# Patient Record
Sex: FEMALE | Race: WHITE | Employment: UNEMPLOYED | URBAN - METROPOLITAN AREA
[De-identification: names, ages, dates, MRNs, and addresses within clinical notes are randomized per-mention and may not be internally consistent; named-entity substitution may affect disease eponyms.]

---

## 2017-01-12 ENCOUNTER — TELEPHONE (OUTPATIENT)
Dept: BARIATRICS/WEIGHT MGMT | Age: 52
End: 2017-01-12

## 2017-03-16 ENCOUNTER — OFFICE VISIT (OUTPATIENT)
Dept: BARIATRICS/WEIGHT MGMT | Age: 52
End: 2017-03-16

## 2017-03-16 VITALS
DIASTOLIC BLOOD PRESSURE: 75 MMHG | SYSTOLIC BLOOD PRESSURE: 121 MMHG | RESPIRATION RATE: 16 BRPM | WEIGHT: 293 LBS | HEIGHT: 60 IN | HEART RATE: 92 BPM | BODY MASS INDEX: 57.52 KG/M2

## 2017-03-16 DIAGNOSIS — K21.9 CHRONIC GERD: ICD-10-CM

## 2017-03-16 DIAGNOSIS — G47.33 OSA (OBSTRUCTIVE SLEEP APNEA): ICD-10-CM

## 2017-03-16 DIAGNOSIS — I10 ESSENTIAL HYPERTENSION: ICD-10-CM

## 2017-03-16 DIAGNOSIS — E66.01 MORBID OBESITY WITH BMI OF 50.0-59.9, ADULT (HCC): Primary | ICD-10-CM

## 2017-03-16 PROCEDURE — 99244 OFF/OP CNSLTJ NEW/EST MOD 40: CPT | Performed by: SURGERY

## 2017-07-08 ENCOUNTER — HOSPITAL ENCOUNTER (OUTPATIENT)
Dept: MAMMOGRAPHY | Age: 52
Discharge: HOME | End: 2017-07-08
Attending: FAMILY MEDICINE | Admitting: FAMILY MEDICINE

## 2017-07-08 DIAGNOSIS — Z12.31 ENCOUNTER FOR SCREENING MAMMOGRAM FOR BREAST CANCER: ICD-10-CM

## 2017-10-09 ENCOUNTER — TELEPHONE (OUTPATIENT)
Dept: ORTHOPEDIC SURGERY | Age: 52
End: 2017-10-09

## 2017-12-01 ENCOUNTER — HOSPITAL ENCOUNTER (OUTPATIENT)
Dept: OTHER | Age: 52
Discharge: HOME | End: 2017-12-01
Attending: NURSE PRACTITIONER | Admitting: NURSE PRACTITIONER

## 2017-12-01 LAB
EKG ATRIAL RATE: 89 BPM
EKG DIAGNOSIS: NORMAL
EKG P AXIS: 32 DEGREES
EKG P-R INTERVAL: 156 MS
EKG Q-T INTERVAL: 364 MS
EKG QRS DURATION: 88 MS
EKG QTC CALCULATION (BAZETT): 442 MS
EKG R AXIS: 35 DEGREES
EKG T AXIS: 44 DEGREES
EKG VENTRICULAR RATE: 89 BPM

## 2017-12-01 PROCEDURE — 93010 ELECTROCARDIOGRAM REPORT: CPT | Performed by: INTERNAL MEDICINE

## 2018-05-01 ENCOUNTER — HOSPITAL ENCOUNTER (OUTPATIENT)
Dept: PHYSICAL THERAPY | Age: 53
Discharge: HOME | End: 2018-05-31
Attending: NURSE PRACTITIONER | Admitting: NURSE PRACTITIONER

## 2018-11-02 ENCOUNTER — APPOINTMENT (OUTPATIENT)
Dept: GENERAL RADIOLOGY | Age: 53
End: 2018-11-02
Payer: MEDICAID

## 2018-11-02 ENCOUNTER — HOSPITAL ENCOUNTER (EMERGENCY)
Age: 53
Discharge: HOME | End: 2018-11-03
Payer: MEDICAID

## 2018-11-02 ENCOUNTER — APPOINTMENT (OUTPATIENT)
Dept: ULTRASOUND IMAGING | Age: 53
End: 2018-11-02
Payer: MEDICAID

## 2018-11-02 DIAGNOSIS — R10.11 ABDOMINAL PAIN, RIGHT UPPER QUADRANT: ICD-10-CM

## 2018-11-02 DIAGNOSIS — J20.8 ACUTE BRONCHITIS DUE TO OTHER SPECIFIED ORGANISMS: Primary | ICD-10-CM

## 2018-11-02 DIAGNOSIS — R05.9 COUGH: ICD-10-CM

## 2018-11-02 DIAGNOSIS — R11.0 NAUSEA WITHOUT VOMITING: ICD-10-CM

## 2018-11-02 LAB
A/G RATIO: 1.4 (ref 1.1–2.2)
ALBUMIN SERPL-MCNC: 3.8 G/DL (ref 3.4–5)
ALP BLD-CCNC: 106 U/L (ref 40–129)
ALT SERPL-CCNC: 20 U/L (ref 10–40)
ANION GAP SERPL CALCULATED.3IONS-SCNC: 7 MMOL/L (ref 3–16)
AST SERPL-CCNC: 16 U/L (ref 15–37)
BACTERIA: ABNORMAL /HPF
BASOPHILS ABSOLUTE: 0 K/UL (ref 0–0.2)
BASOPHILS RELATIVE PERCENT: 0.5 %
BILIRUB SERPL-MCNC: 0.3 MG/DL (ref 0–1)
BILIRUBIN URINE: NEGATIVE
BLOOD, URINE: NEGATIVE
BUN BLDV-MCNC: 14 MG/DL (ref 7–20)
CALCIUM SERPL-MCNC: 9.3 MG/DL (ref 8.3–10.6)
CHLORIDE BLD-SCNC: 101 MMOL/L (ref 99–110)
CLARITY: ABNORMAL
CO2: 27 MMOL/L (ref 21–32)
COLOR: YELLOW
CREAT SERPL-MCNC: 0.7 MG/DL (ref 0.6–1.1)
EOSINOPHILS ABSOLUTE: 0 K/UL (ref 0–0.6)
EOSINOPHILS RELATIVE PERCENT: 0 %
EPITHELIAL CELLS, UA: ABNORMAL /HPF
GFR AFRICAN AMERICAN: >60
GFR NON-AFRICAN AMERICAN: >60
GLOBULIN: 2.7 G/DL
GLUCOSE BLD-MCNC: 113 MG/DL (ref 70–99)
GLUCOSE URINE: NEGATIVE MG/DL
HCT VFR BLD CALC: 34.1 % (ref 36–48)
HEMATOLOGY PATH CONSULT: YES
HEMOGLOBIN: 10.6 G/DL (ref 12–16)
KETONES, URINE: NEGATIVE MG/DL
LEUKOCYTE ESTERASE, URINE: ABNORMAL
LIPASE: 35 U/L (ref 13–60)
LYMPHOCYTES ABSOLUTE: 1.7 K/UL (ref 1–5.1)
LYMPHOCYTES RELATIVE PERCENT: 22.3 %
MCH RBC QN AUTO: 20.2 PG (ref 26–34)
MCHC RBC AUTO-ENTMCNC: 31.2 G/DL (ref 31–36)
MCV RBC AUTO: 64.9 FL (ref 80–100)
MICROSCOPIC EXAMINATION: YES
MONOCYTES ABSOLUTE: 0.5 K/UL (ref 0–1.3)
MONOCYTES RELATIVE PERCENT: 6.5 %
NEUTROPHILS ABSOLUTE: 5.4 K/UL (ref 1.7–7.7)
NEUTROPHILS RELATIVE PERCENT: 70.7 %
NITRITE, URINE: NEGATIVE
PDW BLD-RTO: 19.7 % (ref 12.4–15.4)
PH UA: 6
PLATELET # BLD: 354 K/UL (ref 135–450)
PMV BLD AUTO: 7.3 FL (ref 5–10.5)
POTASSIUM SERPL-SCNC: 4.3 MMOL/L (ref 3.5–5.1)
PROTEIN UA: NEGATIVE MG/DL
RBC # BLD: 5.25 M/UL (ref 4–5.2)
RBC UA: ABNORMAL /HPF (ref 0–2)
SODIUM BLD-SCNC: 135 MMOL/L (ref 136–145)
SPECIFIC GRAVITY UA: 1.02
TOTAL PROTEIN: 6.5 G/DL (ref 6.4–8.2)
URINE TYPE: ABNORMAL
UROBILINOGEN, URINE: 0.2 E.U./DL
WBC # BLD: 7.7 K/UL (ref 4–11)
WBC UA: ABNORMAL /HPF (ref 0–5)

## 2018-11-02 PROCEDURE — 76705 ECHO EXAM OF ABDOMEN: CPT

## 2018-11-02 PROCEDURE — 83690 ASSAY OF LIPASE: CPT

## 2018-11-02 PROCEDURE — 99284 EMERGENCY DEPT VISIT MOD MDM: CPT

## 2018-11-02 PROCEDURE — 71046 X-RAY EXAM CHEST 2 VIEWS: CPT

## 2018-11-02 PROCEDURE — 96374 THER/PROPH/DIAG INJ IV PUSH: CPT

## 2018-11-02 PROCEDURE — 96375 TX/PRO/DX INJ NEW DRUG ADDON: CPT

## 2018-11-02 PROCEDURE — 80053 COMPREHEN METABOLIC PANEL: CPT

## 2018-11-02 PROCEDURE — 6360000002 HC RX W HCPCS: Performed by: NURSE PRACTITIONER

## 2018-11-02 PROCEDURE — 81001 URINALYSIS AUTO W/SCOPE: CPT

## 2018-11-02 PROCEDURE — 85025 COMPLETE CBC W/AUTO DIFF WBC: CPT

## 2018-11-02 RX ORDER — IPRATROPIUM BROMIDE AND ALBUTEROL SULFATE 2.5; .5 MG/3ML; MG/3ML
1 SOLUTION RESPIRATORY (INHALATION) ONCE
Status: DISCONTINUED | OUTPATIENT
Start: 2018-11-02 | End: 2018-11-02

## 2018-11-02 RX ORDER — DILTIAZEM HYDROCHLORIDE 120 MG/1
120 TABLET, FILM COATED ORAL 4 TIMES DAILY
Status: ON HOLD | COMMUNITY
End: 2019-04-08

## 2018-11-02 RX ORDER — ONDANSETRON 2 MG/ML
4 INJECTION INTRAMUSCULAR; INTRAVENOUS ONCE
Status: COMPLETED | OUTPATIENT
Start: 2018-11-02 | End: 2018-11-02

## 2018-11-02 RX ORDER — METHYLPREDNISOLONE SODIUM SUCCINATE 125 MG/2ML
80 INJECTION, POWDER, LYOPHILIZED, FOR SOLUTION INTRAMUSCULAR; INTRAVENOUS ONCE
Status: COMPLETED | OUTPATIENT
Start: 2018-11-02 | End: 2018-11-02

## 2018-11-02 RX ORDER — MORPHINE SULFATE 4 MG/ML
4 INJECTION, SOLUTION INTRAMUSCULAR; INTRAVENOUS ONCE
Status: COMPLETED | OUTPATIENT
Start: 2018-11-02 | End: 2018-11-02

## 2018-11-02 RX ADMIN — MORPHINE SULFATE 4 MG: 4 INJECTION, SOLUTION INTRAMUSCULAR; INTRAVENOUS at 21:41

## 2018-11-02 RX ADMIN — ONDANSETRON 4 MG: 2 INJECTION, SOLUTION INTRAMUSCULAR; INTRAVENOUS at 21:41

## 2018-11-02 RX ADMIN — METHYLPREDNISOLONE SODIUM SUCCINATE 80 MG: 125 INJECTION, POWDER, FOR SOLUTION INTRAMUSCULAR; INTRAVENOUS at 21:41

## 2018-11-02 ASSESSMENT — PAIN SCALES - GENERAL
PAINLEVEL_OUTOF10: 6
PAINLEVEL_OUTOF10: 6
PAINLEVEL_OUTOF10: 3

## 2018-11-02 ASSESSMENT — PAIN SCALES - WONG BAKER
WONGBAKER_NUMERICALRESPONSE: 0
WONGBAKER_NUMERICALRESPONSE: 0

## 2018-11-02 ASSESSMENT — PAIN DESCRIPTION - PAIN TYPE: TYPE: ACUTE PAIN

## 2018-11-03 VITALS
DIASTOLIC BLOOD PRESSURE: 77 MMHG | HEIGHT: 60 IN | BODY MASS INDEX: 55.95 KG/M2 | SYSTOLIC BLOOD PRESSURE: 128 MMHG | HEART RATE: 71 BPM | RESPIRATION RATE: 18 BRPM | OXYGEN SATURATION: 98 % | TEMPERATURE: 98 F | WEIGHT: 285 LBS

## 2018-11-03 RX ORDER — DICYCLOMINE HYDROCHLORIDE 10 MG/1
10 CAPSULE ORAL EVERY 6 HOURS PRN
Qty: 20 CAPSULE | Refills: 0 | Status: ON HOLD | OUTPATIENT
Start: 2018-11-03 | End: 2019-04-08

## 2018-11-03 RX ORDER — ONDANSETRON 4 MG/1
4 TABLET, ORALLY DISINTEGRATING ORAL EVERY 8 HOURS PRN
Qty: 20 TABLET | Refills: 0 | Status: ON HOLD | OUTPATIENT
Start: 2018-11-03 | End: 2019-04-08

## 2018-11-03 RX ORDER — PREDNISONE 10 MG/1
60 TABLET ORAL DAILY
Qty: 30 TABLET | Refills: 0 | Status: SHIPPED | OUTPATIENT
Start: 2018-11-03 | End: 2018-11-08

## 2018-11-03 RX ORDER — ALBUTEROL SULFATE 90 UG/1
2 AEROSOL, METERED RESPIRATORY (INHALATION) EVERY 4 HOURS PRN
Qty: 1 INHALER | Refills: 0 | Status: SHIPPED | OUTPATIENT
Start: 2018-11-03

## 2018-11-03 RX ORDER — PROMETHAZINE HYDROCHLORIDE AND CODEINE PHOSPHATE 6.25; 1 MG/5ML; MG/5ML
5 SYRUP ORAL 4 TIMES DAILY PRN
Qty: 100 ML | Refills: 0 | Status: SHIPPED | OUTPATIENT
Start: 2018-11-03 | End: 2018-11-10

## 2018-11-03 ASSESSMENT — ENCOUNTER SYMPTOMS
NAUSEA: 0
ABDOMINAL PAIN: 1
BACK PAIN: 0
SHORTNESS OF BREATH: 0
COUGH: 1
DIARRHEA: 0
COLOR CHANGE: 0
VOMITING: 0
WHEEZING: 0

## 2018-11-03 NOTE — ED PROVIDER NOTES
Patient reports a positive sonographic Bermudez sign. XR CHEST STANDARD (2 VW)   Preliminary Result   Low lung volume exam with mild prominence of the cardiopericardial silhouette. No evidence of overt pulmonary edema. Lungs are clear. No results found. MEDICAL DECISION MAKING / ED COURSE:      PROCEDURES:   Procedures    None    Patient was given:  Medications   methylPREDNISolone sodium (SOLU-MEDROL) injection 80 mg (80 mg Intravenous Given 11/2/18 2141)   morphine injection 4 mg (4 mg Intravenous Given 11/2/18 2141)   ondansetron (ZOFRAN) injection 4 mg (4 mg Intravenous Given 11/2/18 2141)       Patient presents to the emergency Department with 2 complaints. Patient states she's not been feeling well since October 12 dealing with cough, congestion or shortness of breath however denies any chest pain or pleuritic chest pain. Today she is complaining also of right upper quadrant abdominal pain associated with nausea but denies any vomiting or diarrhea. After evaluation and examination the patient the nursing staff initiated protocols. Upon my exam I ordered pain medicine, nausea medicine, Solu-Medrol and nebulizer and she has diminished breath sounds. CBC shows no acute sepsis, chronic anemia with hemoglobin of 10.6 and hematocrit 34.1. Metabolic panel shows no electrolytes disturbances or renal insufficiency. Liver functions are normal.  Urinalysis shows 6-10 WBCs, most likely a contaminated sample as she has epithelial cells and negative nitrates, her urine will be ReFlex for culturing. Called ultrasound shows a limited study secondary to body habitus, appears cholelithiasis with stones to the posterior wall. Chest x-ray shows low lung volumes however generally clear no evidence of pulmonary edema. Upon reevaluation she does report feeling better.   I did educate her that she would benefit from following up with a GI specialist in regards to her gallbladder also her

## 2018-11-05 LAB — HEMATOLOGY PATH CONSULT: NORMAL

## 2018-11-25 ENCOUNTER — HOSPITAL ENCOUNTER (EMERGENCY)
Age: 53
Discharge: HOME | End: 2018-11-25
Attending: EMERGENCY MEDICINE
Payer: MEDICAID

## 2018-11-25 ENCOUNTER — APPOINTMENT (OUTPATIENT)
Dept: GENERAL RADIOLOGY | Age: 53
End: 2018-11-25
Payer: MEDICAID

## 2018-11-25 VITALS
WEIGHT: 293 LBS | SYSTOLIC BLOOD PRESSURE: 138 MMHG | HEART RATE: 85 BPM | DIASTOLIC BLOOD PRESSURE: 74 MMHG | BODY MASS INDEX: 58.59 KG/M2 | TEMPERATURE: 98.6 F | RESPIRATION RATE: 22 BRPM | OXYGEN SATURATION: 94 %

## 2018-11-25 DIAGNOSIS — R00.2 PALPITATIONS: Primary | ICD-10-CM

## 2018-11-25 DIAGNOSIS — D72.9 WHITE BLOOD CELL ABNORMALITY: ICD-10-CM

## 2018-11-25 LAB
A/G RATIO: 1.3 (ref 1.1–2.2)
ALBUMIN SERPL-MCNC: 3.6 G/DL (ref 3.4–5)
ALP BLD-CCNC: 98 U/L (ref 40–129)
ALT SERPL-CCNC: 21 U/L (ref 10–40)
ANION GAP SERPL CALCULATED.3IONS-SCNC: 10 MMOL/L (ref 3–16)
ANISOCYTOSIS: ABNORMAL
AST SERPL-CCNC: 14 U/L (ref 15–37)
BASOPHILS ABSOLUTE: 0 K/UL (ref 0–0.2)
BASOPHILS RELATIVE PERCENT: 0.2 %
BILIRUB SERPL-MCNC: 0.3 MG/DL (ref 0–1)
BILIRUBIN URINE: NEGATIVE
BLOOD, URINE: NEGATIVE
BUN BLDV-MCNC: 14 MG/DL (ref 7–20)
BURR CELLS: ABNORMAL
CALCIUM SERPL-MCNC: 8.7 MG/DL (ref 8.3–10.6)
CHLORIDE BLD-SCNC: 104 MMOL/L (ref 99–110)
CLARITY: CLEAR
CO2: 26 MMOL/L (ref 21–32)
COLOR: YELLOW
CREAT SERPL-MCNC: 0.6 MG/DL (ref 0.6–1.1)
EKG ATRIAL RATE: 99 BPM
EKG DIAGNOSIS: NORMAL
EKG P AXIS: 36 DEGREES
EKG P-R INTERVAL: 164 MS
EKG Q-T INTERVAL: 346 MS
EKG QRS DURATION: 92 MS
EKG QTC CALCULATION (BAZETT): 444 MS
EKG R AXIS: 20 DEGREES
EKG T AXIS: 53 DEGREES
EKG VENTRICULAR RATE: 99 BPM
EOSINOPHILS ABSOLUTE: 0 K/UL (ref 0–0.6)
EOSINOPHILS RELATIVE PERCENT: 0 %
EPITHELIAL CELLS, UA: ABNORMAL /HPF
GFR AFRICAN AMERICAN: >60
GFR NON-AFRICAN AMERICAN: >60
GLOBULIN: 2.8 G/DL
GLUCOSE BLD-MCNC: 133 MG/DL (ref 70–99)
GLUCOSE URINE: NEGATIVE MG/DL
HCT VFR BLD CALC: 35 % (ref 36–48)
HEMATOLOGY PATH CONSULT: NO
HEMOGLOBIN: 10.8 G/DL (ref 12–16)
KETONES, URINE: NEGATIVE MG/DL
LEUKOCYTE ESTERASE, URINE: ABNORMAL
LYMPHOCYTES ABSOLUTE: 0.9 K/UL (ref 1–5.1)
LYMPHOCYTES RELATIVE PERCENT: 13.1 %
MCH RBC QN AUTO: 20 PG (ref 26–34)
MCHC RBC AUTO-ENTMCNC: 30.9 G/DL (ref 31–36)
MCV RBC AUTO: 64.7 FL (ref 80–100)
MICROCYTES: ABNORMAL
MICROSCOPIC EXAMINATION: YES
MONOCYTES ABSOLUTE: 0.5 K/UL (ref 0–1.3)
MONOCYTES RELATIVE PERCENT: 7.4 %
NEUTROPHILS ABSOLUTE: 5.6 K/UL (ref 1.7–7.7)
NEUTROPHILS RELATIVE PERCENT: 79.3 %
NITRITE, URINE: NEGATIVE
OVALOCYTES: ABNORMAL
PDW BLD-RTO: 20.1 % (ref 12.4–15.4)
PH UA: 6
PLATELET # BLD: 304 K/UL (ref 135–450)
PLATELET SLIDE REVIEW: ADEQUATE
PMV BLD AUTO: 8.8 FL (ref 5–10.5)
POIKILOCYTES: ABNORMAL
POTASSIUM SERPL-SCNC: 4.2 MMOL/L (ref 3.5–5.1)
PROTEIN UA: NEGATIVE MG/DL
RBC # BLD: 5.41 M/UL (ref 4–5.2)
RBC UA: ABNORMAL /HPF (ref 0–2)
SLIDE REVIEW: ABNORMAL
SODIUM BLD-SCNC: 140 MMOL/L (ref 136–145)
SPECIFIC GRAVITY UA: >=1.03
STOMATOCYTES: ABNORMAL
TEAR DROP CELLS: ABNORMAL
TOTAL PROTEIN: 6.4 G/DL (ref 6.4–8.2)
TROPONIN: <0.01 NG/ML
URINE REFLEX TO CULTURE: YES
URINE TYPE: ABNORMAL
UROBILINOGEN, URINE: 0.2 E.U./DL
WBC # BLD: 7 K/UL (ref 4–11)
WBC UA: ABNORMAL /HPF (ref 0–5)

## 2018-11-25 PROCEDURE — 85025 COMPLETE CBC W/AUTO DIFF WBC: CPT

## 2018-11-25 PROCEDURE — 93005 ELECTROCARDIOGRAM TRACING: CPT | Performed by: EMERGENCY MEDICINE

## 2018-11-25 PROCEDURE — 84484 ASSAY OF TROPONIN QUANT: CPT

## 2018-11-25 PROCEDURE — 80053 COMPREHEN METABOLIC PANEL: CPT

## 2018-11-25 PROCEDURE — 81001 URINALYSIS AUTO W/SCOPE: CPT

## 2018-11-25 PROCEDURE — 87086 URINE CULTURE/COLONY COUNT: CPT

## 2018-11-25 PROCEDURE — 71046 X-RAY EXAM CHEST 2 VIEWS: CPT

## 2018-11-25 PROCEDURE — 99285 EMERGENCY DEPT VISIT HI MDM: CPT

## 2018-11-25 PROCEDURE — 93010 ELECTROCARDIOGRAM REPORT: CPT | Performed by: INTERNAL MEDICINE

## 2018-11-25 RX ORDER — DILTIAZEM HYDROCHLORIDE 120 MG/1
120 CAPSULE, COATED, EXTENDED RELEASE ORAL 2 TIMES DAILY
Qty: 30 CAPSULE | Refills: 0 | Status: SHIPPED | OUTPATIENT
Start: 2018-11-25

## 2018-11-25 ASSESSMENT — ENCOUNTER SYMPTOMS
APNEA: 0
PHOTOPHOBIA: 0
ABDOMINAL PAIN: 0
VOMITING: 0
TROUBLE SWALLOWING: 0
ABDOMINAL DISTENTION: 0
NAUSEA: 0
VOICE CHANGE: 0
RHINORRHEA: 0
EYE PAIN: 0
CHEST TIGHTNESS: 0
COUGH: 0
SINUS PRESSURE: 0
EYE REDNESS: 0
COLOR CHANGE: 0
BLOOD IN STOOL: 0
BACK PAIN: 0
RECTAL PAIN: 0
EYE DISCHARGE: 0
CONSTIPATION: 0
STRIDOR: 0
SHORTNESS OF BREATH: 0
WHEEZING: 0
SORE THROAT: 0
DIARRHEA: 0

## 2018-11-25 NOTE — ED PROVIDER NOTES
cardiomediastinal silhouette is stable. The osseous structures are stable. Stable cardiomegaly       EKG Interpretation. The Ekg interpreted by me in the absence of a cardiologist shows. normal sinus rhythm with a rate of 99  Axis is   Normal  QTc is  within an acceptable range  Intervals and Durations are unremarkable. No specific ST-T wave changes appreciated. No evidence of acute ischemia. No significant change from prior EKG dated 1 December 2017.             Yrn Lang MD  11/25/18 [id number]

## 2018-11-27 LAB — URINE CULTURE, ROUTINE: NORMAL

## 2019-04-06 ENCOUNTER — APPOINTMENT (OUTPATIENT)
Dept: CT IMAGING | Age: 54
DRG: 139 | End: 2019-04-06
Payer: MEDICAID

## 2019-04-06 ENCOUNTER — APPOINTMENT (OUTPATIENT)
Dept: GENERAL RADIOLOGY | Age: 54
DRG: 139 | End: 2019-04-06
Payer: MEDICAID

## 2019-04-06 ENCOUNTER — HOSPITAL ENCOUNTER (INPATIENT)
Age: 54
LOS: 2 days | Discharge: HOME | DRG: 139 | End: 2019-04-09
Attending: INTERNAL MEDICINE | Admitting: INTERNAL MEDICINE
Payer: MEDICAID

## 2019-04-06 DIAGNOSIS — J96.01 ACUTE RESPIRATORY FAILURE WITH HYPOXIA (HCC): Primary | ICD-10-CM

## 2019-04-06 LAB
A/G RATIO: 1.4 (ref 1.1–2.2)
ALBUMIN SERPL-MCNC: 3.6 G/DL (ref 3.4–5)
ALP BLD-CCNC: 97 U/L (ref 40–129)
ALT SERPL-CCNC: 19 U/L (ref 10–40)
ANION GAP SERPL CALCULATED.3IONS-SCNC: 11 MMOL/L (ref 3–16)
AST SERPL-CCNC: 14 U/L (ref 15–37)
BASE EXCESS VENOUS: 0.4 MMOL/L (ref -3–3)
BILIRUB SERPL-MCNC: 0.3 MG/DL (ref 0–1)
BUN BLDV-MCNC: 14 MG/DL (ref 7–20)
CALCIUM SERPL-MCNC: 8.5 MG/DL (ref 8.3–10.6)
CARBOXYHEMOGLOBIN: 2 % (ref 0–1.5)
CHLORIDE BLD-SCNC: 104 MMOL/L (ref 99–110)
CO2: 28 MMOL/L (ref 21–32)
CREAT SERPL-MCNC: 0.7 MG/DL (ref 0.6–1.1)
GFR AFRICAN AMERICAN: >60
GFR NON-AFRICAN AMERICAN: >60
GLOBULIN: 2.6 G/DL
GLUCOSE BLD-MCNC: 116 MG/DL (ref 70–99)
GONADOTROPIN, CHORIONIC (HCG) QUANT: <5 MIU/ML
HCO3 VENOUS: 25.8 MMOL/L (ref 23–29)
METHEMOGLOBIN VENOUS: 0.4 %
O2 CONTENT, VEN: 15 VOL %
O2 SAT, VEN: 97 %
O2 THERAPY: ABNORMAL
PCO2, VEN: 44.7 MMHG (ref 40–50)
PH VENOUS: 7.38 (ref 7.35–7.45)
PO2, VEN: 105.1 MMHG (ref 25–40)
POTASSIUM SERPL-SCNC: 4.1 MMOL/L (ref 3.5–5.1)
PRO-BNP: 91 PG/ML (ref 0–124)
RAPID INFLUENZA  B AGN: NEGATIVE
RAPID INFLUENZA A AGN: NEGATIVE
SODIUM BLD-SCNC: 143 MMOL/L (ref 136–145)
TCO2 CALC VENOUS: 27 MMOL/L
TOTAL PROTEIN: 6.2 G/DL (ref 6.4–8.2)
TROPONIN: <0.01 NG/ML

## 2019-04-06 PROCEDURE — 82803 BLOOD GASES ANY COMBINATION: CPT

## 2019-04-06 PROCEDURE — 87804 INFLUENZA ASSAY W/OPTIC: CPT

## 2019-04-06 PROCEDURE — 94761 N-INVAS EAR/PLS OXIMETRY MLT: CPT

## 2019-04-06 PROCEDURE — 2580000003 HC RX 258: Performed by: PHYSICIAN ASSISTANT

## 2019-04-06 PROCEDURE — 85025 COMPLETE CBC W/AUTO DIFF WBC: CPT

## 2019-04-06 PROCEDURE — 80053 COMPREHEN METABOLIC PANEL: CPT

## 2019-04-06 PROCEDURE — 84484 ASSAY OF TROPONIN QUANT: CPT

## 2019-04-06 PROCEDURE — 6370000000 HC RX 637 (ALT 250 FOR IP): Performed by: PHYSICIAN ASSISTANT

## 2019-04-06 PROCEDURE — 84703 CHORIONIC GONADOTROPIN ASSAY: CPT

## 2019-04-06 PROCEDURE — 83880 ASSAY OF NATRIURETIC PEPTIDE: CPT

## 2019-04-06 PROCEDURE — 71260 CT THORAX DX C+: CPT

## 2019-04-06 PROCEDURE — 84702 CHORIONIC GONADOTROPIN TEST: CPT

## 2019-04-06 PROCEDURE — 94640 AIRWAY INHALATION TREATMENT: CPT

## 2019-04-06 PROCEDURE — 6360000004 HC RX CONTRAST MEDICATION: Performed by: PHYSICIAN ASSISTANT

## 2019-04-06 PROCEDURE — 93005 ELECTROCARDIOGRAM TRACING: CPT | Performed by: INTERNAL MEDICINE

## 2019-04-06 PROCEDURE — 71046 X-RAY EXAM CHEST 2 VIEWS: CPT

## 2019-04-06 PROCEDURE — 2700000000 HC OXYGEN THERAPY PER DAY

## 2019-04-06 PROCEDURE — 99285 EMERGENCY DEPT VISIT HI MDM: CPT

## 2019-04-06 RX ORDER — 0.9 % SODIUM CHLORIDE 0.9 %
1000 INTRAVENOUS SOLUTION INTRAVENOUS ONCE
Status: COMPLETED | OUTPATIENT
Start: 2019-04-06 | End: 2019-04-07

## 2019-04-06 RX ORDER — IPRATROPIUM BROMIDE AND ALBUTEROL SULFATE 2.5; .5 MG/3ML; MG/3ML
1 SOLUTION RESPIRATORY (INHALATION) ONCE
Status: COMPLETED | OUTPATIENT
Start: 2019-04-06 | End: 2019-04-06

## 2019-04-06 RX ADMIN — IOPAMIDOL 75 ML: 755 INJECTION, SOLUTION INTRAVENOUS at 22:55

## 2019-04-06 RX ADMIN — SODIUM CHLORIDE 1000 ML: 9 INJECTION, SOLUTION INTRAVENOUS at 21:25

## 2019-04-06 RX ADMIN — IPRATROPIUM BROMIDE AND ALBUTEROL SULFATE 1 AMPULE: .5; 3 SOLUTION RESPIRATORY (INHALATION) at 23:44

## 2019-04-06 ASSESSMENT — PAIN SCALES - GENERAL: PAINLEVEL_OUTOF10: 0

## 2019-04-07 PROBLEM — R09.02 HYPOXIA: Status: ACTIVE | Noted: 2019-04-07

## 2019-04-07 LAB
BASOPHILS ABSOLUTE: 0.1 K/UL (ref 0–0.2)
BASOPHILS RELATIVE PERCENT: 0.9 %
EKG ATRIAL RATE: 110 BPM
EKG DIAGNOSIS: NORMAL
EKG P AXIS: 35 DEGREES
EKG P-R INTERVAL: 164 MS
EKG Q-T INTERVAL: 330 MS
EKG QRS DURATION: 90 MS
EKG QTC CALCULATION (BAZETT): 446 MS
EKG R AXIS: 29 DEGREES
EKG T AXIS: 38 DEGREES
EKG VENTRICULAR RATE: 110 BPM
EOSINOPHILS ABSOLUTE: 0 K/UL (ref 0–0.6)
EOSINOPHILS RELATIVE PERCENT: 0 %
HCT VFR BLD CALC: 34.3 % (ref 36–48)
HEMATOLOGY PATH CONSULT: YES
HEMOGLOBIN: 10.7 G/DL (ref 12–16)
LYMPHOCYTES ABSOLUTE: 1.6 K/UL (ref 1–5.1)
LYMPHOCYTES RELATIVE PERCENT: 20.3 %
MCH RBC QN AUTO: 20.6 PG (ref 26–34)
MCHC RBC AUTO-ENTMCNC: 31.2 G/DL (ref 31–36)
MCV RBC AUTO: 65.9 FL (ref 80–100)
MONOCYTES ABSOLUTE: 0.7 K/UL (ref 0–1.3)
MONOCYTES RELATIVE PERCENT: 8.3 %
NEUTROPHILS ABSOLUTE: 5.7 K/UL (ref 1.7–7.7)
NEUTROPHILS RELATIVE PERCENT: 70.5 %
PDW BLD-RTO: 19.8 % (ref 12.4–15.4)
PLATELET # BLD: 338 K/UL (ref 135–450)
PMV BLD AUTO: 8.1 FL (ref 5–10.5)
PROCALCITONIN: 0.07 NG/ML (ref 0–0.15)
RBC # BLD: 5.2 M/UL (ref 4–5.2)
TROPONIN: <0.01 NG/ML
TROPONIN: <0.01 NG/ML
WBC # BLD: 8.1 K/UL (ref 4–11)

## 2019-04-07 PROCEDURE — 6370000000 HC RX 637 (ALT 250 FOR IP): Performed by: INTERNAL MEDICINE

## 2019-04-07 PROCEDURE — 84484 ASSAY OF TROPONIN QUANT: CPT

## 2019-04-07 PROCEDURE — 83540 ASSAY OF IRON: CPT

## 2019-04-07 PROCEDURE — 36415 COLL VENOUS BLD VENIPUNCTURE: CPT

## 2019-04-07 PROCEDURE — 6360000002 HC RX W HCPCS: Performed by: NURSE PRACTITIONER

## 2019-04-07 PROCEDURE — 94640 AIRWAY INHALATION TREATMENT: CPT

## 2019-04-07 PROCEDURE — 93010 ELECTROCARDIOGRAM REPORT: CPT | Performed by: INTERNAL MEDICINE

## 2019-04-07 PROCEDURE — 6360000002 HC RX W HCPCS: Performed by: INTERNAL MEDICINE

## 2019-04-07 PROCEDURE — 6370000000 HC RX 637 (ALT 250 FOR IP): Performed by: NURSE PRACTITIONER

## 2019-04-07 PROCEDURE — 84145 PROCALCITONIN (PCT): CPT

## 2019-04-07 PROCEDURE — 94761 N-INVAS EAR/PLS OXIMETRY MLT: CPT

## 2019-04-07 PROCEDURE — 2580000003 HC RX 258: Performed by: INTERNAL MEDICINE

## 2019-04-07 PROCEDURE — 2500000003 HC RX 250 WO HCPCS: Performed by: NURSE PRACTITIONER

## 2019-04-07 PROCEDURE — 83550 IRON BINDING TEST: CPT

## 2019-04-07 PROCEDURE — 6370000000 HC RX 637 (ALT 250 FOR IP)

## 2019-04-07 PROCEDURE — 2700000000 HC OXYGEN THERAPY PER DAY

## 2019-04-07 PROCEDURE — 87449 NOS EACH ORGANISM AG IA: CPT

## 2019-04-07 PROCEDURE — 1200000000 HC SEMI PRIVATE

## 2019-04-07 PROCEDURE — 83036 HEMOGLOBIN GLYCOSYLATED A1C: CPT

## 2019-04-07 PROCEDURE — 82728 ASSAY OF FERRITIN: CPT

## 2019-04-07 RX ORDER — ASPIRIN 81 MG/1
81 TABLET ORAL DAILY
Status: DISCONTINUED | OUTPATIENT
Start: 2019-04-07 | End: 2019-04-09 | Stop reason: HOSPADM

## 2019-04-07 RX ORDER — DILTIAZEM HYDROCHLORIDE 60 MG/1
120 TABLET, FILM COATED ORAL 4 TIMES DAILY
Status: DISCONTINUED | OUTPATIENT
Start: 2019-04-07 | End: 2019-04-07

## 2019-04-07 RX ORDER — ACETAMINOPHEN 500 MG
500 TABLET ORAL EVERY 6 HOURS PRN
Status: DISCONTINUED | OUTPATIENT
Start: 2019-04-07 | End: 2019-04-09 | Stop reason: HOSPADM

## 2019-04-07 RX ORDER — MONTELUKAST SODIUM 10 MG/1
10 TABLET ORAL NIGHTLY
Status: DISCONTINUED | OUTPATIENT
Start: 2019-04-07 | End: 2019-04-09 | Stop reason: HOSPADM

## 2019-04-07 RX ORDER — PREDNISONE 20 MG/1
40 TABLET ORAL DAILY
Status: DISCONTINUED | OUTPATIENT
Start: 2019-04-07 | End: 2019-04-09 | Stop reason: HOSPADM

## 2019-04-07 RX ORDER — ALBUTEROL SULFATE 2.5 MG/3ML
2.5 SOLUTION RESPIRATORY (INHALATION) EVERY 6 HOURS PRN
Status: DISCONTINUED | OUTPATIENT
Start: 2019-04-07 | End: 2019-04-09 | Stop reason: HOSPADM

## 2019-04-07 RX ORDER — DILTIAZEM HYDROCHLORIDE 120 MG/1
120 CAPSULE, COATED, EXTENDED RELEASE ORAL 2 TIMES DAILY
Status: DISCONTINUED | OUTPATIENT
Start: 2019-04-07 | End: 2019-04-09 | Stop reason: HOSPADM

## 2019-04-07 RX ORDER — SODIUM CHLORIDE 0.9 % (FLUSH) 0.9 %
10 SYRINGE (ML) INJECTION EVERY 12 HOURS SCHEDULED
Status: DISCONTINUED | OUTPATIENT
Start: 2019-04-07 | End: 2019-04-09 | Stop reason: HOSPADM

## 2019-04-07 RX ORDER — ALBUTEROL SULFATE 90 UG/1
2 AEROSOL, METERED RESPIRATORY (INHALATION) EVERY 4 HOURS PRN
Status: DISCONTINUED | OUTPATIENT
Start: 2019-04-07 | End: 2019-04-09 | Stop reason: HOSPADM

## 2019-04-07 RX ORDER — ACETAMINOPHEN 325 MG/1
650 TABLET ORAL EVERY 4 HOURS PRN
Status: DISCONTINUED | OUTPATIENT
Start: 2019-04-07 | End: 2019-04-09 | Stop reason: HOSPADM

## 2019-04-07 RX ORDER — SODIUM CHLORIDE 0.9 % (FLUSH) 0.9 %
10 SYRINGE (ML) INJECTION PRN
Status: DISCONTINUED | OUTPATIENT
Start: 2019-04-07 | End: 2019-04-09 | Stop reason: HOSPADM

## 2019-04-07 RX ORDER — GUAIFENESIN 600 MG/1
600 TABLET, EXTENDED RELEASE ORAL 2 TIMES DAILY
Status: DISCONTINUED | OUTPATIENT
Start: 2019-04-07 | End: 2019-04-09 | Stop reason: HOSPADM

## 2019-04-07 RX ORDER — ALBUTEROL SULFATE 2.5 MG/3ML
2.5 SOLUTION RESPIRATORY (INHALATION) 4 TIMES DAILY
Status: DISCONTINUED | OUTPATIENT
Start: 2019-04-07 | End: 2019-04-09 | Stop reason: HOSPADM

## 2019-04-07 RX ORDER — LAMOTRIGINE 100 MG/1
200 TABLET ORAL 2 TIMES DAILY
Status: DISCONTINUED | OUTPATIENT
Start: 2019-04-07 | End: 2019-04-09 | Stop reason: HOSPADM

## 2019-04-07 RX ORDER — GABAPENTIN 600 MG/1
800 TABLET ORAL DAILY
Status: DISCONTINUED | OUTPATIENT
Start: 2019-04-07 | End: 2019-04-07

## 2019-04-07 RX ORDER — PANTOPRAZOLE SODIUM 40 MG/1
40 TABLET, DELAYED RELEASE ORAL DAILY
Status: DISCONTINUED | OUTPATIENT
Start: 2019-04-07 | End: 2019-04-09 | Stop reason: HOSPADM

## 2019-04-07 RX ORDER — ONDANSETRON 2 MG/ML
4 INJECTION INTRAMUSCULAR; INTRAVENOUS EVERY 6 HOURS PRN
Status: DISCONTINUED | OUTPATIENT
Start: 2019-04-07 | End: 2019-04-09 | Stop reason: HOSPADM

## 2019-04-07 RX ORDER — CHOLECALCIFEROL (VITAMIN D3) 125 MCG
5 CAPSULE ORAL NIGHTLY PRN
Status: DISCONTINUED | OUTPATIENT
Start: 2019-04-07 | End: 2019-04-09 | Stop reason: HOSPADM

## 2019-04-07 RX ORDER — LEVOFLOXACIN 5 MG/ML
500 INJECTION, SOLUTION INTRAVENOUS EVERY 24 HOURS
Status: DISCONTINUED | OUTPATIENT
Start: 2019-04-07 | End: 2019-04-09 | Stop reason: HOSPADM

## 2019-04-07 RX ORDER — DICYCLOMINE HYDROCHLORIDE 10 MG/1
10 CAPSULE ORAL EVERY 6 HOURS PRN
Status: DISCONTINUED | OUTPATIENT
Start: 2019-04-07 | End: 2019-04-09 | Stop reason: HOSPADM

## 2019-04-07 RX ORDER — LEVOTHYROXINE SODIUM 112 UG/1
112 TABLET ORAL DAILY
Status: DISCONTINUED | OUTPATIENT
Start: 2019-04-07 | End: 2019-04-09 | Stop reason: HOSPADM

## 2019-04-07 RX ORDER — LISINOPRIL 5 MG/1
5 TABLET ORAL DAILY
Status: DISCONTINUED | OUTPATIENT
Start: 2019-04-07 | End: 2019-04-09 | Stop reason: HOSPADM

## 2019-04-07 RX ADMIN — Medication 10 ML: at 22:01

## 2019-04-07 RX ADMIN — SERTRALINE HYDROCHLORIDE 100 MG: 50 TABLET ORAL at 03:00

## 2019-04-07 RX ADMIN — LEVOFLOXACIN 500 MG: 5 INJECTION, SOLUTION INTRAVENOUS at 11:06

## 2019-04-07 RX ADMIN — SERTRALINE HYDROCHLORIDE 100 MG: 50 TABLET ORAL at 21:58

## 2019-04-07 RX ADMIN — DILTIAZEM HYDROCHLORIDE 120 MG: 120 CAPSULE, COATED, EXTENDED RELEASE ORAL at 21:58

## 2019-04-07 RX ADMIN — LAMOTRIGINE 200 MG: 100 TABLET ORAL at 21:58

## 2019-04-07 RX ADMIN — ENOXAPARIN SODIUM 40 MG: 40 INJECTION SUBCUTANEOUS at 09:37

## 2019-04-07 RX ADMIN — Medication: at 21:58

## 2019-04-07 RX ADMIN — LAMOTRIGINE 200 MG: 100 TABLET ORAL at 09:03

## 2019-04-07 RX ADMIN — LEVOTHYROXINE SODIUM 112 MCG: 0.11 TABLET ORAL at 06:10

## 2019-04-07 RX ADMIN — ALBUTEROL SULFATE 2.5 MG: 2.5 SOLUTION RESPIRATORY (INHALATION) at 12:35

## 2019-04-07 RX ADMIN — LISINOPRIL 5 MG: 5 TABLET ORAL at 09:37

## 2019-04-07 RX ADMIN — GUAIFENESIN 600 MG: 600 TABLET, EXTENDED RELEASE ORAL at 21:58

## 2019-04-07 RX ADMIN — MONTELUKAST 10 MG: 10 TABLET, FILM COATED ORAL at 21:58

## 2019-04-07 RX ADMIN — PANTOPRAZOLE SODIUM 40 MG: 40 TABLET, DELAYED RELEASE ORAL at 09:36

## 2019-04-07 RX ADMIN — LAMOTRIGINE 200 MG: 100 TABLET ORAL at 01:36

## 2019-04-07 RX ADMIN — PREDNISONE 40 MG: 20 TABLET ORAL at 11:05

## 2019-04-07 RX ADMIN — ALBUTEROL SULFATE 2.5 MG: 2.5 SOLUTION RESPIRATORY (INHALATION) at 20:38

## 2019-04-07 RX ADMIN — GABAPENTIN 600 MG: 100 CAPSULE ORAL at 09:30

## 2019-04-07 RX ADMIN — ASPIRIN 81 MG: 81 TABLET, COATED ORAL at 09:36

## 2019-04-07 RX ADMIN — MELATONIN TAB 5 MG 5 MG: 5 TAB at 23:36

## 2019-04-07 RX ADMIN — Medication 10 ML: at 09:37

## 2019-04-07 RX ADMIN — ALBUTEROL SULFATE 2.5 MG: 2.5 SOLUTION RESPIRATORY (INHALATION) at 16:41

## 2019-04-07 RX ADMIN — DILTIAZEM HYDROCHLORIDE 120 MG: 120 CAPSULE, COATED, EXTENDED RELEASE ORAL at 01:36

## 2019-04-07 RX ADMIN — DILTIAZEM HYDROCHLORIDE 120 MG: 120 CAPSULE, COATED, EXTENDED RELEASE ORAL at 10:04

## 2019-04-07 RX ADMIN — MICONAZOLE NITRATE: 2 POWDER TOPICAL at 12:01

## 2019-04-07 RX ADMIN — MICONAZOLE NITRATE: 2 POWDER TOPICAL at 22:01

## 2019-04-07 ASSESSMENT — PAIN SCALES - GENERAL: PAINLEVEL_OUTOF10: 0

## 2019-04-07 NOTE — H&P
Hospital Medicine History & Physical      PCP: Anita Gaitan MD    Date of Admission: 4/6/2019    Date of Service: Pt seen/examined on 4/7/2019 and Admitted to Inpatient with expected LOS greater than two midnights due to medical therapy. rvation. Chief Complaint:  SOB    History Of Present Illness:  48 y.o. female, with a PMH of HTN, KRYSTIAN, hypothyroidism and obesity,  who presented to [facility] with SOB. The patient states she was treated for bronchitis at the end of February with a 10 day course of amoxicillin and steroids. She felt better initially, but states she hasn't really felt back to normal since she was first sick. Over the last few days, she has been more SOB, especially with activity. She denies fever, chills, chest pain, back pain, N/V/D, leg swelling, orthopnea. In the ED at [facility] was completed and negative for a PE, but did show possible bilateral lower lobe pneumonia vs atelectasis. She was hypoxic on room air with minimal activity, with O2 sats dropping to 84%. She was admitted for further management. Past Medical History:          Diagnosis Date    Anemia     Anxiety     Depression     Fibromyalgia     GERD (gastroesophageal reflux disease)     Heart palpitations     Hiatal hernia     Hypertension     Irritable bowel syndrome     Osteoarthritis     Panic attacks     Pneumonia     Seasonal allergies     Seizure disorder (HCC)     Sleep apnea     Spastic colon     Thyroid disease        Past Surgical History:          Procedure Laterality Date    BLADDER SURGERY      BRAIN SURGERY      HIP SURGERY      JOINT REPLACEMENT  2009    hip    KNEE SURGERY      OTHER SURGICAL HISTORY  07/31/2013    cystoscopy, urethral dilatation    TOTAL HIP ARTHROPLASTY      Left    URETHRAL STRICTURE DILATATION         Medications Prior to Admission:      Prior to Admission medications    Medication Sig Start Date End Date Taking?  Authorizing Provider   diltiazem (CARDIZEM CD) 120 MG extended release capsule Take 1 capsule by mouth 2 times daily 11/25/18   Leonardo Wheeler MD   albuterol sulfate HFA (PROVENTIL HFA) 108 (90 Base) MCG/ACT inhaler Inhale 2 puffs into the lungs every 4 hours as needed for Wheezing or Shortness of Breath (Space out to every 6 hours as symptoms improve) Space out to every 6 hours as symptoms improve. 11/3/18   MATHEW Hernández CNP   dicyclomine (BENTYL) 10 MG capsule Take 1 capsule by mouth every 6 hours as needed (cramps) 11/3/18   MATHEW Hernández CNP   ondansetron (ZOFRAN ODT) 4 MG disintegrating tablet Take 1 tablet by mouth every 8 hours as needed for Nausea 11/3/18   MATHEW Hernández CNP   diltiazem (CARDIZEM) 120 MG tablet Take 120 mg by mouth 4 times daily    Historical Provider, MD   NAPROXEN PO Take 500 mg by mouth as needed. Historical Provider, MD   lamotrigine (LAMICTAL) 100 MG tablet Take 200 mg by mouth 2 times daily. 100 mg in am- 200 mg HS 1/9/11   Historical Provider, MD   sertraline (ZOLOFT) 50 MG tablet Take 100 mg by mouth nightly     Historical Provider, MD   lisinopril (PRINIVIL;ZESTRIL) 10 MG tablet Take 5 mg by mouth daily     Historical Provider, MD   pantoprazole (PROTONIX) 40 MG tablet Take 40 mg by mouth daily. Historical Provider, MD   ibuprofen (ADVIL;MOTRIN) 600 MG tablet Take 600 mg by mouth every 6 hours as needed. Historical Provider, MD   levothyroxine (SYNTHROID) 112 MCG tablet Take 112 mcg by mouth daily. Historical Provider, MD   gabapentin (NEURONTIN) 600 MG tablet Take 600 mg by mouth 2 times daily. Historical Provider, MD   montelukast (SINGULAIR) 10 MG tablet Take 10 mg by mouth every morning     Historical Provider, MD   aspirin 81 MG EC tablet Take 81 mg by mouth daily. Historical Provider, MD   acetaminophen (TYLENOL) 500 MG tablet Take 500 mg by mouth every 6 hours as needed.     Historical Provider, MD   therapeutic multivitamin-minerals [facility]) tablet Take 1 tablet by mouth daily. Historical Provider, MD       Allergies: Other and Percocet [oxycodone-acetaminophen]    Social History:      The patient currently lives independently. TOBACCO:   reports that she quit smoking about 32 years ago. She has a 1.00 pack-year smoking history. She has never used smokeless tobacco.  ETOH:   reports that she does not drink alcohol. Family History:      Reviewed in detail. Positive as follows:        Problem Relation Age of Onset    Asthma Other     Asthma Other     Hypertension Father     Hypertension Sister     Cancer Neg Hx     Diabetes Neg Hx     Emphysema Neg Hx     Heart Failure Neg Hx        REVIEW OF SYSTEMS:   Pertinent positives as noted in the HPI. All other systems reviewed and negative. PHYSICAL EXAM PERFORMED:    /78   Pulse 86   Temp 98.5 °F (36.9 °C) (Oral)   Resp 16   Ht 5' (1.524 m)   Wt 300 lb (136.1 kg)   LMP 07/29/2013   SpO2 92%   BMI 58.59 kg/m²     General appearance:  Pleasant female in no apparent distress, appears stated age and cooperative. HEENT:  Normal cephalic, atraumatic without obvious deformity. Pupils equal, round, and reactive to light. Extra ocular muscles intact. Conjunctivae/corneas clear. Neck: Supple, with full range of motion. No jugular venous distention. Trachea midline. Respiratory:  Normal respiratory effort. Clear to auscultation, bilaterally without Rales/Wheezes/Rhonchi. On 3L nc. Cardiovascular:  Regular rate and rhythm with normal S1/S2 without murmurs, rubs or gallops. Abdomen: Soft, non-tender, non-distended with normal bowel sounds. Musculoskeletal:  No clubbing, cyanosis or edema bilaterally. Full range of motion without deformity. Skin: Skin color, texture, turgor normal.  No rashes or lesions. Neurologic:  Neurovascularly intact without any focal sensory/motor deficits.  Cranial nerves: II-XII intact, grossly non-focal.  Psychiatric:  Alert and oriented, thought content appropriate, normal insight  Capillary Refill: Brisk,< 3 seconds   Peripheral Pulses: +2 palpable, equal bilaterally       Labs:     Recent Labs     04/06/19 2034   WBC 8.1   HGB 10.7*   HCT 34.3*        Recent Labs     04/06/19 2034      K 4.1      CO2 28   BUN 14   CREATININE 0.7   CALCIUM 8.5     Recent Labs     04/06/19 2034   AST 14*   ALT 19   BILITOT 0.3   ALKPHOS 97     No results for input(s): INR in the last 72 hours. Recent Labs     04/06/19 2034 04/07/19  0950   TROPONINI <0.01 <0.01       Urinalysis:      Lab Results   Component Value Date    NITRU Negative 11/25/2018    WBCUA 6-10 11/25/2018    BACTERIA 1+ 11/02/2018    RBCUA 0-2 11/25/2018    BLOODU Negative 11/25/2018    SPECGRAV >=1.030 11/25/2018    GLUCOSEU Negative 11/25/2018       Radiology:     CXR: I have reviewed the CXR with the following interpretation: Left lung base opacification with concern for possible effusion and adjacent atelectasis. Underlying pneumonia cannot be excluded. Cardiomegaly with pulmonary vascular congestion. EKG:  I have reviewed the EKG with the following interpretation: Sinus tachycardia, rate 110, no acute ST abnormalities. CT CHEST PULMONARY EMBOLISM W CONTRAST   Final Result   1. Limited study with no definite scan evidence for pulmonary embolus. 2. Left pleural effusion with lingular and left lower lobe atelectasis or   pneumonia. 3. Right lower lobe atelectasis or pneumonia. XR CHEST STANDARD (2 VW)   Final Result   Opacification at the left lung base with blunting of the left costophrenic   sulcus on the lateral view concerning for effusion with adjacent atelectasis. Underlying pneumonia cannot be excluded. Cardiomegaly pulmonary vascular congestion. ASSESSMENT:    Active Hospital Problems    Diagnosis Date Noted    Hypoxia [R09.02] 04/07/2019         PLAN:    Acute respiratory failure with hypoxia - possibly related to pneumonia.    - CTPA negative for PE.   - Pro-BNP 91.  - supplemental O2 to keep sats > 92%. Wean as tolerated. Bilateral lower lobe infiltrates - noted on CTPA. - start Levaquin - previously completed course of amoxicillin. - Prednisone 40 mg daily x 5 days. - supportive care with mucinex and scheduled albuterol HHN. - check procalcitonin, strep pneumoniae and legionella. OH - possible anginal equivalent?  - CTPA negative for PE.  - check serial troponin - initial negative. EKG negative. - Check TTE in AM.    HTN - continue home diltiazem, lisinopril. Anemia, microcytic - appears chronic. - check iron studies. - recommend outpatient colonoscopy. Morbid obesity - body mass index is 58.7 kg/m². Complicating assessment and treatment. Placing patient at risk for multiple co-morbidities as well as early death and contributing to the patient's presentation. Counseled on weight loss    DVT Prophylaxis: Lovenox  Diet: DIET LOW SODIUM 2 GM;  Code Status: Full Code    PT/OT Eval Status: not indicated - patient ambulatory    Dispo - likely 1-2 days inpatient       Lebron Stoll, MATHEW - CNP    Thank you Paige Higginbotham MD for the opportunity to be involved in this patient's care. If you have any questions or concerns please feel free to contact me at [phone].

## 2019-04-07 NOTE — PLAN OF CARE
In bed most of day. Up to bathroom wearing O2 3 liters per nasal cannula. Short of breath with short walk. Standby assist for safety. Up to shower but sat in shower chair. Tolerated well. Able to eat meals. Family here to visit. Occasional cough. Reports feeling easily fatigued with small amount of activity. Calls for assist as needed.

## 2019-04-07 NOTE — PROGRESS NOTES
RESPIRATORY THERAPY ASSESSMENT    Name:  Kendrick Mason  Medical Record Number:  [id number]  Age: 48 y.o. Gender: female  : 1965  Today's Date:  2019  Room:  [facility]9/0329-01    Assessment     Is the patient being admitted for a COPD or Asthma exacerbation? No   (If yes the patient will be seen every 4 hours for the first 24 hours and then reassessed)    Patient Admission Diagnosis      Allergies  Allergies   Allergen Reactions    Other      Nickel,citrus, pain meds?  Percocet [Oxycodone-Acetaminophen]      Can take VICODIN       Minimum Predicted Vital Capacity:     682          Actual Vital Capacity:      N/A              Pulmonary History: Sleep apnea  Home Oxygen Therapy:  room air  Home Respiratory Therapy:Albuterol   Current Respiratory Therapy:  Albuterol mdi PRN, Albuterol HHN PRN  Treatment Type: HHN  Medications: Albuterol/Ipratropium    Respiratory Severity Index(RSI)   Patients with orders for inhalation medications, oxygen, or any therapeutic treatment modality will be placed on Respiratory Protocol. They will be assessed with the first treatment and at least every 72 hours thereafter. The following severity scale will be used to determine frequency of treatment intervention.     Smoking History: Smoking History Less than 1ppd or less than 15 pack year = 1    Social History  Social History     Tobacco Use    Smoking status: Former Smoker     Packs/day: 0.50     Years: 2.00     Pack years: 1.00     Last attempt to quit: 1986     Years since quittin.9    Smokeless tobacco: Never Used   Substance Use Topics    Alcohol use: No    Drug use: No       Recent Surgical History: None = 0  Past Surgical History  Past Surgical History:   Procedure Laterality Date    BLADDER SURGERY      BRAIN SURGERY      HIP SURGERY      JOINT REPLACEMENT      hip    KNEE SURGERY      OTHER SURGICAL HISTORY  2013    cystoscopy, urethral dilatation    TOTAL HIP ARTHROPLASTY      Left  URETHRAL STRICTURE DILATATION         Level of Consciousness: Alert, Oriented, and Cooperative = 0    Level of Activity: Walking unassisted = 0    Respiratory Pattern: Regular Pattern; RR 8-20 = 0    Breath Sounds: Diminshed bilaterally and/or crackles = 2    Sputum   ,  , Sputum How Obtained: Spontaneous cough  Cough: Strong, spontaneous, non-productive = 0    Vital Signs   BP (!) 145/76   Pulse 102   Temp 98.4 °F (36.9 °C) (Oral)   Resp 18   Ht 5' (1.524 m)   Wt 300 lb (136.1 kg)   LMP 07/29/2013   SpO2 96%   BMI 58.59 kg/m²   SPO2 (COPD values may differ): 88-89% on room air or greater than 92% on FiO2 28- 35% = 2    Peak Flow (asthma only): not applicable = 0    RSI: 5-6 = Q4hr PRN (every four hours as needed) for dyspnea        Plan       Goals: medication delivery, mobilize retained secretions, volume expansion and improve oxygenation    Patient/caregiver was educated on the proper method of use for Respiratory Care Devices:  Yes      Level of patient/caregiver understanding able to:   ? Verbalize understanding   ? Demonstrate understanding       ? Teach back        ? Needs reinforcement       ? No available caregiver               ? Other:     Response to education:  Good     Is patient being placed on Home Treatment Regimen? Yes     Does the patient have everything they need prior to discharge? NA     Comments: Evaluated pt and reviewed chart. Plan of Care: Albuterol mdi PRN, Albuterol HHN PRN    Electronically signed by Manju Harris RCP on 4/7/2019 at 3:47 AM    Respiratory Protocol Guidelines     1. Assessment and treatment by Respiratory Therapy will be initiated for medication and therapeutic interventions upon initiation of aerosolized medication. 2. Physician will be contacted for respiratory rate (RR) greater than 35 breaths per minute. Therapy will be held for heart rate (HR) greater than 140 beats per minute, pending direction from physician.   3. Bronchodilators will be administered via Metered Dose Inhaler (MDI) with spacer when the following criteria are met:  a. Alert and cooperative     b. HR < 140 bpm  c. RR < 30 bpm                d. Can demonstrate a 2-3 second inspiratory hold  4. Bronchodilators will be administered via Hand Held Nebulizer SOFIA [facility]) to patients when ANY of the following criteria are met  a. Incognizant or uncooperative          b. Patients treated with HHN at Home        c. Unable to demonstrate proper use of MDI with spacer     d. RR > 30 bpm   5. Bronchodilators will be delivered via Metered Dose Inhaler (MDI), HHN, Aerogen to intubated patients on mechanical ventilation. 6. Inhalation medication orders will be delivered and/or substituted as outlined below. Aerosolized Medications Ordering and Administration Guidelines:    1. All Medications will be ordered by a physician, and their frequency and/or modality will be adjusted as defined by the patients Respiratory Severity Index (RSI) score. 2. If the patient does not have documented COPD, consider discontinuing anticholinergics when RSI is less than 9.  3. If the bronchospasm worsens (increased RSI), then the bronchodilator frequency can be increased to a maximum of every 4 hours. If greater than every 4 hours is required, the physician will be contacted. 4. If the bronchospasm improves, the frequency of the bronchodilator can be decreased, based on the patient's RSI, but not less than home treatment regimen frequency. 5. Bronchodilator(s) will be discontinued if patient has a RSI less than 9 and has received no scheduled or as needed treatment for 72  Hrs. Patients Ordered on a Mucolytic Agent:    1. Must always be administered with a bronchodilator. 2. Discontinue if patient experiences worsened bronchospasm, or secretions have lessened to the point that the patient is able to clear them with a cough. Anti-inflammatory and Combination Medications:    1.  If the patient lacks prior history of lung disease, is not using inhaled anti-inflammatory medication at home, and lacks wheezing by examination or by history for at least 24 hours, contact physician for possible discontinuation.

## 2019-04-07 NOTE — PLAN OF CARE
Patient oriented to room and use of call light system. Patient verbalizes understanding that she should call out for assistance if in need. Will continue to monitor.

## 2019-04-07 NOTE — ED PROVIDER NOTES
[address] [facility] Emergency Department    CHIEF COMPLAINT  Shortness of Breath (recent bronchotis 3weeks ago; increased SOB, and cough. )      HISTORY OF PRESENT ILLNESS  Cheryle Aden is a 48 y.o. female who presents to the ED complaining of 6 week history of progressively worsening shortness of breath. Patient is observed lying in bed, appears nontoxic and in no acute distress at this time. Patient brought in by squad today for evaluation. Patient states that she was diagnosed with bronchitis several weeks ago and finish a course of it antibiotics. Nonsmoker. Denies history of asthma or COPD. Reports that despite treatment symptoms have been worsening. States that she is experiencing worsening dyspnea on exertion. No associated chest pain. No pain with deep inspiration. No leg pain or swelling. No recent travel, trauma, or surgery. She uses no blood thinners. Nor does she use any hormone replacement therapies. States that with exertion and shortness of breath does develop some lightheadedness without dizziness or confusion. No abdominal pain. No nausea or vomiting. No fevers chills. No other complaints, modifying factors or associated symptoms. Nursing notes reviewed.    Past Medical History:   Diagnosis Date    Anemia     Anxiety     Depression     Fibromyalgia     GERD (gastroesophageal reflux disease)     Heart palpitations     Hiatal hernia     Hypertension     Irritable bowel syndrome     Osteoarthritis     Panic attacks     Pneumonia     Seasonal allergies     Seizure disorder (HCC)     Sleep apnea     Spastic colon     Thyroid disease      Past Surgical History:   Procedure Laterality Date    BLADDER SURGERY      BRAIN SURGERY      HIP SURGERY      JOINT REPLACEMENT  2009    hip    KNEE SURGERY      OTHER SURGICAL HISTORY  07/31/2013    cystoscopy, urethral dilatation    TOTAL HIP ARTHROPLASTY      Left    URETHRAL STRICTURE DILATATION Family History   Problem Relation Age of Onset    Asthma Other     Asthma Other     Hypertension Father     Hypertension Sister     Cancer Neg Hx     Diabetes Neg Hx     Emphysema Neg Hx     Heart Failure Neg Hx      Social History     Socioeconomic History    Marital status: Single     Spouse name: Not on file    Number of children: Not on file    Years of education: Not on file    Highest education level: Not on file   Occupational History    Not on file   Social Needs    Financial resource strain: Not on file    Food insecurity:     Worry: Not on file     Inability: Not on file    Transportation needs:     Medical: Not on file     Non-medical: Not on file   Tobacco Use    Smoking status: Former Smoker     Packs/day: 0.50     Years: 2.00     Pack years: 1.00     Last attempt to quit: 1986     Years since quittin.9    Smokeless tobacco: Never Used   Substance and Sexual Activity    Alcohol use: No    Drug use: No    Sexual activity: Not on file   Lifestyle    Physical activity:     Days per week: Not on file     Minutes per session: Not on file    Stress: Not on file   Relationships    Social connections:     Talks on phone: Not on file     Gets together: Not on file     Attends Nondenominational service: Not on file     Active member of club or organization: Not on file     Attends meetings of clubs or organizations: Not on file     Relationship status: Not on file    Intimate partner violence:     Fear of current or ex partner: Not on file     Emotionally abused: Not on file     Physically abused: Not on file     Forced sexual activity: Not on file   Other Topics Concern    Not on file   Social History Narrative    Not on file     Current Facility-Administered Medications   Medication Dose Route Frequency Provider Last Rate Last Dose    0.9 % sodium chloride bolus  1,000 mL Intravenous Once BEKAH Knight 1,000 mL/hr at 19 1,000 mL at 19     Current Outpatient Medications   Medication Sig Dispense Refill    diltiazem (CARDIZEM CD) 120 MG extended release capsule Take 1 capsule by mouth 2 times daily 30 capsule 0    albuterol sulfate HFA (PROVENTIL HFA) 108 (90 Base) MCG/ACT inhaler Inhale 2 puffs into the lungs every 4 hours as needed for Wheezing or Shortness of Breath (Space out to every 6 hours as symptoms improve) Space out to every 6 hours as symptoms improve. 1 Inhaler 0    dicyclomine (BENTYL) 10 MG capsule Take 1 capsule by mouth every 6 hours as needed (cramps) 20 capsule 0    ondansetron (ZOFRAN ODT) 4 MG disintegrating tablet Take 1 tablet by mouth every 8 hours as needed for Nausea 20 tablet 0    diltiazem (CARDIZEM) 120 MG tablet Take 120 mg by mouth 4 times daily      NAPROXEN PO Take 500 mg by mouth as needed.  lamotrigine (LAMICTAL) 100 MG tablet Take 200 mg by mouth 2 times daily. 100 mg in am- 200 mg HS      sertraline (ZOLOFT) 50 MG tablet Take 100 mg by mouth daily       lisinopril (PRINIVIL;ZESTRIL) 10 MG tablet Take 5 mg by mouth daily       pantoprazole (PROTONIX) 40 MG tablet Take 40 mg by mouth daily.  ibuprofen (ADVIL;MOTRIN) 600 MG tablet Take 600 mg by mouth every 6 hours as needed.  levothyroxine (SYNTHROID) 112 MCG tablet Take 112 mcg by mouth daily.  gabapentin (NEURONTIN) 600 MG tablet Take 800 mg by mouth daily. .      montelukast (SINGULAIR) 10 MG tablet Take 10 mg by mouth nightly.  aspirin 81 MG EC tablet Take 81 mg by mouth daily.  acetaminophen (TYLENOL) 500 MG tablet Take 500 mg by mouth every 6 hours as needed.  therapeutic multivitamin-minerals (THERAGRAN-M) tablet Take 1 tablet by mouth daily. Allergies   Allergen Reactions    Other      Nickel,citrus, pain meds?     Percocet [Oxycodone-Acetaminophen]      Can take VICODIN       REVIEW OF SYSTEMS  10 systems reviewed, pertinent positives per HPI otherwise noted to be negative    PHYSICAL EXAM  /87   Pulse 99 TECHNIQUE: CTA of the chest was performed after the administration of intravenous contrast.  Multiplanar reformatted images are provided for review. MIP images are provided for review. Dose modulation, iterative reconstruction, and/or weight based adjustment of the mA/kV was utilized to reduce the radiation dose to as low as reasonably achievable. COMPARISON: None. HISTORY: ORDERING SYSTEM PROVIDED HISTORY: sob, tachycardia TECHNOLOGIST PROVIDED HISTORY: Ordering Physician Provided Reason for Exam: Shortness of Breath (recent bronchotis 3weeks ago; increased SOB, and cough. ) FINDINGS: Pulmonary Arteries: Evaluation is compromised by motion artifact and the patient's body habitus. There is no convincing evidence for pulmonary embolus. Mediastinum: No evidence of mediastinal lymphadenopathy. The heart and pericardium demonstrate no acute abnormality. There is no acute abnormality of the thoracic aorta. Lungs/pleura: There is a small left pleural effusion. There is no pneumothorax. There is lingular and left lower lobe airspace disease. There is minimal airspace disease in the lateral and anterior right lower lobe. Upper Abdomen: Limited images of the upper abdomen are unremarkable. Soft Tissues/Bones: No acute bone or soft tissue abnormality. 1. Limited study with no definite scan evidence for pulmonary embolus. 2. Left pleural effusion with lingular and left lower lobe atelectasis or pneumonia. 3. Right lower lobe atelectasis or pneumonia. ED COURSE   I have evaluated this patient. Attending physician was available for consultation. Pain control was not required while here in the emergency department. Triage vital significant for hypoxia at 83%. She was placed on nasal cannula at 2 L with increase to 97%. Also tachycardic with pulse rate 118. CBC without leukocytosis or anemia. HCG less than 5. BNP was 91 with troponin less than 0.01. CMP unremarkable. VBG unremarkable.   Rapid flu negative. Chest x-ray with left lung base opacification which appears consistent with effusion in the adjacent atelectasis. Pulmonary vascular congestion incidentally noted as well. We are Recommending Admission for Respiratory Failure with Hypoxia. Did Attempt Breathing Treatment with No significant improvement of shortness of breath. Discussed case with hospitalist and admission orders placed. A discussion was had with Mrs. Sutton regarding shortness of breath, ED findings and recommendations for admission. All questions were answered. Patient is in agreement. 40 minutes of critical care time spent not including any separately billable procedures.     MDM  Results for orders placed or performed during the hospital encounter of 04/06/19   Rapid influenza A/B antigens   Result Value Ref Range    Rapid Influenza A Ag Negative Negative    Rapid Influenza B Ag Negative Negative   CBC Auto Differential   Result Value Ref Range    WBC 8.1 4.0 - 11.0 K/uL    RBC 5.20 4.00 - 5.20 M/uL    Hemoglobin 10.7 (L) 12.0 - 16.0 g/dL    Hematocrit 34.3 (L) 36.0 - 48.0 %    MCV 65.9 (L) 80.0 - 100.0 fL    MCH 20.6 (L) 26.0 - 34.0 pg    MCHC 31.2 31.0 - 36.0 g/dL    RDW 19.8 (H) 12.4 - 15.4 %    Platelets 744 194 - 873 K/uL    MPV 8.1 5.0 - 10.5 fL    Neutrophils % 70.5 %    Lymphocytes % 20.3 %    Monocytes % 8.3 %    Eosinophils % 0.0 %    Basophils % 0.9 %    Neutrophils # 5.7 1.7 - 7.7 K/uL    Lymphocytes # 1.6 1.0 - 5.1 K/uL    Monocytes # 0.7 0.0 - 1.3 K/uL    Eosinophils # 0.0 0.0 - 0.6 K/uL    Basophils # 0.1 0.0 - 0.2 K/uL   Comprehensive Metabolic Panel   Result Value Ref Range    Sodium 143 136 - 145 mmol/L    Potassium 4.1 3.5 - 5.1 mmol/L    Chloride 104 99 - 110 mmol/L    CO2 28 21 - 32 mmol/L    Anion Gap 11 3 - 16    Glucose 116 (H) 70 - 99 mg/dL    BUN 14 7 - 20 mg/dL    CREATININE 0.7 0.6 - 1.1 mg/dL    GFR Non-African American >60 >60    GFR African American >60 >60    Calcium 8.5 8.3 - 10.6 mg/dL    Total

## 2019-04-07 NOTE — PROGRESS NOTES
4 Eyes Skin Assessment     The patient is being assess for  Admission    I agree that 2 RN's have performed a thorough Head to Toe Skin Assessment on the patient. ALL assessment sites listed below have been assessed. Areas assessed by both nurses:   [x]   Head, Face, and Ears   [x]   Shoulders, Back, and Chest  [x]   Arms, Elbows, and Hands   [x]   Coccyx, Sacrum, and IschIum  [x]   Legs, Feet, and Heels        Does the Patient have Skin Breakdown?   No         Sumit Prevention initiated:  No   Wound Care Orders initiated:  No      [facility] nurse consulted for Pressure Injury (Stage 3,4, Unstageable, DTI, NWPT, and Complex wounds), New and Established Ostomies:  No      Nurse 1 eSignature: Electronically signed by Messi Concepcion RN on 4/7/19 at 1:59 AM    **SHARE this note so that the co-signing nurse is able to place an eSignature**    Nurse 2 eSignature: Electronically signed by Kristi Merchant RN on 4/8/19 at 2:09 AM

## 2019-04-08 LAB
ANION GAP SERPL CALCULATED.3IONS-SCNC: 10 MMOL/L (ref 3–16)
BASOPHILS ABSOLUTE: 0 K/UL (ref 0–0.2)
BASOPHILS RELATIVE PERCENT: 0.4 %
BUN BLDV-MCNC: 14 MG/DL (ref 7–20)
CALCIUM SERPL-MCNC: 8.6 MG/DL (ref 8.3–10.6)
CHLORIDE BLD-SCNC: 105 MMOL/L (ref 99–110)
CO2: 28 MMOL/L (ref 21–32)
CREAT SERPL-MCNC: 0.7 MG/DL (ref 0.6–1.1)
EOSINOPHILS ABSOLUTE: 0 K/UL (ref 0–0.6)
EOSINOPHILS RELATIVE PERCENT: 0 %
ESTIMATED AVERAGE GLUCOSE: 131.2 MG/DL
FERRITIN: 52.9 NG/ML (ref 15–150)
GFR AFRICAN AMERICAN: >60
GFR NON-AFRICAN AMERICAN: >60
GLUCOSE BLD-MCNC: 120 MG/DL (ref 70–99)
HBA1C MFR BLD: 6.2 %
HCT VFR BLD CALC: 33 % (ref 36–48)
HEMATOLOGY PATH CONSULT: NO
HEMATOLOGY PATH CONSULT: NORMAL
HEMOGLOBIN: 10.3 G/DL (ref 12–16)
IRON SATURATION: 7 % (ref 15–50)
IRON: 24 UG/DL (ref 37–145)
L. PNEUMOPHILA SEROGP 1 UR AG: NORMAL
LV EF: 58 %
LVEF MODALITY: NORMAL
LYMPHOCYTES ABSOLUTE: 1.3 K/UL (ref 1–5.1)
LYMPHOCYTES RELATIVE PERCENT: 16.3 %
MCH RBC QN AUTO: 20.6 PG (ref 26–34)
MCHC RBC AUTO-ENTMCNC: 31.2 G/DL (ref 31–36)
MCV RBC AUTO: 66 FL (ref 80–100)
MONOCYTES ABSOLUTE: 0.7 K/UL (ref 0–1.3)
MONOCYTES RELATIVE PERCENT: 8.3 %
NEUTROPHILS ABSOLUTE: 6 K/UL (ref 1.7–7.7)
NEUTROPHILS RELATIVE PERCENT: 75 %
PDW BLD-RTO: 20.2 % (ref 12.4–15.4)
PLATELET # BLD: 340 K/UL (ref 135–450)
PMV BLD AUTO: 8.2 FL (ref 5–10.5)
POTASSIUM SERPL-SCNC: 4.2 MMOL/L (ref 3.5–5.1)
RBC # BLD: 5 M/UL (ref 4–5.2)
SODIUM BLD-SCNC: 143 MMOL/L (ref 136–145)
STREP PNEUMONIAE ANTIGEN, URINE: NORMAL
TOTAL IRON BINDING CAPACITY: 329 UG/DL (ref 260–445)
WBC # BLD: 8.1 K/UL (ref 4–11)

## 2019-04-08 PROCEDURE — 36415 COLL VENOUS BLD VENIPUNCTURE: CPT

## 2019-04-08 PROCEDURE — 6360000002 HC RX W HCPCS: Performed by: NURSE PRACTITIONER

## 2019-04-08 PROCEDURE — 94640 AIRWAY INHALATION TREATMENT: CPT

## 2019-04-08 PROCEDURE — 6370000000 HC RX 637 (ALT 250 FOR IP): Performed by: STUDENT IN AN ORGANIZED HEALTH CARE EDUCATION/TRAINING PROGRAM

## 2019-04-08 PROCEDURE — 85025 COMPLETE CBC W/AUTO DIFF WBC: CPT

## 2019-04-08 PROCEDURE — 1200000000 HC SEMI PRIVATE

## 2019-04-08 PROCEDURE — 2580000003 HC RX 258: Performed by: INTERNAL MEDICINE

## 2019-04-08 PROCEDURE — 2700000000 HC OXYGEN THERAPY PER DAY

## 2019-04-08 PROCEDURE — 80048 BASIC METABOLIC PNL TOTAL CA: CPT

## 2019-04-08 PROCEDURE — 94761 N-INVAS EAR/PLS OXIMETRY MLT: CPT

## 2019-04-08 PROCEDURE — 94150 VITAL CAPACITY TEST: CPT

## 2019-04-08 PROCEDURE — 6360000002 HC RX W HCPCS: Performed by: INTERNAL MEDICINE

## 2019-04-08 PROCEDURE — 6370000000 HC RX 637 (ALT 250 FOR IP): Performed by: INTERNAL MEDICINE

## 2019-04-08 PROCEDURE — 6370000000 HC RX 637 (ALT 250 FOR IP): Performed by: NURSE PRACTITIONER

## 2019-04-08 PROCEDURE — 93306 TTE W/DOPPLER COMPLETE: CPT

## 2019-04-08 RX ORDER — SERTRALINE HYDROCHLORIDE 100 MG/1
100 TABLET, FILM COATED ORAL DAILY
COMMUNITY

## 2019-04-08 RX ORDER — LISINOPRIL 5 MG/1
5 TABLET ORAL DAILY
COMMUNITY
End: 2020-09-01 | Stop reason: SDUPTHER

## 2019-04-08 RX ORDER — LAMOTRIGINE 200 MG/1
200 TABLET ORAL 2 TIMES DAILY
COMMUNITY

## 2019-04-08 RX ORDER — FERROUS SULFATE 325(65) MG
325 TABLET ORAL
Status: DISCONTINUED | OUTPATIENT
Start: 2019-04-08 | End: 2019-04-09 | Stop reason: HOSPADM

## 2019-04-08 RX ORDER — IBUPROFEN 200 MG
600 TABLET ORAL PRN
COMMUNITY

## 2019-04-08 RX ORDER — GABAPENTIN 300 MG/1
600 CAPSULE ORAL 2 TIMES DAILY
Status: DISCONTINUED | OUTPATIENT
Start: 2019-04-08 | End: 2019-04-09 | Stop reason: HOSPADM

## 2019-04-08 RX ADMIN — DILTIAZEM HYDROCHLORIDE 120 MG: 120 CAPSULE, COATED, EXTENDED RELEASE ORAL at 10:26

## 2019-04-08 RX ADMIN — LEVOFLOXACIN 500 MG: 5 INJECTION, SOLUTION INTRAVENOUS at 10:27

## 2019-04-08 RX ADMIN — LISINOPRIL 5 MG: 5 TABLET ORAL at 10:27

## 2019-04-08 RX ADMIN — GUAIFENESIN 600 MG: 600 TABLET, EXTENDED RELEASE ORAL at 20:02

## 2019-04-08 RX ADMIN — ENOXAPARIN SODIUM 40 MG: 40 INJECTION SUBCUTANEOUS at 10:26

## 2019-04-08 RX ADMIN — GUAIFENESIN 600 MG: 600 TABLET, EXTENDED RELEASE ORAL at 10:26

## 2019-04-08 RX ADMIN — LEVOTHYROXINE SODIUM 112 MCG: 0.11 TABLET ORAL at 06:30

## 2019-04-08 RX ADMIN — LAMOTRIGINE 200 MG: 100 TABLET ORAL at 20:02

## 2019-04-08 RX ADMIN — ASPIRIN 81 MG: 81 TABLET, COATED ORAL at 10:27

## 2019-04-08 RX ADMIN — ALBUTEROL SULFATE 2.5 MG: 2.5 SOLUTION RESPIRATORY (INHALATION) at 19:02

## 2019-04-08 RX ADMIN — GABAPENTIN 600 MG: 300 CAPSULE ORAL at 20:02

## 2019-04-08 RX ADMIN — LAMOTRIGINE 200 MG: 100 TABLET ORAL at 10:27

## 2019-04-08 RX ADMIN — MONTELUKAST 10 MG: 10 TABLET, FILM COATED ORAL at 20:02

## 2019-04-08 RX ADMIN — PREDNISONE 40 MG: 20 TABLET ORAL at 10:27

## 2019-04-08 RX ADMIN — GABAPENTIN 600 MG: 100 CAPSULE ORAL at 10:27

## 2019-04-08 RX ADMIN — ALBUTEROL SULFATE 2.5 MG: 2.5 SOLUTION RESPIRATORY (INHALATION) at 07:24

## 2019-04-08 RX ADMIN — MICONAZOLE NITRATE: 2 POWDER TOPICAL at 20:03

## 2019-04-08 RX ADMIN — DILTIAZEM HYDROCHLORIDE 120 MG: 120 CAPSULE, COATED, EXTENDED RELEASE ORAL at 20:02

## 2019-04-08 RX ADMIN — PANTOPRAZOLE SODIUM 40 MG: 40 TABLET, DELAYED RELEASE ORAL at 10:27

## 2019-04-08 RX ADMIN — SERTRALINE HYDROCHLORIDE 100 MG: 50 TABLET ORAL at 20:02

## 2019-04-08 RX ADMIN — ALBUTEROL SULFATE 2.5 MG: 2.5 SOLUTION RESPIRATORY (INHALATION) at 11:16

## 2019-04-08 RX ADMIN — FERROUS SULFATE TAB 325 MG (65 MG ELEMENTAL FE) 325 MG: 325 (65 FE) TAB at 12:48

## 2019-04-08 RX ADMIN — Medication 10 ML: at 20:02

## 2019-04-08 RX ADMIN — Medication 10 ML: at 10:28

## 2019-04-08 RX ADMIN — ALBUTEROL SULFATE 2.5 MG: 2.5 SOLUTION RESPIRATORY (INHALATION) at 15:49

## 2019-04-08 ASSESSMENT — PAIN SCALES - GENERAL
PAINLEVEL_OUTOF10: 0
PAINLEVEL_OUTOF10: 0

## 2019-04-08 NOTE — PROGRESS NOTES
Inpatient Family Medicine   Progress Note      PCP: Candelario Carr MD    Date of Admission: 4/6/2019    Chief Complaint: Shortness of breath    Hospital Course:   48 y.o. female, with a PMH of HTN, KRYSTIAN, hypothyroidism and obesity who presented to [facility] with SOB x 6 weeks. The patient states she was treated for bronchitis at the end of February with a 10 day course of amoxicillin and steroids. She felt better initially, but states she hasn't really felt back to normal since she was first sick. Over the last few days, she has been more SOB, especially with activity. She denies fever, chills, chest pain, back pain, N/V/D, leg swelling, orthopnea.       In the ED at CT was completed and negative for a PE, but did show possible bilateral lower lobe pneumonia vs atelectasis. She was hypoxic on room air with minimal activity, with O2 sats dropping to 84%. She was admitted for further management. Subjective:   Pt resting comfortably in bed. Took off her oxygen when using restroom and not wearing currently. Reports that she is still having some minimal SOB when ambulating back from restroom. She has not attempted to ambulate in hallway. Does not use home oxygen. She does not get much physical activity due to chronic low back pain. Denies any other issues. Denies any fevers, chills, chest pain, SOB, nausea, vomiting, diarrhea, or constipation.      Medications:  Reviewed    Infusion Medications   Scheduled Medications    aspirin  81 mg Oral Daily    diltiazem  120 mg Oral BID    lamoTRIgine  200 mg Oral BID    levothyroxine  112 mcg Oral Daily    lisinopril  5 mg Oral Daily    montelukast  10 mg Oral Nightly    pantoprazole  40 mg Oral Daily    sertraline  100 mg Oral Daily    sodium chloride flush  10 mL Intravenous 2 times per day    enoxaparin  40 mg Subcutaneous Daily    gabapentin  800 mg Oral Daily    albuterol  2.5 mg Nebulization 4x daily    predniSONE  40 mg Oral Daily    miconazole Topical BID    levofloxacin  500 mg Intravenous Q24H    guaiFENesin  600 mg Oral BID     PRN Meds: acetaminophen, albuterol sulfate HFA, dicyclomine, sodium chloride flush, magnesium hydroxide, ondansetron, acetaminophen, albuterol, melatonin      Intake/Output Summary (Last 24 hours) at 4/8/2019 0955  Last data filed at 4/8/2019 0433  Gross per 24 hour   Intake 840 ml   Output 0 ml   Net 840 ml       Physical Exam Performed:    /75   Pulse 96   Temp 98.5 °F (36.9 °C) (Oral)   Resp 16   Ht 5' (1.524 m)   Wt 300 lb (136.1 kg)   LMP 07/29/2013   SpO2 98%   BMI 58.59 kg/m²     General appearance: No apparent distress, appears stated age and cooperative. HEENT: Pupils equal, round, and reactive to light. Conjunctivae/corneas clear. Neck: Supple, with full range of motion. No jugular venous distention. Trachea midline. Respiratory:  Normal respiratory effort. Faint expiratory wheezing noted in bilateral apices. Decreased bibasilar breath sounds. Cardiovascular: Regular rate and rhythm with normal S1/S2 without murmurs, rubs or gallops. Abdomen: Soft, non-tender, non-distended with normal bowel sounds. Musculoskeletal: No clubbing, cyanosis or edema bilaterally. Full range of motion without deformity. Skin: Skin color, texture, turgor normal.  No rashes or lesions. Neurologic:  Neurovascularly intact without any focal sensory/motor deficits.  Cranial nerves: II-XII intact, grossly non-focal.  Psychiatric: Alert and oriented, thought content appropriate, normal insight  Extremities: 1+ pitting edema noted in bilateral ankles and feet      Labs:   Recent Labs     04/06/19 2034 04/08/19  0531   WBC 8.1 8.1   HGB 10.7* 10.3*   HCT 34.3* 33.0*    340     Recent Labs     04/06/19 2034 04/08/19  0531    143   K 4.1 4.2    105   CO2 28 28   BUN 14 14   CREATININE 0.7 0.7   CALCIUM 8.5 8.6     Recent Labs     04/06/19 2034   AST 14*   ALT 19   BILITOT 0.3   ALKPHOS 97     No results for input(s): INR in the last 72 hours. Recent Labs     04/06/19  2034 04/07/19  0950 04/07/19  1458   TROPONINI <0.01 <0.01 <0.01       Urinalysis:      Lab Results   Component Value Date    NITRU Negative 11/25/2018    WBCUA 6-10 11/25/2018    BACTERIA 1+ 11/02/2018    RBCUA 0-2 11/25/2018    BLOODU Negative 11/25/2018    SPECGRAV >=1.030 11/25/2018    GLUCOSEU Negative 11/25/2018       Radiology:  CT CHEST PULMONARY EMBOLISM W CONTRAST   Final Result   1. Limited study with no definite scan evidence for pulmonary embolus. 2. Left pleural effusion with lingular and left lower lobe atelectasis or   pneumonia. 3. Right lower lobe atelectasis or pneumonia. XR CHEST STANDARD (2 VW)   Final Result   Opacification at the left lung base with blunting of the left costophrenic   sulcus on the lateral view concerning for effusion with adjacent atelectasis. Underlying pneumonia cannot be excluded. Cardiomegaly pulmonary vascular congestion. Assessment/Plan:    Active Hospital Problems    Diagnosis Date Noted    Hypoxia [R09.02] 04/07/2019     Sravani Heredia is a 48 y.o. with PMH of HTN, KRYSTIAN, hypothyroidism, and morbid obesity admitted on 4/6 admitted for acute respiratory failure with hypoxia in setting of possible pneumonia. Acute respiratory failure with hypoxia - possibly related to pneumonia. Improving.   - CTPA negative for PE. CT demonstrated bilateral lower atelectasis vs pneumonia  - Pro-BNP 91  - supplemental O2 to keep sats > 92%. O2 sat 89-93% on RA. Placed back on 1L via NC. Wean as tolerated. Ambulatory oxygen test prior to discharge.    - Continue RT  - Echo performed this AM. Awaiting final read.      Bilateral lower lobe infiltrates - noted on CTPA  - Levaquin started on 4/7 - previously completed course of amoxicillin  - Prednisone 40 mg daily x 5 days (started on 4/7)  - supportive care with mucinex and scheduled albuterol   - procalcitonin 0.6 on 4/7   - strep pneumoniae and legionella pending     Dyspnea on exertion   - CTPA negative for PE.  - serial troponin - negative x3. EKG negative. - Echo pending      HTN   - continue home diltiazem, lisinopril.     Anemia, microcytic - appears chronic  - Hgb 10.7 on admission (10.3 today)  - iron studies as noted above suggesting Fe-deficiency. Will start on Feosol PO q48hrs.   - recommend outpatient colonoscopy     Morbid obesity   - body mass index is 11.8 kg/m². Complicating assessment and treatment. Placing patient at risk for multiple co-morbidities as well as early death and contributing to the patient's presentation. Counseled on weight loss  - HgbA1c pending      DVT Prophylaxis: Lovenox  Diet: DIET LOW SODIUM 2 GM;  Code Status: Full Code  PT/OT Eval Status: N/A    Dispo - Doing well. Still requiring 1L O2 via NC. Wean as tolerated. Echo pending today. Home O2 test ordered. Anticipate discharge today or tomorrow. This patient was seen and evaluated with attending, Dr. Ramu Nichols. Ghada Davis D.O.   [facility] Resident  PGY-1

## 2019-04-08 NOTE — PROGRESS NOTES
Oxygen documentation:      1. O2 saturation at REST on ROOM AIR = __92__%.     If saturation is 89% or above please proceed with steps 2 and 3.      2. O2 saturation with AMBULATION of __10__ feet on ROOM AIR = _74__%  3. O2 saturation with AMBULATION on current liter flow = __88___%            Signature  . Misha Lowe, JOANN

## 2019-04-08 NOTE — CARE COORDINATION
Case Management  Progress Note      Chart reviewed and discussed case with attending/NP. No case management needs identified at this time. Please notifiy  (CM) for further needs.

## 2019-04-08 NOTE — PROGRESS NOTES
Perfect serve sent to cross cover NP \"Pt would like to know if she can have something to help her sleep tonight. Melatonin? Thanks! \"       2034: New order placed for PRN nightly melatonin

## 2019-04-09 ENCOUNTER — TELEPHONE (OUTPATIENT)
Dept: PULMONOLOGY | Age: 54
End: 2019-04-09

## 2019-04-09 VITALS
RESPIRATION RATE: 18 BRPM | OXYGEN SATURATION: 96 % | HEIGHT: 60 IN | BODY MASS INDEX: 57.52 KG/M2 | TEMPERATURE: 98.1 F | WEIGHT: 293 LBS | HEART RATE: 97 BPM | SYSTOLIC BLOOD PRESSURE: 144 MMHG | DIASTOLIC BLOOD PRESSURE: 80 MMHG

## 2019-04-09 PROCEDURE — 6360000002 HC RX W HCPCS: Performed by: NURSE PRACTITIONER

## 2019-04-09 PROCEDURE — 2580000003 HC RX 258: Performed by: INTERNAL MEDICINE

## 2019-04-09 PROCEDURE — 6370000000 HC RX 637 (ALT 250 FOR IP): Performed by: FAMILY MEDICINE

## 2019-04-09 PROCEDURE — 6370000000 HC RX 637 (ALT 250 FOR IP): Performed by: NURSE PRACTITIONER

## 2019-04-09 PROCEDURE — 2700000000 HC OXYGEN THERAPY PER DAY

## 2019-04-09 PROCEDURE — 6370000000 HC RX 637 (ALT 250 FOR IP): Performed by: INTERNAL MEDICINE

## 2019-04-09 PROCEDURE — 94761 N-INVAS EAR/PLS OXIMETRY MLT: CPT

## 2019-04-09 PROCEDURE — 6370000000 HC RX 637 (ALT 250 FOR IP): Performed by: STUDENT IN AN ORGANIZED HEALTH CARE EDUCATION/TRAINING PROGRAM

## 2019-04-09 PROCEDURE — 94640 AIRWAY INHALATION TREATMENT: CPT

## 2019-04-09 PROCEDURE — 99223 1ST HOSP IP/OBS HIGH 75: CPT | Performed by: INTERNAL MEDICINE

## 2019-04-09 PROCEDURE — 6360000002 HC RX W HCPCS: Performed by: INTERNAL MEDICINE

## 2019-04-09 RX ORDER — FUROSEMIDE 10 MG/ML
40 INJECTION INTRAMUSCULAR; INTRAVENOUS ONCE
Status: DISCONTINUED | OUTPATIENT
Start: 2019-04-09 | End: 2019-04-09 | Stop reason: HOSPADM

## 2019-04-09 RX ORDER — FERROUS SULFATE 325(65) MG
325 TABLET ORAL
Qty: 30 TABLET | Refills: 0 | Status: ON HOLD | OUTPATIENT
Start: 2019-04-10 | End: 2020-06-26

## 2019-04-09 RX ORDER — FUROSEMIDE 40 MG/1
40 TABLET ORAL ONCE
Status: COMPLETED | OUTPATIENT
Start: 2019-04-09 | End: 2019-04-09

## 2019-04-09 RX ADMIN — ALBUTEROL SULFATE 2.5 MG: 2.5 SOLUTION RESPIRATORY (INHALATION) at 11:22

## 2019-04-09 RX ADMIN — ASPIRIN 81 MG: 81 TABLET, COATED ORAL at 09:55

## 2019-04-09 RX ADMIN — Medication 10 ML: at 09:56

## 2019-04-09 RX ADMIN — PREDNISONE 40 MG: 20 TABLET ORAL at 09:56

## 2019-04-09 RX ADMIN — LAMOTRIGINE 200 MG: 100 TABLET ORAL at 09:55

## 2019-04-09 RX ADMIN — PANTOPRAZOLE SODIUM 40 MG: 40 TABLET, DELAYED RELEASE ORAL at 09:56

## 2019-04-09 RX ADMIN — GUAIFENESIN 600 MG: 600 TABLET, EXTENDED RELEASE ORAL at 09:55

## 2019-04-09 RX ADMIN — MICONAZOLE NITRATE: 2 POWDER TOPICAL at 10:16

## 2019-04-09 RX ADMIN — GABAPENTIN 600 MG: 300 CAPSULE ORAL at 09:55

## 2019-04-09 RX ADMIN — DILTIAZEM HYDROCHLORIDE 120 MG: 120 CAPSULE, COATED, EXTENDED RELEASE ORAL at 09:55

## 2019-04-09 RX ADMIN — LEVOFLOXACIN 500 MG: 5 INJECTION, SOLUTION INTRAVENOUS at 11:21

## 2019-04-09 RX ADMIN — FUROSEMIDE 40 MG: 40 TABLET ORAL at 15:43

## 2019-04-09 RX ADMIN — MELATONIN TAB 5 MG 5 MG: 5 TAB at 01:28

## 2019-04-09 RX ADMIN — ENOXAPARIN SODIUM 40 MG: 40 INJECTION SUBCUTANEOUS at 09:56

## 2019-04-09 RX ADMIN — LEVOTHYROXINE SODIUM 112 MCG: 0.11 TABLET ORAL at 06:03

## 2019-04-09 RX ADMIN — LISINOPRIL 5 MG: 5 TABLET ORAL at 09:56

## 2019-04-09 RX ADMIN — ALBUTEROL SULFATE 2.5 MG: 2.5 SOLUTION RESPIRATORY (INHALATION) at 08:37

## 2019-04-09 RX ADMIN — ALBUTEROL SULFATE 2.5 MG: 2.5 SOLUTION RESPIRATORY (INHALATION) at 15:25

## 2019-04-09 ASSESSMENT — ENCOUNTER SYMPTOMS
SHORTNESS OF BREATH: 1
VOICE CHANGE: 0
COUGH: 0
SORE THROAT: 0
CHOKING: 0
EYE PAIN: 0
DIARRHEA: 0
CONSTIPATION: 0
ABDOMINAL PAIN: 0
EYE DISCHARGE: 0
EYE ITCHING: 0

## 2019-04-09 NOTE — PLAN OF CARE
Bed locked and in lowest position, call light within reach, room free from clutter, non-skid socks in place. Will continue to monitor.

## 2019-04-09 NOTE — DISCHARGE INSTR - COC
Continuity of Care Form    Patient Name: Aleene Bamberger   :  1965  MRN:  [id number]    Admit date:  2019  Discharge date:  ***    Code Status Order: Full Code   Advance Directives:   Advance Care Flowsheet Documentation     Date/Time Healthcare Directive Type of Healthcare Directive Copy in [address] Agent's Name Healthcare Agent's Phone Number    19 [phone]  No, patient does not have an advance directive for healthcare treatment -- -- -- -- --          Admitting Physician:  No admitting provider for patient encounter. PCP: Zeinab Acosta MD    Discharging Nurse: [facility] Unit/Room#: 0329/0329-01  Discharging Unit Phone Number: ***    Emergency Contact:   Extended Emergency Contact Information  Primary Emergency Contact: Óscar Sutton   [facility] [address] Phone: [phone]  Mobile Phone: [phone]  Relation: Parent  Secondary Emergency Contact: [facility] [address] Phone: [phone]  Mobile Phone: [phone]  Relation: Brother/Sister    Past Surgical History:  Past Surgical History:   Procedure Laterality Date    BLADDER SURGERY      BRAIN SURGERY      HIP SURGERY      JOINT REPLACEMENT  2009    hip    KNEE SURGERY      OTHER SURGICAL HISTORY  2013    cystoscopy, urethral dilatation    TOTAL HIP ARTHROPLASTY      Left    URETHRAL STRICTURE DILATATION         Immunization History: There is no immunization history on file for this patient.     Active Problems:  Patient Active Problem List   Diagnosis Code    Morbid obesity with BMI of 50.0-59.9, adult (HCC) E66.01, Z68.43    KRYSTIAN (obstructive sleep apnea) G47.33    Hypothyroid E03.9    Seizure disorder ([facility] [address] G40.909    Essential hypertension I10    Chronic GERD K21.9    Hypoxia R09.02       Isolation/Infection:   Isolation          No Isolation            Nurse Assessment:  Last Vital Signs: BP (!) 144/80   Pulse 97   Temp 98.1 °F (36.7 °C) (Oral)   Resp 18   Ht 5' (1.524 m)   Wt 300 lb (136.1 kg)   LMP 2013   SpO2 96%   BMI 58.59 kg/m²     Last documented pain score (0-10 scale): Pain Level: 0  Last Weight:   Wt Readings from Last 1 Encounters:   19 300 lb (136.1 kg)     Mental Status:  {IP PT MENTAL STATUS:73451}    IV Access:  { BRIGHT IV ACCESS:472278375}    Nursing Mobility/ADLs:  Walking   {CHP DME AXRO:992297677}  Transfer  {CHP DME QHLB:574347182}  Bathing  {CHP DME UWC}  Dressing  {CHP DME YLYJ:833304859}  Toileting  {CHP DME ICJP:790254697}  Feeding  {CHP DME SBZE:387726504}  Med Admin  {P DME IKKI:026755068}  Med Delivery   { BRIGHT MED Delivery:874136159}    Wound Care Documentation and Therapy:        Elimination:  Continence:   · Bowel: {YES / OY:85138}  · Bladder: {YES / KF:24072}  Urinary Catheter: {Urinary Catheter:846537659}   Colostomy/Ileostomy/Ileal Conduit: {YES / DX:69578}       Date of Last BM: ***    Intake/Output Summary (Last 24 hours) at 2019 1709  Last data filed at 2019 1638  Gross per 24 hour   Intake 1930 ml   Output --   Net 1930 ml     I/O last 3 completed shifts:   In: 1 [P.O.:960; I.V.:10]  Out: -     Safety Concerns:     [address] Safety Concerns:287085336}    Impairments/Disabilities:      [address] Impairments/Disabilities:633445378}    Nutrition Therapy:  Current Nutrition Therapy:   [address] Diet List:736259435}    Routes of Feeding: {CHP DME Other Feedings:056405792}  Liquids: {Slp liquid thickness:17467}  Daily Fluid Restriction: {CHP DME Yes amt example:263969869}  Last Modified Barium Swallow with Video (Video Swallowing Test): {Done Not Done WH:909479795}    Treatments at the Time of Hospital Discharge:   Respiratory Treatments: ***  Oxygen Therapy:  {Therapy; copd oxygen:15450}  Ventilator:    {IESHA KEYES Vent TYAY:959613874}    Rehab Therapies: {THERAPEUTIC INTERVENTION:6608415723}  Weight Bearing Status/Restrictions: {IESHA KEYES Weight Bearin}  Other Medical

## 2019-04-09 NOTE — PROGRESS NOTES
Inpatient Family Medicine   Progress Note      PCP: Candelario Carr MD    Date of Admission: 4/6/2019    Chief Complaint: Shortness of breath    Hospital Course:   48 y.o. female, with a PMH of HTN, KRYSTIAN, hypothyroidism and obesity who presented to [facility] with SOB x 6 weeks. The patient states she was treated for bronchitis at the end of February with a 10 day course of amoxicillin and steroids. She felt better initially, but states she hasn't really felt back to normal since she was first sick. Over the last few days, she has been more SOB, especially with activity. She denies fever, chills, chest pain, back pain, N/V/D, leg swelling, orthopnea.       In the ED at [place] was completed and negative for a PE, but did show possible bilateral lower lobe pneumonia vs atelectasis. She was hypoxic on room air with minimal activity, with O2 sats dropping to 84%. She was admitted for further management. Subjective: Pt resting comfortably in bed. Reports that she is doing well. Did have some mild SOB when returning from ambulating to restroom. Took off her oxygen and O2 saturation currently at 92%. Ambulatory oxygen test yesterday reported a drop of O2 down to 74% when ambulating to restroom with improvement with 2L. RN today reported desaturation down to 69% when ambulating back from bathroom on RA. Pt does admit to having remote hx of KRYSTIAN and was using a CPAP machine but lost it >10 years ago. Denies any fevers, chills, chest pain, SOB, nausea, vomiting, diarrhea, or constipation.      Medications:  Reviewed    Infusion Medications   Scheduled Medications    gabapentin  600 mg Oral BID    ferrous sulfate  325 mg Oral Q48H    aspirin  81 mg Oral Daily    diltiazem  120 mg Oral BID    lamoTRIgine  200 mg Oral BID    levothyroxine  112 mcg Oral Daily    lisinopril  5 mg Oral Daily    montelukast  10 mg Oral Nightly    pantoprazole  40 mg Oral Daily    sertraline  100 mg Oral Daily    sodium chloride flush  10 mL Intravenous 2 times per day    enoxaparin  40 mg Subcutaneous Daily    albuterol  2.5 mg Nebulization 4x daily    predniSONE  40 mg Oral Daily    miconazole   Topical BID    levofloxacin  500 mg Intravenous Q24H    guaiFENesin  600 mg Oral BID     PRN Meds: acetaminophen, albuterol sulfate HFA, dicyclomine, sodium chloride flush, magnesium hydroxide, ondansetron, acetaminophen, albuterol, melatonin      Intake/Output Summary (Last 24 hours) at 4/9/2019 0843  Last data filed at 4/9/2019 0411  Gross per 24 hour   Intake 970 ml   Output 0 ml   Net 970 ml       Physical Exam Performed:    /81   Pulse 79   Temp 97.8 °F (36.6 °C) (Axillary)   Resp 20   Ht 5' (1.524 m)   Wt 300 lb (136.1 kg)   LMP 07/29/2013   SpO2 90%   BMI 58.59 kg/m²     General appearance: No apparent distress, appears stated age and cooperative. HEENT: Pupils equal, round, and reactive to light. Conjunctivae/corneas clear. Neck: Supple, with full range of motion. No jugular venous distention. Trachea midline. Respiratory:  Normal respiratory effort. Decreased breath sounds in left base. Cardiovascular: Regular rate and rhythm with normal S1/S2 without murmurs, rubs or gallops. Abdomen: Soft, non-tender, non-distended with normal bowel sounds. Musculoskeletal: No clubbing, cyanosis or edema bilaterally. Full range of motion without deformity. Skin: Skin color, texture, turgor normal.  No rashes or lesions. Neurologic:  Neurovascularly intact without any focal sensory/motor deficits.  Cranial nerves: II-XII intact, grossly non-focal.  Psychiatric: Alert and oriented, thought content appropriate, normal insight  Extremities: 1+ pitting edema noted in bilateral ankles and feet      Labs:   Recent Labs     04/06/19 2034 04/08/19  0531   WBC 8.1 8.1   HGB 10.7* 10.3*   HCT 34.3* 33.0*    340     Recent Labs     04/06/19 2034 04/08/19  0531    143   K 4.1 4.2    105   CO2 28 28   BUN 14 14 CREATININE 0.7 0.7   CALCIUM 8.5 8.6     Recent Labs     04/06/19 2034   AST 14*   ALT 19   BILITOT 0.3   ALKPHOS 97     No results for input(s): INR in the last 72 hours. Recent Labs     04/06/19 2034 04/07/19  0950 04/07/19  1458   TROPONINI <0.01 <0.01 <0.01      4/7/2019 09:50   Ferritin 52.9   Iron 24 (L)   Iron Saturation 7 (L)   TIBC 329     Urinalysis:      Lab Results   Component Value Date    NITRU Negative 11/25/2018    WBCUA 6-10 11/25/2018    BACTERIA 1+ 11/02/2018    RBCUA 0-2 11/25/2018    BLOODU Negative 11/25/2018    SPECGRAV >=1.030 11/25/2018    GLUCOSEU Negative 11/25/2018       Radiology:  CT CHEST PULMONARY EMBOLISM W CONTRAST   Final Result   1. Limited study with no definite scan evidence for pulmonary embolus. 2. Left pleural effusion with lingular and left lower lobe atelectasis or   pneumonia. 3. Right lower lobe atelectasis or pneumonia. XR CHEST STANDARD (2 VW)   Final Result   Opacification at the left lung base with blunting of the left costophrenic   sulcus on the lateral view concerning for effusion with adjacent atelectasis. Underlying pneumonia cannot be excluded. Cardiomegaly pulmonary vascular congestion. Echocardiogram (4/9/19)  Summary   Technically difficult examination due to patient body habitus.   Normal left ventricle systolic function with a visually estimated ejection fraction of 55-60%.   No regional wall motion abnormalities are seen.   Normal left ventricular diastolic filling pressure.   The right ventricle appears mildly enlarged with normal systolic function.   Mild mitral regurgitation. Assessment/Plan:    Active Hospital Problems    Diagnosis Date Noted    Hypoxia [R09.02] 04/07/2019     Kathia Garcia is a 48 y.o. with PMH of HTN, KRYSTIAN, hypothyroidism, and morbid obesity admitted on 4/6 admitted for acute respiratory failure with hypoxia in setting of possible pneumonia.      Acute respiratory failure with hypoxia - possibly related to pneumonia. Improving.   - CTPA negative for PE. CT demonstrated bilateral lower atelectasis vs pneumonia  - Pro-BNP 91  - supplemental O2 to keep sats > 92%. Ambulatory oxygen test in room yesterday with desaturation to 74% with improvement with 2L via NC. Has been on 1-2L overnight. Did desat to 69% this AM after returning from bathroom on RA. Plan for hallway ambulatory home O2 eval today  - Continue RT  - Echo as noted above with mild MR with normal EF  - Pulmonology consulted secondary to episodes of hypoxia down to 60-70% with ambulation on RA which improves with O2 supplementation      Bilateral lower lobe infiltrates - noted on CTPA  - Levaquin started on 4/7 - previously completed course of amoxicillin  - Prednisone 40 mg daily x 5 days (started on 4/7)  - supportive care with mucinex and scheduled albuterol   - procalcitonin 0.6 on 4/7   - strep pneumoniae and legionella negative     Dyspnea on exertion   - CTPA negative for PE.  - serial troponin - negative x3. EKG negative. - Echo results as noted above  - Still requiring oxygen supplementation for desaturation down to upper 60s-80s.      HTN   - continue home diltiazem, lisinopril.     Anemia, microcytic - appears chronic  - Hgb 10.7 on admission (10.3 today)  - iron studies as noted above suggesting Fe-deficiency. Will start on Feosol PO q48hrs.   - recommend outpatient colonoscopy     Morbid obesity   - body mass index is 44.4 kg/m². Complicating assessment and treatment. Placing patient at risk for multiple co-morbidities as well as early death and contributing to the patient's presentation. Counseled on weight loss  - HgbA1c 6.2  - Pt reports hx of KRYSTIAN but not wearing her CPAP for >10 years. Will need sleep study as outpatient. DVT Prophylaxis: Lovenox  Diet: DIET LOW SODIUM 2 GM;  Code Status: Full Code  PT/OT Eval Status: N/A    Dispo - Doing well. Still requiring 1L O2 via NC. Wean as tolerated. Home O2 test today.  Anticipate discharge today or tomorrow. This patient was seen and evaluated with attending, Dr. Delynn Castleman. Cale Rao D.O.   [facility] Resident  PGY-1

## 2019-04-09 NOTE — CONSULTS
Pulmonary & Critical Care Consultation Note   Phi Allen MD    REASONFOR CONSULTATION/CC: acute on chronic resp failure    Consult at request of  Urbano Davis DO  PCP: Jodee Barragan MD    HISTORYOF PRESENT ILLNESS:    48y.o. year old female with morbid obesity, KRYSTIAN (AHI of 19) not on treatment. She started experiencing symptoms six weeks ago consisting of increased shortness of breath with mild exertion on a daily basis. Progressively worsening. Had no other associated symptoms such as cough, congestion, or fevers. Workup in the ED identified a possible lower lobe pneumonia. She was admitted for pneumonia treatment, also required supplemental oxygen for hypoxia. Despite these treatments today she remains with exertional hypoxia and shortness of breath, prompting further pulmonary input. Admits to increased weight gain since her prior sleep study testing. Had intolerance issues with a full face mask but otherwise denies issue with use of her device. No preceding respiratory issues otherwise. Past Medical History:   Diagnosis Date    Anemia     Anxiety     Depression     Fibromyalgia     GERD (gastroesophageal reflux disease)     Heart palpitations     Hiatal hernia     Hypertension     Irritable bowel syndrome     Osteoarthritis     Panic attacks     Pneumonia     Seasonal allergies     Seizure disorder (HCC)     Sleep apnea     Spastic colon     Thyroid disease        Past Surgical History:   Procedure Laterality Date    BLADDER SURGERY      BRAIN SURGERY      HIP SURGERY      JOINT REPLACEMENT  2009    hip    KNEE SURGERY      OTHER SURGICAL HISTORY  07/31/2013    cystoscopy, urethral dilatation    TOTAL HIP ARTHROPLASTY      Left    URETHRAL STRICTURE DILATATION         family history includes Asthma in some other family members; Hypertension in her father and sister.     Social History     Tobacco Use    Smoking status: Former Smoker     Packs/day: 0.50     Years: 2.00 Pack years: 1.00     Last attempt to quit: 1986     Years since quittin.9    Smokeless tobacco: Never Used   Substance Use Topics    Alcohol use: No        Scheduled Meds:   gabapentin  600 mg Oral BID    ferrous sulfate  325 mg Oral Q48H    aspirin  81 mg Oral Daily    diltiazem  120 mg Oral BID    lamoTRIgine  200 mg Oral BID    levothyroxine  112 mcg Oral Daily    lisinopril  5 mg Oral Daily    montelukast  10 mg Oral Nightly    pantoprazole  40 mg Oral Daily    sertraline  100 mg Oral Daily    sodium chloride flush  10 mL Intravenous 2 times per day    enoxaparin  40 mg Subcutaneous Daily    albuterol  2.5 mg Nebulization 4x daily    predniSONE  40 mg Oral Daily    miconazole   Topical BID    levofloxacin  500 mg Intravenous Q24H    guaiFENesin  600 mg Oral BID       Continuous Infusions:      PRN Meds:   acetaminophen, albuterol sulfate HFA, dicyclomine, sodium chloride flush, magnesium hydroxide, ondansetron, acetaminophen, albuterol, melatonin   Inpatient consult to Pulmonology  Consult performed by: Carlie Harry MD  Consult ordered by: Jose Manuel Jones DO        ALLERGIES:  Patient is allergic to other and percocet [oxycodone-acetaminophen]. REVIEW OF SYSTEMS:  Review of Systems   Constitutional: Negative for chills, fever and unexpected weight change. HENT: Negative for mouth sores, sore throat and voice change. Eyes: Negative for pain, discharge and itching. Respiratory: Positive for shortness of breath. Negative for cough and choking. Cardiovascular: Negative for chest pain, palpitations and leg swelling. Gastrointestinal: Negative for abdominal pain, constipation and diarrhea. Endocrine: Negative for cold intolerance, heat intolerance and polydipsia. Genitourinary: Negative for dysuria, frequency and hematuria. Musculoskeletal: Negative for gait problem, joint swelling and neck stiffness.    Neurological: Negative for dizziness, numbness and hours.    Invalid input(s): KETONESU  No results for input(s): PHART, QFB5LFA, PO2ART in the last 72 hours. CT chest personally reviewed, trace left pleural effusion and basilar atelectasis. No other acute findings. Assessment:     1. Chronic respiratory failure with hypoxia  2. KRYSTIAN  3. Pulmonary hypertension relating to KRYSTIAN and Obesity  4. Suspected restrictive lung disease due to extrathoracic effect from obesity    Plan:      -Workup so far not convincing for an acute etiology to her symptoms (negative pct, troponins, etc). No need for atb or additional inpatient testing from a pulm standpoint  -I discussed right heart findings on echo and associated diagnosis of pulm htn. Treatment in her circumstance would be CPAP therapy and weight loss   -Will give a dose of lasix as she had a small left pleural effusion on imaging  -Wean FIO2 as tolerated but will require home O2 eval prior to discharge  -Discussed outpatient sleep study testing, she was agreeable to follow up with us for further treatment of this, will coordinate at discharge     Please contact with questions.      Kami Loera MD

## 2019-04-09 NOTE — DISCHARGE SUMMARY
of KRYSTIAN but not wearing her CPAP for >10 years. Will need sleep study as outpatient. Physical Exam Performed:     BP (!) 144/80   Pulse 97   Temp 98.1 °F (36.7 °C) (Oral)   Resp 18   Ht 5' (1.524 m)   Wt 300 lb (136.1 kg)   LMP 07/29/2013   SpO2 96%   BMI 58.59 kg/m²     General appearance: No apparent distress, appears stated age and cooperative. HEENT: Pupils equal, round, and reactive to light. Conjunctivae/corneas clear. Neck: Supple, with full range of motion. No jugular venous distention. Trachea midline. Respiratory:  Normal respiratory effort. Decreased breath sounds in left base. Cardiovascular: Regular rate and rhythm with normal S1/S2 without murmurs, rubs or gallops. Abdomen: Soft, non-tender, non-distended with normal bowel sounds. Musculoskeletal: No clubbing, cyanosis or edema bilaterally. Full range of motion without deformity. Skin: Skin color, texture, turgor normal.  No rashes or lesions. Neurologic:  Neurovascularly intact without any focal sensory/motor deficits. Cranial nerves: II-XII intact, grossly non-focal.  Psychiatric: Alert and oriented, thought content appropriate, normal insight  Extremities: 1+ pitting edema noted in bilateral ankles and feet    Labs: For convenience and continuity at follow-up the following most recent labs are provided:      CBC:    Lab Results   Component Value Date    WBC 8.1 04/08/2019    HGB 10.3 04/08/2019    HCT 33.0 04/08/2019     04/08/2019       Renal:    Lab Results   Component Value Date     04/08/2019    K 4.2 04/08/2019     04/08/2019    CO2 28 04/08/2019    BUN 14 04/08/2019    CREATININE 0.7 04/08/2019    CALCIUM 8.6 04/08/2019    PHOS 4.2 05/13/2010         Significant Diagnostic Studies    Radiology:   CT CHEST PULMONARY EMBOLISM W CONTRAST   Final Result   1. Limited study with no definite scan evidence for pulmonary embolus.    2. Left pleural effusion with lingular and left lower lobe atelectasis or gabapentin (NEURONTIN) 600 MG tablet Take 600 mg by mouth 2 times daily. Time Spent on discharge is more than 45 minutes in the examination, evaluation, counseling and review of medications and discharge plan. This patient was seen and evaluated with attending, Dr. Gonzalo Abbott. Signed:    Demetrius Storey DO   4/9/2019      Thank you Elaine Rodriguez MD for the opportunity to be involved in this patient's care. If you have any questions or concerns please feel free to contact me at [phone].

## 2019-04-17 ENCOUNTER — OFFICE VISIT (OUTPATIENT)
Dept: PULMONOLOGY | Age: 54
End: 2019-04-17
Payer: MEDICAID

## 2019-04-17 VITALS
OXYGEN SATURATION: 96 % | WEIGHT: 293 LBS | SYSTOLIC BLOOD PRESSURE: 118 MMHG | BODY MASS INDEX: 57.52 KG/M2 | HEIGHT: 60 IN | HEART RATE: 97 BPM | DIASTOLIC BLOOD PRESSURE: 70 MMHG

## 2019-04-17 DIAGNOSIS — G47.10 EXCESSIVE SOMNOLENCE DISORDER: ICD-10-CM

## 2019-04-17 DIAGNOSIS — R06.02 SHORTNESS OF BREATH: Primary | ICD-10-CM

## 2019-04-17 DIAGNOSIS — J96.21 ACUTE ON CHRONIC RESPIRATORY FAILURE WITH HYPOXIA (HCC): ICD-10-CM

## 2019-04-17 PROCEDURE — G8427 DOCREV CUR MEDS BY ELIG CLIN: HCPCS | Performed by: INTERNAL MEDICINE

## 2019-04-17 PROCEDURE — 1111F DSCHRG MED/CURRENT MED MERGE: CPT | Performed by: INTERNAL MEDICINE

## 2019-04-17 PROCEDURE — 99214 OFFICE O/P EST MOD 30 MIN: CPT | Performed by: INTERNAL MEDICINE

## 2019-04-17 PROCEDURE — 3017F COLORECTAL CA SCREEN DOC REV: CPT | Performed by: INTERNAL MEDICINE

## 2019-04-17 PROCEDURE — 1036F TOBACCO NON-USER: CPT | Performed by: INTERNAL MEDICINE

## 2019-04-17 PROCEDURE — G8417 CALC BMI ABV UP PARAM F/U: HCPCS | Performed by: INTERNAL MEDICINE

## 2019-04-17 RX ORDER — LEVOTHYROXINE SODIUM 112 UG/1
112 TABLET ORAL DAILY
COMMUNITY
Start: 2019-01-22

## 2019-04-17 RX ORDER — CETIRIZINE HYDROCHLORIDE 10 MG/1
10 TABLET ORAL DAILY
COMMUNITY
Start: 2019-03-26 | End: 2019-07-16

## 2019-04-17 ASSESSMENT — SLEEP AND FATIGUE QUESTIONNAIRES
HOW LIKELY ARE YOU TO NOD OFF OR FALL ASLEEP WHILE SITTING QUIETLY AFTER LUNCH WITHOUT ALCOHOL: 2
NECK CIRCUMFERENCE (INCHES): 17.25
HOW LIKELY ARE YOU TO NOD OFF OR FALL ASLEEP IN A CAR, WHILE STOPPED FOR A FEW MINUTES IN TRAFFIC: 0
HOW LIKELY ARE YOU TO NOD OFF OR FALL ASLEEP WHILE WATCHING TV: 3
HOW LIKELY ARE YOU TO NOD OFF OR FALL ASLEEP WHILE LYING DOWN TO REST IN THE AFTERNOON WHEN CIRCUMSTANCES PERMIT: 2
HOW LIKELY ARE YOU TO NOD OFF OR FALL ASLEEP WHILE SITTING AND TALKING TO SOMEONE: 0
ESS TOTAL SCORE: 9
HOW LIKELY ARE YOU TO NOD OFF OR FALL ASLEEP WHILE SITTING AND READING: 2
HOW LIKELY ARE YOU TO NOD OFF OR FALL ASLEEP WHILE SITTING INACTIVE IN A PUBLIC PLACE: 0
HOW LIKELY ARE YOU TO NOD OFF OR FALL ASLEEP WHEN YOU ARE A PASSENGER IN A CAR FOR AN HOUR WITHOUT A BREAK: 0

## 2019-04-17 ASSESSMENT — ENCOUNTER SYMPTOMS
DIARRHEA: 0
CONSTIPATION: 0
VOICE CHANGE: 0
SHORTNESS OF BREATH: 1
EYE DISCHARGE: 0
COUGH: 0
EYE PAIN: 0
CHOKING: 0
EYE ITCHING: 0
SORE THROAT: 0
ABDOMINAL PAIN: 0

## 2019-04-17 NOTE — PROGRESS NOTES
Pulmonary Outpatient Note   Salvador Wagner MD       2019    Chief Complaint:  Follow-Up from Hospital (pneumonia)     HPI:   48y.o. year old female here for further evaluation of respiratory failure and recent hospital admission. Since discharge she has been on supplemental oxygen 2-3 LPM. Her DME is [facility], she asked for portable oxygen and they refused to supply, she has been wheeling large tanks when outside of her home. Describes shortness of breath with exertion, will improve with use of oxygen. Denies cough or congestion. Has a prior history of KRYSTIAN, was on a CPAP device but has not used for years. Admits to daytime somnolence, witnessed snoring and apneas at night. Takes naps in the daytime.      Past Medical History:   Diagnosis Date    Anemia     Anxiety     Depression     Fibromyalgia     GERD (gastroesophageal reflux disease)     Heart palpitations     Hiatal hernia     Hypertension     Irritable bowel syndrome     Osteoarthritis     Panic attacks     Pneumonia     Seasonal allergies     Seizure disorder (HCC)     Sleep apnea     Spastic colon     Thyroid disease        Past Surgical History:   Procedure Laterality Date    BLADDER SURGERY      BRAIN SURGERY      HIP SURGERY      JOINT REPLACEMENT  2009    hip    KNEE SURGERY      OTHER SURGICAL HISTORY  2013    cystoscopy, urethral dilatation    TOTAL HIP ARTHROPLASTY      Left    URETHRAL STRICTURE DILATATION         Social History     Tobacco Use    Smoking status: Former Smoker     Packs/day: 0.50     Years: 2.00     Pack years: 1.00     Last attempt to quit: 1986     Years since quittin.0    Smokeless tobacco: Never Used   Substance Use Topics    Alcohol use: No          Family History   Problem Relation Age of Onset    Asthma Other     Asthma Other     Hypertension Father     Hypertension Sister     Cancer Neg Hx     Diabetes Neg Hx     Emphysema Neg Hx     Heart Failure Neg Hx Current Outpatient Medications:     Multiple Vitamin (MULTI-VITAMIN DAILY PO), Take 1 tablet by mouth, Disp: , Rfl:     Cyclobenzaprine HCl (FLEXERIL PO), Take 1 tablet by mouth as needed, Disp: , Rfl:     cetirizine (ZYRTEC) 10 MG tablet, Take 10 mg by mouth daily, Disp: , Rfl:     levothyroxine (SYNTHROID) 112 MCG tablet, Take 112 mcg by mouth daily, Disp: , Rfl:     ferrous sulfate 325 (65 Fe) MG tablet, Take 1 tablet by mouth every 48 hours, Disp: 30 tablet, Rfl: 0    ibuprofen (ADVIL;MOTRIN) 200 MG tablet, Take 600 mg by mouth as needed for Pain, Disp: , Rfl:     sertraline (ZOLOFT) 100 MG tablet, Take 100 mg by mouth daily, Disp: , Rfl:     lisinopril (PRINIVIL;ZESTRIL) 5 MG tablet, Take 5 mg by mouth daily, Disp: , Rfl:     lamoTRIgine (LAMICTAL) 200 MG tablet, Take 200 mg by mouth 2 times daily, Disp: , Rfl:     diltiazem (CARDIZEM CD) 120 MG extended release capsule, Take 1 capsule by mouth 2 times daily, Disp: 30 capsule, Rfl: 0    albuterol sulfate HFA (PROVENTIL HFA) 108 (90 Base) MCG/ACT inhaler, Inhale 2 puffs into the lungs every 4 hours as needed for Wheezing or Shortness of Breath (Space out to every 6 hours as symptoms improve) Space out to every 6 hours as symptoms improve., Disp: 1 Inhaler, Rfl: 0    pantoprazole (PROTONIX) 40 MG tablet, Take 40 mg by mouth daily. , Disp: , Rfl:     gabapentin (NEURONTIN) 600 MG tablet, Take 600 mg by mouth 2 times daily. , Disp: , Rfl:     montelukast (SINGULAIR) 10 MG tablet, Take 10 mg by mouth every morning , Disp: , Rfl:     aspirin 81 MG EC tablet, Take 81 mg by mouth daily. , Disp: , Rfl:     acetaminophen (TYLENOL) 500 MG tablet, Take 500 mg by mouth every 6 hours as needed. , Disp: , Rfl:     Bioflavonoids; Other; Oxycodone-acetaminophen; Percocet [oxycodone-acetaminophen];  Acetaminophen; and Nickel    Vitals:    04/17/19 1331   BP: 118/70   Site: Left Lower Arm   Position: Sitting   Cuff Size: Medium Adult   Pulse: 97   SpO2: 96% Weight: (!) 307 lb (139.3 kg)   Height: 5' (1.524 m)       Review of Systems   Constitutional: Negative for chills, fever and unexpected weight change. HENT: Negative for mouth sores, sore throat and voice change. Eyes: Negative for pain, discharge and itching. Respiratory: Positive for shortness of breath. Negative for cough and choking. Cardiovascular: Negative for chest pain, palpitations and leg swelling. Gastrointestinal: Negative for abdominal pain, constipation and diarrhea. Endocrine: Negative for cold intolerance, heat intolerance and polydipsia. Genitourinary: Negative for dysuria, frequency and hematuria. Musculoskeletal: Negative for gait problem, joint swelling and neck stiffness. Neurological: Negative for dizziness, numbness and headaches. Psychiatric/Behavioral: Negative for agitation, confusion and hallucinations. Physical Exam   Constitutional: She appears well-developed and well-nourished. No distress. HENT:   Head: Normocephalic and atraumatic. Mouth/Throat: Oropharynx is clear and moist. No oropharyngeal exudate. Eyes: Pupils are equal, round, and reactive to light. EOM are normal.   Neck: Neck supple. No JVD present. Cardiovascular: Normal heart sounds. Exam reveals no gallop and no friction rub. No murmur heard. Pulmonary/Chest: Effort normal. She has no wheezes. She has no rales. Equal chest rise and expansion bilaterally   Abdominal: Soft. Bowel sounds are normal. She exhibits no distension. There is no tenderness. Musculoskeletal: Normal range of motion. She exhibits no edema. Lymphadenopathy:     She has no cervical adenopathy. Neurological: She is alert. No cranial nerve deficit. CN 2-12 grossly intact   Skin: Skin is warm and dry. No rash noted. She is not diaphoretic. CT chest personally reviewed, left sided airspace disease, atelectasis, and a small pleural effusion. Echo with right heart dilation. ASSESSMENT:    1.  Shortness of breath    2. Excessive somnolence disorder    3. Acute on chronic respiratory failure with hypoxia (HCC)      PLAN:    -Further evaluate respiratory impairment with PFTs  -Check 6 minute walk to clarify need for oxygen, is achieving a symptom benefit from use and should continue in the interim.  Will send order to DME for portable oxygen   -Check sleep study and start on PAP therapy pending results.   -Counseled on sleep hygiene and weight management    Orders Placed This Encounter   Procedures    Nasal Cannula Oxygen    Full PFT Study With Bronchodilator    6 Minute Walk Test    Baseline Diagnostic Sleep Study       Krishna Mccarthy MD

## 2019-04-20 ENCOUNTER — APPOINTMENT (OUTPATIENT)
Dept: GENERAL RADIOLOGY | Age: 54
DRG: 133 | End: 2019-04-20
Payer: MEDICAID

## 2019-04-20 ENCOUNTER — HOSPITAL ENCOUNTER (INPATIENT)
Age: 54
LOS: 2 days | Discharge: HOME HEALTH | DRG: 133 | End: 2019-04-22
Attending: EMERGENCY MEDICINE | Admitting: FAMILY MEDICINE
Payer: MEDICAID

## 2019-04-20 DIAGNOSIS — J96.01 ACUTE RESPIRATORY FAILURE WITH HYPOXIA (HCC): Primary | ICD-10-CM

## 2019-04-20 PROBLEM — R06.89 INEFFECTIVE AIRWAY CLEARANCE: Status: ACTIVE | Noted: 2019-04-20

## 2019-04-20 PROBLEM — R91.8 PULMONARY INFILTRATES: Status: ACTIVE | Noted: 2019-04-20

## 2019-04-20 PROBLEM — J90 PLEURAL EFFUSION ON LEFT: Status: ACTIVE | Noted: 2019-04-20

## 2019-04-20 PROBLEM — J96.00 ACUTE RESPIRATORY FAILURE (HCC): Status: ACTIVE | Noted: 2019-04-20

## 2019-04-20 PROBLEM — J98.11 ATELECTASIS: Status: ACTIVE | Noted: 2019-04-20

## 2019-04-20 PROBLEM — D50.9 IRON DEFICIENCY ANEMIA: Status: ACTIVE | Noted: 2019-04-20

## 2019-04-20 PROBLEM — R06.02 SOB (SHORTNESS OF BREATH): Status: ACTIVE | Noted: 2019-04-20

## 2019-04-20 PROBLEM — I51.7 RIGHT VENTRICULAR ENLARGEMENT: Status: ACTIVE | Noted: 2019-04-20

## 2019-04-20 LAB
A/G RATIO: 1.3 (ref 1.1–2.2)
ALBUMIN SERPL-MCNC: 3.5 G/DL (ref 3.4–5)
ALP BLD-CCNC: 91 U/L (ref 40–129)
ALT SERPL-CCNC: 14 U/L (ref 10–40)
ANION GAP SERPL CALCULATED.3IONS-SCNC: 12 MMOL/L (ref 3–16)
AST SERPL-CCNC: 15 U/L (ref 15–37)
BASOPHILS ABSOLUTE: 0 K/UL (ref 0–0.2)
BASOPHILS RELATIVE PERCENT: 0.4 %
BILIRUB SERPL-MCNC: 0.4 MG/DL (ref 0–1)
BILIRUBIN URINE: NEGATIVE
BLOOD, URINE: NEGATIVE
BUN BLDV-MCNC: 15 MG/DL (ref 7–20)
CALCIUM SERPL-MCNC: 8.5 MG/DL (ref 8.3–10.6)
CHLORIDE BLD-SCNC: 103 MMOL/L (ref 99–110)
CLARITY: CLEAR
CO2: 27 MMOL/L (ref 21–32)
COLOR: YELLOW
CREAT SERPL-MCNC: 0.7 MG/DL (ref 0.6–1.1)
EOSINOPHILS ABSOLUTE: 0 K/UL (ref 0–0.6)
EOSINOPHILS RELATIVE PERCENT: 0 %
GFR AFRICAN AMERICAN: >60
GFR NON-AFRICAN AMERICAN: >60
GLOBULIN: 2.8 G/DL
GLUCOSE BLD-MCNC: 112 MG/DL (ref 70–99)
GLUCOSE URINE: NEGATIVE MG/DL
HCT VFR BLD CALC: 33.9 % (ref 36–48)
HEMATOLOGY PATH CONSULT: NO
HEMOGLOBIN: 10.6 G/DL (ref 12–16)
KETONES, URINE: NEGATIVE MG/DL
LEUKOCYTE ESTERASE, URINE: NEGATIVE
LYMPHOCYTES ABSOLUTE: 1.3 K/UL (ref 1–5.1)
LYMPHOCYTES RELATIVE PERCENT: 17.9 %
MCH RBC QN AUTO: 20.5 PG (ref 26–34)
MCHC RBC AUTO-ENTMCNC: 31.2 G/DL (ref 31–36)
MCV RBC AUTO: 65.6 FL (ref 80–100)
MICROSCOPIC EXAMINATION: NORMAL
MONOCYTES ABSOLUTE: 0.6 K/UL (ref 0–1.3)
MONOCYTES RELATIVE PERCENT: 7.6 %
NEUTROPHILS ABSOLUTE: 5.5 K/UL (ref 1.7–7.7)
NEUTROPHILS RELATIVE PERCENT: 74.1 %
NITRITE, URINE: NEGATIVE
PDW BLD-RTO: 20.1 % (ref 12.4–15.4)
PH UA: 6 (ref 5–8)
PLATELET # BLD: 331 K/UL (ref 135–450)
PMV BLD AUTO: 7.6 FL (ref 5–10.5)
POTASSIUM SERPL-SCNC: 4.4 MMOL/L (ref 3.5–5.1)
PRO-BNP: 74 PG/ML (ref 0–124)
PROTEIN UA: NEGATIVE MG/DL
RBC # BLD: 5.17 M/UL (ref 4–5.2)
SODIUM BLD-SCNC: 142 MMOL/L (ref 136–145)
SPECIFIC GRAVITY UA: 1.02 (ref 1–1.03)
TOTAL PROTEIN: 6.3 G/DL (ref 6.4–8.2)
TROPONIN: <0.01 NG/ML
URINE REFLEX TO CULTURE: NORMAL
URINE TYPE: NORMAL
UROBILINOGEN, URINE: 0.2 E.U./DL
WBC # BLD: 7.4 K/UL (ref 4–11)

## 2019-04-20 PROCEDURE — 94761 N-INVAS EAR/PLS OXIMETRY MLT: CPT

## 2019-04-20 PROCEDURE — 36415 COLL VENOUS BLD VENIPUNCTURE: CPT

## 2019-04-20 PROCEDURE — 83880 ASSAY OF NATRIURETIC PEPTIDE: CPT

## 2019-04-20 PROCEDURE — 96374 THER/PROPH/DIAG INJ IV PUSH: CPT

## 2019-04-20 PROCEDURE — 1200000000 HC SEMI PRIVATE

## 2019-04-20 PROCEDURE — 84443 ASSAY THYROID STIM HORMONE: CPT

## 2019-04-20 PROCEDURE — 99254 IP/OBS CNSLTJ NEW/EST MOD 60: CPT | Performed by: INTERNAL MEDICINE

## 2019-04-20 PROCEDURE — 80053 COMPREHEN METABOLIC PANEL: CPT

## 2019-04-20 PROCEDURE — 93005 ELECTROCARDIOGRAM TRACING: CPT | Performed by: EMERGENCY MEDICINE

## 2019-04-20 PROCEDURE — 94150 VITAL CAPACITY TEST: CPT

## 2019-04-20 PROCEDURE — 2700000000 HC OXYGEN THERAPY PER DAY

## 2019-04-20 PROCEDURE — 6370000000 HC RX 637 (ALT 250 FOR IP): Performed by: STUDENT IN AN ORGANIZED HEALTH CARE EDUCATION/TRAINING PROGRAM

## 2019-04-20 PROCEDURE — 94640 AIRWAY INHALATION TREATMENT: CPT

## 2019-04-20 PROCEDURE — 84484 ASSAY OF TROPONIN QUANT: CPT

## 2019-04-20 PROCEDURE — 81003 URINALYSIS AUTO W/O SCOPE: CPT

## 2019-04-20 PROCEDURE — 99285 EMERGENCY DEPT VISIT HI MDM: CPT

## 2019-04-20 PROCEDURE — 6370000000 HC RX 637 (ALT 250 FOR IP): Performed by: PHYSICIAN ASSISTANT

## 2019-04-20 PROCEDURE — 85025 COMPLETE CBC W/AUTO DIFF WBC: CPT

## 2019-04-20 PROCEDURE — G0378 HOSPITAL OBSERVATION PER HR: HCPCS

## 2019-04-20 PROCEDURE — 2580000003 HC RX 258: Performed by: STUDENT IN AN ORGANIZED HEALTH CARE EDUCATION/TRAINING PROGRAM

## 2019-04-20 PROCEDURE — 71046 X-RAY EXAM CHEST 2 VIEWS: CPT

## 2019-04-20 PROCEDURE — 96375 TX/PRO/DX INJ NEW DRUG ADDON: CPT

## 2019-04-20 PROCEDURE — 6360000002 HC RX W HCPCS: Performed by: PHYSICIAN ASSISTANT

## 2019-04-20 RX ORDER — LAMOTRIGINE 100 MG/1
200 TABLET ORAL 2 TIMES DAILY
Status: DISCONTINUED | OUTPATIENT
Start: 2019-04-20 | End: 2019-04-22 | Stop reason: HOSPADM

## 2019-04-20 RX ORDER — FUROSEMIDE 10 MG/ML
20 INJECTION INTRAMUSCULAR; INTRAVENOUS ONCE
Status: COMPLETED | OUTPATIENT
Start: 2019-04-20 | End: 2019-04-20

## 2019-04-20 RX ORDER — LEVOTHYROXINE SODIUM 112 UG/1
112 TABLET ORAL DAILY
Status: DISCONTINUED | OUTPATIENT
Start: 2019-04-20 | End: 2019-04-22 | Stop reason: HOSPADM

## 2019-04-20 RX ORDER — FERROUS SULFATE 325(65) MG
325 TABLET ORAL
Status: DISCONTINUED | OUTPATIENT
Start: 2019-04-20 | End: 2019-04-22 | Stop reason: HOSPADM

## 2019-04-20 RX ORDER — METHYLPREDNISOLONE SODIUM SUCCINATE 125 MG/2ML
125 INJECTION, POWDER, LYOPHILIZED, FOR SOLUTION INTRAMUSCULAR; INTRAVENOUS ONCE
Status: COMPLETED | OUTPATIENT
Start: 2019-04-20 | End: 2019-04-20

## 2019-04-20 RX ORDER — MONTELUKAST SODIUM 10 MG/1
10 TABLET ORAL EVERY MORNING
Status: DISCONTINUED | OUTPATIENT
Start: 2019-04-21 | End: 2019-04-22 | Stop reason: HOSPADM

## 2019-04-20 RX ORDER — ONDANSETRON 2 MG/ML
4 INJECTION INTRAMUSCULAR; INTRAVENOUS EVERY 6 HOURS PRN
Status: DISCONTINUED | OUTPATIENT
Start: 2019-04-20 | End: 2019-04-22 | Stop reason: HOSPADM

## 2019-04-20 RX ORDER — GABAPENTIN 300 MG/1
600 CAPSULE ORAL 2 TIMES DAILY
Status: DISCONTINUED | OUTPATIENT
Start: 2019-04-20 | End: 2019-04-22 | Stop reason: HOSPADM

## 2019-04-20 RX ORDER — SODIUM CHLORIDE 0.9 % (FLUSH) 0.9 %
10 SYRINGE (ML) INJECTION PRN
Status: DISCONTINUED | OUTPATIENT
Start: 2019-04-20 | End: 2019-04-22 | Stop reason: HOSPADM

## 2019-04-20 RX ORDER — LISINOPRIL 5 MG/1
5 TABLET ORAL DAILY
Status: DISCONTINUED | OUTPATIENT
Start: 2019-04-20 | End: 2019-04-22 | Stop reason: HOSPADM

## 2019-04-20 RX ORDER — DILTIAZEM HYDROCHLORIDE 120 MG/1
120 CAPSULE, COATED, EXTENDED RELEASE ORAL 2 TIMES DAILY
Status: DISCONTINUED | OUTPATIENT
Start: 2019-04-20 | End: 2019-04-22 | Stop reason: HOSPADM

## 2019-04-20 RX ORDER — ASPIRIN 81 MG/1
81 TABLET ORAL DAILY
Status: DISCONTINUED | OUTPATIENT
Start: 2019-04-20 | End: 2019-04-22 | Stop reason: HOSPADM

## 2019-04-20 RX ORDER — POLYETHYLENE GLYCOL 3350 17 G/17G
17 POWDER, FOR SOLUTION ORAL DAILY PRN
Status: DISCONTINUED | OUTPATIENT
Start: 2019-04-20 | End: 2019-04-22 | Stop reason: HOSPADM

## 2019-04-20 RX ORDER — PANTOPRAZOLE SODIUM 40 MG/1
40 TABLET, DELAYED RELEASE ORAL DAILY
Status: DISCONTINUED | OUTPATIENT
Start: 2019-04-20 | End: 2019-04-22 | Stop reason: HOSPADM

## 2019-04-20 RX ORDER — CETIRIZINE HYDROCHLORIDE 10 MG/1
10 TABLET ORAL DAILY
Status: DISCONTINUED | OUTPATIENT
Start: 2019-04-20 | End: 2019-04-21

## 2019-04-20 RX ORDER — SODIUM CHLORIDE 0.9 % (FLUSH) 0.9 %
10 SYRINGE (ML) INJECTION EVERY 12 HOURS SCHEDULED
Status: DISCONTINUED | OUTPATIENT
Start: 2019-04-20 | End: 2019-04-22 | Stop reason: HOSPADM

## 2019-04-20 RX ORDER — IPRATROPIUM BROMIDE AND ALBUTEROL SULFATE 2.5; .5 MG/3ML; MG/3ML
1 SOLUTION RESPIRATORY (INHALATION)
Status: DISCONTINUED | OUTPATIENT
Start: 2019-04-21 | End: 2019-04-22 | Stop reason: HOSPADM

## 2019-04-20 RX ORDER — IPRATROPIUM BROMIDE AND ALBUTEROL SULFATE 2.5; .5 MG/3ML; MG/3ML
3 SOLUTION RESPIRATORY (INHALATION) ONCE
Status: COMPLETED | OUTPATIENT
Start: 2019-04-20 | End: 2019-04-20

## 2019-04-20 RX ORDER — IPRATROPIUM BROMIDE AND ALBUTEROL SULFATE 2.5; .5 MG/3ML; MG/3ML
1 SOLUTION RESPIRATORY (INHALATION)
Status: DISCONTINUED | OUTPATIENT
Start: 2019-04-20 | End: 2019-04-20

## 2019-04-20 RX ADMIN — ASPIRIN 81 MG: 81 TABLET, COATED ORAL at 21:35

## 2019-04-20 RX ADMIN — DILTIAZEM HYDROCHLORIDE 120 MG: 120 CAPSULE, COATED, EXTENDED RELEASE ORAL at 21:27

## 2019-04-20 RX ADMIN — METHYLPREDNISOLONE SODIUM SUCCINATE 125 MG: 125 INJECTION, POWDER, FOR SOLUTION INTRAMUSCULAR; INTRAVENOUS at 16:38

## 2019-04-20 RX ADMIN — IPRATROPIUM BROMIDE AND ALBUTEROL SULFATE 3 AMPULE: .5; 3 SOLUTION RESPIRATORY (INHALATION) at 16:42

## 2019-04-20 RX ADMIN — FERROUS SULFATE TAB 325 MG (65 MG ELEMENTAL FE) 325 MG: 325 (65 FE) TAB at 21:27

## 2019-04-20 RX ADMIN — Medication 10 ML: at 21:31

## 2019-04-20 RX ADMIN — FUROSEMIDE 20 MG: 10 INJECTION, SOLUTION INTRAMUSCULAR; INTRAVENOUS at 17:53

## 2019-04-20 RX ADMIN — GABAPENTIN 600 MG: 300 CAPSULE ORAL at 21:27

## 2019-04-20 RX ADMIN — LAMOTRIGINE 200 MG: 100 TABLET ORAL at 21:27

## 2019-04-20 RX ADMIN — SERTRALINE HYDROCHLORIDE 100 MG: 50 TABLET ORAL at 21:35

## 2019-04-20 SDOH — HEALTH STABILITY: PHYSICAL HEALTH: ON AVERAGE, HOW MANY MINUTES DO YOU ENGAGE IN EXERCISE AT THIS LEVEL?: 0 MIN

## 2019-04-20 SDOH — HEALTH STABILITY: PHYSICAL HEALTH: ON AVERAGE, HOW MANY DAYS PER WEEK DO YOU ENGAGE IN MODERATE TO STRENUOUS EXERCISE (LIKE A BRISK WALK)?: 0 DAYS

## 2019-04-20 ASSESSMENT — PAIN SCALES - GENERAL
PAINLEVEL_OUTOF10: 0
PAINLEVEL_OUTOF10: 5
PAINLEVEL_OUTOF10: 0

## 2019-04-20 NOTE — H&P
Inpatient Family Medicine Service History & Physical        PCP: Vineet Varela MD    Date of Admission: 4/20/2019    Date of Service: Pt seen/examined on 4/20/19 and Admitted to Inpatient with expected LOS greater than two midnights due to medical therapy. Chief Complaint:  SOB, cough    History Of Present Illness:    Carlos Fraire is a 48 y.o. female with PMHx of morbid obesity, seizures (last one in 2010), KRYSTIAN and chronic respiratory failure who presented to the [facility] ED on 4/20/19 complaining of a several-day history of recurrent shortness of breath and a one-day history of chest pain. She was accompanied by sister, who brought her in for evaluation. Of note patient had a recent admission just a couple of weeks for acute hypoxic respiratory failure. She was discharged home on 2-3L of oxygen. She followed up with Dr. Mardee Libman (pulmonology) on 4/17/19 and later that day began developing worsening symptoms (SOB with minimal exertion and cough). Also began requiring more oxygen at rest. She was supposed to have another appointment the following day with him for a 6-minute walk test and admits she intentionally did not go because she did not think she would be able to do the test. Symptoms continued to progress over the last few days, causing her to come to the ER. She denies any hemoptysis, fever/chills, dizziness, headache, abdominal pain, or N/V. Has had some \"twinges\" of chest pain which appear to be intermittent. Denies neck, arm, jaw or back pain. No pain with deep inspiration. No leg pain or swelling. On arrival to the ER, patient was non-toxic appearing, speaking in full-sentences, was wearing 3L O2 NC, and had stable vitals. She did however desat to 84% with minimal ambulation while wearing oxygen. Initial labs, including CBC, CMP, UA, and Pro-BNP were within normal limits. EKG was NSR. CXR showed only some mild atelectasis. CTPA was negative for PE, possible pleural small pleural effusion. She was evaluated by pulmonology while still in the ER, who recommend trial of lasix and also to do a bronchoscopy in the morning. She was given lasix 20 mg IV, a duoneb breathing treatment and one dose of IV solu-medrol in the ER. Patient was thus admitted to the floor for acute on chronic respiratory failure with hypoxia. Past Medical History:        Diagnosis Date    Anemia     Anxiety     Depression     Fibromyalgia     GERD (gastroesophageal reflux disease)     Heart palpitations     Hiatal hernia     Hypertension     Irritable bowel syndrome     Osteoarthritis     Panic attacks     Pneumonia     Pulmonary infiltrates 4/20/2019    Seasonal allergies     Seizure disorder ([facility] [address]     Sleep apnea     Spastic colon     Thyroid disease        Past Surgical History:        Procedure Laterality Date    BLADDER SURGERY      BRAIN SURGERY      HIP SURGERY      JOINT REPLACEMENT  2009    hip    KNEE SURGERY      OTHER SURGICAL HISTORY  07/31/2013    cystoscopy, urethral dilatation    TOTAL HIP ARTHROPLASTY      Left    URETHRAL STRICTURE DILATATION         Medications Prior to Admission:   Prior to Admission medications    Medication Sig Start Date End Date Taking?  Authorizing Provider   Multiple Vitamin (MULTI-VITAMIN DAILY PO) Take 1 tablet by mouth    Historical Provider, MD   Cyclobenzaprine HCl (FLEXERIL PO) Take 1 tablet by mouth as needed 11/27/18   Historical Provider, MD   cetirizine (ZYRTEC) 10 MG tablet Take 10 mg by mouth daily 3/26/19   Historical Provider, MD   levothyroxine (SYNTHROID) 112 MCG tablet Take 112 mcg by mouth daily 1/22/19   Historical Provider, MD   ferrous sulfate 325 (65 Fe) MG tablet Take 1 tablet by mouth every 48 hours 4/10/19   Herbie Warner DO   ibuprofen (ADVIL;MOTRIN) 200 MG tablet Take 600 mg by mouth as needed for Pain    Historical Provider, MD   sertraline (ZOLOFT) 100 MG tablet Take 100 mg by mouth daily    Historical Provider, MD   lisinopril (PRINIVIL;ZESTRIL) 5 MG tablet Take 5 mg by mouth daily    Historical Provider, MD   lamoTRIgine (LAMICTAL) 200 MG tablet Take 200 mg by mouth 2 times daily    Historical Provider, MD   diltiazem (CARDIZEM CD) 120 MG extended release capsule Take 1 capsule by mouth 2 times daily 11/25/18   Hui Myers MD   albuterol sulfate HFA (PROVENTIL HFA) 108 (90 Base) MCG/ACT inhaler Inhale 2 puffs into the lungs every 4 hours as needed for Wheezing or Shortness of Breath (Space out to every 6 hours as symptoms improve) Space out to every 6 hours as symptoms improve. 11/3/18   Alba Antonio APRN - CNP   pantoprazole (PROTONIX) 40 MG tablet Take 40 mg by mouth daily. Historical Provider, MD   gabapentin (NEURONTIN) 600 MG tablet Take 600 mg by mouth 2 times daily. Historical Provider, MD   montelukast (SINGULAIR) 10 MG tablet Take 10 mg by mouth every morning     Historical Provider, MD   aspirin 81 MG EC tablet Take 81 mg by mouth daily. Historical Provider, MD   acetaminophen (TYLENOL) 500 MG tablet Take 500 mg by mouth every 6 hours as needed. Historical Provider, MD       Allergies:  Bioflavonoids; Other; Oxycodone-acetaminophen; Percocet [oxycodone-acetaminophen]; Acetaminophen; and Nickel    Social History:    The patient currently lives at home  TOBACCO:   reports that she quit smoking about 33 years ago. She has a 1.00 pack-year smoking history. She has never used smokeless tobacco.  ETOH:   reports that she does not drink alcohol. Family History:    Reviewed in detail and negative for DM, CAD, Cancer, CVA. Positive as follows:      Problem Relation Age of Onset    Asthma Other     Asthma Other     Hypertension Father     Hypertension Sister     Cancer Neg Hx     Diabetes Neg Hx     Emphysema Neg Hx     Heart Failure Neg Hx        REVIEW OF SYSTEMS:   Pertinent positives as noted in the HPI. All other systems reviewed and negative.     PHYSICAL EXAM PERFORMED:  /68   Pulse 94 Temp 98 °F (36.7 °C) (Oral)   Resp 12   Wt (!) 308 lb (139.7 kg)   LMP 07/29/2013   SpO2 94%   BMI 60.15 kg/m²   GENERAL APPEARANCE: Awake and alert. Cooperative. No acute  distress. Morbidly obese  HEAD: Normocephalic. Atraumatic. EYES: PERRL. EOM's grossly intact. ENT: Mucous membranes are moist.   NECK: Supple. No JVD. No tracheal tenderness or deviation. HEART: RRR. No murmurs. No chest wall tenderness. *exam limited due to body habitus*  LUNGS: Respirations unlabored on 2L O2 NC. CTAB. Good air exchange. Speaking comfortably in full sentences. No wheezes, rhonchi, rales. ABDOMEN: Soft. Non-distended. Non-tender. No guarding or rebound. No midline pulsatile mass. EXTREMITIES: No peripheral edema. No unilateral calf pain, redness or swelling. Moves all extremities equally. All extremities neurovascularly intact. SKIN: Warm and dry. No acute rashes. NEUROLOGICAL: Alert and oriented. Grossly non-focal   PSYCHIATRIC: Normal mood and affect. Labs:   Recent Labs     04/20/19  1348   WBC 7.4   HGB 10.6*   HCT 33.9*        Recent Labs     04/20/19  1348      K 4.4      CO2 27   BUN 15   CREATININE 0.7   CALCIUM 8.5     Recent Labs     04/20/19  1348   AST 15   ALT 14   BILITOT 0.4   ALKPHOS 91     No results for input(s): INR in the last 72 hours. Recent Labs     04/20/19  1348   TROPONINI <0.01       Urinalysis:   Lab Results   Component Value Date    NITRU Negative 04/20/2019    WBCUA 6-10 11/25/2018    BACTERIA 1+ 11/02/2018    RBCUA 0-2 11/25/2018    BLOODU Negative 04/20/2019    SPECGRAV 1.025 04/20/2019    GLUCOSEU Negative 04/20/2019       Radiology:       XR CHEST STANDARD (2 VW)   Final Result   Slight bibasilar atelectasis suspected. No other significant abnormality.              ASSESSMENT & PLAN:  Active Hospital Problems    Diagnosis Date Noted    SOB (shortness of breath) [R06.02] 04/20/2019    Pleural effusion on left [J90] 04/20/2019    Pulmonary infiltrates [R91.8] 04/20/2019    Atelectasis [J98.11] 04/20/2019    Ineffective airway clearance [R06.89] 04/20/2019    Acute respiratory failure with hypoxia (HCC) [J96.01] 04/20/2019    Right ventricular enlargement [I51.7] 04/20/2019    Iron deficiency anemia [D50.9] 04/20/2019    [facility] [J96.00] 04/20/2019    Morbid obesity with BMI of 50.0-59.9, adult ([facility] [E66.01, Z68.43] 04/23/2014    KRYSTIAN (obstructive sleep apnea) [G47.33] 04/23/2014    Hypothyroid [E03.9] 04/23/2014     Acute on chronic respiratory failure with hypoxia - with recent admission for same diagnosis, was discharged home on 2-3L O2; has been following with pulmonology but missed 2nd appointment; increased oxygen requirement at rest with desaturations to 84% while wearing oxygen. Labs (CBC, CMP, UA, Pro-BMP, troponin) and vitals otherwise within normal limtis. Previous Echo (4/6/19) showing EF 55-60%. Previous chest CTPA on 4/6/19 showing right lower lobe atelectasis vs pneumonia and no evidence of PE. CXR today showing slight bibasilar atelectasis. Does not appear fluid overloaded on exam today  - Pulmonology consulted, appreciate recs- plan for bronchoscopy tomorrow AM  - Continue RT/Duonebs  - continue oxygen protocol  - ICS  - ambulation  - will likely needed CPAP at night    HTN   - continue home diltiazem, lisinopril. History of seizure disorder- managed by PCP, last seizure in 2010  -continue Lamictal and gabapentin    Anemia, microcytic - appears chronic on last admission had a Hgb of 10.7 on admission, today   - continue iron supplement  - recommend outpatient colonoscopy     Morbid obesity - body mass index is 58.7 kg/m². Complicating assessment and treatment. Placing patient at risk for multiple co-morbidities as well as early death and contributing to the patient's presentation.  Counseled on weight loss;  HgbA1c 6.2      Suspected Obstructive Sleep Apnea: Pt reports hx of KRYSTIAN but not wearing her

## 2019-04-20 NOTE — ED NOTES
Ambulated pt approx. 25ft on 3L of oxygen. Pt's O2 dropped from 96% in rm to 85%. Pt brought back to rm and hooked back up to oxyen.  HR increased from 88bpm in room to 113bpm     Camille Lawson  04/20/19 1603

## 2019-04-20 NOTE — CONSULTS
not have any sore throat, patient does feel that she has increased shortness of breath and wheezing along with that patient also says that she feels tiny amount or chest pain on the left side which bothers her along with that patient also says that she has no history of any abdominal discomfort in the recent past, patient does not give history of any nausea or vomiting, patient does not have any dysuria or hematuria, patient does have some leg edema, patient also says that she was not sent home on any diuretics along with that patient also says that she has been taking Synthroid for many years now and she was told on the discharge sheet that she does not need to take that and patient did not know why for that reason patient continues taking her medications as before, patient continues to be symptomatic is made her come to the hospital, and patient says that she wants something to be done about that  Patient does not have a history of any exposures to any chemicals or fumes but patient says that when she was growing up [place] and all the chemicals which were sprayed in the farm were inhaled by her ; patient has history of avascular necrosis of the bones and patient needs some surgeries in the near future; patient is alert and communicative when seen, no other pertinent review of system of concern  Patient has history of iron deficiency anemia and takes iron pills every other day       Patient Active Problem List    Diagnosis Date Noted    SOB (shortness of breath) 04/20/2019    Pleural effusion on left 04/20/2019    Pulmonary infiltrates 04/20/2019    Atelectasis 04/20/2019    Ineffective airway clearance 04/20/2019    Acute respiratory failure with hypoxia ([address] 04/20/2019    Right ventricular enlargement 04/20/2019    Iron deficiency anemia 04/20/2019    Hypoxia 04/07/2019    Essential hypertension 03/16/2017    Chronic GERD 03/16/2017    Morbid obesity with BMI of 50.0-59.9, adult ([facility] 2014    KRYSTIAN (obstructive sleep apnea) 2014    Hypothyroid 2014    Seizure disorder ([facility] 2014       Past Medical History:   Diagnosis Date    Anemia     Anxiety     Depression     Fibromyalgia     GERD (gastroesophageal reflux disease)     Heart palpitations     Hiatal hernia     Hypertension     Irritable bowel syndrome     Osteoarthritis     Panic attacks     Pneumonia     Pulmonary infiltrates 2019    Seasonal allergies     Seizure disorder (HCC)     Sleep apnea     Spastic colon     Thyroid disease         Past Surgical History:   Procedure Laterality Date    BLADDER SURGERY      BRAIN SURGERY      HIP SURGERY      JOINT REPLACEMENT  2009    hip    KNEE SURGERY      OTHER SURGICAL HISTORY  2013    cystoscopy, urethral dilatation    TOTAL HIP ARTHROPLASTY      Left    URETHRAL STRICTURE DILATATION          Family History   Problem Relation Age of Onset    Asthma Other     Asthma Other     Hypertension Father     Hypertension Sister     Cancer Neg Hx     Diabetes Neg Hx     Emphysema Neg Hx     Heart Failure Neg Hx         Social History     Tobacco Use    Smoking status: Former Smoker     Packs/day: 0.50     Years: 2.00     Pack years: 1.00     Last attempt to quit: 1986     Years since quittin.0    Smokeless tobacco: Never Used   Substance Use Topics    Alcohol use: No        Allergies   Allergen Reactions    Bioflavonoids Anaphylaxis    Other      Nickel,citrus, pain meds?  Oxycodone-Acetaminophen Hives    Percocet [Oxycodone-Acetaminophen]      Can take VICODIN    Acetaminophen Rash    Nickel Hives, Other (See Comments) and Rash               Physical Exam:  Blood pressure 124/66, pulse 80, temperature 98 °F (36.7 °C), temperature source Oral, resp. rate 16, weight (!) 308 lb (139.7 kg), last menstrual period 2013, SpO2 96 %.'   Constitutional:  No acute distress.    HENT:  Oropharynx is clear and moist. No thyromegaly. Eyes:  Conjunctivae are normal. Pupils equal, round, and reactive to light. No scleral icterus. Neck: . No tracheal deviation present. No obvious thyroid mass. Short and large neck  Cardiovascular: Normal rate, regular rhythm, normal heart sounds. No right ventricular heave. 1+ lower extremity edema. Pulmonary/Chest: No wheezes. Bibasilar rales-left more than right. Chest wall is not dull to percussion. No accessory muscle usage or stridor. Prolonged expiration with decreased breath sound intensity  Abdominal: Soft. Bowel sounds present. No distension or hernia. No tenderness. Morbid obesity and possible free fluid in the peritoneal cavity   Musculoskeletal: No cyanosis. No clubbing. No obvious joint deformity. Lymphadenopathy: No cervical or supraclavicular adenopathy. Skin: Skin is warm and dry. No rash or nodules on the exposed extremities. Psychiatric: Normal mood and affect. Behavior is normal.  No anxiety. Neurologic: Alert, awake and oriented. PERRL. Speech fluent        Results:  CBC:   Recent Labs     04/20/19  1348   WBC 7.4   HGB 10.6*   HCT 33.9*   MCV 65.6*        BMP:   Recent Labs     04/20/19  1348      K 4.4      CO2 27   BUN 15   CREATININE 0.7     LIVER PROFILE:   Recent Labs     04/20/19  1348   AST 15   ALT 14   BILITOT 0.4   ALKPHOS 91     PT/INR: No results for input(s): PROTIME, INR in the last 72 hours. APTT: No results for input(s): APTT in the last 72 hours. UA:  Recent Labs     04/20/19  1434   COLORU Yellow   PHUR 6.0   CLARITYU Clear   SPECGRAV 1.025   LEUKOCYTESUR Negative   UROBILINOGEN 0.2   BILIRUBINUR Negative   BLOODU Negative   GLUCOSEU Negative       Imaging:  I have reviewed radiology images personally. XR CHEST STANDARD (2 VW)   Final Result   Slight bibasilar atelectasis suspected. No other significant abnormality.            Xr Chest Standard (2 Vw)    Result Date: 4/20/2019  EXAMINATION: TWO VIEWS OF THE CHEST 4/20/2019 1:54 pm COMPARISON: 04/06/2019 HISTORY: ORDERING SYSTEM PROVIDED HISTORY: shortness of breath TECHNOLOGIST PROVIDED HISTORY: Reason for exam:->shortness of breath Acute symptoms. Initial exam. FINDINGS: Heart size is within normal.  Slight bibasilar atelectasis may be present. No abnormal vascular congestion or sizable pleural effusion. Slight bibasilar atelectasis suspected. No other significant abnormality. Xr Chest Standard (2 Vw)    Result Date: 4/6/2019  EXAMINATION: TWO VIEWS OF THE CHEST 4/6/2019 8:29 pm COMPARISON: 11/25/2018 HISTORY: ORDERING SYSTEM PROVIDED HISTORY: SOB TECHNOLOGIST PROVIDED HISTORY: Reason for exam:->SOB Ordering Physician Provided Reason for Exam: sob; cough Acuity: Acute Type of Exam: Initial FINDINGS: Limited by body habitus. Cardiomegaly. Fullness of the pulmonary vasculature. Opacification at the left lung base with blunting of the left costophrenic sulcus on the lateral view concerning for effusion with atelectasis or pneumonia. No pneumothorax. Opacification at the left lung base with blunting of the left costophrenic sulcus on the lateral view concerning for effusion with adjacent atelectasis. Underlying pneumonia cannot be excluded. Cardiomegaly pulmonary vascular congestion. Ct Chest Pulmonary Embolism W Contrast    Result Date: 4/6/2019  EXAMINATION: CTA OF THE CHEST 4/6/2019 10:35 pm TECHNIQUE: CTA of the chest was performed after the administration of intravenous contrast.  Multiplanar reformatted images are provided for review. MIP images are provided for review. Dose modulation, iterative reconstruction, and/or weight based adjustment of the mA/kV was utilized to reduce the radiation dose to as low as reasonably achievable. COMPARISON: None.  HISTORY: ORDERING SYSTEM PROVIDED HISTORY: sob, tachycardia TECHNOLOGIST PROVIDED HISTORY: Ordering Physician Provided Reason for Exam: Shortness of Breath (recent bronchotis 3weeks ago; increased SOB, and cough. ) FINDINGS: Pulmonary Arteries: Evaluation is compromised by motion artifact and the patient's body habitus. There is no convincing evidence for pulmonary embolus. Mediastinum: No evidence of mediastinal lymphadenopathy. The heart and pericardium demonstrate no acute abnormality. There is no acute abnormality of the thoracic aorta. Lungs/pleura: There is a small left pleural effusion. There is no pneumothorax. There is lingular and left lower lobe airspace disease. There is minimal airspace disease in the lateral and anterior right lower lobe. Upper Abdomen: Limited images of the upper abdomen are unremarkable. Soft Tissues/Bones: No acute bone or soft tissue abnormality. 1. Limited study with no definite scan evidence for pulmonary embolus. 2. Left pleural effusion with lingular and left lower lobe atelectasis or pneumonia. 3. Right lower lobe atelectasis or pneumonia. Results for Heather Clark (MRN [id number]) as of 4/20/2019 17:04   Ref. Range 4/6/2019 20:34 4/7/2019 09:50 4/7/2019 14:58 4/8/2019 05:31 4/20/2019 13:48   Sodium Latest Ref Range: 136 - 145 mmol/L 143   143 142   Potassium Latest Ref Range: 3.5 - 5.1 mmol/L 4.1   4.2 4.4   Chloride Latest Ref Range: 99 - 110 mmol/L 104   105 103   CO2 Latest Ref Range: 21 - 32 mmol/L 28   28 27   BUN Latest Ref Range: 7 - 20 mg/dL 14   14 15   Creatinine Latest Ref Range: 0.6 - 1.1 mg/dL 0.7   0.7 0.7   Anion Gap Latest Ref Range: 3 - 16  11   10 12   GFR Non- Latest Ref Range: >60  >60   >60 >60   GFR  Latest Ref Range: >60  >60   >60 >60   Glucose Latest Ref Range: 70 - 99 mg/dL 116 (H)   120 (H) 112 (H)   Calcium Latest Ref Range: 8.3 - 10.6 mg/dL 8.5   8.6 8.5   Total Protein Latest Ref Range: 6.4 - 8.2 g/dL 6.2 (L)    6.3 (L)   Procalcitonin Latest Ref Range: 0.00 - 0.15 ng/mL  0.07        Results for Heather Clark (MRN [id number]) as of 4/20/2019 17:04   Ref.  Range 11/25/2018 14:11 4/6/2019 20:34 4/7/2019 09:50 4/8/2019 05:31 4/20/2019 13:48   WBC Latest Ref Range: 4.0 - 11.0 K/uL 7.0 8.1  8.1 7.4   RBC Latest Ref Range: 4.00 - 5.20 M/uL 5.41 (H) 5.20  5.00 5.17   Hemoglobin Quant Latest Ref Range: 12.0 - 16.0 g/dL 10.8 (L) 10.7 (L)  10.3 (L) 10.6 (L)   Hematocrit Latest Ref Range: 36.0 - 48.0 % 35.0 (L) 34.3 (L)  33.0 (L) 33.9 (L)   MCV Latest Ref Range: 80.0 - 100.0 fL 64.7 (L) 65.9 (L)  66.0 (L) 65.6 (L)   MCH Latest Ref Range: 26.0 - 34.0 pg 20.0 (L) 20.6 (L)  20.6 (L) 20.5 (L)   MCHC Latest Ref Range: 31.0 - 36.0 g/dL 30.9 (L) 31.2  31.2 31.2   MPV Latest Ref Range: 5.0 - 10.5 fL 8.8 8.1  8.2 7.6   RDW Latest Ref Range: 12.4 - 15.4 % 20.1 (H) 19.8 (H)  20.2 (H) 20.1 (H)   Platelet Count Latest Ref Range: 135 - 450 K/uL 304 338  340 331   Neutrophils % Latest Units: % 79.3 70.5  75.0 74.1   Lymphocyte % Latest Units: % 13.1 20.3  16.3 17.9   Monocytes % Latest Units: % 7.4 8.3  8.3 7.6     Results for Darylene Judge (MRN [id number]) as of 4/20/2019 17:04   Ref. Range 4/6/2019 20:39 4/7/2019 22:08 4/20/2019 14:34   Urine Reflex to Culture Unknown   Not Indicated   Rapid Influenza A Ag Latest Ref Range: Negative  Negative     Rapid Influenza B Ag Latest Ref Range: Negative  Negative     RAPID INFLUENZA A/B ANTIGENS Unknown Rpt     L. pneumophila Serogp 1 Ur Ag Unknown  Presumptive Negat. .. LEGIONELLA ANTIGEN, URINE Unknown  Rpt      Results for Darylene Judge (MRN [id number]) as of 4/20/2019 17:04   Ref.  Range 4/6/2019 20:34   pH, Augustine Latest Ref Range: 7.350 - 7.450  7.379   pCO2, Augustine Latest Ref Range: 40.0 - 50.0 mmHg 44.7   pO2, Augustine Latest Ref Range: 25.0 - 40.0 mmHg 105.1 (H)   HCO3, Venous Latest Ref Range: 23.0 - 29.0 mmol/L 25.8   TC02 (Calc), Augustine Latest Ref Range: Not Established mmol/L 27   Base Excess, Augustine Latest Ref Range: -3.0 - 3.0 mmol/L 0.4   O2 Content, Augustine Latest Ref Range: Not Established VOL % 15   MetHgb, Augustine Latest Ref Range: <1.5 % 0.4   O2 Sat, Augustine Latest Ref Range: Not Established % 97     Echocardiogram:   Summary   Technically difficult examination due to patient body habitus.   Normal left ventricle systolic function with a visually estimated ejection   fraction of 55-60%.   No regional wall motion abnormalities are seen.   Normal left ventricular diastolic filling pressure.   The right ventricle appears mildly enlarged with normal systolic function.   Mild mitral regurgitation.     PFT:None in Epic         Assessment:  Active Problems: Morbid obesity with BMI of 50.0-59.9, adult (HCC)    KRYSTIAN (obstructive sleep apnea)    Hypothyroid    SOB (shortness of breath)    Pleural effusion on left    Pulmonary infiltrates    Atelectasis    Ineffective airway clearance    Acute respiratory failure with hypoxia (HCC)    Right ventricular enlargement    Iron deficiency anemia  Resolved Problems:    * No resolved hospital problems.  *          Plan:   · Oxygen supplementation to keep saturation between 90-94% only  · Pulmonary toilet  · Patient was shown the x-ray chest done today along with findings and implications  · Patient was also shown the CT scan of the chest done 2 weeks back along with findings, differential diagnoses and implications  · Patient was also told about the echocardiogram results along with implications  · Patient has shortness of breath which may be multifactorial in nature including some obstructive airway disease along with that patient also has pleural effusion with atelectasis pulmonary infiltrates patient also has morbid obesity with possible restrictive lung disease and KRYSTIAN along with that patient also has right ventricular enlargement along with some allergic rhinitis and hypothyroidism which maybe contributing to patient's symptomatology  · Patient was told about these entities in detail  · Patient also has iron index is anemia which needs to be evaluated including evaluation for any thalassemia minor, if deemed appropriate patient's PCP can decide upon that  · Patient to be given bronchodilators  · Patient was given 1 dose of IV Solu-Medrol in the ER  · Patient can be given small dose of IV Solu-Medrol or PO prednisone as patient still has some prolonged expiration on auscultation when seen  · Patient should be given some diuretics and after discussion with ER provider patient was given 20 mg of Lasix in the ER but it may need to be continued  · Patient needs to be monitored in terms of improper and BMP  · Patient and he be given some saline nasal spray if the nasal congestion is more  · Patient should have some humidification with the supplemental oxygen  · Patient to have a TSH level drawn to see if patient needs to continue with the same dose of Synthroid or to change it  · Salt and fluid restriction in the diet will help  · BiPAP on whenever necessary basis ordered  · Patient may benefit from a bronchoscopy for diagnostic and the liberty purposes and patient was told about the procedure along with the pros and cons and patient wants to proceed with that-we will keep patient nothing by mouth after midnight and do the procedure tomorrow morning  · Consider IV Venofer if deemed appropriate  · PUD and DVT prophylaxis as per IM team    Case discussed with patient, family and ER provider    Further management depending on patient's clinical status and the follow-up on above recommendations          Electronically signed by:  Ofelia Ohara MD    4/20/2019    5:52 PM.

## 2019-04-20 NOTE — ED PROVIDER NOTES
[facility] Emergency Department    CHIEF COMPLAINT  Shortness of Breath (started this morning, was sent home with oxygen 2 weeks ago, after hospitalization for pneumonia, cp in center of chest)      HISTORY OF PRESENT ILLNESS  Cheryle Aden is a 48 y.o. female who presents to the ED complaining of several-day history of recurrent shortness of breath and a one-day history of chest pain. Patient observed lying in bed, appears nontoxic and in no acute distress at this time. She is accompanied by sister brought her in today for evaluation. Patient reports recent admission for respiratory failure with hypoxia. Discharged home on home oxygen. Follow with pulmonologist on Wednesday. Reports that her shortness of breath began that evening. Reports that she wears 3 L when out in the bowel. Approximately 2 L when moving around the house. States that she typically does not need oxygen while at rest however. Over the past 3 days has required oxygen at rest.  Mild nonproductive cough. No hemoptysis. Denies fevers chills. Has had some \"twinges\" of chest pain which appear to be intermittent. Without obvious aggravating or alleviating factors. Does not radiate. As bad as 3-4 out of 10. No neck, arm, jaw or back pain. Denies headache, lightheadedness, dizziness or confusion. No pain with deep inspiration. No abdominal pain. No nausea or vomiting. Denies urinary symptoms. No leg pain or swelling. No other complaints, modifying factors or associated symptoms. Nursing notes reviewed.    Past Medical History:   Diagnosis Date    Anemia     Anxiety     Depression     Fibromyalgia     GERD (gastroesophageal reflux disease)     Heart palpitations     Hiatal hernia     Hypertension     Irritable bowel syndrome     Osteoarthritis     Panic attacks     Pneumonia     Seasonal allergies     Seizure disorder (HCC)     Sleep apnea     Spastic colon     Thyroid disease normal sinus rhythm without evidence for acute ischemic changes. With ambulation on patient's max 3 L she does still desat to 85% with tachycardia and around 1:15. Patient seen and evaluated in the emergency department by pulmonologist Dr. Lawrence Cuello. He does agree with admission and will perform bronchoscopy tomorrow. Discussed care with patient's primary physician who is admitting physician as well and orders have been placed. A discussion was had with Mrs. Sutton regarding worsening shortness of breath, ED findings and recommendations for admission. All questions were answered. Patient is in agreement. MDM  I spoke with Maria Elena Montana, and Puja Martinez. We thoroughly discussed the history, physical exam, laboratory and imaging studies, as well as, emergency department course. Based upon that discussion, we've decided to admit Esthela Mauro to the hospital for further observation, evaluation, and treatment. Final Impression  1. Acute respiratory failure with hypoxia (HCC)      Blood pressure 126/68, pulse 94, temperature 98 °F (36.7 °C), temperature source Oral, resp. rate 12, weight (!) 308 lb (139.7 kg), last menstrual period 07/29/2013, SpO2 94 %. DISPOSITION  Patient was admitted to the hospital in stable condition.           Forrest Merlosma  04/20/19 [id number]

## 2019-04-21 LAB
A/G RATIO: 1.3 (ref 1.1–2.2)
ALBUMIN SERPL-MCNC: 3.8 G/DL (ref 3.4–5)
ALP BLD-CCNC: 88 U/L (ref 40–129)
ALT SERPL-CCNC: 14 U/L (ref 10–40)
ANION GAP SERPL CALCULATED.3IONS-SCNC: 11 MMOL/L (ref 3–16)
AST SERPL-CCNC: 11 U/L (ref 15–37)
BASOPHILS ABSOLUTE: 0 K/UL (ref 0–0.2)
BASOPHILS RELATIVE PERCENT: 0.2 %
BILIRUB SERPL-MCNC: <0.2 MG/DL (ref 0–1)
BUN BLDV-MCNC: 18 MG/DL (ref 7–20)
CALCIUM SERPL-MCNC: 9.2 MG/DL (ref 8.3–10.6)
CHLORIDE BLD-SCNC: 103 MMOL/L (ref 99–110)
CO2: 27 MMOL/L (ref 21–32)
CREAT SERPL-MCNC: 0.8 MG/DL (ref 0.6–1.1)
EOSINOPHILS ABSOLUTE: 0 K/UL (ref 0–0.6)
EOSINOPHILS RELATIVE PERCENT: 0 %
GFR AFRICAN AMERICAN: >60
GFR NON-AFRICAN AMERICAN: >60
GLOBULIN: 3 G/DL
GLUCOSE BLD-MCNC: 184 MG/DL (ref 70–99)
HCT VFR BLD CALC: 34.5 % (ref 36–48)
HEMATOLOGY PATH CONSULT: NO
HEMOGLOBIN: 10.8 G/DL (ref 12–16)
LYMPHOCYTES ABSOLUTE: 0.5 K/UL (ref 1–5.1)
LYMPHOCYTES RELATIVE PERCENT: 5.6 %
MCH RBC QN AUTO: 20.5 PG (ref 26–34)
MCHC RBC AUTO-ENTMCNC: 31.3 G/DL (ref 31–36)
MCV RBC AUTO: 65.5 FL (ref 80–100)
MONOCYTES ABSOLUTE: 0.1 K/UL (ref 0–1.3)
MONOCYTES RELATIVE PERCENT: 1.2 %
NEUTROPHILS ABSOLUTE: 7.7 K/UL (ref 1.7–7.7)
NEUTROPHILS RELATIVE PERCENT: 93 %
PDW BLD-RTO: 19.9 % (ref 12.4–15.4)
PLATELET # BLD: 395 K/UL (ref 135–450)
PMV BLD AUTO: 8.1 FL (ref 5–10.5)
POTASSIUM REFLEX MAGNESIUM: 4.7 MMOL/L (ref 3.5–5.1)
RBC # BLD: 5.27 M/UL (ref 4–5.2)
SODIUM BLD-SCNC: 141 MMOL/L (ref 136–145)
TOTAL PROTEIN: 6.8 G/DL (ref 6.4–8.2)
TSH REFLEX: 2.61 UIU/ML (ref 0.27–4.2)
WBC # BLD: 8.2 K/UL (ref 4–11)

## 2019-04-21 PROCEDURE — 2700000000 HC OXYGEN THERAPY PER DAY

## 2019-04-21 PROCEDURE — 88305 TISSUE EXAM BY PATHOLOGIST: CPT

## 2019-04-21 PROCEDURE — 87205 SMEAR GRAM STAIN: CPT

## 2019-04-21 PROCEDURE — 7100000011 HC PHASE II RECOVERY - ADDTL 15 MIN: Performed by: INTERNAL MEDICINE

## 2019-04-21 PROCEDURE — 97161 PT EVAL LOW COMPLEX 20 MIN: CPT

## 2019-04-21 PROCEDURE — 99233 SBSQ HOSP IP/OBS HIGH 50: CPT | Performed by: INTERNAL MEDICINE

## 2019-04-21 PROCEDURE — G0378 HOSPITAL OBSERVATION PER HR: HCPCS

## 2019-04-21 PROCEDURE — 3609010800 HC BRONCHOSCOPY ALVEOLAR LAVAGE: Performed by: INTERNAL MEDICINE

## 2019-04-21 PROCEDURE — 7100000010 HC PHASE II RECOVERY - FIRST 15 MIN: Performed by: INTERNAL MEDICINE

## 2019-04-21 PROCEDURE — 6370000000 HC RX 637 (ALT 250 FOR IP): Performed by: NURSE PRACTITIONER

## 2019-04-21 PROCEDURE — 36415 COLL VENOUS BLD VENIPUNCTURE: CPT

## 2019-04-21 PROCEDURE — 85025 COMPLETE CBC W/AUTO DIFF WBC: CPT

## 2019-04-21 PROCEDURE — 2580000003 HC RX 258: Performed by: INTERNAL MEDICINE

## 2019-04-21 PROCEDURE — 3609010900 HC BRONCHOSCOPY THERAPUTIC ASPIRATION INITIAL: Performed by: INTERNAL MEDICINE

## 2019-04-21 PROCEDURE — 88112 CYTOPATH CELL ENHANCE TECH: CPT

## 2019-04-21 PROCEDURE — 93010 ELECTROCARDIOGRAM REPORT: CPT | Performed by: INTERNAL MEDICINE

## 2019-04-21 PROCEDURE — 6360000002 HC RX W HCPCS: Performed by: INTERNAL MEDICINE

## 2019-04-21 PROCEDURE — 87116 MYCOBACTERIA CULTURE: CPT

## 2019-04-21 PROCEDURE — 1200000000 HC SEMI PRIVATE

## 2019-04-21 PROCEDURE — 31645 BRNCHSC W/THER ASPIR 1ST: CPT | Performed by: INTERNAL MEDICINE

## 2019-04-21 PROCEDURE — G8979 MOBILITY GOAL STATUS: HCPCS

## 2019-04-21 PROCEDURE — 99152 MOD SED SAME PHYS/QHP 5/>YRS: CPT | Performed by: INTERNAL MEDICINE

## 2019-04-21 PROCEDURE — 80053 COMPREHEN METABOLIC PANEL: CPT

## 2019-04-21 PROCEDURE — 2500000003 HC RX 250 WO HCPCS: Performed by: INTERNAL MEDICINE

## 2019-04-21 PROCEDURE — 6370000000 HC RX 637 (ALT 250 FOR IP): Performed by: INTERNAL MEDICINE

## 2019-04-21 PROCEDURE — 2580000003 HC RX 258: Performed by: STUDENT IN AN ORGANIZED HEALTH CARE EDUCATION/TRAINING PROGRAM

## 2019-04-21 PROCEDURE — 87252 VIRUS INOCULATION TISSUE: CPT

## 2019-04-21 PROCEDURE — 87070 CULTURE OTHR SPECIMN AEROBIC: CPT

## 2019-04-21 PROCEDURE — 6370000000 HC RX 637 (ALT 250 FOR IP): Performed by: FAMILY MEDICINE

## 2019-04-21 PROCEDURE — 94761 N-INVAS EAR/PLS OXIMETRY MLT: CPT

## 2019-04-21 PROCEDURE — 6370000000 HC RX 637 (ALT 250 FOR IP): Performed by: STUDENT IN AN ORGANIZED HEALTH CARE EDUCATION/TRAINING PROGRAM

## 2019-04-21 PROCEDURE — 6360000002 HC RX W HCPCS: Performed by: STUDENT IN AN ORGANIZED HEALTH CARE EDUCATION/TRAINING PROGRAM

## 2019-04-21 PROCEDURE — 2580000003 HC RX 258

## 2019-04-21 PROCEDURE — 87015 SPECIMEN INFECT AGNT CONCNTJ: CPT

## 2019-04-21 PROCEDURE — 94640 AIRWAY INHALATION TREATMENT: CPT

## 2019-04-21 PROCEDURE — 87206 SMEAR FLUORESCENT/ACID STAI: CPT

## 2019-04-21 PROCEDURE — 87102 FUNGUS ISOLATION CULTURE: CPT

## 2019-04-21 PROCEDURE — 97116 GAIT TRAINING THERAPY: CPT

## 2019-04-21 PROCEDURE — 31624 DX BRONCHOSCOPE/LAVAGE: CPT | Performed by: INTERNAL MEDICINE

## 2019-04-21 PROCEDURE — G8978 MOBILITY CURRENT STATUS: HCPCS

## 2019-04-21 PROCEDURE — 2709999900 HC NON-CHARGEABLE SUPPLY: Performed by: INTERNAL MEDICINE

## 2019-04-21 PROCEDURE — 0B9J8ZX DRAINAGE OF LEFT LOWER LUNG LOBE, VIA NATURAL OR ARTIFICIAL OPENING ENDOSCOPIC, DIAGNOSTIC: ICD-10-PCS | Performed by: INTERNAL MEDICINE

## 2019-04-21 PROCEDURE — 94660 CPAP INITIATION&MGMT: CPT

## 2019-04-21 RX ORDER — SODIUM CHLORIDE 9 MG/ML
INJECTION, SOLUTION INTRAVENOUS
Status: COMPLETED
Start: 2019-04-21 | End: 2019-04-21

## 2019-04-21 RX ORDER — GUAIFENESIN 600 MG/1
600 TABLET, EXTENDED RELEASE ORAL 2 TIMES DAILY
Status: DISCONTINUED | OUTPATIENT
Start: 2019-04-21 | End: 2019-04-22 | Stop reason: HOSPADM

## 2019-04-21 RX ORDER — 0.9 % SODIUM CHLORIDE 0.9 %
INTRAVENOUS SOLUTION INTRAVENOUS CONTINUOUS PRN
Status: COMPLETED | OUTPATIENT
Start: 2019-04-21 | End: 2019-04-21

## 2019-04-21 RX ORDER — LIDOCAINE HYDROCHLORIDE 20 MG/ML
INJECTION, SOLUTION INFILTRATION; PERINEURAL PRN
Status: DISCONTINUED | OUTPATIENT
Start: 2019-04-21 | End: 2019-04-21 | Stop reason: ALTCHOICE

## 2019-04-21 RX ORDER — MAGNESIUM HYDROXIDE 1200 MG/15ML
LIQUID ORAL CONTINUOUS PRN
Status: COMPLETED | OUTPATIENT
Start: 2019-04-21 | End: 2019-04-21

## 2019-04-21 RX ORDER — FUROSEMIDE 10 MG/ML
40 INJECTION INTRAMUSCULAR; INTRAVENOUS ONCE
Status: COMPLETED | OUTPATIENT
Start: 2019-04-21 | End: 2019-04-21

## 2019-04-21 RX ORDER — MIDAZOLAM HYDROCHLORIDE 5 MG/ML
INJECTION INTRAMUSCULAR; INTRAVENOUS PRN
Status: DISCONTINUED | OUTPATIENT
Start: 2019-04-21 | End: 2019-04-21 | Stop reason: ALTCHOICE

## 2019-04-21 RX ORDER — ACETYLCYSTEINE 200 MG/ML
SOLUTION ORAL; RESPIRATORY (INHALATION) PRN
Status: DISCONTINUED | OUTPATIENT
Start: 2019-04-21 | End: 2019-04-21 | Stop reason: ALTCHOICE

## 2019-04-21 RX ORDER — FENTANYL CITRATE 50 UG/ML
INJECTION, SOLUTION INTRAMUSCULAR; INTRAVENOUS PRN
Status: DISCONTINUED | OUTPATIENT
Start: 2019-04-21 | End: 2019-04-21 | Stop reason: ALTCHOICE

## 2019-04-21 RX ORDER — CHOLECALCIFEROL (VITAMIN D3) 125 MCG
5 CAPSULE ORAL NIGHTLY PRN
Status: DISCONTINUED | OUTPATIENT
Start: 2019-04-21 | End: 2019-04-22 | Stop reason: HOSPADM

## 2019-04-21 RX ORDER — ACETAMINOPHEN 325 MG/1
650 TABLET ORAL EVERY 4 HOURS PRN
Status: DISCONTINUED | OUTPATIENT
Start: 2019-04-21 | End: 2019-04-22 | Stop reason: HOSPADM

## 2019-04-21 RX ADMIN — Medication 10 ML: at 20:53

## 2019-04-21 RX ADMIN — PANTOPRAZOLE SODIUM 40 MG: 40 TABLET, DELAYED RELEASE ORAL at 16:20

## 2019-04-21 RX ADMIN — FUROSEMIDE 40 MG: 10 INJECTION, SOLUTION INTRAMUSCULAR; INTRAVENOUS at 23:10

## 2019-04-21 RX ADMIN — DILTIAZEM HYDROCHLORIDE 120 MG: 120 CAPSULE, COATED, EXTENDED RELEASE ORAL at 20:52

## 2019-04-21 RX ADMIN — SERTRALINE HYDROCHLORIDE 100 MG: 50 TABLET ORAL at 16:21

## 2019-04-21 RX ADMIN — ACETAMINOPHEN 650 MG: 325 TABLET ORAL at 16:22

## 2019-04-21 RX ADMIN — IPRATROPIUM BROMIDE AND ALBUTEROL SULFATE 1 AMPULE: .5; 3 SOLUTION RESPIRATORY (INHALATION) at 15:59

## 2019-04-21 RX ADMIN — CETIRIZINE HYDROCHLORIDE 10 MG: 10 TABLET, FILM COATED ORAL at 16:22

## 2019-04-21 RX ADMIN — LAMOTRIGINE 200 MG: 100 TABLET ORAL at 20:52

## 2019-04-21 RX ADMIN — IPRATROPIUM BROMIDE AND ALBUTEROL SULFATE 1 AMPULE: .5; 3 SOLUTION RESPIRATORY (INHALATION) at 00:33

## 2019-04-21 RX ADMIN — GABAPENTIN 600 MG: 300 CAPSULE ORAL at 20:52

## 2019-04-21 RX ADMIN — LAMOTRIGINE 200 MG: 100 TABLET ORAL at 16:21

## 2019-04-21 RX ADMIN — IPRATROPIUM BROMIDE AND ALBUTEROL SULFATE 1 AMPULE: .5; 3 SOLUTION RESPIRATORY (INHALATION) at 08:35

## 2019-04-21 RX ADMIN — Medication 10 ML: at 16:39

## 2019-04-21 RX ADMIN — ASPIRIN 81 MG: 81 TABLET, COATED ORAL at 16:23

## 2019-04-21 RX ADMIN — ENOXAPARIN SODIUM 40 MG: 40 INJECTION SUBCUTANEOUS at 16:39

## 2019-04-21 RX ADMIN — DILTIAZEM HYDROCHLORIDE 120 MG: 120 CAPSULE, COATED, EXTENDED RELEASE ORAL at 16:22

## 2019-04-21 RX ADMIN — Medication 5 MG: at 02:22

## 2019-04-21 RX ADMIN — LISINOPRIL 5 MG: 5 TABLET ORAL at 16:21

## 2019-04-21 RX ADMIN — SODIUM CHLORIDE: 9 INJECTION, SOLUTION INTRAVENOUS at 12:00

## 2019-04-21 RX ADMIN — IPRATROPIUM BROMIDE AND ALBUTEROL SULFATE 1 AMPULE: .5; 3 SOLUTION RESPIRATORY (INHALATION) at 20:40

## 2019-04-21 RX ADMIN — GUAIFENESIN 600 MG: 600 TABLET, EXTENDED RELEASE ORAL at 20:51

## 2019-04-21 ASSESSMENT — PAIN SCALES - GENERAL
PAINLEVEL_OUTOF10: 0
PAINLEVEL_OUTOF10: 3

## 2019-04-21 NOTE — PROCEDURES
Bronchoscopy Note     Patient with worsening shortness of breath, patient also was found to have pulmonary infiltrates, lung collapse, atelectasis with ineffective airway clearance and given the patient's clinical status and the radiology it was decided to a bronchoscopy for diagnostic and therapeutic purposes and for that reason after informed consent, the patient was taken to the endoscopy suite, patient was given oropharyngeal analgesia with benzocaine and after timeout, patient was given lidocaine for tracheal bronchial analgesia, patient was given conscious sedation in the form of 8 mg of Versed and 100 mcg of fentanyl for the procedure; the bronchoscopy was introduced the mouth using a bite block, patient was found to have some increased bogginess of the posterior pharyngeal tissue, patient's vocal cords and normal without any paralysis or anatomy defect, patient's trachea was normal, patient's right tracheal bronchial tree shows some mucus plugging in the right lower lobe but the entire left tracheal bronchial tree was full of thick mucous plugs which were quite tenacious and viscous and mucoid, the bronchoscopy was wedged into the left lower lobe bronchus and BAL was done from that area which was sent for various cultures and cytology, the rest of the tracheobronchial tree namely left upper lobe and right lower lobe bronchi were therapeutic reaspirated using Mucomyst and saline, patient tolerated the procedure well and did not have any apparent complications  Further management depending on patient's clinical status and the bronchoscopy results  Patient is bronchoscopy findings were discussed later with Dr. Delynn Castleman and the patient    Kirit Lucero

## 2019-04-21 NOTE — PROGRESS NOTES
sulcus on the lateral view concerning for effusion with adjacent atelectasis. Underlying pneumonia cannot be excluded. Cardiomegaly pulmonary vascular congestion. Ct Chest Pulmonary Embolism W Contrast    Result Date: 4/6/2019  EXAMINATION: CTA OF THE CHEST 4/6/2019 10:35 pm TECHNIQUE: CTA of the chest was performed after the administration of intravenous contrast.  Multiplanar reformatted images are provided for review. MIP images are provided for review. Dose modulation, iterative reconstruction, and/or weight based adjustment of the mA/kV was utilized to reduce the radiation dose to as low as reasonably achievable. COMPARISON: None. HISTORY: ORDERING SYSTEM PROVIDED HISTORY: sob, tachycardia TECHNOLOGIST PROVIDED HISTORY: Ordering Physician Provided Reason for Exam: Shortness of Breath (recent bronchotis 3weeks ago; increased SOB, and cough. ) FINDINGS: Pulmonary Arteries: Evaluation is compromised by motion artifact and the patient's body habitus. There is no convincing evidence for pulmonary embolus. Mediastinum: No evidence of mediastinal lymphadenopathy. The heart and pericardium demonstrate no acute abnormality. There is no acute abnormality of the thoracic aorta. Lungs/pleura: There is a small left pleural effusion. There is no pneumothorax. There is lingular and left lower lobe airspace disease. There is minimal airspace disease in the lateral and anterior right lower lobe. Upper Abdomen: Limited images of the upper abdomen are unremarkable. Soft Tissues/Bones: No acute bone or soft tissue abnormality. 1. Limited study with no definite scan evidence for pulmonary embolus. 2. Left pleural effusion with lingular and left lower lobe atelectasis or pneumonia. 3. Right lower lobe atelectasis or pneumonia. Assessment and plan:  Acute respiratory failure, hypoxemic. Presently on oxygen at 2 LPM, wean to keep SaO2 >90%. Encouraged she should get a home pulse oximeter.  Asking for a POC,  Moises Garcia can arrange after results of 6 MWT available. Bilateral pneumonia/atelectasis, inability to clear secretions. Status post bronchoscopy, continue aggressive pulmonary toilet, not on any antibiotic. Results pending  Left pleural effusion. Small, conservative management  KRYSTIAN. Noncompliant in the past, has a sleep study scheduled. Anemia. Microcytic, low serum Fe. Hypothyroidism, hypertension, seizure disorder iron deficiency anemia. Per IM. DVT prophylaxis. Lovenox    Can be discharged home from the pulmonary standpoint with follow with Dr. Moises Garcia, message sent to office.           Electronically signed by:  Sulaiman Muller MD    4/22/2019    12:30 PM.

## 2019-04-21 NOTE — PROGRESS NOTES
Physical Therapy    Facility/Department: [facility] C5 - MED SURG/ORTHO  Initial Assessment    NAME: Sharita Guido  : 1965  MRN: [id number]    Date of Service: 2019    Discharge Recommendations:  Home with Home health PT, Home with assist PRN   PT Equipment Recommendations  Equipment Needed: No    Assessment   Body structures, Functions, Activity limitations: Decreased functional mobility ; Decreased endurance  Assessment: Patient is a 48year old female with hx of chronic respiratory failure and seizures referred to physical therapy due to O2 desat during ambulation. Patient ambulated 165 ft with SBA on 2 L O2, desatting to 82%. Patient's O2 sats increased to 90s within 30 seconds of sitting. Patient is currently functioning below baseline with functional mobility and endurance, and requires continued skilled therapy to address deficits and DC safely. Recommending home with PRN A and home PT upon DC. Treatment Diagnosis: decreased endurance  Prognosis: Good  Decision Making: Low Complexity  Patient Education: Role of PT, importance of monitoring O2 sats at home, safety with mobility, DC recs  Barriers to Learning: None  REQUIRES PT FOLLOW UP: Yes  Activity Tolerance  Activity Tolerance: Patient Tolerated treatment well;Patient limited by endurance  Activity Tolerance: O2 sats dropped to 82% when ambulating on 2 L; increased to 90s within 30 seconds of sitting. Patient Diagnosis(es): The encounter diagnosis was Acute respiratory failure with hypoxia ([address] has a past medical history of Anemia, Anxiety, Depression, Fibromyalgia, GERD (gastroesophageal reflux disease), Heart palpitations, Hiatal hernia, Hypertension, Irritable bowel syndrome, Osteoarthritis, Panic attacks, Pneumonia, Pulmonary infiltrates, Seasonal allergies, Seizure disorder ([address] Sleep apnea, Spastic colon, and Thyroid disease.    has a past surgical history that includes Total hip arthroplasty; brain surgery; Urethra dilation; knee surgery; hip surgery; Bladder surgery; joint replacement (2009); and other surgical history (07/31/2013).     Restrictions  Restrictions/Precautions  Restrictions/Precautions: General Precautions  Required Braces or Orthoses?: No  Position Activity Restriction  Other position/activity restrictions: O2     Vision/Hearing  Vision: Impaired  Hearing: Within functional limits       Subjective  General  Chart Reviewed: Yes  Patient assessed for rehabilitation services?: Yes  Response To Previous Treatment: Not applicable  Family / Caregiver Present: No  Referring Practitioner: Loulou Kaur  Referral Date : 04/21/19  Follows Commands: Within Functional Limits  General Comment  Comments: RN cleared pt for therapy eval  Subjective  Subjective: Patient resting supine in bed upon therapy arrival.  Patient agreeable to therapy eval.  Pain Screening  Patient Currently in Pain: Denies  Vital Signs  Patient Currently in Pain: Denies       Orientation  Orientation  Overall Orientation Status: Within Normal Limits     Social/Functional History  Social/Functional History  Type of Home: Apartment  Home Layout: One level  Home Access: Stairs to enter with rails  Entrance Stairs - Number of Steps: 5  Entrance Stairs - Rails: Right  Bathroom Shower/Tub: Tub/Shower unit, Shower chair with back  Bathroom Toilet: Standard  Bathroom Equipment: Hand-held shower  Home Equipment: Oxygen  Receives Help From: Family  ADL Assistance: Independent  Homemaking Assistance: Independent  Homemaking Responsibilities: Yes  Meal Prep Responsibility: Primary  Laundry Responsibility: Primary  Cleaning Responsibility: Primary  Shopping Responsibility: Primary(uses scooter)  Ambulation Assistance: Independent(uses RW occasionally, typically uses cane)  Transfer Assistance: Independent  Active : Yes     Cognition   Cognition  Overall Cognitive Status: WNL    Objective  AROM RLE (degrees)  RLE AROM: WNL  AROM LLE (degrees)  LLE AROM :

## 2019-04-21 NOTE — PROGRESS NOTES
breathing treatment and one dose of IV solu-medrol in the ER. Patient was thus admitted to the floor for acute on chronic respiratory failure with hypoxia. Subjective:   No acute events overnight. Vitals stable. Did not tolerate BiPAP very well overnight. Still de-satting with ambulation. On my evaluation patient was laying in bed on room air, satting at 96%. Still having the occasional cough, said she could not sleep well last night. Denying any SOB or chest pain at rest. Otherwise doing well, awaiting bronchoscopy for today. Medications:  Reviewed  Infusion Medications   Scheduled Medications    aspirin  81 mg Oral Daily    cetirizine  10 mg Oral Daily    diltiazem  120 mg Oral BID    ferrous sulfate  325 mg Oral Q48H    gabapentin  600 mg Oral BID    lamoTRIgine  200 mg Oral BID    levothyroxine  112 mcg Oral Daily    lisinopril  5 mg Oral Daily    montelukast  10 mg Oral QAM    pantoprazole  40 mg Oral Daily    sertraline  100 mg Oral Daily    sodium chloride flush  10 mL Intravenous 2 times per day    enoxaparin  40 mg Subcutaneous Daily    ipratropium-albuterol  1 ampule Inhalation Q4H WA     PRN Meds: melatonin, sodium chloride flush, ondansetron, polyethylene glycol      Intake/Output Summary (Last 24 hours) at 4/21/2019 0925  Last data filed at 4/20/2019 2131  Gross per 24 hour   Intake 10 ml   Output --   Net 10 ml       Physical Exam Performed:  BP (!) 143/75   Pulse 88   Temp 98 °F (36.7 °C) (Oral)   Resp 26   Ht 5' (1.524 m)   Wt (!) 308 lb (139.7 kg)   LMP 07/29/2013   SpO2 96%   BMI 60.15 kg/m²   GENERAL APPEARANCE: Awake and alert. Cooperative. No acute  distress. Morbidly obese  HEAD: Normocephalic. Atraumatic. EYES: PERRL. EOM's grossly intact. ENT: Mucous membranes are moist.   NECK: Supple. No JVD.  No tracheal tenderness or deviation. HEART: RRR. No murmurs. No chest wall tenderness.  *exam limited due to body habitus*  LUNGS: Respirations unlabored on RA while Family Practice

## 2019-04-21 NOTE — PROGRESS NOTES
the left costophrenic sulcus on the lateral view concerning for effusion with atelectasis or pneumonia. No pneumothorax. Opacification at the left lung base with blunting of the left costophrenic sulcus on the lateral view concerning for effusion with adjacent atelectasis. Underlying pneumonia cannot be excluded. Cardiomegaly pulmonary vascular congestion. Ct Chest Pulmonary Embolism W Contrast    Result Date: 4/6/2019  EXAMINATION: CTA OF THE CHEST 4/6/2019 10:35 pm TECHNIQUE: CTA of the chest was performed after the administration of intravenous contrast.  Multiplanar reformatted images are provided for review. MIP images are provided for review. Dose modulation, iterative reconstruction, and/or weight based adjustment of the mA/kV was utilized to reduce the radiation dose to as low as reasonably achievable. COMPARISON: None. HISTORY: ORDERING SYSTEM PROVIDED HISTORY: sob, tachycardia TECHNOLOGIST PROVIDED HISTORY: Ordering Physician Provided Reason for Exam: Shortness of Breath (recent bronchotis 3weeks ago; increased SOB, and cough. ) FINDINGS: Pulmonary Arteries: Evaluation is compromised by motion artifact and the patient's body habitus. There is no convincing evidence for pulmonary embolus. Mediastinum: No evidence of mediastinal lymphadenopathy. The heart and pericardium demonstrate no acute abnormality. There is no acute abnormality of the thoracic aorta. Lungs/pleura: There is a small left pleural effusion. There is no pneumothorax. There is lingular and left lower lobe airspace disease. There is minimal airspace disease in the lateral and anterior right lower lobe. Upper Abdomen: Limited images of the upper abdomen are unremarkable. Soft Tissues/Bones: No acute bone or soft tissue abnormality. 1. Limited study with no definite scan evidence for pulmonary embolus. 2. Left pleural effusion with lingular and left lower lobe atelectasis or pneumonia.  3. Right lower lobe atelectasis or pneumonia. Results for Oracio Maharaj (MRN [id number]) as of 4/21/2019 17:19   Ref. Range 4/20/2019 13:48   TSH Latest Ref Range: 0.27 - 4.20 uIU/mL 2.61     Results for Oracio Maharaj (MRN [id number]) as of 4/21/2019 17:19   Ref. Range 4/8/2019 05:31 4/20/2019 13:48 4/21/2019 05:18   Sodium Latest Ref Range: 136 - 145 mmol/L 143 142 141   Potassium Latest Ref Range: 3.5 - 5.1 mmol/L 4.2 4.4 4.7   Chloride Latest Ref Range: 99 - 110 mmol/L 105 103 103   CO2 Latest Ref Range: 21 - 32 mmol/L 28 27 27   BUN Latest Ref Range: 7 - 20 mg/dL 14 15 18   Creatinine Latest Ref Range: 0.6 - 1.1 mg/dL 0.7 0.7 0.8   Anion Gap Latest Ref Range: 3 - 16  10 12 11   GFR Non- Latest Ref Range: >60  >60 >60 >60   GFR  Latest Ref Range: >60  >60 >60 >60   Glucose Latest Ref Range: 70 - 99 mg/dL 120 (H) 112 (H) 184 (H)   Calcium Latest Ref Range: 8.3 - 10.6 mg/dL 8.6 8.5 9.2   Total Protein Latest Ref Range: 6.4 - 8.2 g/dL  6.3 (L) 6.8     Results for Oracio Maharaj (MRN [id number]) as of 4/21/2019 17:19   Ref.  Range 4/6/2019 20:34 4/7/2019 09:50 4/8/2019 05:31 4/20/2019 13:48 4/21/2019 05:18   WBC Latest Ref Range: 4.0 - 11.0 K/uL 8.1  8.1 7.4 8.2   RBC Latest Ref Range: 4.00 - 5.20 M/uL 5.20  5.00 5.17 5.27 (H)   Hemoglobin Quant Latest Ref Range: 12.0 - 16.0 g/dL 10.7 (L)  10.3 (L) 10.6 (L) 10.8 (L)   Hematocrit Latest Ref Range: 36.0 - 48.0 % 34.3 (L)  33.0 (L) 33.9 (L) 34.5 (L)   MCV Latest Ref Range: 80.0 - 100.0 fL 65.9 (L)  66.0 (L) 65.6 (L) 65.5 (L)   MCH Latest Ref Range: 26.0 - 34.0 pg 20.6 (L)  20.6 (L) 20.5 (L) 20.5 (L)   MCHC Latest Ref Range: 31.0 - 36.0 g/dL 31.2  31.2 31.2 31.3   MPV Latest Ref Range: 5.0 - 10.5 fL 8.1  8.2 7.6 8.1   RDW Latest Ref Range: 12.4 - 15.4 % 19.8 (H)  20.2 (H) 20.1 (H) 19.9 (H)   Platelet Count Latest Ref Range: 135 - 450 K/uL 338  340 331 395   Neutrophils % Latest Units: % 70.5  75.0 74.1 93.0   Lymphocyte % Latest Units: % 20.3  16.3 17.9 5.6 benefit from a bronchoscopy for diagnostic and the liberty purposes and patient was told about the procedure along with the pros and cons and patient wants to proceed with that-we will keep patient nothing by mouth after midnight and do the procedure today  · Consider IV Venofer if deemed appropriate  · Patient needs to lose weight   · PUD and DVT prophylaxis as per primary  team     Case discussed with Dr Tami Joya     Further management depending on patient's clinical status and the follow-up on above recommendations and bronchoscopy findings           Electronically signed by:  Andrzej Meyer MD    4/21/2019    5:17 PM.

## 2019-04-21 NOTE — PROGRESS NOTES
..4 Eyes Skin Assessment     The patient is being assess for  Admission    I agree that 2 RN's have performed a thorough Head to Toe Skin Assessment on the patient. ALL assessment sites listed below have been assessed. Areas assessed by both nurses: yes  [x]   Head, Face, and Ears   [x]   Shoulders, Back, and Chest  [x]   Arms, Elbows, and Hands   [x]   Coccyx, Sacrum, and IschIum  [x]   Legs, Feet, and Heels        Does the Patient have Skin Breakdown?   No         Sumit Prevention initiated:  No   Wound Care Orders initiated:  No      [facility] nurse consulted for Pressure Injury (Stage 3,4, Unstageable, DTI, NWPT, and Complex wounds), New and Established Ostomies:  No      Nurse 1 eSignature: Electronically signed by Sharan Reyez on 4/20/19 at 11:02 PM    **SHARE this note so that the co-signing nurse is able to place an eSignature**    Nurse 2 eSignature: Electronically signed by Khadijah Nam RN on 4/21/19 at 4:40 AM

## 2019-04-21 NOTE — PLAN OF CARE
..Bed in lowest position, wheels locked, 2/4 side rails up, nonskid footwear on. Bed/ chair check alarm in place, call light within reach. Pt instructed to call out when needing assistance. Pt stated understanding. Nurse will continue to monitor. Gio Bonilla .Pt scoring pain on 0-10 scale. Pain medications given per MAR. Pt instructed to call out when pain level increasing. Call light within reach. Nurse will continue to reassess and monitor.

## 2019-04-21 NOTE — PRE SEDATION
Sedation Pre-Procedure Note    Patient Name: Emily Watt   YOB: 1965  Room/Bed: 0521/0521-01  Medical Record Number: [id number]  Date: 4/21/2019   Time: 5:34 PM       Indication:  SOB/Lung collapse/atelectasis/ineffective airway clearance     Consent: I have discussed with the patient and/or the patient representative the indication, alternatives, and the possible risks and/or complications of the planned procedure and the anesthesia methods. The patient and/or patient representative appear to understand and agree to proceed. Vital Signs:   Vitals:    04/21/19 1559   BP:    Pulse:    Resp: 18   Temp:    SpO2: 90%       Past Medical History:   has a past medical history of Anemia, Anxiety, Depression, Fibromyalgia, GERD (gastroesophageal reflux disease), Heart palpitations, Hiatal hernia, Hypertension, Irritable bowel syndrome, Osteoarthritis, Panic attacks, Pneumonia, Pulmonary infiltrates, Seasonal allergies, Seizure disorder ([address] Sleep apnea, Spastic colon, and Thyroid disease. Past Surgical History:   has a past surgical history that includes Total hip arthroplasty; brain surgery; Urethra dilation; knee surgery; hip surgery; Bladder surgery; joint replacement (2009); and other surgical history (07/31/2013).     Medications:   Scheduled Meds:    furosemide  40 mg Intravenous Once    guaiFENesin  600 mg Oral BID    aspirin  81 mg Oral Daily    diltiazem  120 mg Oral BID    ferrous sulfate  325 mg Oral Q48H    gabapentin  600 mg Oral BID    lamoTRIgine  200 mg Oral BID    levothyroxine  112 mcg Oral Daily    lisinopril  5 mg Oral Daily    montelukast  10 mg Oral QAM    pantoprazole  40 mg Oral Daily    sertraline  100 mg Oral Daily    sodium chloride flush  10 mL Intravenous 2 times per day    enoxaparin  40 mg Subcutaneous Daily    ipratropium-albuterol  1 ampule Inhalation Q4H WA     Continuous Infusions:   PRN Meds: melatonin, acetaminophen, sodium chloride flush, MD Angelia           Pre-Sedation Documentation and Exam:   Vital signs have been reviewed (see flow sheet for vitals).     Mallampati Airway Assessment:  Mallampati Class  IV - (soft palate not visible)    Prior History of Anesthesia Complications:   none    ASA Classification:  Class 3 - A patient with severe systemic disease that limits activity but is not incapacitating    Sedation/ Anesthesia Plan:   intravenous sedation    Medications Planned:   midazolam (Versed) intravenously and fentanyl intravenously    Patient is an appropriate candidate for plan of sedation: yes    Electronically signed by Naheed Goss MD on 4/21/2019 at 5:34 PM

## 2019-04-21 NOTE — PROGRESS NOTES
RESPIRATORY THERAPY ASSESSMENT    Name:  Savanah Xiong  Medical Record Number:  [id number]  Age: 48 y.o. Gender: female  : 1965  Today's Date:  2019  Room:  [facility]0521-    Assessment     Is the patient being admitted for a COPD or Asthma exacerbation? No   (If yes the patient will be seen every 4 hours for the first 24 hours and then reassessed)    Patient Admission Diagnosis      Allergies  Allergies   Allergen Reactions    Bioflavonoids Anaphylaxis    Other      Nickel,citrus, pain meds?  Oxycodone-Acetaminophen Hives    Percocet [Oxycodone-Acetaminophen]      Can take VICODIN    Acetaminophen Rash    Nickel Hives, Other (See Comments) and Rash       Minimum Predicted Vital Capacity:     690          Actual Vital Capacity:      1000              Pulmonary History:No history  Home Oxygen Therapy:  2 liters/min via nasal cannula prn  Home Respiratory Therapy:Albuterol   Current Respiratory Therapy:  HHN Duoneb Q4WA  Treatment Type: IS  Medications: Albuterol/Ipratropium    Respiratory Severity Index(RSI)   Patients with orders for inhalation medications, oxygen, or any therapeutic treatment modality will be placed on Respiratory Protocol. They will be assessed with the first treatment and at least every 72 hours thereafter. The following severity scale will be used to determine frequency of treatment intervention.     Smoking History: Smoking History Less than 1ppd or less than 15 pack year = 1    Social History  Social History     Tobacco Use    Smoking status: Former Smoker     Packs/day: 0.50     Years: 2.00     Pack years: 1.00     Last attempt to quit: 1986     Years since quittin.0    Smokeless tobacco: Never Used   Substance Use Topics    Alcohol use: No    Drug use: No       Recent Surgical History: None = 0  Past Surgical History  Past Surgical History:   Procedure Laterality Date    BLADDER SURGERY      BRAIN SURGERY      HIP SURGERY      JOINT REPLACEMENT 2009    hip    KNEE SURGERY      OTHER SURGICAL HISTORY  07/31/2013    cystoscopy, urethral dilatation    TOTAL HIP ARTHROPLASTY      Left    URETHRAL STRICTURE DILATATION         Level of Consciousness: Alert, Oriented, and Cooperative = 0    Level of Activity: Walking unassisted = 0    Respiratory Pattern: Dyspnea with exertion;Irregular pattern;or RR less than 6 = 2    Breath Sounds: Diminshed bilaterally and/or crackles = 2    Sputum   ,  , Sputum How Obtained: Cough on request, Spontaneous cough  Cough: Strong, spontaneous, non-productive = 0    Vital Signs   BP (!) 146/81   Pulse 99   Temp 98.4 °F (36.9 °C) (Oral)   Resp 22   Ht 5' (1.524 m)   Wt (!) 308 lb (139.7 kg)   LMP 07/29/2013   SpO2 95%   BMI 60.15 kg/m²   SPO2 (COPD values may differ): 90-91% on room air or greater than 92% on FiO2 24- 28% = 1    Peak Flow (asthma only): not applicable = 0    RSI: 5-6 = Q4hr PRN (every four hours as needed) for dyspnea        Plan       Goals: medication delivery, mobilize retained secretions, volume expansion and improve oxygenation    Patient/caregiver was educated on the proper method of use for Respiratory Care Devices:  Yes      Level of patient/caregiver understanding able to:   ? Verbalize understanding   ? Demonstrate understanding       ? Teach back        ? Needs reinforcement       ? No available caregiver               ? Other:     Response to education:  Good     Is patient being placed on Home Treatment Regimen? No     Does the patient have everything they need prior to discharge? NA     Comments: Patient admits with shortness of breath. Plan of Care: [facility] Duoneb Q4WA    Electronically signed by Fredis Olivares RCP on 4/20/2019 at 9:59 PM    Respiratory Protocol Guidelines     1. Assessment and treatment by Respiratory Therapy will be initiated for medication and therapeutic interventions upon initiation of aerosolized medication.   2. Physician will be contacted for respiratory rate (RR) greater than 35 breaths per minute. Therapy will be held for heart rate (HR) greater than 140 beats per minute, pending direction from physician. 3. Bronchodilators will be administered via Metered Dose Inhaler (MDI) with spacer when the following criteria are met:  a. Alert and cooperative     b. HR < 140 bpm  c. RR < 30 bpm                d. Can demonstrate a 2-3 second inspiratory hold  4. Bronchodilators will be administered via Hand Held Nebulizer SOF[facility]) to patients when ANY of the following criteria are met  a. Incognizant or uncooperative          b. Patients treated with HHN at Home        c. Unable to demonstrate proper use of MDI with spacer     d. RR > 30 bpm   5. Bronchodilators will be delivered via Metered Dose Inhaler (MDI), HHN, Aerogen to intubated patients on mechanical ventilation. 6. Inhalation medication orders will be delivered and/or substituted as outlined below. Aerosolized Medications Ordering and Administration Guidelines:    1. All Medications will be ordered by a physician, and their frequency and/or modality will be adjusted as defined by the patients Respiratory Severity Index (RSI) score. 2. If the patient does not have documented COPD, consider discontinuing anticholinergics when RSI is less than 9.  3. If the bronchospasm worsens (increased RSI), then the bronchodilator frequency can be increased to a maximum of every 4 hours. If greater than every 4 hours is required, the physician will be contacted. 4. If the bronchospasm improves, the frequency of the bronchodilator can be decreased, based on the patient's RSI, but not less than home treatment regimen frequency. 5. Bronchodilator(s) will be discontinued if patient has a RSI less than 9 and has received no scheduled or as needed treatment for 72  Hrs. Patients Ordered on a Mucolytic Agent:    1. Must always be administered with a bronchodilator.     2. Discontinue if patient experiences worsened bronchospasm, or secretions have lessened to the point that the patient is able to clear them with a cough. Anti-inflammatory and Combination Medications:    1. If the patient lacks prior history of lung disease, is not using inhaled anti-inflammatory medication at home, and lacks wheezing by examination or by history for at least 24 hours, contact physician for possible discontinuation.

## 2019-04-22 ENCOUNTER — TELEPHONE (OUTPATIENT)
Dept: PULMONOLOGY | Age: 54
End: 2019-04-22

## 2019-04-22 VITALS
HEIGHT: 60 IN | RESPIRATION RATE: 16 BRPM | BODY MASS INDEX: 57.52 KG/M2 | TEMPERATURE: 98.2 F | WEIGHT: 293 LBS | DIASTOLIC BLOOD PRESSURE: 88 MMHG | OXYGEN SATURATION: 96 % | HEART RATE: 76 BPM | SYSTOLIC BLOOD PRESSURE: 143 MMHG

## 2019-04-22 PROCEDURE — 97535 SELF CARE MNGMENT TRAINING: CPT

## 2019-04-22 PROCEDURE — G0378 HOSPITAL OBSERVATION PER HR: HCPCS

## 2019-04-22 PROCEDURE — 99232 SBSQ HOSP IP/OBS MODERATE 35: CPT | Performed by: INTERNAL MEDICINE

## 2019-04-22 PROCEDURE — 97530 THERAPEUTIC ACTIVITIES: CPT

## 2019-04-22 PROCEDURE — 6370000000 HC RX 637 (ALT 250 FOR IP): Performed by: STUDENT IN AN ORGANIZED HEALTH CARE EDUCATION/TRAINING PROGRAM

## 2019-04-22 PROCEDURE — 6360000002 HC RX W HCPCS: Performed by: STUDENT IN AN ORGANIZED HEALTH CARE EDUCATION/TRAINING PROGRAM

## 2019-04-22 PROCEDURE — 94640 AIRWAY INHALATION TREATMENT: CPT

## 2019-04-22 PROCEDURE — 96372 THER/PROPH/DIAG INJ SC/IM: CPT

## 2019-04-22 PROCEDURE — 97116 GAIT TRAINING THERAPY: CPT

## 2019-04-22 PROCEDURE — 6370000000 HC RX 637 (ALT 250 FOR IP): Performed by: INTERNAL MEDICINE

## 2019-04-22 PROCEDURE — 94761 N-INVAS EAR/PLS OXIMETRY MLT: CPT

## 2019-04-22 PROCEDURE — 2700000000 HC OXYGEN THERAPY PER DAY

## 2019-04-22 PROCEDURE — 97165 OT EVAL LOW COMPLEX 30 MIN: CPT

## 2019-04-22 RX ORDER — FUROSEMIDE 20 MG/1
20 TABLET ORAL DAILY
Qty: 30 TABLET | Refills: 1 | Status: SHIPPED
Start: 2019-04-22 | End: 2019-07-16

## 2019-04-22 RX ORDER — FLUCONAZOLE 100 MG/1
100 TABLET ORAL DAILY
Qty: 7 TABLET | Refills: 0 | Status: SHIPPED | OUTPATIENT
Start: 2019-04-22 | End: 2019-04-29

## 2019-04-22 RX ADMIN — ENOXAPARIN SODIUM 40 MG: 40 INJECTION SUBCUTANEOUS at 10:06

## 2019-04-22 RX ADMIN — GUAIFENESIN 600 MG: 600 TABLET, EXTENDED RELEASE ORAL at 10:06

## 2019-04-22 RX ADMIN — LEVOTHYROXINE SODIUM 112 MCG: 112 TABLET ORAL at 06:36

## 2019-04-22 RX ADMIN — LAMOTRIGINE 200 MG: 100 TABLET ORAL at 10:06

## 2019-04-22 RX ADMIN — IPRATROPIUM BROMIDE AND ALBUTEROL SULFATE 1 AMPULE: .5; 3 SOLUTION RESPIRATORY (INHALATION) at 11:36

## 2019-04-22 RX ADMIN — GABAPENTIN 600 MG: 300 CAPSULE ORAL at 10:05

## 2019-04-22 RX ADMIN — SERTRALINE HYDROCHLORIDE 100 MG: 50 TABLET ORAL at 10:05

## 2019-04-22 RX ADMIN — LISINOPRIL 5 MG: 5 TABLET ORAL at 10:06

## 2019-04-22 RX ADMIN — ASPIRIN 81 MG: 81 TABLET, COATED ORAL at 10:06

## 2019-04-22 RX ADMIN — MONTELUKAST 10 MG: 10 TABLET, FILM COATED ORAL at 10:06

## 2019-04-22 RX ADMIN — DILTIAZEM HYDROCHLORIDE 120 MG: 120 CAPSULE, COATED, EXTENDED RELEASE ORAL at 10:05

## 2019-04-22 RX ADMIN — PANTOPRAZOLE SODIUM 40 MG: 40 TABLET, DELAYED RELEASE ORAL at 10:05

## 2019-04-22 RX ADMIN — IPRATROPIUM BROMIDE AND ALBUTEROL SULFATE 1 AMPULE: .5; 3 SOLUTION RESPIRATORY (INHALATION) at 08:17

## 2019-04-22 ASSESSMENT — PAIN SCALES - GENERAL: PAINLEVEL_OUTOF10: 0

## 2019-04-22 NOTE — DISCHARGE INSTR - COC
Continuity of Care Form    Patient Name: Jordan Jolley   :  1965  MRN:  [id number]    Admit date:  2019  Discharge date:  19    Code Status Order: Full Code   Advance Directives:   [facility] Documentation     Date/Time Healthcare Directive Type of Healthcare Directive Copy in [address] Agent's Name Healthcare Agent's Phone Number    [phone]  No, patient does not have an advance directive for healthcare treatment -- -- -- -- --          Admitting Physician:  Sergio Starr DO  PCP: Anita Gaiatn MD    Discharging Nurse: [address]  Unit/Room#: 0521/0521-01  Discharging Unit Phone Number: [phone]    Emergency Contact:   Extended Emergency Contact Information  Primary Emergency Contact: Óscar Sutton   [facility] [address] [facility] Phone: [phone]  Mobile Phone: [phone]  Relation: Parent  Secondary Emergency Contact: [address] Phone: [phone]  Mobile Phone: [phone]  Relation: Brother/Sister    Past Surgical History:  Past Surgical History:   Procedure Laterality Date    BLADDER SURGERY      BRAIN SURGERY      BRONCHOSCOPY N/A 2019    BRONCHOSCOPY ALVEOLAR LAVAGE performed by Bernarda Piper MD at  [facility]  2019    BRONCHOSCOPY THERAPUTIC ASPIRATION INITIAL performed by Bernarda Piper MD at [facility] 468 REPLACEMENT  2009    hip    KNEE SURGERY      OTHER SURGICAL HISTORY  2013    cystoscopy, urethral dilatation    TOTAL HIP ARTHROPLASTY      Left    URETHRAL STRICTURE DILATATION         Immunization History:   Immunization History   Administered Date(s) Administered    Influenza, Quadv, 3 yrs and older, IM, PF (Fluzone 3 yrs and older or Afluria 5 yrs and older) 2018    Tdap (Boostrix, Adacel) 2015       Active Problems:  Patient Active Problem List   Diagnosis Code    Swallowing Test): not done    Treatments at the Time of Hospital Discharge:   Respiratory Treatments:   Oxygen Therapy:  is on oxygen at 2 L/min per nasal cannula. Ventilator:    - No ventilator support    Rehab Therapies: Physical Therapy  Weight Bearing Status/Restrictions: No weight bearing restirctions  Other Medical Equipment (for information only, NOT a DME order):  {EQUIPMENT:[id number]}  Other Treatments:     Patient's personal belongings (please select all that are sent with patient):  None    RN SIGNATURE:  Electronically signed by Darwin Herndon RN on 4/22/19 at 3:22 PM    CASE MANAGEMENT/SOCIAL WORK SECTION    Inpatient Status Date:     Readmission Risk Assessment Score:  Readmission Risk              Risk of Unplanned Readmission:        12           Discharging to Facility/ Agency   Name:  [facility] care    Address: [address] [facility] [place], [address] [place] [address],[address]  Phone: [phone]  Fax: [phone]  ·     Dialysis Facility (if applicable)   · Name:  · Address:  · Dialysis Schedule:  · Phone:  · Fax:    / signature: {Esignature:[id number]}    PHYSICIAN SECTION    Prognosis: Good    Condition at Discharge: Stable    Rehab Potential (if transferring to Rehab): Good    Recommended Labs or Other Treatments After Discharge: She will be getting home skilled nursing and  physical therapy (evaluate & treat). Physician Certification: I certify the above information and transfer of Byron Cote  is necessary for the continuing treatment of the diagnosis listed and that she requires Home Care for less 30 days.      Update Admission H&P: No change in H&P    PHYSICIAN SIGNATURE:  Electronically signed by Mo Gagnon on 4/22/19 at 2:27 PM

## 2019-04-22 NOTE — PROGRESS NOTES
assess(pt sitting EOB before & after therapy)  Scooting: Independent(to scoot forward<>backward at EOB)     Transfers  Sit to Stand: Independent  Stand to sit: Independent  Bed to Chair: Independent(without AD, moving from bed<>couch)     Ambulation  Surface: level tile  Device: No Device  Other Apparatus: O2(2L O2)  Assistance: Supervision  Quality of Gait: Patient ambulated with decreased eliel, step length and heel strike, although gait pattern otherwise [facility] for pt. No significant episodes of unsteadiness or LOB observed. Distance: 2 x 125 feet (seated rest break x 3 minutes midway through amb 2* fatigue/increasing SOB)    Stairs/Curb  # Steps : 3  Stairs Height: 6\"  Rails: Bilateral  Device: No Device  Assistance: Stand by assistance  Comment: Pt amb up/down steps using step-to gait pattern with good safety awareness. States she has one handrail and a wall on opposite side to push against near her 5 steps at home. Balance  Posture: Good  Sitting - Static: Good  Sitting - Dynamic: Good  Standing - Static: Good  Standing - Dynamic: Good;-     Exercises  Comments: Deferred LE ther ex today due to impending D/C home. Assessment   Body structures, Functions, Activity limitations: Decreased functional mobility ; Decreased endurance;Decreased strength;Decreased balance  Assessment: Pt participated well with PT this afternoon, ambulating increased distance without AD and navigating 3 steps safely with use of B handrails. Pt requiring 2L O2 at rest and with activity with mild O2 desaturations after ambulating long distances.   Pt appears to be functioning with adequate safety and (I) to safely return home once medically cleared for discharge by medical team.  Will continue to follow until formally discharged from hospital.  Treatment Diagnosis: Decreased endurance  Specific instructions for Next Treatment: Progress ther ex and mobility as tolerated  Prognosis: Good  Decision Making: Low Complexity  Patient

## 2019-04-22 NOTE — PLAN OF CARE
Bed in lowest position, wheels locked, 2/4 side rails up, nonskid footwear on, call light within reach. Pt instructed to call out when needing assistance, however pt can ambulate independently. Pt stated understanding. Nurse will continue to monitor.      Problem: Falls - Risk of:  Goal: Absence of physical injury  Description  Absence of physical injury  Outcome: Ongoing

## 2019-04-22 NOTE — TELEPHONE ENCOUNTER
Needs FU with Dr. Kathleen Flowers after 6 MWT completed.  Needs a POC, Dr. Kathleen Flowers can arrange after 6 MWT results available

## 2019-04-22 NOTE — DISCHARGE SUMMARY
Inpatient Family Medicine Service Discharge Summary      Patient ID: Jordan Jolley    Patient's PCP: Anita Gaitan MD    Admit Date: 4/20/2019   Discharge Date:   4/22/19    Admitting Physician: Sergio Starr DO  Discharge Physician: Sherly Herndon     Discharge Diagnoses: Active Hospital Problems    Diagnosis Date Noted    SOB (shortness of breath) [R06.02] 04/20/2019    Pleural effusion on left [J90] 04/20/2019    Pulmonary infiltrates [R91.8] 04/20/2019    Atelectasis [J98.11] 04/20/2019    Ineffective airway clearance [R06.89] 04/20/2019    Acute respiratory failure with hypoxia (HCC) [J96.01] 04/20/2019    Right ventricular enlargement [I51.7] 04/20/2019    Iron deficiency anemia [D50.9] 04/20/2019    Acute respiratory failure (HCC) [J96.00] 04/20/2019    Morbid obesity with BMI of 50.0-59.9, adult ([facility] [E66.01, Z68.43] 04/23/2014    KRYSTIAN (obstructive sleep apnea) [G47.33] 04/23/2014    Hypothyroid [E03.9] 04/23/2014     The patient was seen and examined on day of discharge and this discharge summary is in conjunction with any daily progress note from day of discharge. Hospital Course:   Demetris Sutton is a 48 y.o. female with PMHx of morbid obesity, seizures (last one in 2010), KRYSTIAN and chronic respiratory failure who presented to the [address] [place] [address] on 4/20/19 complaining of a several-day history of recurrent shortness of breath and a one-day history of chest pain. She was accompanied by sister, who brought her in for evaluation. Of note patient had a recent admission just a couple of weeks for acute hypoxic respiratory failure. She was discharged home on 2-3L of oxygen. She followed up with Dr. Satish Hay (pulmonology) on 4/17/19 and later that day began developing worsening symptoms (SOB with minimal exertion and cough).  Also began requiring more oxygen at rest. She was supposed to have another appointment the following day with him for a 6-minute walk test and admits she showing EF 55-60%. Previous chest CTPA on 4/6/19 showing right lower lobe atelectasis vs pneumonia and no evidence of PE. CXR 4/20/19 showing slight bibasilar atelectasis. Does not appear fluid overloaded on exam   - Pulmonology consulted, appreciate recs- plan for bronchoscopy today  - Continued RT/Duonebs - currently on 2-3 L with ambulation  - continue oxygen protocol  - ICS  - ambulation  - CPAP at night     HTN   - continue home diltiazem, lisinopril.     History of seizure disorder- managed by PCP, last seizure in 2010  -continue Lamictal and gabapentin     Anemia, microcytic - appears chronic on last admission had a Hgb of 10.7 on admission, today 10.8  - continue iron supplement  - recommend outpatient colonoscopy     Morbid obesity - body mass index is 58.7 kg/m². Complicating assessment and treatment. Placing patient at risk for multiple co-morbidities as well as early death and contributing to the patient's presentation. Counseled on weight loss;  HgbA1c 6.2     Suspected Obstructive Sleep Apnea: Pt reports hx of KRYSTIAN but not wearing her CPAP for >10 years. Will need sleep study as outpatient. Will attempt to use while inpatient as well       Physical Exam Performed:   BP (!) 143/88   Pulse 76   Temp 98.2 °F (36.8 °C) (Oral)   Resp 16   Ht 5' (1.524 m)   Wt (!) 308 lb (139.7 kg)   LMP 07/29/2013   SpO2 96%   BMI 60.15 kg/m²   GENERAL APPEARANCE: Awake and alert. Cooperative. No acute  distress. Morbidly obese  HEAD: Normocephalic. Atraumatic. EYES: PERRL. EOM's grossly intact. ENT: Mucous membranes are moist.   NECK: Supple. No JVD.  No tracheal tenderness or deviation. HEART: RRR. No murmurs. No chest wall tenderness.  *exam limited due to body habitus*  LUNGS: Respirations unlabored on RA while at rest. CTAB. Good air exchange. Speaking comfortably in full sentences. No wheezes, rhonchi, rales.    ABDOMEN: Soft. Non-distended. Non-tender.  No guarding or rebound. No midline pulsatile mass.   EXTREMITIES: No peripheral edema.  No unilateral calf pain, redness or swelling.  Moves all extremities equally. All extremities neurovascularly intact. SKIN: Warm and dry. No acute rashes. NEUROLOGICAL: Alert and oriented. Grossly non-focal   PSYCHIATRIC: Normal mood and affect. Labs: For convenience and continuity at follow-up the following most recent labs are provided:  CBC:    Lab Results   Component Value Date    WBC 8.2 04/21/2019    HGB 10.8 04/21/2019    HCT 34.5 04/21/2019     04/21/2019       Renal:    Lab Results   Component Value Date     04/21/2019    K 4.7 04/21/2019     04/21/2019    CO2 27 04/21/2019    BUN 18 04/21/2019    CREATININE 0.8 04/21/2019    CALCIUM 9.2 04/21/2019    PHOS 4.2 05/13/2010       Significant Diagnostic Studies  Radiology:   XR CHEST STANDARD (2 VW)   Final Result   Slight bibasilar atelectasis suspected. No other significant abnormality.               Consults:   IP CONSULT TO PULMONOLOGY  IP CONSULT TO HOSPITALIST  IP CONSULT TO HOME CARE NEEDS    Disposition:  Home     Condition at Discharge: Stable    Discharge Instructions/Follow-up:  Follow up with Dr. Harriet Jon and Wanda Foley (for diet) within one week; Continue to follow with Pumonology     Code Status:  Full Code     Activity: activity as tolerated    Diet: regular diet      Discharge Medications:     Current Discharge Medication List           Details   fluconazole (DIFLUCAN) 100 MG tablet Take 1 tablet by mouth daily for 7 days  Qty: 7 tablet, Refills: 0      furosemide (LASIX) 20 MG tablet Take 1 tablet by mouth daily  Qty: 30 tablet, Refills: 1              Details   Multiple Vitamin (MULTI-VITAMIN DAILY PO) Take 1 tablet by mouth      Cyclobenzaprine HCl (FLEXERIL PO) Take 1 tablet by mouth as needed      cetirizine (ZYRTEC) 10 MG tablet Take 10 mg by mouth daily      levothyroxine (SYNTHROID) 112 MCG tablet Take 112 mcg by mouth daily      ferrous sulfate 325 (65 Fe) MG tablet Take 1 tablet by mouth every 48 hours  Qty: 30 tablet, Refills: 0      ibuprofen (ADVIL;MOTRIN) 200 MG tablet Take 600 mg by mouth as needed for Pain      sertraline (ZOLOFT) 100 MG tablet Take 100 mg by mouth daily      lisinopril (PRINIVIL;ZESTRIL) 5 MG tablet Take 5 mg by mouth daily      lamoTRIgine (LAMICTAL) 200 MG tablet Take 200 mg by mouth 2 times daily      diltiazem (CARDIZEM CD) 120 MG extended release capsule Take 1 capsule by mouth 2 times daily  Qty: 30 capsule, Refills: 0      albuterol sulfate HFA (PROVENTIL HFA) 108 (90 Base) MCG/ACT inhaler Inhale 2 puffs into the lungs every 4 hours as needed for Wheezing or Shortness of Breath (Space out to every 6 hours as symptoms improve) Space out to every 6 hours as symptoms improve. Qty: 1 Inhaler, Refills: 0      pantoprazole (PROTONIX) 40 MG tablet Take 40 mg by mouth daily. gabapentin (NEURONTIN) 600 MG tablet Take 600 mg by mouth 2 times daily. montelukast (SINGULAIR) 10 MG tablet Take 10 mg by mouth every morning       aspirin 81 MG EC tablet Take 81 mg by mouth daily. acetaminophen (TYLENOL) 500 MG tablet Take 500 mg by mouth every 6 hours as needed. Time Spent on discharge is more than 30 minutes in the examination, evaluation, counseling and review of medications and discharge plan. This patient was seen and evaluated with attending, Dr. Steff Marie. Signed:    Signed,  Rodri Logan D.O. Resident Physician PGY3  [facility] [address] [place] [address]      Thank you Kate Duron MD for the opportunity to be involved in this patient's care. If you have any questions or concerns please feel free to contact me at [phone].

## 2019-04-22 NOTE — CARE COORDINATION
[facility]    Referral received from [address] [place] [address] with request for home care services-SN/PT/OT. Writer has reviewed H&P and is aware the pt had a bronchoscopy 4/21. Per notes pt was previously DC'ed home with O2- to wear 2-3L. I will continue to follow patient for needs, and arrange [facility] services prior to DC. Should pt dc over the weekend please fax facesheet, order, BRIGHT, and H&P to [facility]. Update 11:50- Spoke with resident in the [place] after she saw the pt, she would like an order for home care placed for SN/PT/OT, Dr. Jessica Rivero. Will place order- verbal order for home care. Spoke with pt at bedside about [facility], all questions answered. Pt is agreeable to SN/PT/OT. Pt aware agency will call to set up [facility] by 5p or tomorrow mid-morning/afternoon. Pt is agreeable to having a SOC within 2 days of DC. Demographics verified. Orders to be faxed to [facility], waiting to DC order. Pt reports she needs a portable O2 tank, CM aware. Update 3:55- DC order noted, orders faxed to [facility].    Elder Garcias RN CTN with [facility]  YKS:[phone]

## 2019-04-22 NOTE — PROGRESS NOTES
techniques      Restrictions  Restrictions/Precautions  Restrictions/Precautions: Up as Tolerated, Fall Risk  Required Braces or Orthoses?: No  Position Activity Restriction  Other position/activity restrictions: O2    Subjective   General  Chart Reviewed: Yes  Patient assessed for rehabilitation services?: Yes  Family / Caregiver Present: No  Subjective  Subjective: Pt found seated on EOB. Is receptive to OT evaluation and treatment  Pain Assessment  Pain Assessment: 0-10  Pain Level: 0  Oxygen Therapy  SpO2: 96 %  O2 Device: Nasal cannula  O2 Flow Rate (L/min): 2 L/min  Social/Functional History  Social/Functional History  Lives With: Alone  Type of Home: Apartment  Home Layout: One level  Home Access: (railing on R side)  Entrance Stairs - Number of Steps: 5  Entrance Stairs - Rails: Right  Bathroom Shower/Tub: Curtain, Tub/Shower unit, Shower chair with back  Bathroom Toilet: Standard  Bathroom Equipment: Hand-held shower  Home Equipment: Oxygen, Cane, Rolling walker, Reacher, Crutches  Receives Help From: Family, Friend(s)  ADL Assistance: Independent  Homemaking Assistance: Independent  Homemaking Responsibilities: Yes  Meal Prep Responsibility: Primary  Laundry Responsibility: (laundry on first floor)  Cleaning Responsibility: Primary  Shopping Responsibility: Primary(uses scooter)  Ambulation Assistance: Independent(uses RW occasionally, typically uses cane)  Transfer Assistance: Independent  Active : Yes  Occupation: On disability  Leisure & Hobbies: sewing, craft shows     Objective   Vision Exceptions: Wears glasses for reading;Wears glasses for distance    Orientation  Overall Orientation Status: Within Normal Limits     Balance  Sitting Balance: Independent  Standing Balance: Independent(with dynamic balance)  Standing Balance  Time: 5 mins.   Activity: grooming, brushing hair and teeth  Comment: no loss of balance  Functional Mobility  Functional - Mobility Device: No device  Activity: Retrieve items; Transport items; To/from bathroom  Assist Level: Modified independent (with O2-2L)  Toilet Transfers  Toilet - Technique: Ambulating  Equipment Used: Grab bars  Toilet Transfer: Modified independent  Toilet Transfers Comments: used grab bar  ADL  Grooming: Supervision  LE Dressing: Supervision(donned pants while seated on commode)  Toileting: Supervision  Tone RUE  RUE Tone: Normotonic  Tone LUE  LUE Tone: Normotonic  Coordination  Movements Are Fluid And Coordinated: Yes     Bed mobility  Sit to Supine: Modified independent(with HOB elevated)  Scooting: Independent  Transfers  Sit to stand: Independent  Stand to sit: Independent  Vision - Basic Assessment  Prior Vision: Wears glasses all the time  Cognition  Overall Cognitive Status: WNL  LUE AROM (degrees)  LUE AROM : WNL  RUE AROM (degrees)  RUE AROM : WNL  LUE Strength  Gross LUE Strength: WFL  RUE Strength  Gross RUE Strength: WFL     Hand Dominance  Hand Dominance: Right       Plan   Plan  Times per week: 3-5x/wk  Current Treatment Recommendations: Patient/Caregiver Education & Training, Self-Care / ADL, Safety Education & Training, Endurance Training, Strengthening    AM-PAC Score   AM-[facility] Inpatient Daily Activity Raw Score: 22  AM-PAC Inpatient ADL T-Scale Score : 47.1  ADL Inpatient CMS 0-100% Score: 25.8  ADL Inpatient CMS G-Code Modifier : CJ    Goals  Short term goals  Time Frame for Short term goals: 1 week  Short term goal 1: Pt will list 2 energy conservation techniques to improve her daily function by 4/27. Short term goal 2: Pt will perform toileting and grooming activity and maintain SpO2 in the 90s. Short term goal 3: Supervision with shower task. Patient Goals   Patient goals : None stated     Therapy Time   Individual Concurrent Group Co-treatment   Time In 0926         Time Out 1001         Minutes 35         Timed Code Treatment Minutes: 25 Minutes(10 mins.  eval)   [address] [facility] MS, OTR/L

## 2019-04-22 NOTE — PLAN OF CARE
Pt ambulated in room, oxygen saturation dropped to 91% with 2 liters of oxygen, however reached 95% upon sitting and taking deep breaths. Will continue to monitor.    Problem: Gas Exchange - Impaired:  Goal: Levels of oxygenation will improve  Description  Levels of oxygenation will improve  Outcome: Ongoing

## 2019-04-22 NOTE — PROGRESS NOTES
Prescriptions can be picked up at patient pharmacy and discharge instructions given. Pt verbalized understanding denies any questions/ needs at this time. Took patient down to front lobby in wheelchair for discharge.

## 2019-04-22 NOTE — CARE COORDINATION
CASE MANAGEMENT INITIAL ASSESSMENT      Reviewed chart and met with patient today, re: 48 yr old female   Explained Case Management role/services. Family present: None  Primary contact information: Father Ishmael Sevilla [phone]    Admit date/status: 04/20/19  Diagnosis: Acute Respiratory Failure     Insurance: [employer] required for SNF - Y      3 night stay required - N    Living arrangements, Adls, care needs, prior to admission: Pt states that she lives in second story apartment with five steps to get into residence. She lives alone. She is typically independent in ADLs and drives. Transportation: [place] [id number] at home: Walker_x_Cane_x_RTS__ BSC__Shower Chair__  02_2L/NC W/ [facility] _ HHN__ CPAP__  BiPap__  Hospital Bed__ W/C___ Other___Crutches _______    Services in the home and/or outpatient, prior to admission: O2 though [address] (if applicable)   · Name:  · Address:  · Dialysis Schedule:  · Phone:  · Fax:    PT/OT recs: Home assist PRN, Home PT/OT    Hospital Exemption Notification (HEN): NA    Barriers to discharge: None    Plan/comments: Pt plans to dc home. She is aware of PT/OT recs and is agreeable to [facility]. She does not have agency preference. Pt already [facility] portable O2 tank [facility] but is requesting more portable option stating that navigating her steps is very difficult. Spoke with [employer] who will contact pt PCP office for Orders. [facility] upon DC to set up eval by RT. Referral for [facility] placed with [facility]; Marques Burroughs is aware and will follow. No further needs identified at this time.      ECOC on chart for MD johana Christopher, RN

## 2019-04-23 LAB
CULTURE, RESPIRATORY: NORMAL
EKG ATRIAL RATE: 88 BPM
EKG DIAGNOSIS: NORMAL
EKG P AXIS: 36 DEGREES
EKG P-R INTERVAL: 148 MS
EKG Q-T INTERVAL: 364 MS
EKG QRS DURATION: 86 MS
EKG QTC CALCULATION (BAZETT): 440 MS
EKG R AXIS: 49 DEGREES
EKG T AXIS: 49 DEGREES
EKG VENTRICULAR RATE: 88 BPM
GRAM STAIN RESULT: NORMAL

## 2019-04-23 NOTE — ED PROVIDER NOTES
achievable. COMPARISON: None. HISTORY: ORDERING SYSTEM PROVIDED HISTORY: sob, tachycardia TECHNOLOGIST PROVIDED HISTORY: Ordering Physician Provided Reason for Exam: Shortness of Breath (recent bronchotis 3weeks ago; increased SOB, and cough. ) FINDINGS: Pulmonary Arteries: Evaluation is compromised by motion artifact and the patient's body habitus. There is no convincing evidence for pulmonary embolus. Mediastinum: No evidence of mediastinal lymphadenopathy. The heart and pericardium demonstrate no acute abnormality. There is no acute abnormality of the thoracic aorta. Lungs/pleura: There is a small left pleural effusion. There is no pneumothorax. There is lingular and left lower lobe airspace disease. There is minimal airspace disease in the lateral and anterior right lower lobe. Upper Abdomen: Limited images of the upper abdomen are unremarkable. Soft Tissues/Bones: No acute bone or soft tissue abnormality. 1. Limited study with no definite scan evidence for pulmonary embolus. 2. Left pleural effusion with lingular and left lower lobe atelectasis or pneumonia. 3. Right lower lobe atelectasis or pneumonia. XR CHEST STANDARD (2 VW)   Final Result   Slight bibasilar atelectasis suspected. No other significant abnormality.              CBC Auto Differential   Result Value Ref Range    WBC 7.4 4.0 - 11.0 K/uL    RBC 5.17 4.00 - 5.20 M/uL    Hemoglobin 10.6 (L) 12.0 - 16.0 g/dL    Hematocrit 33.9 (L) 36.0 - 48.0 %    MCV 65.6 (L) 80.0 - 100.0 fL    MCH 20.5 (L) 26.0 - 34.0 pg    MCHC 31.2 31.0 - 36.0 g/dL    RDW 20.1 (H) 12.4 - 15.4 %    Platelets 445 130 - 650 K/uL    MPV 7.6 5.0 - 10.5 fL    Path Consult No     Neutrophils % 74.1 %    Lymphocytes % 17.9 %    Monocytes % 7.6 %    Eosinophils % 0.0 %    Basophils % 0.4 %    Neutrophils # 5.5 1.7 - 7.7 K/uL    Lymphocytes # 1.3 1.0 - 5.1 K/uL    Monocytes # 0.6 0.0 - 1.3 K/uL    Eosinophils # 0.0 0.0 - 0.6 K/uL    Basophils # 0.0 0.0 - 0.2 K/uL Comprehensive Metabolic Panel   Result Value Ref Range    Sodium 142 136 - 145 mmol/L    Potassium 4.4 3.5 - 5.1 mmol/L    Chloride 103 99 - 110 mmol/L    CO2 27 21 - 32 mmol/L    Anion Gap 12 3 - 16    Glucose 112 (H) 70 - 99 mg/dL    BUN 15 7 - 20 mg/dL    CREATININE 0.7 0.6 - 1.1 mg/dL    GFR Non-African American >60 >60    GFR African American >60 >60    Calcium 8.5 8.3 - 10.6 mg/dL    Total Protein 6.3 (L) 6.4 - 8.2 g/dL    Alb 3.5 3.4 - 5.0 g/dL    Albumin/Globulin Ratio 1.3 1.1 - 2.2    Total Bilirubin 0.4 0.0 - 1.0 mg/dL    Alkaline Phosphatase 91 40 - 129 U/L    ALT 14 10 - 40 U/L    AST 15 15 - 37 U/L    Globulin 2.8 g/dL   Urinalysis Reflex to Culture   Result Value Ref Range    Color, UA Yellow Straw/Yellow    Clarity, UA Clear Clear    Glucose, Ur Negative Negative mg/dL    Bilirubin Urine Negative Negative    Ketones, Urine Negative Negative mg/dL    Specific Gravity, UA 1.025 1.005 - 1.030    Blood, Urine Negative Negative    pH, UA 6.0 5.0 - 8.0    Protein, UA Negative Negative mg/dL    Urobilinogen, Urine 0.2 <2.0 E.U./dL    Nitrite, Urine Negative Negative    Leukocyte Esterase, Urine Negative Negative    Microscopic Examination Not Indicated     Urine Reflex to Culture Not Indicated     Urine Type Not Specified    Troponin   Result Value Ref Range    Troponin <0.01 <0.01 ng/mL   Brain Natriuretic Peptide   Result Value Ref Range    Pro-BNP 74 0 - 124 pg/mL     Patient was not hypoxic at rest but desats to 85% with ambulation even on 3 liters oxygen. No signficant bronchospasm but given trial of neb in ED. Has had CTPA for PE negative but suboptimal due to habitus. No CHF on cXR and normal BNP. No clear pneumonia or leukocytosis. Discussed case with pulmonary who agree with admission plan and plan to perform bronch for  More information. 1. Acute respiratory failure with hypoxia ([address]          This chart was created using Dragon voice recognition software.         Karmen Badillo

## 2019-04-25 ENCOUNTER — OFFICE VISIT (OUTPATIENT)
Dept: PULMONOLOGY | Age: 54
End: 2019-04-25
Payer: MEDICAID

## 2019-04-25 ENCOUNTER — HOSPITAL ENCOUNTER (OUTPATIENT)
Dept: PULMONOLOGY | Age: 54
Discharge: HOME | End: 2019-04-25
Payer: MEDICAID

## 2019-04-25 VITALS
RESPIRATION RATE: 16 BRPM | HEART RATE: 89 BPM | SYSTOLIC BLOOD PRESSURE: 131 MMHG | WEIGHT: 293 LBS | TEMPERATURE: 98.1 F | DIASTOLIC BLOOD PRESSURE: 52 MMHG | OXYGEN SATURATION: 95 % | HEIGHT: 60 IN | BODY MASS INDEX: 57.52 KG/M2

## 2019-04-25 VITALS — OXYGEN SATURATION: 93 %

## 2019-04-25 DIAGNOSIS — J45.50 SEVERE PERSISTENT ASTHMA WITHOUT COMPLICATION: Primary | ICD-10-CM

## 2019-04-25 DIAGNOSIS — I27.29 PULMONARY HYPERTENSION DUE TO SLEEP-DISORDERED BREATHING (HCC): ICD-10-CM

## 2019-04-25 DIAGNOSIS — G47.33 OBSTRUCTIVE SLEEP APNEA: ICD-10-CM

## 2019-04-25 DIAGNOSIS — R06.02 SHORTNESS OF BREATH: ICD-10-CM

## 2019-04-25 DIAGNOSIS — J98.4 RESTRICTIVE LUNG DISEASE SECONDARY TO OBESITY: ICD-10-CM

## 2019-04-25 DIAGNOSIS — G47.8 PULMONARY HYPERTENSION DUE TO SLEEP-DISORDERED BREATHING (HCC): ICD-10-CM

## 2019-04-25 DIAGNOSIS — E66.9 RESTRICTIVE LUNG DISEASE SECONDARY TO OBESITY: ICD-10-CM

## 2019-04-25 DIAGNOSIS — J96.11 CHRONIC RESPIRATORY FAILURE WITH HYPOXIA (HCC): ICD-10-CM

## 2019-04-25 LAB
DLCO %PRED: 301 %
DLCO PRED: NORMAL ML/MIN/MMHG
DLCO/VA %PRED: NORMAL %
DLCO/VA PRED: NORMAL ML/MIN/MMHG
DLCO/VA: NORMAL ML/MIN/MMHG
DLCO: NORMAL ML/MIN/MMHG
EXPIRATORY TIME-POST: NORMAL SEC
EXPIRATORY TIME: NORMAL SEC
FEF 25-75% %CHNG: NORMAL
FEF 25-75% %PRED-POST: NORMAL %
FEF 25-75% %PRED-PRE: NORMAL L/SEC
FEF 25-75% PRED: NORMAL L/SEC
FEF 25-75%-POST: NORMAL L/SEC
FEF 25-75%-PRE: NORMAL L/SEC
FEV1 %PRED-POST: 108 %
FEV1 %PRED-PRE: 100 %
FEV1 PRED: NORMAL L
FEV1-POST: NORMAL L
FEV1-PRE: NORMAL L
FEV1/FVC %PRED-POST: NORMAL %
FEV1/FVC %PRED-PRE: NORMAL %
FEV1/FVC PRED: NORMAL %
FEV1/FVC-POST: 1.43 %
FEV1/FVC-PRE: 1.07 %
FVC %PRED-POST: 55 L
FVC %PRED-PRE: 50 %
FVC PRED: NORMAL L
FVC-POST: NORMAL L
FVC-PRE: NORMAL L
GAW %PRED: NORMAL %
GAW PRED: NORMAL L/S/CMH2O
GAW: NORMAL L/S/CMH2O
IC %PRED: NORMAL %
IC PRED: NORMAL L
IC: NORMAL L
MEP: NORMAL
MIP: NORMAL
MVV %PRED-PRE: NORMAL %
MVV PRED: NORMAL L/MIN
MVV-PRE: NORMAL L/MIN
PEF %PRED-POST: NORMAL %
PEF %PRED-PRE: NORMAL L/SEC
PEF PRED: NORMAL L/SEC
PEF%CHNG: NORMAL
PEF-POST: NORMAL L/SEC
PEF-PRE: NORMAL L/SEC
RAW %PRED: NORMAL %
RAW PRED: NORMAL CMH2O/L/S
RAW: NORMAL CMH2O/L/S
RV %PRED: NORMAL %
RV PRED: NORMAL L
RV: NORMAL L
SVC %PRED: NORMAL %
SVC PRED: NORMAL L
SVC: NORMAL L
TLC %PRED: 27 %
TLC PRED: NORMAL L
TLC: NORMAL L
VA %PRED: NORMAL %
VA PRED: NORMAL L
VA: NORMAL L
VTG %PRED: NORMAL %
VTG PRED: NORMAL L
VTG: NORMAL L

## 2019-04-25 PROCEDURE — 94060 EVALUATION OF WHEEZING: CPT

## 2019-04-25 PROCEDURE — G8417 CALC BMI ABV UP PARAM F/U: HCPCS | Performed by: INTERNAL MEDICINE

## 2019-04-25 PROCEDURE — G8427 DOCREV CUR MEDS BY ELIG CLIN: HCPCS | Performed by: INTERNAL MEDICINE

## 2019-04-25 PROCEDURE — 3017F COLORECTAL CA SCREEN DOC REV: CPT | Performed by: INTERNAL MEDICINE

## 2019-04-25 PROCEDURE — 1036F TOBACCO NON-USER: CPT | Performed by: INTERNAL MEDICINE

## 2019-04-25 PROCEDURE — 1111F DSCHRG MED/CURRENT MED MERGE: CPT | Performed by: INTERNAL MEDICINE

## 2019-04-25 PROCEDURE — 6370000000 HC RX 637 (ALT 250 FOR IP): Performed by: INTERNAL MEDICINE

## 2019-04-25 PROCEDURE — 99214 OFFICE O/P EST MOD 30 MIN: CPT | Performed by: INTERNAL MEDICINE

## 2019-04-25 PROCEDURE — 94618 PULMONARY STRESS TESTING: CPT

## 2019-04-25 PROCEDURE — 94729 DIFFUSING CAPACITY: CPT

## 2019-04-25 PROCEDURE — 94726 PLETHYSMOGRAPHY LUNG VOLUMES: CPT

## 2019-04-25 RX ORDER — ALBUTEROL SULFATE 90 UG/1
4 AEROSOL, METERED RESPIRATORY (INHALATION) ONCE
Status: COMPLETED | OUTPATIENT
Start: 2019-04-25 | End: 2019-04-25

## 2019-04-25 RX ADMIN — Medication 4 PUFF: at 13:38

## 2019-04-25 ASSESSMENT — ENCOUNTER SYMPTOMS
CHEST TIGHTNESS: 0
VOICE CHANGE: 0
STRIDOR: 0
EYE PAIN: 0
SHORTNESS OF BREATH: 1
CONSTIPATION: 0
SORE THROAT: 0
DIARRHEA: 0
ABDOMINAL PAIN: 0
EYE ITCHING: 0
CHOKING: 0
EYE DISCHARGE: 0

## 2019-04-25 ASSESSMENT — PULMONARY FUNCTION TESTS
FVC_PERCENT_PREDICTED_PRE: 50
FVC_PERCENT_PREDICTED_POST: 55
FEV1/FVC_PRE: 1.07
FEV1_PERCENT_PREDICTED_POST: 108
FEV1/FVC_POST: 1.43
FEV1_PERCENT_PREDICTED_PRE: 100

## 2019-04-25 NOTE — PROGRESS NOTES
[facility] Pulmonary Function Lab - Six Minute Walk      Test Performed on:   Room Air__X____   Oxygen at _1-3___ lpm via N/C- continuous Oxygen at _____ lpm via N/C- OCD  Assist Device Used During Test:  None__X____ Cane________ Walker_________    Modified Piter's Scale  0 Nothing at all 5 Strong    0.5 Extremely Weak 6 Stronger (Hard)    1 Very weak 7 Very Strong   2 Weak (light) 8 Very Very Strong   3 Moderate 9 Extremely Strong   4 Somewhat Strong 10 Maximum All out      Time SpO2 Heart Rate Respiratory Rate Dyspnea-  Modified Piters Scale Fatigue- Modified Piters Scale Other Symptoms   Baseline                     93% room air rest 85 18 2 2      1 minute                     85%  20 2 2 Pt stopped 1 min, placed on 1L and increased in 1L increments to keep sat 88% and above   2 minutes                     95% 2L 115 22 2 2      3 minutes                     86% 3L 100 22 2 2 Pt stopped to rest   4 minutes                     88% 3L 118 24 3 2    5 minutes                     91% 3L 113 24 3 2 Pt stopped to rest   6 minutes                     88% 3L 118 26 3 3    Recovery x 1 minute                     92% 3L 107 24 2 2    Recovery x 2 Minute                     96% 2L 92 20 2 2     Number of Laps_____6____ X 120 feet + _________ additional feet = Total Distance ___720__feet   Stopped or paused before 6 minutes? No_______ Yes ___X_____  Total expected 6 MW distance is __1247___feet. Patient achieved ___58_% of expected distance.    Pre Blood Pressure: 126/68  Post Blood Pressure: 123/65  Other symptoms at the end of exercise: back pain, some leg pain

## 2019-04-25 NOTE — PROGRESS NOTES
Pulmonary Outpatient Note   Adam Mabry MD       4/25/2019    Chief Complaint:  Results (PFt)     HPI:   48y.o. year old female here for follow up from the hospital.    Since last visit she was hospitalized with an acute respiratory infection, required bronchoscopic intervention and was discharged on supplemental oxygen. CXR personally reviewed, basilar atelectasis  Labs reviewed, cultures from bronchoscopy negative. PFTs reviewed. FEV1 50% predicted with partial post bronchodilator response indicating severe obstruction. Reduced TLC consistent with extrathoracic restriction from obesity  Walk testing showed desaturation requiring 3 LPM of oxygen to maintain saturations     States that she is still short of breath but improved after use of oxygen. Using inhaler with some improvement, now shown how to use a spacer and feels that this helped more.      Past Medical History:   Diagnosis Date    Anemia     Anxiety     Depression     Fibromyalgia     GERD (gastroesophageal reflux disease)     Heart palpitations     Hiatal hernia     Hypertension     Irritable bowel syndrome     Osteoarthritis     Panic attacks     Pneumonia     Pulmonary infiltrates 4/20/2019    Seasonal allergies     Seizure disorder ([address]     Sleep apnea     Spastic colon     Thyroid disease        Past Surgical History:   Procedure Laterality Date    BLADDER SURGERY      BRAIN SURGERY      BRONCHOSCOPY N/A 4/21/2019    BRONCHOSCOPY ALVEOLAR LAVAGE performed by Mainor White MD at [address] [place] [address]  4/21/2019    BRONCHOSCOPY THERAPUTIC ASPIRATION INITIAL performed by Mainor White MD at [facility] REPLACEMENT  2009    hip    KNEE SURGERY      OTHER SURGICAL HISTORY  07/31/2013    cystoscopy, urethral dilatation    TOTAL HIP ARTHROPLASTY      Left    URETHRAL STRICTURE DILATATION         Social History     Tobacco Use    Smoking status: Former Smoker     Packs/day: 0.50     Years: 2.00     Pack years: 1.00     Last attempt to quit: 1986     Years since quittin.0    Smokeless tobacco: Never Used   Substance Use Topics    Alcohol use: No          Family History   Problem Relation Age of Onset    Asthma Other     Asthma Other     Hypertension Father     Hypertension Sister     Cancer Neg Hx     Diabetes Neg Hx     Emphysema Neg Hx     Heart Failure Neg Hx          Current Outpatient Medications:     fluconazole (DIFLUCAN) 100 MG tablet, Take 1 tablet by mouth daily for 7 days, Disp: 7 tablet, Rfl: 0    furosemide (LASIX) 20 MG tablet, Take 1 tablet by mouth daily, Disp: 30 tablet, Rfl: 1    Multiple Vitamin (MULTI-VITAMIN DAILY PO), Take 1 tablet by mouth, Disp: , Rfl:     Cyclobenzaprine HCl (FLEXERIL PO), Take 1 tablet by mouth as needed, Disp: , Rfl:     cetirizine (ZYRTEC) 10 MG tablet, Take 10 mg by mouth daily, Disp: , Rfl:     levothyroxine (SYNTHROID) 112 MCG tablet, Take 112 mcg by mouth daily, Disp: , Rfl:     ferrous sulfate 325 (65 Fe) MG tablet, Take 1 tablet by mouth every 48 hours, Disp: 30 tablet, Rfl: 0    ibuprofen (ADVIL;MOTRIN) 200 MG tablet, Take 600 mg by mouth as needed for Pain, Disp: , Rfl:     sertraline (ZOLOFT) 100 MG tablet, Take 100 mg by mouth daily, Disp: , Rfl:     lisinopril (PRINIVIL;ZESTRIL) 5 MG tablet, Take 5 mg by mouth daily, Disp: , Rfl:     lamoTRIgine (LAMICTAL) 200 MG tablet, Take 200 mg by mouth 2 times daily, Disp: , Rfl:     diltiazem (CARDIZEM CD) 120 MG extended release capsule, Take 1 capsule by mouth 2 times daily, Disp: 30 capsule, Rfl: 0    albuterol sulfate HFA (PROVENTIL HFA) 108 (90 Base) MCG/ACT inhaler, Inhale 2 puffs into the lungs every 4 hours as needed for Wheezing or Shortness of Breath (Space out to every 6 hours as symptoms improve) Space out to every 6 hours as symptoms improve., Disp: 1 Inhaler, Rfl: 0    pantoprazole (PROTONIX) 40 MG tablet, Take 40 mg by mouth daily. , Disp: , She has no wheezes. She has no rales. Equal chest rise and expansion bilaterally   Abdominal: Soft. Bowel sounds are normal. She exhibits no distension. There is no tenderness. Musculoskeletal: Normal range of motion. She exhibits no edema. Lymphadenopathy:     She has no cervical adenopathy. Neurological: She is alert. No cranial nerve deficit. CN 2-12 grossly intact   Skin: Skin is warm and dry. No rash noted. She is not diaphoretic. ASSESSMENT:    1. Severe persistent asthma without complication    2. Chronic respiratory failure with hypoxia (HCC)    3. Obstructive sleep apnea    4. Restrictive lung disease secondary to obesity    5. Pulmonary hypertension due to sleep-disordered breathing (HCC)      PLAN:    -Discussed PFT findings which are consistent with mixed asthma and obesity related restriction. Continue albuterol MDI for now, can escalate inhaled regimen further if additional testing and treatment due to not improve her symptoms.   -Has obesity related restrictive lung disease as well as pulm htn, discussed the benefits of weight loss.   -Sleep study scheduled for May 22, will follow up with the patient after and discuss additional treatment for probable underlying KRYSTIAN  -Is achieving a symptom benefit from supplemental oxygen and should continue. Will be set up with portable tanks from [facility] next week.    -Discussed additional measures including pulm rehab, she states that she will be receiving home therapy and wanted to hold off       Kami Loera MD

## 2019-04-25 NOTE — PROCEDURES
distance  was 1147. The patient achieved 58% of expected distance. On the basis  of this PFT, the patient had combined severe obstructive airway disease  along with restrictive lung disease which is also severe. Along with  that, the patient has some reactive airway disease. The patient also  has some changes in the PFT parameters because of body habitus. Along  with that, the patient has exertional hypoxemia, requiring 3L of nasal  cannula oxygen. Please correlate clinically.         Virgen Matta MD    D: 04/25/2019 15:35:47       T: 04/25/2019 16:36:19     [email or web address]  Job#: [id number]     Doc#: [id number]    CC:

## 2019-05-03 LAB
FINAL REPORT: NORMAL
PRELIMINARY: NORMAL

## 2019-05-08 ENCOUNTER — TELEPHONE (OUTPATIENT)
Dept: PULMONOLOGY | Age: 54
End: 2019-05-08

## 2019-05-08 DIAGNOSIS — J45.50 SEVERE PERSISTENT ASTHMA WITHOUT COMPLICATION: Primary | ICD-10-CM

## 2019-05-08 RX ORDER — IPRATROPIUM BROMIDE AND ALBUTEROL SULFATE 2.5; .5 MG/3ML; MG/3ML
1 SOLUTION RESPIRATORY (INHALATION) 4 TIMES DAILY
Qty: 360 ML | Refills: 3 | Status: ON HOLD | OUTPATIENT
Start: 2019-05-08 | End: 2022-10-15 | Stop reason: HOSPADM

## 2019-05-08 NOTE — TELEPHONE ENCOUNTER
Patient was seen yesterday at home visit , she has increased SOB , oxygen sat are dropping to the high 80's and now complaining of intermittent left upper back.

## 2019-05-08 NOTE — TELEPHONE ENCOUNTER
Reviewed prior notes. See if she can get set up with a nebulizer device, set up for follow up visit within the next week. Ok to schedule with Dr. Thalia Blizzard or Dr. Latnoia Coronado if I do not have availability.    To ED otherwise

## 2019-05-09 ENCOUNTER — OFFICE VISIT (OUTPATIENT)
Dept: PULMONOLOGY | Age: 54
End: 2019-05-09
Payer: MEDICAID

## 2019-05-09 ENCOUNTER — HOSPITAL ENCOUNTER (OUTPATIENT)
Age: 54
Discharge: HOME | End: 2019-05-09
Payer: MEDICAID

## 2019-05-09 VITALS
TEMPERATURE: 97.9 F | DIASTOLIC BLOOD PRESSURE: 68 MMHG | BODY MASS INDEX: 57.52 KG/M2 | OXYGEN SATURATION: 97 % | HEIGHT: 60 IN | WEIGHT: 293 LBS | HEART RATE: 99 BPM | RESPIRATION RATE: 24 BRPM | SYSTOLIC BLOOD PRESSURE: 128 MMHG

## 2019-05-09 DIAGNOSIS — J45.51 SEVERE PERSISTENT ASTHMA WITH (ACUTE) EXACERBATION: ICD-10-CM

## 2019-05-09 DIAGNOSIS — J22 ACUTE LOWER RESPIRATORY TRACT INFECTION: Primary | ICD-10-CM

## 2019-05-09 DIAGNOSIS — J96.11 CHRONIC RESPIRATORY FAILURE WITH HYPOXIA (HCC): ICD-10-CM

## 2019-05-09 DIAGNOSIS — J22 ACUTE LOWER RESPIRATORY TRACT INFECTION: ICD-10-CM

## 2019-05-09 DIAGNOSIS — G47.33 OSA (OBSTRUCTIVE SLEEP APNEA): ICD-10-CM

## 2019-05-09 LAB
IGA: 76 MG/DL (ref 70–400)
IGG: 448 MG/DL (ref 700–1600)
IGM: 219 MG/DL (ref 40–230)

## 2019-05-09 PROCEDURE — 86003 ALLG SPEC IGE CRUDE XTRC EA: CPT

## 2019-05-09 PROCEDURE — 1111F DSCHRG MED/CURRENT MED MERGE: CPT | Performed by: INTERNAL MEDICINE

## 2019-05-09 PROCEDURE — 3017F COLORECTAL CA SCREEN DOC REV: CPT | Performed by: INTERNAL MEDICINE

## 2019-05-09 PROCEDURE — G8417 CALC BMI ABV UP PARAM F/U: HCPCS | Performed by: INTERNAL MEDICINE

## 2019-05-09 PROCEDURE — 82785 ASSAY OF IGE: CPT

## 2019-05-09 PROCEDURE — 99214 OFFICE O/P EST MOD 30 MIN: CPT | Performed by: INTERNAL MEDICINE

## 2019-05-09 PROCEDURE — 1036F TOBACCO NON-USER: CPT | Performed by: INTERNAL MEDICINE

## 2019-05-09 PROCEDURE — G8427 DOCREV CUR MEDS BY ELIG CLIN: HCPCS | Performed by: INTERNAL MEDICINE

## 2019-05-09 PROCEDURE — 82784 ASSAY IGA/IGD/IGG/IGM EACH: CPT

## 2019-05-09 PROCEDURE — 36415 COLL VENOUS BLD VENIPUNCTURE: CPT

## 2019-05-09 RX ORDER — PREDNISONE 20 MG/1
20 TABLET ORAL 2 TIMES DAILY
Qty: 10 TABLET | Refills: 0 | Status: SHIPPED | OUTPATIENT
Start: 2019-05-09 | End: 2019-05-14

## 2019-05-09 RX ORDER — DOXYCYCLINE HYCLATE 100 MG
100 TABLET ORAL 2 TIMES DAILY
Qty: 14 TABLET | Refills: 0 | Status: SHIPPED | OUTPATIENT
Start: 2019-05-09 | End: 2019-05-16

## 2019-05-09 ASSESSMENT — SLEEP AND FATIGUE QUESTIONNAIRES
HOW LIKELY ARE YOU TO NOD OFF OR FALL ASLEEP WHILE SITTING AND TALKING TO SOMEONE: 0
HOW LIKELY ARE YOU TO NOD OFF OR FALL ASLEEP WHEN YOU ARE A PASSENGER IN A CAR FOR AN HOUR WITHOUT A BREAK: 0
HOW LIKELY ARE YOU TO NOD OFF OR FALL ASLEEP WHILE WATCHING TV: 3
HOW LIKELY ARE YOU TO NOD OFF OR FALL ASLEEP WHILE SITTING AND READING: 3
NECK CIRCUMFERENCE (INCHES): 17.5
ESS TOTAL SCORE: 10
HOW LIKELY ARE YOU TO NOD OFF OR FALL ASLEEP IN A CAR, WHILE STOPPED FOR A FEW MINUTES IN TRAFFIC: 0
HOW LIKELY ARE YOU TO NOD OFF OR FALL ASLEEP WHILE SITTING INACTIVE IN A PUBLIC PLACE: 0
HOW LIKELY ARE YOU TO NOD OFF OR FALL ASLEEP WHILE SITTING QUIETLY AFTER LUNCH WITHOUT ALCOHOL: 1
HOW LIKELY ARE YOU TO NOD OFF OR FALL ASLEEP WHILE LYING DOWN TO REST IN THE AFTERNOON WHEN CIRCUMSTANCES PERMIT: 3

## 2019-05-12 LAB
2000687N OAK TREE IGE: <0.1 KU/L
ALLERGEN ASPERGILLUS ALTERNATA IGE: <0.1 KU/L
ALLERGEN ASPERGILLUS FUMIGATUS IGE: <0.1 KU/L
ALLERGEN BERMUDA GRASS IGE: <0.1 KU/L
ALLERGEN BIRCH IGE: <0.1 KU/L
ALLERGEN CAT DANDER IGE: <0.1 KU/L
ALLERGEN COMMON SHORT RAGWEED IGE: <0.1 KU/L
ALLERGEN COTTONWOOD: <0.1 KU/L
ALLERGEN COW MILK IGE: <0.1 KU/L
ALLERGEN DOG DANDER IGE: <0.1 KU/L
ALLERGEN ELM IGE: <0.1 KU/L
ALLERGEN FUNGI/MOLD M.RACEMOSUS IGE: <0.1 KU/L
ALLERGEN GERMAN COCKROACH IGE: <0.1 KU/L
ALLERGEN HORMODENDRUM HORDEI IGE: <0.1 KU/L
ALLERGEN MAPLE/BOX ELDER IGE: <0.1 KU/L
ALLERGEN MITE DUST FARINAE IGE: <0.1 KU/L
ALLERGEN MITE DUST PTERONYSSINUS IGE: <0.1 KU/L
ALLERGEN MOUNTAIN CEDAR: <0.1 KU/L
ALLERGEN MOUSE EPITHELIA IGE: <0.1 KU/L
ALLERGEN PEANUT (F13) IGE: <0.1 KU/L
ALLERGEN PECAN TREE IGE: <0.1 KU/L
ALLERGEN PENICILLIUM NOTATUM: <0.1 KU/L
ALLERGEN ROUGH PIGWEED (W14) IGE: <0.1 KU/L
ALLERGEN RUSSIAN THISTLE IGE: <0.1 KU/L
ALLERGEN SEE NOTE: NORMAL
ALLERGEN SHEEP SORREL (W18) IGE: <0.1 KU/L
ALLERGEN TIMOTHY GRASS: <0.1 KU/L
ALLERGEN TREE SYCAMORE: <0.1 KU/L
ALLERGEN WALNUT TREE IGE: <0.1 KU/L
ALLERGEN WHITE MULBERRY TREE, IGE: <0.1 KU/L
ALLERGEN, TREE, WHITE ASH IGE: <0.1 KU/L
IGE: 3 KU/L

## 2019-05-12 ASSESSMENT — ENCOUNTER SYMPTOMS
EYE PAIN: 0
CHOKING: 0
CHEST TIGHTNESS: 0
EYE ITCHING: 0
STRIDOR: 0
COUGH: 1
EYE DISCHARGE: 0
WHEEZING: 1
ABDOMINAL PAIN: 0
SORE THROAT: 0
VOICE CHANGE: 0
SHORTNESS OF BREATH: 1
CONSTIPATION: 0
DIARRHEA: 0

## 2019-05-12 NOTE — PROGRESS NOTES
Father     Hypertension Sister     Cancer Neg Hx     Diabetes Neg Hx     Emphysema Neg Hx     Heart Failure Neg Hx          Current Outpatient Medications:     doxycycline hyclate (VIBRA-TABS) 100 MG tablet, Take 1 tablet by mouth 2 times daily for 7 days, Disp: 14 tablet, Rfl: 0    predniSONE (DELTASONE) 20 MG tablet, Take 1 tablet by mouth 2 times daily for 5 days, Disp: 10 tablet, Rfl: 0    ipratropium-albuterol (DUONEB) 0.5-2.5 (3) MG/3ML SOLN nebulizer solution, Inhale 3 mLs into the lungs 4 times daily, Disp: 360 mL, Rfl: 3    furosemide (LASIX) 20 MG tablet, Take 1 tablet by mouth daily, Disp: 30 tablet, Rfl: 1    Multiple Vitamin (MULTI-VITAMIN DAILY PO), Take 1 tablet by mouth, Disp: , Rfl:     Cyclobenzaprine HCl (FLEXERIL PO), Take 1 tablet by mouth as needed, Disp: , Rfl:     cetirizine (ZYRTEC) 10 MG tablet, Take 10 mg by mouth daily, Disp: , Rfl:     levothyroxine (SYNTHROID) 112 MCG tablet, Take 112 mcg by mouth daily, Disp: , Rfl:     ferrous sulfate 325 (65 Fe) MG tablet, Take 1 tablet by mouth every 48 hours, Disp: 30 tablet, Rfl: 0    ibuprofen (ADVIL;MOTRIN) 200 MG tablet, Take 600 mg by mouth as needed for Pain, Disp: , Rfl:     sertraline (ZOLOFT) 100 MG tablet, Take 100 mg by mouth daily, Disp: , Rfl:     lisinopril (PRINIVIL;ZESTRIL) 5 MG tablet, Take 5 mg by mouth daily, Disp: , Rfl:     lamoTRIgine (LAMICTAL) 200 MG tablet, Take 200 mg by mouth 2 times daily, Disp: , Rfl:     diltiazem (CARDIZEM CD) 120 MG extended release capsule, Take 1 capsule by mouth 2 times daily, Disp: 30 capsule, Rfl: 0    albuterol sulfate HFA (PROVENTIL HFA) 108 (90 Base) MCG/ACT inhaler, Inhale 2 puffs into the lungs every 4 hours as needed for Wheezing or Shortness of Breath (Space out to every 6 hours as symptoms improve) Space out to every 6 hours as symptoms improve., Disp: 1 Inhaler, Rfl: 0    pantoprazole (PROTONIX) 40 MG tablet, Take 40 mg by mouth daily. , Disp: , Rfl:     gabapentin (NEURONTIN) 600 MG tablet, Take 600 mg by mouth 2 times daily. , Disp: , Rfl:     montelukast (SINGULAIR) 10 MG tablet, Take 10 mg by mouth every morning , Disp: , Rfl:     aspirin 81 MG EC tablet, Take 81 mg by mouth daily. , Disp: , Rfl:     acetaminophen (TYLENOL) 500 MG tablet, Take 500 mg by mouth every 6 hours as needed. , Disp: , Rfl:     Bioflavonoids; Other; Oxycodone-acetaminophen; Percocet [oxycodone-acetaminophen]; Acetaminophen; and Nickel    Vitals:    05/09/19 1419   BP: 128/68   Site: Left Lower Arm   Position: Sitting   Pulse: 99   Resp: 24   Temp: 97.9 °F (36.6 °C)   TempSrc: Oral   SpO2: 97%   Weight: (!) 308 lb (139.7 kg)   Height: 5' (1.524 m)       Review of Systems   Constitutional: Negative for chills, fever and unexpected weight change. HENT: Negative for mouth sores, sore throat and voice change. Eyes: Negative for pain, discharge and itching. Respiratory: Positive for cough, shortness of breath and wheezing. Negative for choking, chest tightness and stridor. Cardiovascular: Negative for chest pain, palpitations and leg swelling. Gastrointestinal: Negative for abdominal pain, constipation and diarrhea. Endocrine: Negative for cold intolerance, heat intolerance and polydipsia. Genitourinary: Negative for dysuria, frequency and hematuria. Musculoskeletal: Negative for gait problem, joint swelling and neck stiffness. Neurological: Negative for dizziness, numbness and headaches. Psychiatric/Behavioral: Negative for agitation, confusion and hallucinations. Physical Exam   Constitutional: She appears well-developed and well-nourished. No distress. HENT:   Head: Normocephalic and atraumatic. Mouth/Throat: Oropharynx is clear and moist. No oropharyngeal exudate. Eyes: Pupils are equal, round, and reactive to light. EOM are normal.   Neck: Neck supple. No JVD present. Cardiovascular: Normal heart sounds. Exam reveals no gallop and no friction rub.    No murmur heard.  Pulmonary/Chest: Effort normal. She has no wheezes. She has no rales. Equal chest rise and expansion bilaterally   Abdominal: Soft. Bowel sounds are normal. She exhibits no distension. There is no tenderness. Musculoskeletal: Normal range of motion. She exhibits no edema. Lymphadenopathy:     She has no cervical adenopathy. Neurological: She is alert. No cranial nerve deficit. CN 2-12 grossly intact   Skin: Skin is warm and dry. No rash noted. She is not diaphoretic. CXR personally reviewed, basilar atelectasis  Labs reviewed, cultures from bronchoscopy negative. PFTs reviewed. FEV1 50% predicted with partial post bronchodilator response indicating severe obstruction. Reduced TLC consistent with extrathoracic restriction from obesity  Walk testing showed desaturation requiring 3 LPM of oxygen to maintain saturations       ASSESSMENT:    1. Acute lower respiratory tract infection    2. Severe persistent asthma with (acute) exacerbation    3. Chronic respiratory failure with hypoxia (HCC)    4. KRYSTIAN (obstructive sleep apnea)      PLAN:    -Will treat with a course of prednisone and doxycycline to assist acute symptoms.  I reviewed potential adverse effects and to go to ED if she worsens despite using.   -Set up with a home neb device  -Check allergen panel and immunoglobulins given her ongoing symptoms  -Bronch set up next week if not improving  -Continue supplemental oxygen use for now  -Has sleep study set up later this month to determine adequate pressure settings for PAP device     Orders Placed This Encounter   Procedures    Bronchoscopy    RESPIRATORY ALLERGEN PROFILE    IGG, IGA, IGM       Conchis Hernandes MD

## 2019-05-15 ENCOUNTER — HOSPITAL ENCOUNTER (OUTPATIENT)
Age: 54
Setting detail: OUTPATIENT SURGERY
Discharge: HOME | End: 2019-05-15
Attending: INTERNAL MEDICINE | Admitting: INTERNAL MEDICINE
Payer: MEDICAID

## 2019-05-15 VITALS
SYSTOLIC BLOOD PRESSURE: 120 MMHG | HEART RATE: 87 BPM | HEIGHT: 60 IN | WEIGHT: 293 LBS | BODY MASS INDEX: 57.52 KG/M2 | RESPIRATION RATE: 18 BRPM | TEMPERATURE: 98.4 F | OXYGEN SATURATION: 97 % | DIASTOLIC BLOOD PRESSURE: 70 MMHG

## 2019-05-15 LAB
APPEARANCE BAL (LAVAGE): ABNORMAL
CLOT EVALUATION BAL: ABNORMAL
COLOR LAVAGE: COLORLESS
EPITHELIAL CELLS FLUID: 16 %
LYMPHOCYTES, BAL: 1 % (ref 5–10)
MACROPHAGES, BAL: 10 % (ref 90–95)
NUMBER OF CELLS COUNTED BAL (LAVAGE): 200
RBC, BAL: 533 /CUMM
SEGMENTED NEUTROPHILS, BAL: 73 % (ref 5–10)
VOLUME LAVAGE: 3 ML
WBC/EPI CELLS BAL: 67 /CUMM

## 2019-05-15 PROCEDURE — 2709999900 HC NON-CHARGEABLE SUPPLY: Performed by: INTERNAL MEDICINE

## 2019-05-15 PROCEDURE — 88112 CYTOPATH CELL ENHANCE TECH: CPT

## 2019-05-15 PROCEDURE — 87116 MYCOBACTERIA CULTURE: CPT

## 2019-05-15 PROCEDURE — 99152 MOD SED SAME PHYS/QHP 5/>YRS: CPT | Performed by: INTERNAL MEDICINE

## 2019-05-15 PROCEDURE — 7100000011 HC PHASE II RECOVERY - ADDTL 15 MIN: Performed by: INTERNAL MEDICINE

## 2019-05-15 PROCEDURE — 2580000003 HC RX 258: Performed by: INTERNAL MEDICINE

## 2019-05-15 PROCEDURE — 89051 BODY FLUID CELL COUNT: CPT

## 2019-05-15 PROCEDURE — 88305 TISSUE EXAM BY PATHOLOGIST: CPT

## 2019-05-15 PROCEDURE — 6360000002 HC RX W HCPCS: Performed by: INTERNAL MEDICINE

## 2019-05-15 PROCEDURE — 88312 SPECIAL STAINS GROUP 1: CPT

## 2019-05-15 PROCEDURE — 87205 SMEAR GRAM STAIN: CPT

## 2019-05-15 PROCEDURE — 31624 DX BRONCHOSCOPE/LAVAGE: CPT | Performed by: INTERNAL MEDICINE

## 2019-05-15 PROCEDURE — 7100000010 HC PHASE II RECOVERY - FIRST 15 MIN: Performed by: INTERNAL MEDICINE

## 2019-05-15 PROCEDURE — 2500000003 HC RX 250 WO HCPCS: Performed by: INTERNAL MEDICINE

## 2019-05-15 PROCEDURE — 3609010900 HC BRONCHOSCOPY THERAPUTIC ASPIRATION INITIAL: Performed by: INTERNAL MEDICINE

## 2019-05-15 PROCEDURE — 87015 SPECIMEN INFECT AGNT CONCNTJ: CPT

## 2019-05-15 PROCEDURE — 87070 CULTURE OTHR SPECIMN AEROBIC: CPT

## 2019-05-15 PROCEDURE — 3609010800 HC BRONCHOSCOPY ALVEOLAR LAVAGE: Performed by: INTERNAL MEDICINE

## 2019-05-15 PROCEDURE — 31645 BRNCHSC W/THER ASPIR 1ST: CPT | Performed by: INTERNAL MEDICINE

## 2019-05-15 PROCEDURE — 87206 SMEAR FLUORESCENT/ACID STAI: CPT

## 2019-05-15 PROCEDURE — 87102 FUNGUS ISOLATION CULTURE: CPT

## 2019-05-15 RX ORDER — MIDAZOLAM HYDROCHLORIDE 5 MG/ML
INJECTION INTRAMUSCULAR; INTRAVENOUS PRN
Status: DISCONTINUED | OUTPATIENT
Start: 2019-05-15 | End: 2019-05-15 | Stop reason: ALTCHOICE

## 2019-05-15 RX ORDER — MAGNESIUM HYDROXIDE 1200 MG/15ML
LIQUID ORAL CONTINUOUS PRN
Status: COMPLETED | OUTPATIENT
Start: 2019-05-15 | End: 2019-05-15

## 2019-05-15 RX ORDER — FENTANYL CITRATE 50 UG/ML
INJECTION, SOLUTION INTRAMUSCULAR; INTRAVENOUS PRN
Status: DISCONTINUED | OUTPATIENT
Start: 2019-05-15 | End: 2019-05-15 | Stop reason: ALTCHOICE

## 2019-05-15 RX ORDER — LIDOCAINE HYDROCHLORIDE 20 MG/ML
INJECTION, SOLUTION INFILTRATION; PERINEURAL PRN
Status: DISCONTINUED | OUTPATIENT
Start: 2019-05-15 | End: 2019-05-15 | Stop reason: ALTCHOICE

## 2019-05-15 NOTE — PROGRESS NOTES
Pt awake, pt ride here , Rocio Luong reviewed discharge instructions, pt and ride denies any questions,PIV removed, bandage applied
Pt tolerated procedure well, reviewed discharge instructions with pt prior to sedation, verbalizes understanding , denied any further questions pt resting comfortably, easily arouses, VSS, pt on 6 liters via NC stats 97%,
Patient/Caregiver provided printed discharge information.

## 2019-05-15 NOTE — OP NOTE
Preoperative Diagnosis:  1. Acute on chronic resp failure, hypoxic  2. Acute exac of asthma    Postoperative Diagnosis:  1. Acute on chronic resp failure, hypoxic  2. Acute exac of asthma    Procedure Performed:  1. Flexible bronchoscopy  2. Therapeutic aspiration of endobronchial secretions  3. Bronchoalveolar lavage of right middle lobe    Findings:  1. Improved airway patency after aspiration of secretions from the tracheobronchial tree    Recommendations:  1. Await results of collected specimen    Indications:  Jennie Engel is a pleasant 48year old female who has had ongoing congestion and asthma. Bronchoscopy indicated for further evaluation of findings and to assist with pulmonary toileting. After review of the procedure and associated risks consent was obtained to proceed. ASA 3, Mallampati 2    Procedure Details: The patient was correctly identified as  Jennie Engel, a safety timeout was performed. After sedation was administered with versed and fentanyl intravenously as well as topical lidocaine an adult therapeutic bronchoscope was inserted through the mouth and into the airways. Tenacious clear secretions were aspirated from the tracheobronchial tree with subsequent improved airway patency. An airway exam was then performed, all subsegments were well visualized and did not appear to show any acute abnormality. The bronchoscope was wedged into the right middle lobe and a bronchoalveolar lavage was performed. Bronchoscope was withdrawn and the procedure was terminated. There was no immediate complications, the patient tolerated the procedure well. Estimated blood loss was less than 1 cc. Specimens:  RML BAL    Sedation Details:  Sedation start time 1016  Sedation stop time 1026  Total moderate sedation time 10 minutes  I was physically present and the patient was monitored continuously 1:1 throughout the entire procedure while administering sedation.

## 2019-05-17 LAB
CULTURE, RESPIRATORY: NORMAL
GRAM STAIN RESULT: NORMAL

## 2019-05-20 NOTE — H&P
The patient was seen and examined prior to the procedure, I have no changes to the below listed note. Chief Complaint:  Shortness of Breath     HPI:   48y.o. year old female here for acute visit due to worsening respiratory symptoms.     For the past four days she had been experiencing an increased cough and congestion, wheezing. No relief with use of her home bronchodilators or supplemental oxygen. Denies fevers or sick contacts. Was previously admitted to the hospital and feels that her current symptoms are similar to what she experienced then.  Had relief with bronchoscopic intervention at that time.      Past Medical History        Past Medical History:   Diagnosis Date    Anemia      Anxiety      Depression      Fibromyalgia      GERD (gastroesophageal reflux disease)      Heart palpitations      Hiatal hernia      Hypertension      Irritable bowel syndrome      Osteoarthritis      Panic attacks      Pneumonia      Pulmonary infiltrates 2019    Seasonal allergies      Seizure disorder (HCC)      Sleep apnea      Spastic colon      Thyroid disease              Past Surgical History         Past Surgical History:   Procedure Laterality Date    BLADDER SURGERY        BRAIN SURGERY        BRONCHOSCOPY N/A 2019     BRONCHOSCOPY ALVEOLAR LAVAGE performed by Jeane Starks MD at [facility]   2019     BRONCHOSCOPY THERAPUTIC ASPIRATION INITIAL performed by Jeane Starks MD at [facility] [address]        hip    KNEE SURGERY        OTHER SURGICAL HISTORY   2013     cystoscopy, urethral dilatation    TOTAL HIP ARTHROPLASTY         Left    URETHRAL STRICTURE DILATATION                Social History            Tobacco Use    Smoking status: Former Smoker       Packs/day: 0.50       Years: 2.00       Pack years: 1.00       Last attempt to quit: 1986       Years since quittin.0    Smokeless tobacco: Never Used   Substance Use Topics    Alcohol use:  No            Family History         Family History   Problem Relation Age of Onset    Asthma Other      Asthma Other      Hypertension Father      Hypertension Sister      Cancer Neg Hx      Diabetes Neg Hx      Emphysema Neg Hx      Heart Failure Neg Hx                Current Medication      Current Outpatient Medications:     doxycycline hyclate (VIBRA-TABS) 100 MG tablet, Take 1 tablet by mouth 2 times daily for 7 days, Disp: 14 tablet, Rfl: 0    predniSONE (DELTASONE) 20 MG tablet, Take 1 tablet by mouth 2 times daily for 5 days, Disp: 10 tablet, Rfl: 0    ipratropium-albuterol (DUONEB) 0.5-2.5 (3) MG/3ML SOLN nebulizer solution, Inhale 3 mLs into the lungs 4 times daily, Disp: 360 mL, Rfl: 3    furosemide (LASIX) 20 MG tablet, Take 1 tablet by mouth daily, Disp: 30 tablet, Rfl: 1    Multiple Vitamin (MULTI-VITAMIN DAILY PO), Take 1 tablet by mouth, Disp: , Rfl:     Cyclobenzaprine HCl (FLEXERIL PO), Take 1 tablet by mouth as needed, Disp: , Rfl:     cetirizine (ZYRTEC) 10 MG tablet, Take 10 mg by mouth daily, Disp: , Rfl:     levothyroxine (SYNTHROID) 112 MCG tablet, Take 112 mcg by mouth daily, Disp: , Rfl:     ferrous sulfate 325 (65 Fe) MG tablet, Take 1 tablet by mouth every 48 hours, Disp: 30 tablet, Rfl: 0    ibuprofen (ADVIL;MOTRIN) 200 MG tablet, Take 600 mg by mouth as needed for Pain, Disp: , Rfl:     sertraline (ZOLOFT) 100 MG tablet, Take 100 mg by mouth daily, Disp: , Rfl:     lisinopril (PRINIVIL;ZESTRIL) 5 MG tablet, Take 5 mg by mouth daily, Disp: , Rfl:     lamoTRIgine (LAMICTAL) 200 MG tablet, Take 200 mg by mouth 2 times daily, Disp: , Rfl:     diltiazem (CARDIZEM CD) 120 MG extended release capsule, Take 1 capsule by mouth 2 times daily, Disp: 30 capsule, Rfl: 0    albuterol sulfate HFA (PROVENTIL HFA) 108 (90 Base) MCG/ACT inhaler, Inhale 2 puffs into the lungs every 4 hours as needed for Wheezing or Shortness of Breath (Space out to every 6 hours as symptoms improve) Space out to every 6 hours as symptoms improve., Disp: 1 Inhaler, Rfl: 0    pantoprazole (PROTONIX) 40 MG tablet, Take 40 mg by mouth daily. , Disp: , Rfl:     gabapentin (NEURONTIN) 600 MG tablet, Take 600 mg by mouth 2 times daily. , Disp: , Rfl:     montelukast (SINGULAIR) 10 MG tablet, Take 10 mg by mouth every morning , Disp: , Rfl:     aspirin 81 MG EC tablet, Take 81 mg by mouth daily. , Disp: , Rfl:     acetaminophen (TYLENOL) 500 MG tablet, Take 500 mg by mouth every 6 hours as needed. , Disp: , Rfl:         Bioflavonoids; Other; Oxycodone-acetaminophen; Percocet [oxycodone-acetaminophen]; Acetaminophen; and Nickel     Vitals   Vitals:     05/09/19 1419   BP: 128/68   Site: Left Lower Arm   Position: Sitting   Pulse: 99   Resp: 24   Temp: 97.9 °F (36.6 °C)   TempSrc: Oral   SpO2: 97%   Weight: (!) 308 lb (139.7 kg)   Height: 5' (1.524 m)            Review of Systems   Constitutional: Negative for chills, fever and unexpected weight change. HENT: Negative for mouth sores, sore throat and voice change. Eyes: Negative for pain, discharge and itching. Respiratory: Positive for cough, shortness of breath and wheezing. Negative for choking, chest tightness and stridor. Cardiovascular: Negative for chest pain, palpitations and leg swelling. Gastrointestinal: Negative for abdominal pain, constipation and diarrhea. Endocrine: Negative for cold intolerance, heat intolerance and polydipsia. Genitourinary: Negative for dysuria, frequency and hematuria. Musculoskeletal: Negative for gait problem, joint swelling and neck stiffness. Neurological: Negative for dizziness, numbness and headaches. Psychiatric/Behavioral: Negative for agitation, confusion and hallucinations.         Physical Exam   Constitutional: She appears well-developed and well-nourished. No distress. HENT:   Head: Normocephalic and atraumatic.    Mouth/Throat: Oropharynx is clear and moist. No oropharyngeal exudate. Eyes: Pupils are equal, round, and reactive to light. EOM are normal.   Neck: Neck supple. No JVD present. Cardiovascular: Normal heart sounds. Exam reveals no gallop and no friction rub. No murmur heard. Pulmonary/Chest: Effort normal. She has no wheezes. She has no rales. Equal chest rise and expansion bilaterally   Abdominal: Soft. Bowel sounds are normal. She exhibits no distension. There is no tenderness. Musculoskeletal: Normal range of motion. She exhibits no edema. Lymphadenopathy:     She has no cervical adenopathy. Neurological: She is alert. No cranial nerve deficit. CN 2-12 grossly intact   Skin: Skin is warm and dry. No rash noted. She is not diaphoretic.      CXR personally reviewed, basilar atelectasis  Labs reviewed, cultures from bronchoscopy negative. PFTs reviewed. FEV1 50% predicted with partial post bronchodilator response indicating severe obstruction. Reduced TLC consistent with extrathoracic restriction from obesity  Walk testing showed desaturation requiring 3 LPM of oxygen to maintain saturations         ASSESSMENT:     1. Acute lower respiratory tract infection    2. Severe persistent asthma with (acute) exacerbation    3. Chronic respiratory failure with hypoxia (HCC)    4. KRYSTIAN (obstructive sleep apnea)       PLAN:     -Will treat with a course of prednisone and doxycycline to assist acute symptoms.  I reviewed potential adverse effects and to go to ED if she worsens despite using.   -Set up with a home neb device  -Check allergen panel and immunoglobulins given her ongoing symptoms  -Bronch set up next week if not improving  -Continue supplemental oxygen use for now  -Has sleep study set up later this month to determine adequate pressure settings for PAP device

## 2019-05-22 ENCOUNTER — HOSPITAL ENCOUNTER (OUTPATIENT)
Dept: SLEEP CENTER | Age: 54
Discharge: HOME | End: 2019-05-24
Payer: MEDICAID

## 2019-05-22 DIAGNOSIS — G47.10 EXCESSIVE SOMNOLENCE DISORDER: ICD-10-CM

## 2019-05-22 PROCEDURE — 95810 POLYSOM 6/> YRS 4/> PARAM: CPT

## 2019-05-27 LAB
FUNGUS (MYCOLOGY) CULTURE: NORMAL
FUNGUS STAIN: NORMAL

## 2019-06-04 LAB
AFB CULTURE (MYCOBACTERIA): NORMAL
AFB SMEAR: NORMAL

## 2019-06-05 ENCOUNTER — TELEPHONE (OUTPATIENT)
Dept: PULMONOLOGY | Age: 54
End: 2019-06-05

## 2019-06-05 NOTE — TELEPHONE ENCOUNTER
Patient did not show for sleep results appointment  with Dr. Wilder Gonzales on 6/5/19    Same Day Cancellation: Yes    Patient rescheduled:  No    Patient was also no show on: N/A    LOV 5/9/19

## 2019-06-13 ENCOUNTER — OFFICE VISIT (OUTPATIENT)
Dept: PULMONOLOGY | Age: 54
End: 2019-06-13
Payer: MEDICAID

## 2019-06-13 VITALS
HEART RATE: 91 BPM | BODY MASS INDEX: 60.93 KG/M2 | SYSTOLIC BLOOD PRESSURE: 125 MMHG | DIASTOLIC BLOOD PRESSURE: 67 MMHG | OXYGEN SATURATION: 97 % | WEIGHT: 293 LBS

## 2019-06-13 DIAGNOSIS — G47.33 OSA (OBSTRUCTIVE SLEEP APNEA): Primary | ICD-10-CM

## 2019-06-13 PROCEDURE — 3017F COLORECTAL CA SCREEN DOC REV: CPT | Performed by: INTERNAL MEDICINE

## 2019-06-13 PROCEDURE — 1036F TOBACCO NON-USER: CPT | Performed by: INTERNAL MEDICINE

## 2019-06-13 PROCEDURE — 99214 OFFICE O/P EST MOD 30 MIN: CPT | Performed by: INTERNAL MEDICINE

## 2019-06-13 PROCEDURE — G8417 CALC BMI ABV UP PARAM F/U: HCPCS | Performed by: INTERNAL MEDICINE

## 2019-06-13 PROCEDURE — G8427 DOCREV CUR MEDS BY ELIG CLIN: HCPCS | Performed by: INTERNAL MEDICINE

## 2019-06-13 ASSESSMENT — ENCOUNTER SYMPTOMS
CONSTIPATION: 0
DIARRHEA: 0
CHEST TIGHTNESS: 0
SORE THROAT: 0
EYE PAIN: 0
EYE ITCHING: 0
CHOKING: 0
ABDOMINAL PAIN: 0
VOICE CHANGE: 0
EYE DISCHARGE: 0
STRIDOR: 0

## 2019-06-13 NOTE — PROGRESS NOTES
Pulmonary Outpatient Note   Kami Loera MD       6/13/2019    Chief Complaint:  Results (Sleep Study Results 5/22/19)     HPI:   48y.o. year old female here for follow up regarding respiratory issues. Had a sleep study I reviewed results with her on, showed severe KRYSTIAN with very high REM AHI. She has been using supplemental oxygen, still feels daytime somnolence/fatigue and shortness of breath. Bronch recently done for recurrent bronchitis episodes, cultures all negative. Labs reviewed, low IgG, first time checked.      Past Medical History:   Diagnosis Date    Anemia     Anxiety     Depression     Fibromyalgia     GERD (gastroesophageal reflux disease)     Heart palpitations     Hiatal hernia     Hypertension     Irritable bowel syndrome     Osteoarthritis     Panic attacks     Pneumonia     Pulmonary infiltrates 4/20/2019    Seasonal allergies     Seizure disorder ([facility] [address]     Sleep apnea     Spastic colon     Thyroid disease        Past Surgical History:   Procedure Laterality Date    BLADDER SURGERY      BRAIN SURGERY      BRONCHOSCOPY N/A 4/21/2019    BRONCHOSCOPY ALVEOLAR LAVAGE performed by Bernarda Piper MD at [address] [place] [address]  4/21/2019    BRONCHOSCOPY THERAPUTIC ASPIRATION INITIAL performed by Bernarda Piper MD at [address] [place] [address] N/A 5/15/2019    BRONCHOSCOPY ALVEOLAR LAVAGE performed by Laxmi Mercado MD at [address] [place] [address]  5/15/2019    BRONCHOSCOPY THERAPUTIC ASPIRATION INITIAL performed by Laxmi Mercado MD at [facility] [address] REPLACEMENT  2009    hip    KNEE SURGERY      OTHER SURGICAL HISTORY  07/31/2013    cystoscopy, urethral dilatation    SHOULDER SURGERY      r rotator cuff repair    TOTAL HIP ARTHROPLASTY      Left    URETHRAL STRICTURE DILATATION         Social History     Tobacco Use    Smoking status: Former Smoker     Packs/day: 0.50     Years: 2.00     Pack years: 1.00 Last attempt to quit: 1986     Years since quittin.1    Smokeless tobacco: Never Used   Substance Use Topics    Alcohol use: No          Family History   Problem Relation Age of Onset    Asthma Other     Asthma Other     Hypertension Father     Hypertension Sister     Cancer Neg Hx     Diabetes Neg Hx     Emphysema Neg Hx     Heart Failure Neg Hx          Current Outpatient Medications:     PREDNISONE PO, Take by mouth, Disp: , Rfl:     ipratropium-albuterol (DUONEB) 0.5-2.5 (3) MG/3ML SOLN nebulizer solution, Inhale 3 mLs into the lungs 4 times daily, Disp: 360 mL, Rfl: 3    furosemide (LASIX) 20 MG tablet, Take 1 tablet by mouth daily, Disp: 30 tablet, Rfl: 1    Multiple Vitamin (MULTI-VITAMIN DAILY PO), Take 1 tablet by mouth, Disp: , Rfl:     Cyclobenzaprine HCl (FLEXERIL PO), Take 1 tablet by mouth as needed, Disp: , Rfl:     cetirizine (ZYRTEC) 10 MG tablet, Take 10 mg by mouth daily, Disp: , Rfl:     levothyroxine (SYNTHROID) 112 MCG tablet, Take 112 mcg by mouth daily, Disp: , Rfl:     ferrous sulfate 325 (65 Fe) MG tablet, Take 1 tablet by mouth every 48 hours, Disp: 30 tablet, Rfl: 0    ibuprofen (ADVIL;MOTRIN) 200 MG tablet, Take 600 mg by mouth as needed for Pain, Disp: , Rfl:     sertraline (ZOLOFT) 100 MG tablet, Take 100 mg by mouth daily, Disp: , Rfl:     lisinopril (PRINIVIL;ZESTRIL) 5 MG tablet, Take 5 mg by mouth daily, Disp: , Rfl:     lamoTRIgine (LAMICTAL) 200 MG tablet, Take 200 mg by mouth 2 times daily, Disp: , Rfl:     diltiazem (CARDIZEM CD) 120 MG extended release capsule, Take 1 capsule by mouth 2 times daily, Disp: 30 capsule, Rfl: 0    albuterol sulfate HFA (PROVENTIL HFA) 108 (90 Base) MCG/ACT inhaler, Inhale 2 puffs into the lungs every 4 hours as needed for Wheezing or Shortness of Breath (Space out to every 6 hours as symptoms improve) Space out to every 6 hours as symptoms improve., Disp: 1 Inhaler, Rfl: 0    pantoprazole (PROTONIX) 40 MG tablet,

## 2019-06-13 NOTE — PATIENT INSTRUCTIONS
Remember to bring all pulmonary medications to your next appointment with the office. Please keep all of your future appointments scheduled by [facility] Pulmonary office. Out of respect for other patients and providers, you may be asked to reschedule your appointment if you arrive later than your scheduled appointment time. Appointments cancelled less than 24hrs in advance will be considered a no show. Patients with three missed appointments within 1 year or four missed appointments within 2 years can be dismissed from the practice. The Sleep Center at [facility], [address] [place] [address]                      Phone: [phone] Fax: [phone]      Your appointment for a sleep study is scheduled on Wednesday, 7/10/19 at 8:30pm. Please arrive at the [facility] at the time indicated. On Saturday and Sunday night a sleep tech will come down to let you in the building and escort you upstairs to the sleep center; please call from the parking lot if no one is at the door when you arrive. PLEASE DO NOT ARRIVE TO THE SLEEP CENTER ANY EARLIER THAN 8:30PM AS THERE IS NO STAFF ON DUTY AND THE DOORS WILL BE LOCKED    IMPORTANT: We ask that you please phone the [facility] Patient Pre-Services ([phone]) at least 3-5 days prior to your sleep study to pre-register. Failing to pre-register may ultimately cause your insurance to not pay for this procedure. Please be aware that [facility] is a non-smoking facility. There is no smoking allowed anywhere on [facility] property at any time. Each patient room is a private room with cable television, WiFi and a private bathroom with shower facilities. The test itself consists of electrodes and sensors attached to specific areas of your scalp, face, chest and legs. We will also monitor respiratory effort, nasal and oral airflow and oxygen levels.  The test will not hurt; it is completely painless and not invasive in any way. Please bring with you:  ? Appropriate nightclothes (pajamas, sweats, etc.), slippers and robe  ? All medications you need during your stay, including breathing medications, nebulizers and metered dose inhalers, as well as a complete list of all medications you are currently taking. ? Your own toiletries and hairdryer if you wish to shower before you leave  ? Current Photo I.D. and insurance card  ? We do not allow any pillows or bed linens from home due to health regulations  ? We recommend that you do not bring valuables with you to the Sleep Center as we cannot be responsible for any lost or damaged personal items. Please refrain from or reduce the use of caffeine and/or alcohol prior to your sleep study and avoid napping the day of your study as these will affect the accuracy of your test results. If you are ill the day of your test (cold, upper respiratory infection, flu, etc.) please call to reschedule your test as the test will not be accurate if you are ill. If you should need to cancel or reschedule your appointment, please call the Sleep Center at [phone] as soon as possible. Please also call the Sleep Center directly to let us know if you have any special needs, dietary needs or for any further questions.      [address] [facility] [address]   [facility] [phone] [address]  [place], [address]   611 [facility]  [phone] [address] [place] [address] Home Patient [phone] [address], [address]   [facility]       ([address]) [phone] 107 [facility]  [place], [address] 2402 [facility] Cpap [phone] 229 [address] [place], [facility] [phone] 1301 [facility], [address]   [address] [phone] or  [phone], [address]  ([address]) [address], [address]   [address] [phone] opt4 opt2 [phone]    [facility] Surgical [phone] 323 [facility] [address], [address]   [place]  ([address]) [phone] or  [phone] 600 [facility] [address] [place], [address]   [facility] [phone] 3400 [facility] 99, [facility] 78   5783 [facility] 22 [phone] [facility] 43, [address] [place] [address] Dr Coreas [phone] 82-68 [address] [place] [address] 27   [address] [phone] [facility], 1400 [address] [facility] [phone] [facility] 96., [facility] 37  ([address] 67 [phone] [facility] 4751 [address]  [place], [address]   [employer]  ([address]) 21  [address] [facility], [address] [place] [address]) [phone]    [employer]   (portable O2 concentrators) [phone]    [address]  (No sleep equipment) [phone] [address]  [place], 2501 [facility] Hoop ([address] place Ins) [phone] [address] [place] [address].   [place], [address]   [place] [phone] 309 N [facility], [address]   Lila Gail [phone] 5165 [facility], [address] [place] [address] [phone] 1311 [facility] [address] [phone] [facility]6 [facility], [address]   [facility] [phone] 25860 [facility],1St Floor  [place], Tam[address] Mikel [address] 31 [phone] 620 [facility]   503 [facility] ([address]) [phone]  [facility]                 330 [facility]        [phone] or          [phone] In home set up [address] [phone] [facility] [phone] 200 [facility], [address]   Patient Aids Alice Mcguire [phone] [address] [place] [address].  Alice Mcguire, 100 [facility]   Patient [facility] [address]   [phone] 225 [facility], [facility]   [facility] ANGERS) [phone]

## 2019-06-13 NOTE — PROGRESS NOTES
MA Communication: The following orders are received by verbal communication from Dr. Estella Lin.     Orders include:  FU Titration study scheduled 7/15/19

## 2019-06-17 LAB
FUNGUS (MYCOLOGY) CULTURE: NORMAL
FUNGUS STAIN: NORMAL

## 2019-06-25 ENCOUNTER — HOSPITAL ENCOUNTER (OUTPATIENT)
Dept: SLEEP CENTER | Age: 54
Discharge: HOME | End: 2019-06-27
Payer: MEDICAID

## 2019-06-25 DIAGNOSIS — G47.33 OSA (OBSTRUCTIVE SLEEP APNEA): ICD-10-CM

## 2019-06-26 ENCOUNTER — TELEPHONE (OUTPATIENT)
Dept: PULMONOLOGY | Age: 54
End: 2019-06-26

## 2019-06-26 RX ORDER — PHENOL 1.4 %
1 AEROSOL, SPRAY (ML) MUCOUS MEMBRANE NIGHTLY PRN
Qty: 5 TABLET | Refills: 0 | Status: ON HOLD | OUTPATIENT
Start: 2019-06-26 | End: 2020-06-26

## 2019-07-02 LAB
AFB CULTURE (MYCOBACTERIA): NORMAL
AFB SMEAR: NORMAL

## 2019-07-15 ENCOUNTER — HOSPITAL ENCOUNTER (OUTPATIENT)
Age: 54
Setting detail: OBSERVATION
Discharge: HOME | End: 2019-07-17
Attending: EMERGENCY MEDICINE | Admitting: INTERNAL MEDICINE
Payer: MEDICAID

## 2019-07-15 ENCOUNTER — APPOINTMENT (OUTPATIENT)
Dept: CT IMAGING | Age: 54
End: 2019-07-15
Payer: MEDICAID

## 2019-07-15 ENCOUNTER — APPOINTMENT (OUTPATIENT)
Dept: GENERAL RADIOLOGY | Age: 54
End: 2019-07-15
Payer: MEDICAID

## 2019-07-15 ENCOUNTER — TELEPHONE (OUTPATIENT)
Dept: PULMONOLOGY | Age: 54
End: 2019-07-15

## 2019-07-15 DIAGNOSIS — R06.02 SHORTNESS OF BREATH ON EXERTION: ICD-10-CM

## 2019-07-15 DIAGNOSIS — M79.89 LEG SWELLING: ICD-10-CM

## 2019-07-15 DIAGNOSIS — J45.21 MILD INTERMITTENT ASTHMA WITH EXACERBATION: ICD-10-CM

## 2019-07-15 DIAGNOSIS — R07.9 CHEST PAIN, UNSPECIFIED TYPE: Primary | ICD-10-CM

## 2019-07-15 DIAGNOSIS — Z99.81 SUPPLEMENTAL OXYGEN DEPENDENT: ICD-10-CM

## 2019-07-15 LAB
A/G RATIO: 1.6 (ref 1.1–2.2)
ALBUMIN SERPL-MCNC: 3.9 G/DL (ref 3.4–5)
ALP BLD-CCNC: 102 U/L (ref 40–129)
ALT SERPL-CCNC: 18 U/L (ref 10–40)
ANION GAP SERPL CALCULATED.3IONS-SCNC: 9 MMOL/L (ref 3–16)
AST SERPL-CCNC: 14 U/L (ref 15–37)
BILIRUB SERPL-MCNC: 0.3 MG/DL (ref 0–1)
BUN BLDV-MCNC: 15 MG/DL (ref 7–20)
CALCIUM SERPL-MCNC: 9.3 MG/DL (ref 8.3–10.6)
CHLORIDE BLD-SCNC: 102 MMOL/L (ref 99–110)
CO2: 29 MMOL/L (ref 21–32)
CREAT SERPL-MCNC: 0.8 MG/DL (ref 0.6–1.1)
GFR AFRICAN AMERICAN: >60
GFR NON-AFRICAN AMERICAN: >60
GLOBULIN: 2.5 G/DL
GLUCOSE BLD-MCNC: 110 MG/DL (ref 70–99)
HCG QUALITATIVE: NEGATIVE
INR BLD: 1.11 (ref 0.86–1.14)
MAGNESIUM: 2.2 MG/DL (ref 1.8–2.4)
POTASSIUM SERPL-SCNC: 4.4 MMOL/L (ref 3.5–5.1)
PRO-BNP: 56 PG/ML (ref 0–124)
PROTHROMBIN TIME: 12.7 SEC (ref 9.8–13)
SODIUM BLD-SCNC: 140 MMOL/L (ref 136–145)
TOTAL PROTEIN: 6.4 G/DL (ref 6.4–8.2)
TROPONIN: <0.01 NG/ML

## 2019-07-15 PROCEDURE — 94640 AIRWAY INHALATION TREATMENT: CPT

## 2019-07-15 PROCEDURE — 2700000000 HC OXYGEN THERAPY PER DAY

## 2019-07-15 PROCEDURE — 80053 COMPREHEN METABOLIC PANEL: CPT

## 2019-07-15 PROCEDURE — 83880 ASSAY OF NATRIURETIC PEPTIDE: CPT

## 2019-07-15 PROCEDURE — 83735 ASSAY OF MAGNESIUM: CPT

## 2019-07-15 PROCEDURE — 85610 PROTHROMBIN TIME: CPT

## 2019-07-15 PROCEDURE — 71260 CT THORAX DX C+: CPT

## 2019-07-15 PROCEDURE — 93005 ELECTROCARDIOGRAM TRACING: CPT | Performed by: NURSE PRACTITIONER

## 2019-07-15 PROCEDURE — 94761 N-INVAS EAR/PLS OXIMETRY MLT: CPT

## 2019-07-15 PROCEDURE — 6370000000 HC RX 637 (ALT 250 FOR IP): Performed by: NURSE PRACTITIONER

## 2019-07-15 PROCEDURE — 6360000004 HC RX CONTRAST MEDICATION: Performed by: EMERGENCY MEDICINE

## 2019-07-15 PROCEDURE — 84484 ASSAY OF TROPONIN QUANT: CPT

## 2019-07-15 PROCEDURE — 84703 CHORIONIC GONADOTROPIN ASSAY: CPT

## 2019-07-15 PROCEDURE — 96375 TX/PRO/DX INJ NEW DRUG ADDON: CPT

## 2019-07-15 PROCEDURE — 99285 EMERGENCY DEPT VISIT HI MDM: CPT

## 2019-07-15 PROCEDURE — 96374 THER/PROPH/DIAG INJ IV PUSH: CPT

## 2019-07-15 PROCEDURE — 71046 X-RAY EXAM CHEST 2 VIEWS: CPT

## 2019-07-15 PROCEDURE — 85025 COMPLETE CBC W/AUTO DIFF WBC: CPT

## 2019-07-15 PROCEDURE — 6360000002 HC RX W HCPCS: Performed by: NURSE PRACTITIONER

## 2019-07-15 PROCEDURE — 36415 COLL VENOUS BLD VENIPUNCTURE: CPT

## 2019-07-15 RX ORDER — KETOROLAC TROMETHAMINE 30 MG/ML
30 INJECTION, SOLUTION INTRAMUSCULAR; INTRAVENOUS ONCE
Status: COMPLETED | OUTPATIENT
Start: 2019-07-15 | End: 2019-07-15

## 2019-07-15 RX ORDER — ASPIRIN 325 MG
325 TABLET ORAL ONCE
Status: COMPLETED | OUTPATIENT
Start: 2019-07-15 | End: 2019-07-16

## 2019-07-15 RX ORDER — IPRATROPIUM BROMIDE AND ALBUTEROL SULFATE 2.5; .5 MG/3ML; MG/3ML
1 SOLUTION RESPIRATORY (INHALATION) ONCE
Status: COMPLETED | OUTPATIENT
Start: 2019-07-15 | End: 2019-07-15

## 2019-07-15 RX ORDER — METHYLPREDNISOLONE SODIUM SUCCINATE 125 MG/2ML
80 INJECTION, POWDER, LYOPHILIZED, FOR SOLUTION INTRAMUSCULAR; INTRAVENOUS ONCE
Status: COMPLETED | OUTPATIENT
Start: 2019-07-15 | End: 2019-07-15

## 2019-07-15 RX ADMIN — METHYLPREDNISOLONE SODIUM SUCCINATE 80 MG: 125 INJECTION, POWDER, FOR SOLUTION INTRAMUSCULAR; INTRAVENOUS at 21:50

## 2019-07-15 RX ADMIN — IOPAMIDOL 75 ML: 755 INJECTION, SOLUTION INTRAVENOUS at 23:00

## 2019-07-15 RX ADMIN — IPRATROPIUM BROMIDE AND ALBUTEROL SULFATE 1 AMPULE: .5; 3 SOLUTION RESPIRATORY (INHALATION) at 21:01

## 2019-07-15 RX ADMIN — KETOROLAC TROMETHAMINE 30 MG: 30 INJECTION, SOLUTION INTRAMUSCULAR at 21:50

## 2019-07-15 ASSESSMENT — PAIN DESCRIPTION - LOCATION: LOCATION: BACK

## 2019-07-15 ASSESSMENT — ENCOUNTER SYMPTOMS
WHEEZING: 0
VOMITING: 0
ABDOMINAL PAIN: 0
NAUSEA: 0
COLOR CHANGE: 0
COUGH: 1
CHEST TIGHTNESS: 1
BACK PAIN: 0
SHORTNESS OF BREATH: 1
DIARRHEA: 0

## 2019-07-15 ASSESSMENT — PAIN SCALES - GENERAL
PAINLEVEL_OUTOF10: 4
PAINLEVEL_OUTOF10: 4

## 2019-07-15 ASSESSMENT — PAIN DESCRIPTION - PAIN TYPE: TYPE: ACUTE PAIN

## 2019-07-15 ASSESSMENT — HEART SCORE: ECG: 0

## 2019-07-16 LAB
ANION GAP SERPL CALCULATED.3IONS-SCNC: 7 MMOL/L (ref 3–16)
ANISOCYTOSIS: ABNORMAL
BASOPHILS ABSOLUTE: 0 K/UL (ref 0–0.2)
BASOPHILS RELATIVE PERCENT: 0.3 %
BUN BLDV-MCNC: 16 MG/DL (ref 7–20)
CALCIUM SERPL-MCNC: 9.1 MG/DL (ref 8.3–10.6)
CHLORIDE BLD-SCNC: 106 MMOL/L (ref 99–110)
CO2: 29 MMOL/L (ref 21–32)
CREAT SERPL-MCNC: 0.6 MG/DL (ref 0.6–1.1)
EKG ATRIAL RATE: 72 BPM
EKG DIAGNOSIS: NORMAL
EKG P AXIS: 29 DEGREES
EKG P-R INTERVAL: 160 MS
EKG Q-T INTERVAL: 390 MS
EKG QRS DURATION: 88 MS
EKG QTC CALCULATION (BAZETT): 427 MS
EKG R AXIS: 19 DEGREES
EKG T AXIS: 18 DEGREES
EKG VENTRICULAR RATE: 72 BPM
EOSINOPHILS ABSOLUTE: 0 K/UL (ref 0–0.6)
EOSINOPHILS RELATIVE PERCENT: 0 %
GFR AFRICAN AMERICAN: >60
GFR NON-AFRICAN AMERICAN: >60
GLUCOSE BLD-MCNC: 144 MG/DL (ref 70–99)
GLUCOSE BLD-MCNC: 145 MG/DL (ref 70–99)
GLUCOSE BLD-MCNC: 147 MG/DL (ref 70–99)
GLUCOSE BLD-MCNC: 152 MG/DL (ref 70–99)
GLUCOSE BLD-MCNC: 154 MG/DL (ref 70–99)
HCT VFR BLD CALC: 33.9 % (ref 36–48)
HCT VFR BLD CALC: 34.3 % (ref 36–48)
HEMATOLOGY PATH CONSULT: NO
HEMATOLOGY PATH CONSULT: NO
HEMOGLOBIN: 10.3 G/DL (ref 12–16)
HEMOGLOBIN: 10.7 G/DL (ref 12–16)
LYMPHOCYTES ABSOLUTE: 1.4 K/UL (ref 1–5.1)
LYMPHOCYTES RELATIVE PERCENT: 18.2 %
MCH RBC QN AUTO: 20.2 PG (ref 26–34)
MCH RBC QN AUTO: 20.8 PG (ref 26–34)
MCHC RBC AUTO-ENTMCNC: 30.4 G/DL (ref 31–36)
MCHC RBC AUTO-ENTMCNC: 31.2 G/DL (ref 31–36)
MCV RBC AUTO: 66.3 FL (ref 80–100)
MCV RBC AUTO: 66.6 FL (ref 80–100)
MONOCYTES ABSOLUTE: 0.6 K/UL (ref 0–1.3)
MONOCYTES RELATIVE PERCENT: 7.7 %
NEUTROPHILS ABSOLUTE: 5.5 K/UL (ref 1.7–7.7)
NEUTROPHILS RELATIVE PERCENT: 73.8 %
PDW BLD-RTO: 20.8 % (ref 12.4–15.4)
PDW BLD-RTO: 20.9 % (ref 12.4–15.4)
PERFORMED ON: ABNORMAL
PLATELET # BLD: 308 K/UL (ref 135–450)
PLATELET # BLD: 358 K/UL (ref 135–450)
PMV BLD AUTO: 8.5 FL (ref 5–10.5)
PMV BLD AUTO: 8.6 FL (ref 5–10.5)
POIKILOCYTES: ABNORMAL
POLYCHROMASIA: ABNORMAL
POTASSIUM REFLEX MAGNESIUM: 5 MMOL/L (ref 3.5–5.1)
RBC # BLD: 5.11 M/UL (ref 4–5.2)
RBC # BLD: 5.15 M/UL (ref 4–5.2)
SCHISTOCYTES: ABNORMAL
SODIUM BLD-SCNC: 142 MMOL/L (ref 136–145)
WBC # BLD: 7.4 K/UL (ref 4–11)
WBC # BLD: 7.5 K/UL (ref 4–11)

## 2019-07-16 PROCEDURE — 94640 AIRWAY INHALATION TREATMENT: CPT

## 2019-07-16 PROCEDURE — 6370000000 HC RX 637 (ALT 250 FOR IP): Performed by: EMERGENCY MEDICINE

## 2019-07-16 PROCEDURE — 94150 VITAL CAPACITY TEST: CPT

## 2019-07-16 PROCEDURE — 96365 THER/PROPH/DIAG IV INF INIT: CPT

## 2019-07-16 PROCEDURE — 6370000000 HC RX 637 (ALT 250 FOR IP): Performed by: INTERNAL MEDICINE

## 2019-07-16 PROCEDURE — 6360000002 HC RX W HCPCS: Performed by: INTERNAL MEDICINE

## 2019-07-16 PROCEDURE — 6360000002 HC RX W HCPCS

## 2019-07-16 PROCEDURE — 93010 ELECTROCARDIOGRAM REPORT: CPT | Performed by: INTERNAL MEDICINE

## 2019-07-16 PROCEDURE — 96376 TX/PRO/DX INJ SAME DRUG ADON: CPT

## 2019-07-16 PROCEDURE — 6360000002 HC RX W HCPCS: Performed by: EMERGENCY MEDICINE

## 2019-07-16 PROCEDURE — 36415 COLL VENOUS BLD VENIPUNCTURE: CPT

## 2019-07-16 PROCEDURE — 93971 EXTREMITY STUDY: CPT

## 2019-07-16 PROCEDURE — G0378 HOSPITAL OBSERVATION PER HR: HCPCS

## 2019-07-16 PROCEDURE — 2580000003 HC RX 258: Performed by: EMERGENCY MEDICINE

## 2019-07-16 PROCEDURE — 80048 BASIC METABOLIC PNL TOTAL CA: CPT

## 2019-07-16 PROCEDURE — 96372 THER/PROPH/DIAG INJ SC/IM: CPT

## 2019-07-16 PROCEDURE — 99220 PR INITIAL OBSERVATION CARE/DAY 70 MINUTES: CPT | Performed by: INTERNAL MEDICINE

## 2019-07-16 PROCEDURE — 2580000003 HC RX 258: Performed by: INTERNAL MEDICINE

## 2019-07-16 PROCEDURE — 96367 TX/PROPH/DG ADDL SEQ IV INF: CPT

## 2019-07-16 PROCEDURE — 85027 COMPLETE CBC AUTOMATED: CPT

## 2019-07-16 PROCEDURE — 6360000002 HC RX W HCPCS: Performed by: NURSE PRACTITIONER

## 2019-07-16 PROCEDURE — 96375 TX/PRO/DX INJ NEW DRUG ADDON: CPT

## 2019-07-16 PROCEDURE — 2580000003 HC RX 258: Performed by: NURSE PRACTITIONER

## 2019-07-16 RX ORDER — SODIUM CHLORIDE 0.9 % (FLUSH) 0.9 %
10 SYRINGE (ML) INJECTION PRN
Status: DISCONTINUED | OUTPATIENT
Start: 2019-07-16 | End: 2019-07-17 | Stop reason: HOSPADM

## 2019-07-16 RX ORDER — POTASSIUM CHLORIDE 7.45 MG/ML
10 INJECTION INTRAVENOUS PRN
Status: DISCONTINUED | OUTPATIENT
Start: 2019-07-16 | End: 2019-07-17 | Stop reason: HOSPADM

## 2019-07-16 RX ORDER — LAMOTRIGINE 100 MG/1
200 TABLET ORAL 2 TIMES DAILY
Status: DISCONTINUED | OUTPATIENT
Start: 2019-07-16 | End: 2019-07-17 | Stop reason: HOSPADM

## 2019-07-16 RX ORDER — ASPIRIN 81 MG/1
81 TABLET ORAL DAILY
Status: DISCONTINUED | OUTPATIENT
Start: 2019-07-16 | End: 2019-07-17 | Stop reason: HOSPADM

## 2019-07-16 RX ORDER — ALBUTEROL SULFATE 2.5 MG/3ML
2.5 SOLUTION RESPIRATORY (INHALATION) EVERY 4 HOURS PRN
Status: DISCONTINUED | OUTPATIENT
Start: 2019-07-16 | End: 2019-07-17 | Stop reason: HOSPADM

## 2019-07-16 RX ORDER — MAGNESIUM SULFATE 1 G/100ML
1 INJECTION INTRAVENOUS PRN
Status: DISCONTINUED | OUTPATIENT
Start: 2019-07-16 | End: 2019-07-17 | Stop reason: HOSPADM

## 2019-07-16 RX ORDER — POTASSIUM CHLORIDE 20 MEQ/1
40 TABLET, EXTENDED RELEASE ORAL PRN
Status: DISCONTINUED | OUTPATIENT
Start: 2019-07-16 | End: 2019-07-17 | Stop reason: HOSPADM

## 2019-07-16 RX ORDER — METHYLPREDNISOLONE SODIUM SUCCINATE 40 MG/ML
40 INJECTION, POWDER, LYOPHILIZED, FOR SOLUTION INTRAMUSCULAR; INTRAVENOUS 2 TIMES DAILY
Status: DISCONTINUED | OUTPATIENT
Start: 2019-07-16 | End: 2019-07-16

## 2019-07-16 RX ORDER — IBUPROFEN 600 MG/1
600 TABLET ORAL EVERY 6 HOURS PRN
Status: DISCONTINUED | OUTPATIENT
Start: 2019-07-16 | End: 2019-07-17 | Stop reason: HOSPADM

## 2019-07-16 RX ORDER — NICOTINE POLACRILEX 4 MG
15 LOZENGE BUCCAL PRN
Status: DISCONTINUED | OUTPATIENT
Start: 2019-07-16 | End: 2019-07-17 | Stop reason: HOSPADM

## 2019-07-16 RX ORDER — DILTIAZEM HYDROCHLORIDE 120 MG/1
120 CAPSULE, COATED, EXTENDED RELEASE ORAL 2 TIMES DAILY
Status: DISCONTINUED | OUTPATIENT
Start: 2019-07-16 | End: 2019-07-17 | Stop reason: HOSPADM

## 2019-07-16 RX ORDER — DEXTROSE MONOHYDRATE 25 G/50ML
12.5 INJECTION, SOLUTION INTRAVENOUS PRN
Status: DISCONTINUED | OUTPATIENT
Start: 2019-07-16 | End: 2019-07-17 | Stop reason: HOSPADM

## 2019-07-16 RX ORDER — M-VIT,TX,IRON,MINS/CALC/FOLIC 27MG-0.4MG
1 TABLET ORAL DAILY
Status: DISCONTINUED | OUTPATIENT
Start: 2019-07-16 | End: 2019-07-17 | Stop reason: HOSPADM

## 2019-07-16 RX ORDER — DEXTROSE MONOHYDRATE 50 MG/ML
100 INJECTION, SOLUTION INTRAVENOUS PRN
Status: DISCONTINUED | OUTPATIENT
Start: 2019-07-16 | End: 2019-07-17 | Stop reason: HOSPADM

## 2019-07-16 RX ORDER — PANTOPRAZOLE SODIUM 40 MG/1
40 TABLET, DELAYED RELEASE ORAL DAILY
Status: DISCONTINUED | OUTPATIENT
Start: 2019-07-16 | End: 2019-07-17 | Stop reason: HOSPADM

## 2019-07-16 RX ORDER — GABAPENTIN 300 MG/1
600 CAPSULE ORAL 2 TIMES DAILY
Status: DISCONTINUED | OUTPATIENT
Start: 2019-07-16 | End: 2019-07-17 | Stop reason: HOSPADM

## 2019-07-16 RX ORDER — CHOLECALCIFEROL (VITAMIN D3) 125 MCG
10 CAPSULE ORAL NIGHTLY PRN
Status: DISCONTINUED | OUTPATIENT
Start: 2019-07-16 | End: 2019-07-17 | Stop reason: HOSPADM

## 2019-07-16 RX ORDER — PREDNISONE 20 MG/1
40 TABLET ORAL DAILY
Status: DISCONTINUED | OUTPATIENT
Start: 2019-07-17 | End: 2019-07-17 | Stop reason: HOSPADM

## 2019-07-16 RX ORDER — LISINOPRIL 5 MG/1
5 TABLET ORAL DAILY
Status: DISCONTINUED | OUTPATIENT
Start: 2019-07-16 | End: 2019-07-17 | Stop reason: HOSPADM

## 2019-07-16 RX ORDER — MONTELUKAST SODIUM 10 MG/1
10 TABLET ORAL EVERY MORNING
Status: DISCONTINUED | OUTPATIENT
Start: 2019-07-16 | End: 2019-07-17 | Stop reason: HOSPADM

## 2019-07-16 RX ORDER — ACETAMINOPHEN 325 MG/1
650 TABLET ORAL EVERY 6 HOURS PRN
Status: DISCONTINUED | OUTPATIENT
Start: 2019-07-16 | End: 2019-07-17 | Stop reason: HOSPADM

## 2019-07-16 RX ORDER — FERROUS SULFATE 325(65) MG
325 TABLET ORAL
Status: DISCONTINUED | OUTPATIENT
Start: 2019-07-16 | End: 2019-07-17 | Stop reason: HOSPADM

## 2019-07-16 RX ORDER — ONDANSETRON 2 MG/ML
4 INJECTION INTRAMUSCULAR; INTRAVENOUS EVERY 6 HOURS PRN
Status: DISCONTINUED | OUTPATIENT
Start: 2019-07-16 | End: 2019-07-17 | Stop reason: HOSPADM

## 2019-07-16 RX ORDER — ONDANSETRON 2 MG/ML
INJECTION INTRAMUSCULAR; INTRAVENOUS
Status: COMPLETED
Start: 2019-07-16 | End: 2019-07-16

## 2019-07-16 RX ORDER — ALBUTEROL SULFATE 2.5 MG/3ML
2.5 SOLUTION RESPIRATORY (INHALATION)
Status: DISCONTINUED | OUTPATIENT
Start: 2019-07-16 | End: 2019-07-16

## 2019-07-16 RX ORDER — IPRATROPIUM BROMIDE AND ALBUTEROL SULFATE 2.5; .5 MG/3ML; MG/3ML
1 SOLUTION RESPIRATORY (INHALATION) 2 TIMES DAILY
Status: DISCONTINUED | OUTPATIENT
Start: 2019-07-16 | End: 2019-07-17 | Stop reason: HOSPADM

## 2019-07-16 RX ORDER — SODIUM CHLORIDE 0.9 % (FLUSH) 0.9 %
10 SYRINGE (ML) INJECTION EVERY 12 HOURS SCHEDULED
Status: DISCONTINUED | OUTPATIENT
Start: 2019-07-16 | End: 2019-07-17 | Stop reason: HOSPADM

## 2019-07-16 RX ORDER — ALBUTEROL SULFATE 2.5 MG/3ML
2.5 SOLUTION RESPIRATORY (INHALATION) EVERY 6 HOURS
Status: DISCONTINUED | OUTPATIENT
Start: 2019-07-16 | End: 2019-07-16

## 2019-07-16 RX ORDER — IPRATROPIUM BROMIDE AND ALBUTEROL SULFATE 2.5; .5 MG/3ML; MG/3ML
1 SOLUTION RESPIRATORY (INHALATION) EVERY 4 HOURS PRN
Status: DISCONTINUED | OUTPATIENT
Start: 2019-07-16 | End: 2019-07-16

## 2019-07-16 RX ORDER — LEVOTHYROXINE SODIUM 112 UG/1
112 TABLET ORAL DAILY
Status: DISCONTINUED | OUTPATIENT
Start: 2019-07-16 | End: 2019-07-17 | Stop reason: HOSPADM

## 2019-07-16 RX ADMIN — LAMOTRIGINE 200 MG: 100 TABLET ORAL at 22:12

## 2019-07-16 RX ADMIN — MONTELUKAST SODIUM 10 MG: 10 TABLET, FILM COATED ORAL at 08:55

## 2019-07-16 RX ADMIN — ONDANSETRON 4 MG: 2 INJECTION INTRAMUSCULAR; INTRAVENOUS at 02:27

## 2019-07-16 RX ADMIN — IPRATROPIUM BROMIDE AND ALBUTEROL SULFATE 3 ML: .5; 3 SOLUTION RESPIRATORY (INHALATION) at 19:20

## 2019-07-16 RX ADMIN — INSULIN LISPRO 2 UNITS: 100 INJECTION, SOLUTION INTRAVENOUS; SUBCUTANEOUS at 17:38

## 2019-07-16 RX ADMIN — ASPIRIN 81 MG: 81 TABLET ORAL at 08:55

## 2019-07-16 RX ADMIN — PANTOPRAZOLE SODIUM 40 MG: 40 TABLET, DELAYED RELEASE ORAL at 08:55

## 2019-07-16 RX ADMIN — LAMOTRIGINE 200 MG: 100 TABLET ORAL at 08:55

## 2019-07-16 RX ADMIN — DILTIAZEM HYDROCHLORIDE 120 MG: 120 CAPSULE, COATED, EXTENDED RELEASE ORAL at 22:12

## 2019-07-16 RX ADMIN — ONDANSETRON 4 MG: 2 INJECTION INTRAMUSCULAR; INTRAVENOUS at 02:19

## 2019-07-16 RX ADMIN — Medication 1 TABLET: at 08:55

## 2019-07-16 RX ADMIN — INSULIN LISPRO 2 UNITS: 100 INJECTION, SOLUTION INTRAVENOUS; SUBCUTANEOUS at 09:01

## 2019-07-16 RX ADMIN — LISINOPRIL 5 MG: 5 TABLET ORAL at 08:55

## 2019-07-16 RX ADMIN — NITROGLYCERIN 0.5 INCH: 20 OINTMENT TOPICAL at 00:05

## 2019-07-16 RX ADMIN — AZITHROMYCIN MONOHYDRATE 500 MG: 500 INJECTION, POWDER, LYOPHILIZED, FOR SOLUTION INTRAVENOUS at 01:58

## 2019-07-16 RX ADMIN — ASPIRIN 325 MG: 325 TABLET, COATED ORAL at 00:05

## 2019-07-16 RX ADMIN — GABAPENTIN 600 MG: 300 CAPSULE ORAL at 22:12

## 2019-07-16 RX ADMIN — GABAPENTIN 600 MG: 300 CAPSULE ORAL at 08:55

## 2019-07-16 RX ADMIN — FERROUS SULFATE TAB 325 MG (65 MG ELEMENTAL FE) 325 MG: 325 (65 FE) TAB at 08:55

## 2019-07-16 RX ADMIN — Medication 10 ML: at 08:56

## 2019-07-16 RX ADMIN — MELATONIN TAB 5 MG 10 MG: 5 TAB at 22:12

## 2019-07-16 RX ADMIN — SERTRALINE HYDROCHLORIDE 100 MG: 50 TABLET ORAL at 08:55

## 2019-07-16 RX ADMIN — DILTIAZEM HYDROCHLORIDE 120 MG: 120 CAPSULE, COATED, EXTENDED RELEASE ORAL at 08:55

## 2019-07-16 RX ADMIN — INSULIN LISPRO 2 UNITS: 100 INJECTION, SOLUTION INTRAVENOUS; SUBCUTANEOUS at 13:26

## 2019-07-16 RX ADMIN — METHYLPREDNISOLONE SODIUM SUCCINATE 40 MG: 40 INJECTION, POWDER, FOR SOLUTION INTRAMUSCULAR; INTRAVENOUS at 08:55

## 2019-07-16 RX ADMIN — CEFTRIAXONE SODIUM 1 G: 1 INJECTION, POWDER, FOR SOLUTION INTRAMUSCULAR; INTRAVENOUS at 00:05

## 2019-07-16 RX ADMIN — ENOXAPARIN SODIUM 40 MG: 40 INJECTION SUBCUTANEOUS at 08:54

## 2019-07-16 RX ADMIN — LEVOTHYROXINE SODIUM 112 MCG: 0.11 TABLET ORAL at 06:09

## 2019-07-16 RX ADMIN — IPRATROPIUM BROMIDE AND ALBUTEROL SULFATE 3 ML: .5; 3 SOLUTION RESPIRATORY (INHALATION) at 09:21

## 2019-07-16 ASSESSMENT — PAIN SCALES - GENERAL: PAINLEVEL_OUTOF10: 0

## 2019-07-16 NOTE — ED PROVIDER NOTES
I independently performed a history and physical on Kassy Sutton. All diagnostic, treatment, and disposition decisions were made by myself in conjunction with the advanced practice provider. For further details of Leanna Calvo [facility] emergency department encounter, please see advanced practice provider's documentation    This is a 44-year-old female presenting to the ER with chest tightness and shortness of breath. Physical examination: Bilateral bibasilar wheezing. Bilateral lower extremity edema left greater than right    Xr Chest Standard (2 Vw)    Result Date: 7/15/2019  EXAMINATION: TWO XRAY VIEWS OF THE CHEST 7/15/2019 7:38 pm COMPARISON: 04/20/2019 HISTORY: ORDERING SYSTEM PROVIDED HISTORY: Chest Discomfort TECHNOLOGIST PROVIDED HISTORY: Reason for exam:->Chest Discomfort Reason for Exam: sob for the last two days, with leg swelling Acuity: Unknown Type of Exam: Unknown FINDINGS: Lordotic positioning. Cardiomegaly. Central pulmonary vessels are prominent. There is diffuse haziness of the lungs which is felt to mostly be due to the patient's body habitus. No definite airspace disease. Costophrenic angles are sharp     Cardiomegaly with pulmonary vascular congestion     Ct Chest Pulmonary Embolism W Contrast    Result Date: 7/15/2019  EXAMINATION: CTA OF THE CHEST 7/15/2019 10:35 pm TECHNIQUE: CTA of the chest was performed after the administration of intravenous contrast.  Multiplanar reformatted images are provided for review. MIP images are provided for review. Dose modulation, iterative reconstruction, and/or weight based adjustment of the mA/kV was utilized to reduce the radiation dose to as low as reasonably achievable. COMPARISON: Chest x-ray today, 04/06/2019 CT HISTORY: ORDERING SYSTEM PROVIDED HISTORY: CHEST PAIN, ACUTE CORONARY SYNDROME SUSPECT TECHNOLOGIST PROVIDED HISTORY: Reason for Exam: SOB, usually on O2 as needed but has had to wear it full time recently;  Chest pains Acuity: Acute
Non-plain film images such as CT, Ultrasound and MRI are read by the radiologist. Plain radiographic images are visualized andpreliminarily interpreted by the  ED Provider with the below findings:        Interpretation perthe Radiologist below, if available at the time of this note:    CT CHEST PULMONARY EMBOLISM W CONTRAST   Final Result   No evidence of pulmonary embolism to the segmental level. Small left pleural effusion with minimal left basilar airspace disease,   likely atelectasis. Mild airspace disease within the right lung base/costophrenic angle. Correlation for pneumonia is recommended.          XR CHEST STANDARD (2 VW)   Final Result   Cardiomegaly with pulmonary vascular congestion             PROCEDURES   Unless otherwise noted below, none     Procedures    CRITICAL CARE TIME   N/A    CONSULTS:  IP CONSULT TO HOSPITALIST      EMERGENCY DEPARTMENT COURSE and DIFFERENTIAL DIAGNOSIS/MDM:   Vitals:    Vitals:    07/15/19 2101 07/15/19 2103 07/15/19 2104 07/15/19 2152   BP:    137/76   Pulse:    83   Resp: 18 18 17 16   Temp:    98.7 °F (37.1 °C)   TempSrc:    Oral   SpO2: 93% 98% 97% 96%   Weight:       Height:           Patient was given thefollowing medications:  Medications   aspirin tablet 325 mg (has no administration in time range)   nitroglycerin (NITRO-BID) 2 % ointment 0.5 inch (has no administration in time range)   cefTRIAXone (ROCEPHIN) 1 g IVPB in 50 mL D5W minibag (has no administration in time range)   ipratropium-albuterol (DUONEB) nebulizer solution 1 ampule (1 ampule Inhalation Given 7/15/19 2101)   ketorolac (TORADOL) injection 30 mg (30 mg Intravenous Given 7/15/19 2150)   methylPREDNISolone sodium (SOLU-MEDROL) injection 80 mg (80 mg Intravenous Given 7/15/19 2150)   iopamidol (ISOVUE-370) 76 % injection 75 mL (75 mLs Intravenous Given 7/15/19 2300)     Patient presents emergency department with shortness of breath and leg swelling, patient states that she has a history

## 2019-07-16 NOTE — PROGRESS NOTES
Surgical History  Past Surgical History:   Procedure Laterality Date    BLADDER SURGERY      BRAIN SURGERY      BRONCHOSCOPY N/A 4/21/2019    BRONCHOSCOPY ALVEOLAR LAVAGE performed by Yajaira Pardo MD at [address] [place], [place] [address]  4/21/2019    BRONCHOSCOPY THERAPUTIC ASPIRATION INITIAL performed by Yajaira Pardo MD at [address] [place] [address] N/A 5/15/2019    BRONCHOSCOPY ALVEOLAR LAVAGE performed by Amberly Gaona MD at [address] [place] [address]  5/15/2019    BRONCHOSCOPY THERAPUTIC ASPIRATION INITIAL performed by Amberly Gaona MD at [facility] [address] REPLACEMENT  2009    hip    KNEE SURGERY      OTHER SURGICAL HISTORY  07/31/2013    cystoscopy, urethral dilatation    SHOULDER SURGERY      r rotator cuff repair    TOTAL HIP ARTHROPLASTY      Left    URETHRAL STRICTURE DILATATION         Level of Consciousness: Alert, Oriented, and Cooperative = 0    Level of Activity: Walking with assistance = 1    Respiratory Pattern: Dyspnea with exertion;Irregular pattern;or RR less than 6 = 2    Breath Sounds: Diminshed bilaterally and/or crackles = 2    Sputum   ,  ,    Cough: Strong, spontaneous, non-productive = 0    Vital Signs   /61   Pulse 107   Temp 98.4 °F (36.9 °C) (Oral)   Resp 16   Ht 5' 5\" (1.651 m)   Wt (!) 310 lb 14.4 oz (141 kg)   LMP 07/29/2013   SpO2 (!) 87%   BMI 51.74 kg/m²   SPO2 (COPD values may differ): 90-91% on room air or greater than 92% on FiO2 24- 28% = 1    Peak Flow (asthma only): not applicable = 0    RSI: 7-8 = BID and Q4HPRN (every four hours as needed) for dyspnea        Plan       Goals: medication delivery    Patient/caregiver was educated on the proper method of use for Respiratory Care Devices:  Yes      Level of patient/caregiver understanding able to:   ? Verbalize understanding   ? Demonstrate understanding       ? Teach back        ? Needs reinforcement       ? No available caregiver               ? a maximum of every 4 hours. If greater than every 4 hours is required, the physician will be contacted. 4. If the bronchospasm improves, the frequency of the bronchodilator can be decreased, based on the patient's RSI, but not less than home treatment regimen frequency. 5. Bronchodilator(s) will be discontinued if patient has a RSI less than 9 and has received no scheduled or as needed treatment for 72  Hrs. Patients Ordered on a Mucolytic Agent:    1. Must always be administered with a bronchodilator. 2. Discontinue if patient experiences worsened bronchospasm, or secretions have lessened to the point that the patient is able to clear them with a cough. Anti-inflammatory and Combination Medications:    1. If the patient lacks prior history of lung disease, is not using inhaled anti-inflammatory medication at home, and lacks wheezing by examination or by history for at least 24 hours, contact physician for possible discontinuation.

## 2019-07-16 NOTE — H&P
hip    KNEE SURGERY      OTHER SURGICAL HISTORY  07/31/2013    cystoscopy, urethral dilatation    SHOULDER SURGERY      r rotator cuff repair    TOTAL HIP ARTHROPLASTY      Left    URETHRAL STRICTURE DILATATION         Medications Prior to Admission:      Prior to Admission medications    Medication Sig Start Date End Date Taking? Authorizing Provider   ipratropium-albuterol (DUONEB) 0.5-2.5 (3) MG/3ML SOLN nebulizer solution Inhale 3 mLs into the lungs 4 times daily 5/8/19  Yes Joy Hliton MD   Multiple Vitamin (MULTI-VITAMIN DAILY PO) Take 1 tablet by mouth   Yes Historical Provider, MD   Cyclobenzaprine HCl (FLEXERIL PO) Take 1 tablet by mouth as needed 11/27/18  Yes Historical Provider, MD   cetirizine (ZYRTEC) 10 MG tablet Take 10 mg by mouth daily 3/26/19  Yes Historical Provider, MD   levothyroxine (SYNTHROID) 112 MCG tablet Take 112 mcg by mouth daily 1/22/19  Yes Historical Provider, MD   ferrous sulfate 325 (65 Fe) MG tablet Take 1 tablet by mouth every 48 hours 4/10/19  Yes Rob Kumar DO   ibuprofen (ADVIL;MOTRIN) 200 MG tablet Take 600 mg by mouth as needed for Pain   Yes Historical Provider, MD   sertraline (ZOLOFT) 100 MG tablet Take 100 mg by mouth daily   Yes Historical Provider, MD   lisinopril (PRINIVIL;ZESTRIL) 5 MG tablet Take 5 mg by mouth daily   Yes Historical Provider, MD   lamoTRIgine (LAMICTAL) 200 MG tablet Take 200 mg by mouth 2 times daily   Yes Historical Provider, MD   diltiazem (CARDIZEM CD) 120 MG extended release capsule Take 1 capsule by mouth 2 times daily 11/25/18  Yes Yesica Ortega MD   albuterol sulfate HFA (PROVENTIL HFA) 108 (90 Base) MCG/ACT inhaler Inhale 2 puffs into the lungs every 4 hours as needed for Wheezing or Shortness of Breath (Space out to every 6 hours as symptoms improve) Space out to every 6 hours as symptoms improve. 11/3/18  Yes MATHEW Hernández - CNP   pantoprazole (PROTONIX) 40 MG tablet Take 40 mg by mouth daily.    Yes Historical

## 2019-07-16 NOTE — PROGRESS NOTES
Provider, MD   montelukast (SINGULAIR) 10 MG tablet Take 10 mg by mouth every morning    Yes Historical Provider, MD   aspirin 81 MG EC tablet Take 81 mg by mouth daily. Yes Historical Provider, MD   acetaminophen (TYLENOL) 500 MG tablet Take 500 mg by mouth every 6 hours as needed. Yes Historical Provider, MD   Melatonin 10 MG TABS Take 1 tablet by mouth nightly as needed (sleep issues) 6/26/19   Brinda Ahn MD   PREDNISONE PO Take by mouth    Historical Provider, MD       Allergies:  Bioflavonoids; Other; Oxycodone-acetaminophen; Percocet [oxycodone-acetaminophen]; Azithromycin; and Nickel    Social History:      The patient currently lives at home    TOBACCO:   reports that she quit smoking about 33 years ago. She has a 1.00 pack-year smoking history. She has never used smokeless tobacco.  ETOH:   reports that she does not drink alcohol. Family History:       Reviewed in detail and . Positive as follows:        Problem Relation Age of Onset    Asthma Other     Asthma Other     Hypertension Father     Hypertension Sister     Cancer Neg Hx     Diabetes Neg Hx     Emphysema Neg Hx     Heart Failure Neg Hx        REVIEW OF SYSTEMS:   All twelve systems reviewed and negative except for noted in HPI. PHYSICAL EXAM PERFORMED:    /65   Pulse 99   Temp 98.1 °F (36.7 °C) (Oral)   Resp 16   Ht 5' 5\" (1.651 m)   Wt (!) 310 lb 14.4 oz (141 kg)   LMP 07/29/2013   SpO2 93%   BMI 51.74 kg/m²     General appearance:  No apparent distress, appears stated age and cooperative. HEENT:  Normal cephalic, atraumatic without obvious deformity. Pupils equal, round, and reactive to light. Extra ocular muscles intact. Conjunctivae/corneas clear. Neck: Supple, with full range of motion. No jugular venous distention. Trachea midline.   Respiratory:  Lungs: decrease breath sounds bilaterally, with mild wheezes and crackles  Cardiovascular:  Regular rate and rhythm with normal S1/S2 without murmurs, rubs or

## 2019-07-16 NOTE — CONSULTS
lumps.   Neurologic: Denies dizziness, numbness, tingling, weakness. Psychiatric: Mood is stable.     Patient Active Problem List    Diagnosis Date Noted    SOB (shortness of breath) 04/20/2019    Pleural effusion on left 04/20/2019    Pulmonary infiltrates 04/20/2019    Atelectasis 04/20/2019    Ineffective airway clearance 04/20/2019    Acute respiratory failure with hypoxia (HCC) 04/20/2019    Right ventricular enlargement 04/20/2019    Iron deficiency anemia 04/20/2019    Acute respiratory failure ([address] 04/20/2019    Hypoxia 04/07/2019    Essential hypertension 03/16/2017    Chronic GERD 03/16/2017    Morbid obesity with BMI of 50.0-59.9, adult ([facility] 04/23/2014    KRYSTIAN (obstructive sleep apnea) 04/23/2014    Hypothyroid 04/23/2014    Seizure disorder ([facility] 04/23/2014       Past Medical History:   Diagnosis Date    Anemia     Anxiety     Depression     Fibromyalgia     GERD (gastroesophageal reflux disease)     Heart palpitations     Hiatal hernia     Hypertension     Irritable bowel syndrome     Osteoarthritis     Panic attacks     Pneumonia     Pulmonary infiltrates 4/20/2019    Seasonal allergies     Seizure disorder ([facility]     Sleep apnea     Spastic colon     Thyroid disease         Past Surgical History:   Procedure Laterality Date    BLADDER SURGERY      BRAIN SURGERY      BRONCHOSCOPY N/A 4/21/2019    BRONCHOSCOPY ALVEOLAR LAVAGE performed by Patrica Benton MD at [address] [place] [address]  4/21/2019    BRONCHOSCOPY THERAPUTIC ASPIRATION INITIAL performed by Patrica Benton MD at [address] [place] [address] N/A 5/15/2019    BRONCHOSCOPY ALVEOLAR LAVAGE performed by Johanna Gold MD at [address] [place] [address]  5/15/2019    BRONCHOSCOPY THERAPUTIC ASPIRATION INITIAL performed by Johanna Gold MD at [facility]  REPLACEMENT  2009    hip    KNEE SURGERY      OTHER SURGICAL HISTORY  07/31/2013    cystoscopy,

## 2019-07-17 VITALS
OXYGEN SATURATION: 94 % | RESPIRATION RATE: 16 BRPM | TEMPERATURE: 98 F | WEIGHT: 293 LBS | HEIGHT: 65 IN | HEART RATE: 78 BPM | BODY MASS INDEX: 48.82 KG/M2 | DIASTOLIC BLOOD PRESSURE: 81 MMHG | SYSTOLIC BLOOD PRESSURE: 128 MMHG

## 2019-07-17 LAB
GLUCOSE BLD-MCNC: 130 MG/DL (ref 70–99)
PERFORMED ON: ABNORMAL

## 2019-07-17 PROCEDURE — 6370000000 HC RX 637 (ALT 250 FOR IP): Performed by: INTERNAL MEDICINE

## 2019-07-17 PROCEDURE — G0378 HOSPITAL OBSERVATION PER HR: HCPCS

## 2019-07-17 PROCEDURE — 6360000002 HC RX W HCPCS: Performed by: INTERNAL MEDICINE

## 2019-07-17 PROCEDURE — 99225 PR SBSQ OBSERVATION CARE/DAY 25 MINUTES: CPT | Performed by: INTERNAL MEDICINE

## 2019-07-17 PROCEDURE — 96372 THER/PROPH/DIAG INJ SC/IM: CPT

## 2019-07-17 PROCEDURE — 94640 AIRWAY INHALATION TREATMENT: CPT

## 2019-07-17 RX ORDER — PREDNISONE 20 MG/1
40 TABLET ORAL DAILY
Qty: 10 TABLET | Refills: 0 | Status: SHIPPED | OUTPATIENT
Start: 2019-07-17 | End: 2019-07-22

## 2019-07-17 RX ORDER — FLUTICASONE FUROATE AND VILANTEROL 200; 25 UG/1; UG/1
1 POWDER RESPIRATORY (INHALATION) DAILY
Qty: 1 EACH | Refills: 2 | Status: SHIPPED | OUTPATIENT
Start: 2019-07-17 | End: 2022-10-14

## 2019-07-17 RX ADMIN — PREDNISONE 40 MG: 20 TABLET ORAL at 08:22

## 2019-07-17 RX ADMIN — LISINOPRIL 5 MG: 5 TABLET ORAL at 08:23

## 2019-07-17 RX ADMIN — LEVOTHYROXINE SODIUM 112 MCG: 0.11 TABLET ORAL at 07:32

## 2019-07-17 RX ADMIN — Medication 1 TABLET: at 08:22

## 2019-07-17 RX ADMIN — IPRATROPIUM BROMIDE AND ALBUTEROL SULFATE 3 ML: .5; 3 SOLUTION RESPIRATORY (INHALATION) at 09:15

## 2019-07-17 RX ADMIN — PANTOPRAZOLE SODIUM 40 MG: 40 TABLET, DELAYED RELEASE ORAL at 08:22

## 2019-07-17 RX ADMIN — MONTELUKAST SODIUM 10 MG: 10 TABLET, FILM COATED ORAL at 08:22

## 2019-07-17 RX ADMIN — GABAPENTIN 600 MG: 300 CAPSULE ORAL at 08:22

## 2019-07-17 RX ADMIN — DILTIAZEM HYDROCHLORIDE 120 MG: 120 CAPSULE, COATED, EXTENDED RELEASE ORAL at 08:23

## 2019-07-17 RX ADMIN — ENOXAPARIN SODIUM 40 MG: 40 INJECTION SUBCUTANEOUS at 08:24

## 2019-07-17 RX ADMIN — ASPIRIN 81 MG: 81 TABLET ORAL at 08:22

## 2019-07-17 RX ADMIN — LAMOTRIGINE 200 MG: 100 TABLET ORAL at 08:22

## 2019-07-17 ASSESSMENT — PAIN SCALES - GENERAL
PAINLEVEL_OUTOF10: 0

## 2019-07-17 NOTE — PROGRESS NOTES
Pt d/c'd home with sister in personal car. Notified [facility] and removed tele box. Reviewed d/c instructions, home meds, and  f/u information utilizing teach-back method. Meds to beds given to patient from outpatient pharmacy. Patient verbalized understanding. Pt discharged with all belongings including cane. Pt taken to car in wheelchair.

## 2019-07-17 NOTE — PROGRESS NOTES
minimal left basilar airspace disease,   likely atelectasis. Mild airspace disease within the right lung base/costophrenic angle. Correlation for pneumonia is recommended. XR CHEST STANDARD (2 VW)   Final Result   Cardiomegaly with pulmonary vascular congestion           Xr Chest Standard (2 Vw)    Result Date: 7/15/2019  EXAMINATION: TWO XRAY VIEWS OF THE CHEST 7/15/2019 7:38 pm COMPARISON: 04/20/2019 HISTORY: ORDERING SYSTEM PROVIDED HISTORY: Chest Discomfort TECHNOLOGIST PROVIDED HISTORY: Reason for exam:->Chest Discomfort Reason for Exam: sob for the last two days, with leg swelling Acuity: Unknown Type of Exam: Unknown FINDINGS: Lordotic positioning. Cardiomegaly. Central pulmonary vessels are prominent. There is diffuse haziness of the lungs which is felt to mostly be due to the patient's body habitus. No definite airspace disease. Costophrenic angles are sharp     Cardiomegaly with pulmonary vascular congestion     Vl Extremity Venous Left    Result Date: 7/16/2019  Vascular Lower Extremities DVT Study Procedure -- PRELIMINARY SONOGRAPHER REPORT --   Demographics   Patient Name       Villalobos Pencil   Date of Study      07/16/2019         Gender              Female   Patient Number     [id number]         Date of Birth       1965   Visit Number       [id number]          Age                 48 year(s)   Accession Number   [id number]          Room Number         8630   Corporate ID       [id number]            Sonographer         Neida Olivarez RVT   Ordering Physician Lauro Friedman,   Interpreting        [facility]                     DO                 Physician           Readers  Procedure Type of Study:   Veins:Lower Extremities DVT Study, VL EXTREMITY VENOUS DUPLEX LEFT. Tech Comments Right 1. There is complete compressibility of the common femoral vein. 2. There is normal spontaneous and phasic flow in the common femoral vein. Left 1.  Technically limited exam due to depth and

## 2019-08-06 ENCOUNTER — TELEPHONE (OUTPATIENT)
Dept: PULMONOLOGY | Age: 54
End: 2019-08-06

## 2019-08-06 NOTE — TELEPHONE ENCOUNTER
Patient did not show for hospital follow up appointment  with Dr. Duke Walker on 8/6/19    Same Day Cancellation: Yes  Car broke down    Patient rescheduled:  Yes    New Appointment  8/22/19    Patient was also no show on: 6/5/19    LOV 6/5/19

## 2019-08-09 ENCOUNTER — HOSPITAL ENCOUNTER (OUTPATIENT)
Dept: WOMENS IMAGING | Age: 54
Discharge: HOME | End: 2019-08-09
Payer: MEDICAID

## 2019-08-09 DIAGNOSIS — Z12.31 ENCOUNTER FOR SCREENING MAMMOGRAM FOR BREAST CANCER: ICD-10-CM

## 2019-08-09 PROCEDURE — 77067 SCR MAMMO BI INCL CAD: CPT

## 2019-08-22 ENCOUNTER — OFFICE VISIT (OUTPATIENT)
Dept: PULMONOLOGY | Age: 54
End: 2019-08-22
Payer: MEDICAID

## 2019-08-22 VITALS
WEIGHT: 293 LBS | SYSTOLIC BLOOD PRESSURE: 152 MMHG | BODY MASS INDEX: 48.82 KG/M2 | HEART RATE: 91 BPM | HEIGHT: 65 IN | RESPIRATION RATE: 16 BRPM | DIASTOLIC BLOOD PRESSURE: 87 MMHG | OXYGEN SATURATION: 93 %

## 2019-08-22 DIAGNOSIS — G47.33 OSA (OBSTRUCTIVE SLEEP APNEA): Primary | ICD-10-CM

## 2019-08-22 DIAGNOSIS — J96.11 CHRONIC RESPIRATORY FAILURE WITH HYPOXIA (HCC): ICD-10-CM

## 2019-08-22 DIAGNOSIS — J98.4 RESTRICTIVE LUNG DISEASE SECONDARY TO OBESITY: ICD-10-CM

## 2019-08-22 DIAGNOSIS — G47.8 PULMONARY HYPERTENSION DUE TO SLEEP-DISORDERED BREATHING (HCC): ICD-10-CM

## 2019-08-22 DIAGNOSIS — E66.9 RESTRICTIVE LUNG DISEASE SECONDARY TO OBESITY: ICD-10-CM

## 2019-08-22 DIAGNOSIS — J45.50 SEVERE PERSISTENT ASTHMA WITHOUT COMPLICATION: ICD-10-CM

## 2019-08-22 DIAGNOSIS — I27.29 PULMONARY HYPERTENSION DUE TO SLEEP-DISORDERED BREATHING (HCC): ICD-10-CM

## 2019-08-22 PROCEDURE — 3017F COLORECTAL CA SCREEN DOC REV: CPT | Performed by: INTERNAL MEDICINE

## 2019-08-22 PROCEDURE — 99214 OFFICE O/P EST MOD 30 MIN: CPT | Performed by: INTERNAL MEDICINE

## 2019-08-22 PROCEDURE — G8427 DOCREV CUR MEDS BY ELIG CLIN: HCPCS | Performed by: INTERNAL MEDICINE

## 2019-08-22 PROCEDURE — 1036F TOBACCO NON-USER: CPT | Performed by: INTERNAL MEDICINE

## 2019-08-22 PROCEDURE — G8417 CALC BMI ABV UP PARAM F/U: HCPCS | Performed by: INTERNAL MEDICINE

## 2019-08-22 RX ORDER — CIPROFLOXACIN 500 MG/1
500 TABLET, FILM COATED ORAL 2 TIMES DAILY
Status: ON HOLD | COMMUNITY
End: 2020-06-26 | Stop reason: ALTCHOICE

## 2019-08-22 ASSESSMENT — ENCOUNTER SYMPTOMS
ABDOMINAL PAIN: 0
EYE DISCHARGE: 0
CONSTIPATION: 0
STRIDOR: 0
EYE ITCHING: 0
DIARRHEA: 0
SORE THROAT: 0
VOICE CHANGE: 0
EYE PAIN: 0
CHOKING: 0
CHEST TIGHTNESS: 0

## 2019-08-22 NOTE — PROGRESS NOTES
Pulmonary Outpatient Note   Landon Solis MD       8/22/2019    Chief Complaint:  Follow-Up from Hospital     HPI:   48y.o. year old female here for follow up of asthma, ricardo, and resp failure. Recent hospitalization for respiratory infection and acute asthma exac. Feels better since discharge. She is less dependent on her oxygen  Could not tolerate CPAP mask and has a titration study coming up next month to determine optimal settings. Adherent to bronchodilators  Intends to lose weight, seen by dietary while inpatient  Has knee surgery due later this year when she is able to lose more weight.      Past Medical History:   Diagnosis Date    Anemia     Anxiety     Depression     Fibromyalgia     GERD (gastroesophageal reflux disease)     Heart palpitations     Hiatal hernia     Hypertension     Irritable bowel syndrome     Mild intermittent asthma with exacerbation     Osteoarthritis     Panic attacks     Pneumonia     Pulmonary infiltrates 4/20/2019    Seasonal allergies     Seizure disorder ([address]     Sleep apnea     Spastic colon     Thyroid disease        Past Surgical History:   Procedure Laterality Date    BLADDER SURGERY      BRAIN SURGERY      BRONCHOSCOPY N/A 4/21/2019    BRONCHOSCOPY ALVEOLAR LAVAGE performed by Lulu Pérez MD at [address] [place] [address]  4/21/2019    BRONCHOSCOPY THERAPUTIC ASPIRATION INITIAL performed by Lulu Pérez MD at [address] [place] [address] N/A 5/15/2019    BRONCHOSCOPY ALVEOLAR LAVAGE performed by Alia Durant MD at [address] [place] [address]  5/15/2019    BRONCHOSCOPY THERAPUTIC ASPIRATION INITIAL performed by Alia Durant MD at [facility] 468 REPLACEMENT  2009    hip    KNEE SURGERY      OTHER SURGICAL HISTORY  07/31/2013    cystoscopy, urethral dilatation    SHOULDER SURGERY      r rotator cuff repair    TOTAL HIP ARTHROPLASTY      Left    URETHRAL STRICTURE DILATATION Social History     Tobacco Use    Smoking status: Former Smoker     Packs/day: 0.50     Years: 2.00     Pack years: 1.00     Last attempt to quit: 1986     Years since quittin.3    Smokeless tobacco: Never Used   Substance Use Topics    Alcohol use: No          Family History   Problem Relation Age of Onset    Asthma Other     Asthma Other     Hypertension Father     Hypertension Sister     Cancer Neg Hx     Diabetes Neg Hx     Emphysema Neg Hx     Heart Failure Neg Hx          Current Outpatient Medications:     ciprofloxacin (CIPRO) 500 MG tablet, Take 500 mg by mouth 2 times daily, Disp: , Rfl:     Fluticasone Furoate-Vilanterol (BREO ELLIPTA) 200-25 MCG/INH AEPB, Inhale 1 puff into the lungs daily, Disp: 1 each, Rfl: 2    Melatonin 10 MG TABS, Take 1 tablet by mouth nightly as needed (sleep issues), Disp: 5 tablet, Rfl: 0    ipratropium-albuterol (DUONEB) 0.5-2.5 (3) MG/3ML SOLN nebulizer solution, Inhale 3 mLs into the lungs 4 times daily, Disp: 360 mL, Rfl: 3    Multiple Vitamin (MULTI-VITAMIN DAILY PO), Take 1 tablet by mouth, Disp: , Rfl:     Cyclobenzaprine HCl (FLEXERIL PO), Take 1 tablet by mouth as needed, Disp: , Rfl:     levothyroxine (SYNTHROID) 112 MCG tablet, Take 112 mcg by mouth daily, Disp: , Rfl:     ibuprofen (ADVIL;MOTRIN) 200 MG tablet, Take 600 mg by mouth as needed for Pain, Disp: , Rfl:     sertraline (ZOLOFT) 100 MG tablet, Take 100 mg by mouth daily, Disp: , Rfl:     lisinopril (PRINIVIL;ZESTRIL) 5 MG tablet, Take 5 mg by mouth daily, Disp: , Rfl:     lamoTRIgine (LAMICTAL) 200 MG tablet, Take 200 mg by mouth 2 times daily, Disp: , Rfl:     diltiazem (CARDIZEM CD) 120 MG extended release capsule, Take 1 capsule by mouth 2 times daily, Disp: 30 capsule, Rfl: 0    albuterol sulfate HFA (PROVENTIL HFA) 108 (90 Base) MCG/ACT inhaler, Inhale 2 puffs into the lungs every 4 hours as needed for Wheezing or Shortness of Breath (Space out to every 6 hours as symptoms improve) Space out to every 6 hours as symptoms improve., Disp: 1 Inhaler, Rfl: 0    pantoprazole (PROTONIX) 40 MG tablet, Take 40 mg by mouth daily. , Disp: , Rfl:     gabapentin (NEURONTIN) 600 MG tablet, Take 600 mg by mouth 2 times daily. , Disp: , Rfl:     montelukast (SINGULAIR) 10 MG tablet, Take 10 mg by mouth every morning , Disp: , Rfl:     aspirin 81 MG EC tablet, Take 81 mg by mouth daily. , Disp: , Rfl:     acetaminophen (TYLENOL) 500 MG tablet, Take 500 mg by mouth every 6 hours as needed. , Disp: , Rfl:     PREDNISONE PO, Take by mouth, Disp: , Rfl:     ferrous sulfate 325 (65 Fe) MG tablet, Take 1 tablet by mouth every 48 hours (Patient not taking: Reported on 8/22/2019), Disp: 30 tablet, Rfl: 0    Bioflavonoids; Other; Oxycodone-acetaminophen; Percocet [oxycodone-acetaminophen]; Azithromycin; and Nickel    Vitals:    08/22/19 1305   BP: (!) 152/87   Site: Left Lower Arm   Position: Sitting   Pulse: 91   Resp: 16   SpO2: 93%   Weight: (!) 313 lb (142 kg)   Height: 5' 5\" (1.651 m)       Review of Systems   Constitutional: Negative for chills, fever and unexpected weight change. HENT: Negative for mouth sores, sore throat and voice change. Eyes: Negative for pain, discharge and itching. Respiratory: Negative for choking, chest tightness and stridor. Cardiovascular: Negative for chest pain, palpitations and leg swelling. Gastrointestinal: Negative for abdominal pain, constipation and diarrhea. Endocrine: Negative for cold intolerance, heat intolerance and polydipsia. Genitourinary: Negative for dysuria, frequency and hematuria. Musculoskeletal: Negative for gait problem, joint swelling and neck stiffness. Neurological: Negative for dizziness, numbness and headaches. Psychiatric/Behavioral: Negative for agitation, confusion and hallucinations. Physical Exam   Constitutional: She appears well-developed and well-nourished. No distress.    HENT:   Head: Normocephalic

## 2019-08-22 NOTE — PATIENT INSTRUCTIONS
Remember to bring all pulmonary medications to your next appointment with the office. Please keep all of your future appointments scheduled by [facility]. Out of respect for other patients and providers, you may be asked to reschedule your appointment if you arrive later than your scheduled appointment time. Appointments cancelled less than 24hrs in advance will be considered a no show. Patients with three missed appointments within 1 year or four missed appointments within 2 years can be dismissed from the practice. You may receive a survey regarding the care you received during your visit. Your input is valuable to us. We encourage you to complete and return your survey. We hope you will choose us in the future for your healthcare needs.

## 2019-08-26 NOTE — TELEPHONE ENCOUNTER
Pt states she was given [facility] while in hospital recently and needs a refill of it. I do not see on any med lists.

## 2019-09-03 ENCOUNTER — APPOINTMENT (OUTPATIENT)
Dept: GENERAL RADIOLOGY | Age: 54
End: 2019-09-03
Payer: OTHER MISCELLANEOUS

## 2019-09-03 ENCOUNTER — APPOINTMENT (OUTPATIENT)
Dept: CT IMAGING | Age: 54
End: 2019-09-03
Payer: OTHER MISCELLANEOUS

## 2019-09-03 ENCOUNTER — HOSPITAL ENCOUNTER (EMERGENCY)
Age: 54
Discharge: HOME | End: 2019-09-03
Attending: EMERGENCY MEDICINE
Payer: OTHER MISCELLANEOUS

## 2019-09-03 VITALS
BODY MASS INDEX: 57.52 KG/M2 | OXYGEN SATURATION: 94 % | RESPIRATION RATE: 22 BRPM | HEART RATE: 92 BPM | SYSTOLIC BLOOD PRESSURE: 136 MMHG | DIASTOLIC BLOOD PRESSURE: 77 MMHG | HEIGHT: 60 IN | TEMPERATURE: 98.5 F | WEIGHT: 293 LBS

## 2019-09-03 DIAGNOSIS — S20.211A CONTUSION OF RIGHT CHEST WALL, INITIAL ENCOUNTER: ICD-10-CM
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
## 2019-09-03 LAB
A/G RATIO: 1.5 (ref 1.1–2.2)
ALBUMIN SERPL-MCNC: 4 G/DL (ref 3.4–5)
ALP BLD-CCNC: 102 U/L (ref 40–129)
ALT SERPL-CCNC: 18 U/L (ref 10–40)
AMPHETAMINE SCREEN, URINE: ABNORMAL
ANION GAP SERPL CALCULATED.3IONS-SCNC: 9 MMOL/L (ref 3–16)
ANISOCYTOSIS: ABNORMAL
AST SERPL-CCNC: 16 U/L (ref 15–37)
BARBITURATE SCREEN URINE: ABNORMAL
BASOPHILS ABSOLUTE: 0 K/UL (ref 0–0.2)
BASOPHILS RELATIVE PERCENT: 0.6 %
BENZODIAZEPINE SCREEN, URINE: ABNORMAL
BILIRUB SERPL-MCNC: 0.3 MG/DL (ref 0–1)
BILIRUBIN URINE: NEGATIVE
BLOOD, URINE: NEGATIVE
BUN BLDV-MCNC: 15 MG/DL (ref 7–20)
CALCIUM SERPL-MCNC: 9.2 MG/DL (ref 8.3–10.6)
CANNABINOID SCREEN URINE: ABNORMAL
CHLORIDE BLD-SCNC: 106 MMOL/L (ref 99–110)
CLARITY: CLEAR
CO2: 26 MMOL/L (ref 21–32)
COCAINE METABOLITE SCREEN URINE: ABNORMAL
COLOR: YELLOW
CREAT SERPL-MCNC: 0.7 MG/DL (ref 0.6–1.1)
EOSINOPHILS ABSOLUTE: 0 K/UL (ref 0–0.6)
EOSINOPHILS RELATIVE PERCENT: 0 %
ETHANOL: NORMAL MG/DL (ref 0–0.08)
GFR AFRICAN AMERICAN: >60
GFR NON-AFRICAN AMERICAN: >60
GLOBULIN: 2.6 G/DL
GLUCOSE BLD-MCNC: 128 MG/DL (ref 70–99)
GLUCOSE URINE: NEGATIVE MG/DL
HCT VFR BLD CALC: 34.1 % (ref 36–48)
HEMATOLOGY PATH CONSULT: YES
HEMOGLOBIN: 10.6 G/DL (ref 12–16)
KETONES, URINE: NEGATIVE MG/DL
LEUKOCYTE ESTERASE, URINE: NEGATIVE
LYMPHOCYTES ABSOLUTE: 1.4 K/UL (ref 1–5.1)
LYMPHOCYTES RELATIVE PERCENT: 18.9 %
Lab: ABNORMAL
MCH RBC QN AUTO: 20.7 PG (ref 26–34)
MCHC RBC AUTO-ENTMCNC: 31.1 G/DL (ref 31–36)
MCV RBC AUTO: 66.5 FL (ref 80–100)
METHADONE SCREEN, URINE: ABNORMAL
MICROCYTES: ABNORMAL
MICROSCOPIC EXAMINATION: NORMAL
MONOCYTES ABSOLUTE: 0.5 K/UL (ref 0–1.3)
MONOCYTES RELATIVE PERCENT: 6.8 %
NEUTROPHILS ABSOLUTE: 5.6 K/UL (ref 1.7–7.7)
NEUTROPHILS RELATIVE PERCENT: 73.7 %
NITRITE, URINE: NEGATIVE
OPIATE SCREEN URINE: ABNORMAL
OVALOCYTES: ABNORMAL
OXYCODONE URINE: ABNORMAL
PDW BLD-RTO: 21.7 % (ref 12.4–15.4)
PH UA: 7.5
PH UA: 7.5 (ref 5–8)
PHENCYCLIDINE SCREEN URINE: POSITIVE
PLATELET # BLD: 316 K/UL (ref 135–450)
PLATELET SLIDE REVIEW: ADEQUATE
PMV BLD AUTO: 8.4 FL (ref 5–10.5)
POIKILOCYTES: ABNORMAL
POLYCHROMASIA: ABNORMAL
POTASSIUM REFLEX MAGNESIUM: 4.1 MMOL/L (ref 3.5–5.1)
PROPOXYPHENE SCREEN: ABNORMAL
PROTEIN UA: NEGATIVE MG/DL
RBC # BLD: 5.13 M/UL (ref 4–5.2)
SLIDE REVIEW: ABNORMAL
SODIUM BLD-SCNC: 141 MMOL/L (ref 136–145)
SPECIFIC GRAVITY UA: 1.01 (ref 1–1.03)
TEAR DROP CELLS: ABNORMAL
TOTAL PROTEIN: 6.6 G/DL (ref 6.4–8.2)
TROPONIN: <0.01 NG/ML
URINE REFLEX TO CULTURE: NORMAL
URINE TYPE: NORMAL
UROBILINOGEN, URINE: 0.2 E.U./DL
WBC # BLD: 7.7 K/UL (ref 4–11)

## 2019-09-03 PROCEDURE — 70450 CT HEAD/BRAIN W/O DYE: CPT

## 2019-09-03 PROCEDURE — G0480 DRUG TEST DEF 1-7 CLASSES: HCPCS

## 2019-09-03 PROCEDURE — 81003 URINALYSIS AUTO W/O SCOPE: CPT

## 2019-09-03 PROCEDURE — 80053 COMPREHEN METABOLIC PANEL: CPT

## 2019-09-03 PROCEDURE — 71046 X-RAY EXAM CHEST 2 VIEWS: CPT

## 2019-09-03 PROCEDURE — 99284 EMERGENCY DEPT VISIT MOD MDM: CPT

## 2019-09-03 PROCEDURE — 72125 CT NECK SPINE W/O DYE: CPT

## 2019-09-03 PROCEDURE — 72070 X-RAY EXAM THORAC SPINE 2VWS: CPT

## 2019-09-03 PROCEDURE — 84484 ASSAY OF TROPONIN QUANT: CPT

## 2019-09-03 PROCEDURE — 72100 X-RAY EXAM L-S SPINE 2/3 VWS: CPT

## 2019-09-03 PROCEDURE — 93005 ELECTROCARDIOGRAM TRACING: CPT | Performed by: NURSE PRACTITIONER

## 2019-09-03 PROCEDURE — 85025 COMPLETE CBC W/AUTO DIFF WBC: CPT

## 2019-09-03 PROCEDURE — 73501 X-RAY EXAM HIP UNI 1 VIEW: CPT

## 2019-09-03 PROCEDURE — 80307 DRUG TEST PRSMV CHEM ANLYZR: CPT

## 2019-09-03 ASSESSMENT — ENCOUNTER SYMPTOMS
SHORTNESS OF BREATH: 0
ABDOMINAL PAIN: 0
NAUSEA: 0
VOMITING: 0
BACK PAIN: 1
DIARRHEA: 0
COUGH: 0
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
## 2019-09-03 NOTE — ED NOTES
Bed: 11  Expected date:   Expected time:   Means of arrival:   Comments:  fatuma Lennon [facility]  09/03/19 3087

## 2019-09-04 LAB
EKG ATRIAL RATE: 97 BPM
EKG DIAGNOSIS: NORMAL
EKG P AXIS: 40 DEGREES
EKG P-R INTERVAL: 164 MS
EKG Q-T INTERVAL: 352 MS
EKG QRS DURATION: 88 MS
EKG QTC CALCULATION (BAZETT): 447 MS
EKG R AXIS: 32 DEGREES
EKG T AXIS: 44 DEGREES
EKG VENTRICULAR RATE: 97 BPM
HEMATOLOGY PATH CONSULT: NORMAL

## 2019-09-04 PROCEDURE — 93010 ELECTROCARDIOGRAM REPORT: CPT | Performed by: INTERNAL MEDICINE

## 2019-09-06 ENCOUNTER — TELEPHONE (OUTPATIENT)
Dept: PULMONOLOGY | Age: 54
End: 2019-09-06

## 2019-09-06 DIAGNOSIS — G47.33 OSA (OBSTRUCTIVE SLEEP APNEA): Primary | ICD-10-CM

## 2019-11-04 ENCOUNTER — TELEPHONE (OUTPATIENT)
Dept: PULMONOLOGY | Age: 54
End: 2019-11-04

## 2019-11-14 ENCOUNTER — OFFICE VISIT (OUTPATIENT)
Dept: PULMONOLOGY | Age: 54
End: 2019-11-14
Payer: MEDICAID

## 2019-11-14 VITALS
DIASTOLIC BLOOD PRESSURE: 89 MMHG | WEIGHT: 293 LBS | HEART RATE: 91 BPM | BODY MASS INDEX: 57.52 KG/M2 | OXYGEN SATURATION: 96 % | HEIGHT: 60 IN | SYSTOLIC BLOOD PRESSURE: 138 MMHG | RESPIRATION RATE: 16 BRPM

## 2019-11-14 DIAGNOSIS — E66.9 RESTRICTIVE LUNG DISEASE SECONDARY TO OBESITY: ICD-10-CM

## 2019-11-14 DIAGNOSIS — J98.4 RESTRICTIVE LUNG DISEASE SECONDARY TO OBESITY: ICD-10-CM

## 2019-11-14 DIAGNOSIS — G47.33 OSA (OBSTRUCTIVE SLEEP APNEA): Primary | ICD-10-CM

## 2019-11-14 DIAGNOSIS — J45.50 SEVERE PERSISTENT ASTHMA WITHOUT COMPLICATION: ICD-10-CM

## 2019-11-14 PROCEDURE — 99214 OFFICE O/P EST MOD 30 MIN: CPT | Performed by: INTERNAL MEDICINE

## 2019-11-14 PROCEDURE — G8417 CALC BMI ABV UP PARAM F/U: HCPCS | Performed by: INTERNAL MEDICINE

## 2019-11-14 PROCEDURE — 1036F TOBACCO NON-USER: CPT | Performed by: INTERNAL MEDICINE

## 2019-11-14 PROCEDURE — G8482 FLU IMMUNIZE ORDER/ADMIN: HCPCS | Performed by: INTERNAL MEDICINE

## 2019-11-14 PROCEDURE — 3017F COLORECTAL CA SCREEN DOC REV: CPT | Performed by: INTERNAL MEDICINE

## 2019-11-14 PROCEDURE — G8427 DOCREV CUR MEDS BY ELIG CLIN: HCPCS | Performed by: INTERNAL MEDICINE

## 2019-11-14 ASSESSMENT — SLEEP AND FATIGUE QUESTIONNAIRES
HOW LIKELY ARE YOU TO NOD OFF OR FALL ASLEEP WHILE SITTING QUIETLY AFTER LUNCH WITHOUT ALCOHOL: 2
HOW LIKELY ARE YOU TO NOD OFF OR FALL ASLEEP WHEN YOU ARE A PASSENGER IN A CAR FOR AN HOUR WITHOUT A BREAK: 2
HOW LIKELY ARE YOU TO NOD OFF OR FALL ASLEEP WHILE SITTING AND READING: 2
HOW LIKELY ARE YOU TO NOD OFF OR FALL ASLEEP WHILE SITTING AND TALKING TO SOMEONE: 0
HOW LIKELY ARE YOU TO NOD OFF OR FALL ASLEEP WHILE LYING DOWN TO REST IN THE AFTERNOON WHEN CIRCUMSTANCES PERMIT: 2
NECK CIRCUMFERENCE (INCHES): 17
HOW LIKELY ARE YOU TO NOD OFF OR FALL ASLEEP IN A CAR, WHILE STOPPED FOR A FEW MINUTES IN TRAFFIC: 0
HOW LIKELY ARE YOU TO NOD OFF OR FALL ASLEEP WHILE SITTING INACTIVE IN A PUBLIC PLACE: 0
HOW LIKELY ARE YOU TO NOD OFF OR FALL ASLEEP WHILE WATCHING TV: 2
ESS TOTAL SCORE: 10

## 2019-11-14 ASSESSMENT — ENCOUNTER SYMPTOMS
EYE ITCHING: 0
EYE DISCHARGE: 0
STRIDOR: 0
SORE THROAT: 0
CHOKING: 0
CHEST TIGHTNESS: 0
VOICE CHANGE: 0
ABDOMINAL PAIN: 0
CONSTIPATION: 0
DIARRHEA: 0
EYE PAIN: 0

## 2020-01-30 ENCOUNTER — OFFICE VISIT (OUTPATIENT)
Dept: ORTHOPEDIC SURGERY | Age: 55
End: 2020-01-30
Payer: MEDICAID

## 2020-01-30 VITALS — WEIGHT: 293 LBS | BODY MASS INDEX: 57.52 KG/M2 | HEIGHT: 60 IN | RESPIRATION RATE: 16 BRPM

## 2020-01-30 PROBLEM — R20.2 NUMBNESS AND TINGLING IN BOTH HANDS: Status: ACTIVE | Noted: 2020-01-30

## 2020-01-30 PROBLEM — R20.0 NUMBNESS AND TINGLING IN BOTH HANDS: Status: ACTIVE | Noted: 2020-01-30

## 2020-01-30 PROCEDURE — L3908 WHO COCK-UP NONMOLDE PRE OTS: HCPCS | Performed by: ORTHOPAEDIC SURGERY

## 2020-01-30 PROCEDURE — G8417 CALC BMI ABV UP PARAM F/U: HCPCS | Performed by: ORTHOPAEDIC SURGERY

## 2020-01-30 PROCEDURE — 99243 OFF/OP CNSLTJ NEW/EST LOW 30: CPT | Performed by: ORTHOPAEDIC SURGERY

## 2020-01-30 PROCEDURE — G8427 DOCREV CUR MEDS BY ELIG CLIN: HCPCS | Performed by: ORTHOPAEDIC SURGERY

## 2020-01-30 PROCEDURE — G8482 FLU IMMUNIZE ORDER/ADMIN: HCPCS | Performed by: ORTHOPAEDIC SURGERY

## 2020-01-30 NOTE — PROGRESS NOTES
HISTORY OF PRESENT ILLNESS: The patient is a 37-year-old left-hand-dominant female who is a very avid crafter and she presents today for a new hand surgery specialty consultation at the request of Dr. Yonathan Camp regarding both hands. Over the last year she has had right greater than left numbness and tingling and discomfort. This has been particularly bothersome throughout the day and she has a very difficult time sleeping at night with a history of obstructive sleep apnea. She does also have endocrine dysfunction and hyperparathyroidism by report. PAST MEDICAL HISTORY: Patient's medications, allergies, past medical, surgical, social and family histories were reviewed and updated as appropriate. ROS: Pertinent items are noted in HPI. Review of systems reviewed from patient history form dated on 1/30/20 and available in the patient's chart under the media tab. Denies fever, chills, confusion, bowel/bladder active change. PHYSICAL EXAMINATION: Examination reveals a pleasant individual in no acute distress. There appears to be satisfactory pain-free range of motion, strength, and stability of the cervical spine, shoulders, and elbows. Skin is intact without lymphadenopathy, discoloration, or abnormal temperature. There is intact, symmetric circulation in both upper extremities. Wrist, hand, and digital range of motion is satisfactory bilaterally. Provocative testing for bilateral carpal tunnel syndrome suggests reproduction of symptoms with direct compression, Phalen's, and Tinel's. Right hand seems to have more dramatic symptoms especially with Tinel's. There is no sign of circulatory dysfunction or atrophy. DIAGNOSTIC TESTING:        IMPRESSION AND PLAN: I believe she has some very classic signs of carpal tunnel syndrome but I cannot completely rule out other neuropathic process.   We will begin by ensuring that we have provided for her carpal tunnel braces to use at nighttime, and we will also

## 2020-02-20 ENCOUNTER — OFFICE VISIT (OUTPATIENT)
Dept: ORTHOPEDIC SURGERY | Age: 55
End: 2020-02-20
Payer: MEDICAID

## 2020-02-20 VITALS — WEIGHT: 293 LBS | HEIGHT: 60 IN | BODY MASS INDEX: 57.52 KG/M2

## 2020-02-20 PROBLEM — G56.03 CARPAL TUNNEL SYNDROME, BILATERAL: Status: ACTIVE | Noted: 2020-02-20

## 2020-02-20 PROCEDURE — 99214 OFFICE O/P EST MOD 30 MIN: CPT | Performed by: ORTHOPAEDIC SURGERY

## 2020-02-20 PROCEDURE — 95911 NRV CNDJ TEST 9-10 STUDIES: CPT | Performed by: PHYSICAL MEDICINE & REHABILITATION

## 2020-02-20 PROCEDURE — 3017F COLORECTAL CA SCREEN DOC REV: CPT | Performed by: ORTHOPAEDIC SURGERY

## 2020-02-20 PROCEDURE — G8417 CALC BMI ABV UP PARAM F/U: HCPCS | Performed by: ORTHOPAEDIC SURGERY

## 2020-02-20 PROCEDURE — G8482 FLU IMMUNIZE ORDER/ADMIN: HCPCS | Performed by: ORTHOPAEDIC SURGERY

## 2020-02-20 PROCEDURE — 1036F TOBACCO NON-USER: CPT | Performed by: ORTHOPAEDIC SURGERY

## 2020-02-20 PROCEDURE — G8427 DOCREV CUR MEDS BY ELIG CLIN: HCPCS | Performed by: ORTHOPAEDIC SURGERY

## 2020-02-20 PROCEDURE — 95886 MUSC TEST DONE W/N TEST COMP: CPT | Performed by: PHYSICAL MEDICINE & REHABILITATION

## 2020-02-20 NOTE — PROGRESS NOTES
[address], [address]   Ph: [phone]  NCS & Electromyography Report        Full Name: Ethel Matta Gender: Female  Patient ID: [id number] YOB: 1965      Visit Date: 2/20/2020 07:54  Age: 48 Years 1 Months Old  Examining Physician: Dr Zoila Weiner  Referring Physician: Dr Joan Smith  Patient History: reilly hand numbness & tingling      Sensory NCS      Nerve / Sites Rec. Site Onset Lat Peak Lat PP Amp Segments Distance Peak Diff Velocity     ms ms µV  cm ms m/s   R Median - D2 Ulnar D5      Median Dig II NR NR NR Median - Dig II 14  NR      Ref.   ?3.70 ? 20.0 Ref. ?53      Ulnar Dig V 2.50 2.97 32.8 Ulnar - Median 14 NR NR      Ref. ?3.50 ? 10.0 Ref. ?53   L Median - D2 Ulnar D5      Median Dig II 5.36 7.03 3.1 Median - Dig II 14  26      Ref. ?3.70 ? 20.0 Ref. ?53      Ulnar Dig V 2.50 2.97 20.6 Ulnar - Median 14 -4.06 49      Ref. ?3.50 ? 10.0 Ref. ?53   R Radial - Anatomical snuff box (Forearm)      Forearm Wrist 1.56 2.03 42.3 Forearm - Wrist 10  64      Ref. ?2.70 ? 20.0 Ref. ?48       Motor NCS      Nerve / Sites Muscle Latency Amplitude Amp % Duration Segments Distance Lat Diff Velocity     ms mV % ms  cm ms m/s   R Median - APB      Wrist APB 6.09 3.9 100 5.73 Wrist - APB 8        Ref. ?4.40 ?4.0   Ref. Elbow APB 10.68 3.1 79.1 6.15 Elbow - Wrist 23 4.58 50      Ref. Ref.   ?49   L Median - APB      Wrist APB 6.04 2.2 100 6.56 Wrist - APB 8        Ref. ?4.40 ?4.0   Ref. Elbow APB 10.68 1.9 86.7 6.93 Elbow - Wrist 26 4.64 56      Ref. Ref.   ?49   R Ulnar - ADM      Wrist ADM 2.71 8.2 100 5.73 Wrist - ADM 8        Ref. ?3.60 ?5.0   Ref. B. Elbow ADM 5.47 8.6 105 5.57 B. Elbow - Wrist 19 2.76 69      Ref. Ref.   ?49      A. Elbow ADM 7.40 8.1 98.3 5.78 A. Elbow - B. Elbow 10 1.93 52      Ref. Ref.   ?49   L Ulnar - ADM      Wrist ADM 2.55 7.2 100 3.91 Wrist - ADM 8        Ref. ?3.60 ?5.0   Ref. B. Elbow ADM 5.68 7.1 97.9 4.01 B. Elbow - Wrist 20 3.12 64      Ref. Ref.   ?49      A. Elbow ADM 7.40 6.8 93.3 4.11 A. Elbow - B. Elbow 10 1.72 58      Ref. Ref.   ?49       EMG Summary Table     Spontaneous MUAP Recruitment   Muscle Nerve Roots IA Fib PSW Fasc H.F. Amp Dur. PPP Pattern   R. Abductor pollicis brevis Median Z6-J4 N None None None None 2+ 2+ 2+ N   L. Abductor pollicis brevis Median T4-Y9 N None None None None 2+ 2+ 1+ N   L. First dorsal interosseous Ulnar C8-T1 N None None None None N N N N   R. First dorsal interosseous Ulnar C8-T1 N None None None None N N N N   L. Deltoid Axillary C5-C6 N None None None None N N N N   R. Deltoid Axillary C5-C6 N None None None None N N N N   L. Triceps brachii Radial C6-C8 N None None None None N N N N   R. Triceps brachii Radial C6-C8 N None None None None N N N N   L. Biceps brachii Musculocutaneous C5-C6 N None None None None N N N N   R. Biceps brachii Musculocutaneous C5-C6 N None None None None N N N N   L. Pronator teres Median C6-C7 N None None None None N N N N   R. Pronator teres Median C6-C7 N None None None None N N N N   L. Extensor digitorum communis Radial C7-C8 N None None None None N N N N   R. Extensor digitorum communis Radial C7-C8 N None None None None N N N N         Summary: Right median SNAP response is absent. Left median SNAP and bilateral median CMAP latencies are slowed with low amplitudes. Monopolar exam reveals chronic neurogenic motor unit changes to bilateral APB muscles. Conclusion:  Abnormal examination. There is electrodiagnostic evidence of:    1.  Moderate to severe chronic bilateral carpal tunnel syndrome              Erin Quan MD    Board Certified Physical Medicine and Rehabilitation

## 2020-02-20 NOTE — PROGRESS NOTES
Chief Complaint:  Results (TR EMG BUE)      History of Present of Illness: The patient returns and has had the EMG testing performed. She continues to have worse symptoms on the right side with significant discomfort at night. The EMG testing demonstrated severe chronic bilateral carpal tunnel syndrome with nonresponsive sensory latencies on the right side and involvement of the abductor pollicis brevis bilaterally. Review of Systems  Pertinent items are noted in HPI  Denies fever, chills, confusion, bowel/bladder active change. Review of systems reviewed from Patient History Form dated on 1/30/2020 and available in the patient's chart under the Media tab. Examination:  On exam today provocative testing for carpal tunnel syndrome once again reproduces her symptoms in the median nerve distributions. She has good range of motion the fingers and no obvious triggering. Fingers have good perfusion and baseline decreased sensation in the median nerve distribution. Radiology:     X-rays obtained and reviewed in office:  Views    Location    Impression      No orders of the defined types were placed in this encounter. Impression:  Encounter Diagnosis   Name Primary?  Carpal tunnel syndrome, bilateral          Treatment Plan: Today we talked about options moving forward. I have not hesitated and recommending moving forward initially with a right outpatient carpal tunnel release and then subsequently an opposite side left carpal tunnel release. She is going through more testing and evaluation for her sleep apnea as well. We discussed the risks, benefits, limitations, alternatives, and postop recovery following the proposed procedure. We will begin the process of scheduling surgery if there are no other preoperative medical contraindications. Please note that this transcription was created using voice recognition software.   Any errors are unintentional and may be due to voice recognition transcription.

## 2020-03-10 NOTE — TELEPHONE ENCOUNTER
Pt was seen in office yesterday, 6/13/19. Received pt in [place] [address] for day 2 Thyrogen injection  Pt reports she felt a "little chilled" yesterday afternoon for a short time, was afebrile  Denied any rash, itching or airway disturbance  Thyrogen injection procedure reviewed, pt verbalized understanding  See MAR for injection record  L buttock site clean and dry, patient offered no complaints  Pt remaining in department awaiting next appointment for capsule admin

## 2020-03-19 ENCOUNTER — TELEPHONE (OUTPATIENT)
Dept: ORTHOPEDIC SURGERY | Age: 55
End: 2020-03-19

## 2020-05-20 ENCOUNTER — HOSPITAL ENCOUNTER (OUTPATIENT)
Dept: GENERAL RADIOLOGY | Age: 55
Discharge: HOME | End: 2020-05-20
Payer: MEDICAID

## 2020-05-20 ENCOUNTER — HOSPITAL ENCOUNTER (OUTPATIENT)
Age: 55
Discharge: HOME | End: 2020-05-20
Payer: MEDICAID

## 2020-05-20 PROCEDURE — 71046 X-RAY EXAM CHEST 2 VIEWS: CPT

## 2020-06-05 ENCOUNTER — VIRTUAL VISIT (OUTPATIENT)
Dept: CARDIOLOGY CLINIC | Age: 55
End: 2020-06-05
Payer: MEDICAID

## 2020-06-05 ENCOUNTER — TELEPHONE (OUTPATIENT)
Dept: ORTHOPEDIC SURGERY | Age: 55
End: 2020-06-05

## 2020-06-05 VITALS
HEART RATE: 92 BPM | BODY MASS INDEX: 57.52 KG/M2 | WEIGHT: 293 LBS | SYSTOLIC BLOOD PRESSURE: 162 MMHG | HEIGHT: 60 IN | DIASTOLIC BLOOD PRESSURE: 74 MMHG

## 2020-06-05 PROCEDURE — 99443 PR PHYS/QHP TELEPHONE EVALUATION 21-30 MIN: CPT | Performed by: INTERNAL MEDICINE

## 2020-06-05 NOTE — PATIENT INSTRUCTIONS
Plan:   Stress test- TERENCE Myoview   Echocardiogram   Labs- BNP, CMP, CBC, fasting lipids   Venous dopplers left leg   Continue current medications   Check blood pressure at home weekly  Regular exercise and following a healthy diet encouraged   Follow up with me in 3-4 weeks.

## 2020-06-05 NOTE — PROGRESS NOTES
2020    New Patient; Chest Pain (comes and goes); Edema (left hand and left leg); Shortness of Breath (has a bad cough); Back Pain; and Palpitations (flutters)     Rachel Sam is having a telephone visit today as a new patient for Chest pain, SOB, and edema. TELEHEALTH EVALUATION -- Audio/Visual ([facility])    HPI: Rachel Sam is a 48 y.o. female who is having a telephone visit today as a NEW patient for interventional cardiology for Right ventricular enlargement seen on echocardiogram from 2019, and cardiomegaly with mild vascular congestion seen on chest Xray 2020. She states she was seen by a cardiologist years ago around . She states last year she was treated for PNA and was told she had mild heart failure. She has SOB and chest pain at times. SOB started last year when she was being treated for PNA. She has SOB related to activity like walking up and down stairs. Occ assoc chest pain. SOB daily, getting worse. Improves w/ rest. She has chest pain when she takes a deep breath. She has left leg swelling that started a few weeks ago. She was started on Lasix 20 mg by her PCP short term for swelling. Patient currently denies any palpitations, chest pain, dizziness, and syncope. Rachel Sam (:  1965) has requested an audio/video evaluation for the following concern(s): NEW patient for Right ventricular enlargement seen on echocardiogram from 2019, and cardiomegaly with mild vascular congestion seen on chest Xray 2020. [x] Medications and dosages reviewed. ROS:  [x]Full ROS obtained and negative except as mentioned in HPI    Prior to Visit Medications    Medication Sig Taking?  Authorizing Provider   mometasone-formoterol [facility]) 100-5 MCG/ACT inhaler Inhale 2 puffs into the lungs 2 times daily [id number]  [id number]  RxPCN:[id number]  XVSJTY:[id number]  [id number]  Exp:17  (free trial of one 120 dose inhaler) Yes Wing SU THERAPUTIC ASPIRATION INITIAL performed by Gama Vasquez MD at [facility] 468 REPLACEMENT  2009    hip    KNEE SURGERY      OTHER SURGICAL HISTORY  2013    cystoscopy, urethral dilatation    SHOULDER SURGERY      r rotator cuff repair    TOTAL HIP ARTHROPLASTY      Left    URETHRAL STRICTURE DILATATION     ,   Social History     Tobacco Use    Smoking status: Former Smoker     Packs/day: 0.50     Years: 2.00     Pack years: 1.00     Last attempt to quit: 1986     Years since quittin.1    Smokeless tobacco: Never Used   Substance Use Topics    Alcohol use: No    Drug use: No   ,   Family History   Problem Relation Age of Onset    Asthma Other     Asthma Other     Hypertension Father     Hypertension Sister     Cancer Neg Hx     Diabetes Neg Hx     Emphysema Neg Hx     Heart Failure Neg Hx    ,   Immunization History   Administered Date(s) Administered    Influenza, Quadv, IM, PF (6 mo and older Fluzone, Flulaval, Fluarix, and 3 yrs and older Afluria) 2018    Influenza, Quadv, Recombinant, IM PF (Flublok 18 yrs and older) 10/03/2019    Tdap (Boostrix, Adacel) 2015   ,   Health Maintenance   Topic Date Due    Pneumococcal 0-64 years Vaccine (1 of 1 - PPSV23) 10/27/1973    HIV screen  10/27/1982    Cervical cancer screen  10/27/1988    Lipid screen  10/27/2007    Shingles Vaccine (1 of 2) 10/27/2017    Colon cancer screen colonoscopy  10/27/2017    A1C test (Diabetic or Prediabetic)  2020    TSH testing  2020    Potassium monitoring  2020    Creatinine monitoring  2020    Breast cancer screen  2021    DTaP/Tdap/Td vaccine (2 - Td) 2025    Flu vaccine  Completed    Hepatitis A vaccine  Aged Out    Hepatitis B vaccine  Aged Out    Hib vaccine  Aged Out    Meningococcal (ACWY) vaccine  Aged Out     BP (!) 162/74   Pulse 92   Ht 5' (1.524 m)   Wt (!) 305 lb (138.3 kg)   LMP 2013 BMI 59.57 kg/m²     PHYSICAL EXAMINATION:  [ INSTRUCTIONS:  \"[x]\" Indicates a positive item  \"[]\" Indicates a negative item  -- DELETE ALL ITEMS NOT EXAMINED]  Vital Signs: (As obtained by patient/caregiver or practitioner observation)    Blood pressure- 132/74 Heart rate- 92   Respiratory rate-    Temperature-  Pulse oximetry-           Other pertinent observable physical exam findings-     Echocardiogram 4/8/19  Summary   Technically difficult examination due to patient body habitus. Normal left ventricle systolic function with a visually estimated ejection   fraction of 55-60%. No regional wall motion abnormalities are seen. Normal left ventricular diastolic filling pressure. The right ventricle appears mildly enlarged with normal systolic function. Mild mitral regurgitation. EKG 9/3/19  NSR    ASSESSMENT:  Right ventricular enlargement   Cardiomegaly with mild vascular congestion seen on chest Xray 5/20/2020. Chest pain - typical/atypical  SOB - likely multifactorial but possile cardiac  Left Leg swelling   Obesity   CXR s/o pulm edema. Need to further evaluate. Etiology cardiac vs noncardiac      Plan:   Stress test- TERENCE Myoview   Echocardiogram   Labs- BNP, CMP, CBC, fasting lipids   Venous dopplers left leg   Continue current medications   Check blood pressure at home weekly  Regular exercise and following a healthy diet encouraged   Follow up with me in 3-4 weeks. Travis Romero is a 48 y.o. female being evaluated by a Virtual Visit (video visit) encounter to address concerns as mentioned above. A caregiver was present when appropriate. Due to this being a TeleHealth encounter (During [facility] public health emergency), evaluation of the following organ systems was limited: Vitals/Constitutional/EENT/Resp/CV/GI//MS/Neuro/Skin/Heme-Lymph-Imm.   Pursuant to the emergency declaration under the [address] [facility]5 waiver authority and the Coronavirus

## 2020-06-05 NOTE — LETTER
2020    New Patient; Chest Pain (comes and goes); Edema (left hand and left leg); Shortness of Breath (has a bad cough); Back Pain; and Palpitations (flutters)     Racheal Fuentes is having a telephone visit today as a new patient for Chest pain, SOB, and edema. TELEHEALTH EVALUATION -- Audio/Visual (During ODGQM-36 public health emergency)    HPI: Racheal Fuentes is a 48 y.o. female who is having a telephone visit today as a NEW patient for interventional cardiology for Right ventricular enlargement seen on echocardiogram from 2019, and cardiomegaly with mild vascular congestion seen on chest Xray 2020. She states she was seen by a cardiologist years ago around . She states last year she was treated for PNA and was told she had mild heart failure. She has SOB and chest pain at times. SOB started last year when she was being treated for PNA. She has SOB related to activity like walking up and down stairs. Occ assoc chest pain. SOB daily, getting worse. Improves w/ rest. She has chest pain when she takes a deep breath. She has left leg swelling that started a few weeks ago. She was started on Lasix 20 mg by her PCP short term for swelling. Patient currently denies any palpitations, chest pain, dizziness, and syncope. Racheal Fuentes (:  1965) has requested an audio/video evaluation for the following concern(s): NEW patient for Right ventricular enlargement seen on echocardiogram from 2019, and cardiomegaly with mild vascular congestion seen on chest Xray 2020. [x] Medications and dosages reviewed. ROS:  [x]Full ROS obtained and negative except as mentioned in HPI    Prior to Visit Medications    Medication Sig Taking?  Authorizing Provider   mometasone-formoterol [facility]) 100-5 MCG/ACT inhaler Inhale 2 puffs into the lungs 2 times daily RxBIN:[id number]  IVXEO:[id number]  RxPCN:[id number]  CAROLINEAOR:[id number]  UT:[id number]  Exp:17 (free trial of one 120 dose inhaler) Yes Yosvany Woodruff MD   Melatonin 10 MG TABS Take 1 tablet by mouth nightly as needed (sleep issues) Yes Yosvany Woodruff MD   ipratropium-albuterol (DUONEB) 0.5-2.5 (3) MG/3ML SOLN nebulizer solution Inhale 3 mLs into the lungs 4 times daily Yes Yosvany Woodruff MD   Multiple Vitamin (MULTI-VITAMIN DAILY PO) Take 1 tablet by mouth Yes Historical Provider, MD   levothyroxine (SYNTHROID) 112 MCG tablet Take 112 mcg by mouth daily Yes Historical Provider, MD   ibuprofen (ADVIL;MOTRIN) 200 MG tablet Take 600 mg by mouth as needed for Pain Yes Historical Provider, MD   sertraline (ZOLOFT) 100 MG tablet Take 100 mg by mouth daily Yes Historical Provider, MD   lisinopril (PRINIVIL;ZESTRIL) 5 MG tablet Take 5 mg by mouth daily Yes Historical Provider, MD   lamoTRIgine (LAMICTAL) 200 MG tablet Take 200 mg by mouth 2 times daily Yes Historical Provider, MD   diltiazem (CARDIZEM CD) 120 MG extended release capsule Take 1 capsule by mouth 2 times daily Yes Gal Jaffe MD   albuterol sulfate HFA (PROVENTIL HFA) 108 (90 Base) MCG/ACT inhaler Inhale 2 puffs into the lungs every 4 hours as needed for Wheezing or Shortness of Breath (Space out to every 6 hours as symptoms improve) Space out to every 6 hours as symptoms improve. Yes Alba Antonio APRN - CNP   pantoprazole (PROTONIX) 40 MG tablet Take 40 mg by mouth daily. Yes Historical Provider, MD   gabapentin (NEURONTIN) 600 MG tablet Take 600 mg by mouth 2 times daily. Yes Historical Provider, MD   montelukast (SINGULAIR) 10 MG tablet Take 10 mg by mouth every morning  Yes Historical Provider, MD   aspirin 81 MG EC tablet Take 81 mg by mouth daily. Yes Historical Provider, MD   acetaminophen (TYLENOL) 500 MG tablet Take 500 mg by mouth every 6 hours as needed.  Yes Historical Provider, MD   ciprofloxacin (CIPRO) 500 MG tablet Take 500 mg by mouth 2 times daily  Historical Provider, MD Fluticasone Furoate-Vilanterol (BREO ELLIPTA) 200-25 MCG/INH AEPB Inhale 1 puff into the lungs daily  Patient not taking: Reported on 5150  MATHEW Burkett - CNP   PREDNISONE PO Take by mouth  Historical Provider, MD   Cyclobenzaprine HCl (FLEXERIL PO) Take 1 tablet by mouth as needed  Historical Provider, MD   ferrous sulfate 325 (65 Fe) MG tablet Take 1 tablet by mouth every 48 hours  Patient not taking: Reported on 2020  Rainer Baker DO       Social History     Tobacco Use    Smoking status: Former Smoker     Packs/day: 0.50     Years: 2.00     Pack years: 1.00     Last attempt to quit: 1986     Years since quittin.1    Smokeless tobacco: Never Used   Substance Use Topics    Alcohol use: No    Drug use: No          Allergies   Allergen Reactions    Bioflavonoids Anaphylaxis    Other      Nickel,citrus, pain meds?     Oxycodone-Acetaminophen Hives    Percocet [Oxycodone-Acetaminophen]      Can take VICODIN    Azithromycin Nausea And Vomiting and Rash    Nickel Hives, Other (See Comments) and Rash   ,   Past Medical History:   Diagnosis Date    Anemia     Anxiety     Depression     Fibromyalgia     GERD (gastroesophageal reflux disease)     Heart palpitations     Hiatal hernia     Hypertension     Irritable bowel syndrome     Mild intermittent asthma with exacerbation     Osteoarthritis     Panic attacks     Pneumonia     Pulmonary infiltrates 2019    Seasonal allergies     Seizure disorder (HCC)     Sleep apnea     Spastic colon     Thyroid disease    ,   Past Surgical History:   Procedure Laterality Date    BLADDER SURGERY      BRAIN SURGERY      BRONCHOSCOPY N/A 2019    BRONCHOSCOPY ALVEOLAR LAVAGE performed by Najma Ambrose MD at [address] [place] [address]  2019    BRONCHOSCOPY THERAPUTIC ASPIRATION INITIAL performed by Najma Ambrose MD at [address] [place] [address] N/A 5/15/2019 BRONCHOSCOPY ALVEOLAR LAVAGE performed by Alin Henao MD at [facility]  5/15/2019    BRONCHOSCOPY THERAPUTIC ASPIRATION INITIAL performed by Alin Henao MD at [place] [address] JOINT REPLACEMENT  2009    hip    KNEE SURGERY      OTHER SURGICAL HISTORY  2013    cystoscopy, urethral dilatation    SHOULDER SURGERY      r rotator cuff repair    TOTAL HIP ARTHROPLASTY      Left    URETHRAL STRICTURE DILATATION     ,   Social History     Tobacco Use    Smoking status: Former Smoker     Packs/day: 0.50     Years: 2.00     Pack years: 1.00     Last attempt to quit: 1986     Years since quittin.1    Smokeless tobacco: Never Used   Substance Use Topics    Alcohol use: No    Drug use: No   ,   Family History   Problem Relation Age of Onset    Asthma Other     Asthma Other     Hypertension Father     Hypertension Sister     Cancer Neg Hx     Diabetes Neg Hx     Emphysema Neg Hx     Heart Failure Neg Hx    ,   Immunization History   Administered Date(s) Administered    Influenza, Quadv, IM, PF (6 mo and older Fluzone, Flulaval, Fluarix, and 3 yrs and older Afluria) 2018    Influenza, Quadv, Recombinant, IM PF (Flublok 18 yrs and older) 10/03/2019    Tdap (Boostrix, Adacel) 2015   ,   Health Maintenance   Topic Date Due    Pneumococcal 0-64 years Vaccine (1 of 1 - PPSV23) 10/27/1973    HIV screen  10/27/1982    Cervical cancer screen  10/27/1988    Lipid screen  10/27/2007    Shingles Vaccine (1 of 2) 10/27/2017    Colon cancer screen colonoscopy  10/27/2017    A1C test (Diabetic or Prediabetic)  2020    TSH testing  2020    Potassium monitoring  2020    Creatinine monitoring  2020    Breast cancer screen  2021    DTaP/Tdap/Td vaccine (2 - Td) 2025    Flu vaccine  Completed    Hepatitis A vaccine  Aged Out    Hepatitis B vaccine  Aged Out    Hib vaccine  Aged Out

## 2020-06-09 ENCOUNTER — HOSPITAL ENCOUNTER (OUTPATIENT)
Age: 55
Discharge: HOME | End: 2020-06-09
Payer: MEDICAID

## 2020-06-09 LAB
A/G RATIO: 1.6 (ref 1.1–2.2)
ALBUMIN SERPL-MCNC: 4 G/DL (ref 3.4–5)
ALP BLD-CCNC: 101 U/L (ref 40–129)
ALT SERPL-CCNC: 16 U/L (ref 10–40)
ANION GAP SERPL CALCULATED.3IONS-SCNC: 13 MMOL/L (ref 3–16)
AST SERPL-CCNC: 16 U/L (ref 15–37)
BILIRUB SERPL-MCNC: 0.3 MG/DL (ref 0–1)
BUN BLDV-MCNC: 12 MG/DL (ref 7–20)
CALCIUM SERPL-MCNC: 9.3 MG/DL (ref 8.3–10.6)
CHLORIDE BLD-SCNC: 100 MMOL/L (ref 99–110)
CHOLESTEROL, TOTAL: 145 MG/DL (ref 0–199)
CO2: 27 MMOL/L (ref 21–32)
CREAT SERPL-MCNC: 0.8 MG/DL (ref 0.6–1.1)
GFR AFRICAN AMERICAN: >60
GFR NON-AFRICAN AMERICAN: >60
GLOBULIN: 2.5 G/DL
GLUCOSE BLD-MCNC: 121 MG/DL (ref 70–99)
HCT VFR BLD CALC: 35.3 % (ref 36–48)
HDLC SERPL-MCNC: 41 MG/DL (ref 40–60)
HEMATOLOGY PATH CONSULT: NO
HEMOGLOBIN: 10.8 G/DL (ref 12–16)
LDL CHOLESTEROL CALCULATED: 81 MG/DL
MCH RBC QN AUTO: 20.2 PG (ref 26–34)
MCHC RBC AUTO-ENTMCNC: 30.5 G/DL (ref 31–36)
MCV RBC AUTO: 66.1 FL (ref 80–100)
PDW BLD-RTO: 19.9 % (ref 12.4–15.4)
PLATELET # BLD: 289 K/UL (ref 135–450)
PMV BLD AUTO: 9 FL (ref 5–10.5)
POTASSIUM SERPL-SCNC: 4.5 MMOL/L (ref 3.5–5.1)
PRO-BNP: 35 PG/ML (ref 0–124)
RBC # BLD: 5.35 M/UL (ref 4–5.2)
SODIUM BLD-SCNC: 140 MMOL/L (ref 136–145)
TOTAL PROTEIN: 6.5 G/DL (ref 6.4–8.2)
TRIGL SERPL-MCNC: 115 MG/DL (ref 0–150)
VLDLC SERPL CALC-MCNC: 23 MG/DL
WBC # BLD: 6.6 K/UL (ref 4–11)

## 2020-06-09 PROCEDURE — 80061 LIPID PANEL: CPT

## 2020-06-09 PROCEDURE — 80053 COMPREHEN METABOLIC PANEL: CPT

## 2020-06-09 PROCEDURE — 85027 COMPLETE CBC AUTOMATED: CPT

## 2020-06-09 PROCEDURE — 83880 ASSAY OF NATRIURETIC PEPTIDE: CPT

## 2020-06-09 PROCEDURE — 36415 COLL VENOUS BLD VENIPUNCTURE: CPT

## 2020-06-12 ENCOUNTER — TELEPHONE (OUTPATIENT)
Dept: CARDIOLOGY CLINIC | Age: 55
End: 2020-06-12

## 2020-06-17 ENCOUNTER — HOSPITAL ENCOUNTER (OUTPATIENT)
Dept: NUCLEAR MEDICINE | Age: 55
Discharge: HOME | End: 2020-06-17
Payer: MEDICAID

## 2020-06-17 ENCOUNTER — HOSPITAL ENCOUNTER (OUTPATIENT)
Dept: NON INVASIVE DIAGNOSTICS | Age: 55
Discharge: HOME | End: 2020-06-17
Payer: MEDICAID

## 2020-06-17 LAB
LV EF: 68 %
LVEF MODALITY: NORMAL

## 2020-06-17 PROCEDURE — A9502 TC99M TETROFOSMIN: HCPCS | Performed by: INTERNAL MEDICINE

## 2020-06-17 PROCEDURE — 93017 CV STRESS TEST TRACING ONLY: CPT

## 2020-06-17 PROCEDURE — 3430000000 HC RX DIAGNOSTIC RADIOPHARMACEUTICAL: Performed by: INTERNAL MEDICINE

## 2020-06-17 PROCEDURE — 78452 HT MUSCLE IMAGE SPECT MULT: CPT

## 2020-06-17 PROCEDURE — 6360000002 HC RX W HCPCS: Performed by: INTERNAL MEDICINE

## 2020-06-17 RX ADMIN — REGADENOSON 0.4 MG: 0.08 INJECTION, SOLUTION INTRAVENOUS at 08:41

## 2020-06-17 RX ADMIN — TETROFOSMIN 34.7 MILLICURIE: 1.38 INJECTION, POWDER, LYOPHILIZED, FOR SOLUTION INTRAVENOUS at 08:35

## 2020-06-18 ENCOUNTER — HOSPITAL ENCOUNTER (OUTPATIENT)
Dept: NUCLEAR MEDICINE | Age: 55
Discharge: HOME | End: 2020-06-18
Payer: MEDICAID

## 2020-06-18 PROCEDURE — 3430000000 HC RX DIAGNOSTIC RADIOPHARMACEUTICAL: Performed by: INTERNAL MEDICINE

## 2020-06-18 PROCEDURE — A9502 TC99M TETROFOSMIN: HCPCS | Performed by: INTERNAL MEDICINE

## 2020-06-18 RX ADMIN — TETROFOSMIN 33.1 MILLICURIE: 1.38 INJECTION, POWDER, LYOPHILIZED, FOR SOLUTION INTRAVENOUS at 10:20

## 2020-06-19 ENCOUNTER — TELEPHONE (OUTPATIENT)
Dept: CARDIOLOGY CLINIC | Age: 55
End: 2020-06-19

## 2020-06-19 NOTE — TELEPHONE ENCOUNTER
Russ Celestin reviewed testing and need for cath with patient. Medications reviewed. Please call patient to schedule right and left heart cath.

## 2020-06-22 ENCOUNTER — OFFICE VISIT (OUTPATIENT)
Dept: PRIMARY CARE CLINIC | Age: 55
End: 2020-06-22
Payer: MEDICAID

## 2020-06-22 PROCEDURE — G8417 CALC BMI ABV UP PARAM F/U: HCPCS | Performed by: PHYSICIAN ASSISTANT

## 2020-06-22 PROCEDURE — 99211 OFF/OP EST MAY X REQ PHY/QHP: CPT | Performed by: PHYSICIAN ASSISTANT

## 2020-06-22 PROCEDURE — G8428 CUR MEDS NOT DOCUMENT: HCPCS | Performed by: PHYSICIAN ASSISTANT

## 2020-06-22 NOTE — TELEPHONE ENCOUNTER
Spoke with patient. Patient is scheduled with Dr. Andree Johansen for  Echo & Left Heart Cath on 6/26/20 at [facility], arrival time of 8:30am to the Cath Lab. Please have patient arrive to the main entrance of [facility] at 7:30am and check in with the registration desk. Please call patient regarding medication instructions. Remind patient to be NPO after midnight (8 hours prior). Do not apply lotions/creams on skin the day of procedure. COVID test 6/22. Vascular dopper @ 8am 6/26 then she will proceed to Cath Lab.

## 2020-06-24 LAB
SARS-COV-2: NOT DETECTED
SOURCE: NORMAL

## 2020-06-26 ENCOUNTER — HOSPITAL ENCOUNTER (OUTPATIENT)
Dept: CARDIAC CATH/INVASIVE PROCEDURES | Age: 55
Discharge: HOME | End: 2020-06-26
Attending: INTERNAL MEDICINE | Admitting: INTERNAL MEDICINE
Payer: MEDICAID

## 2020-06-26 ENCOUNTER — HOSPITAL ENCOUNTER (OUTPATIENT)
Dept: VASCULAR LAB | Age: 55
Discharge: HOME | End: 2020-06-26
Payer: MEDICAID

## 2020-06-26 ENCOUNTER — HOSPITAL ENCOUNTER (OUTPATIENT)
Dept: NON INVASIVE DIAGNOSTICS | Age: 55
Discharge: HOME | End: 2020-06-26
Payer: MEDICAID

## 2020-06-26 VITALS — WEIGHT: 293 LBS | BODY MASS INDEX: 57.52 KG/M2 | HEIGHT: 60 IN

## 2020-06-26 LAB
ANION GAP SERPL CALCULATED.3IONS-SCNC: 9 MMOL/L (ref 3–16)
BUN BLDV-MCNC: 16 MG/DL (ref 7–20)
CALCIUM SERPL-MCNC: 9.2 MG/DL (ref 8.3–10.6)
CHLORIDE BLD-SCNC: 99 MMOL/L (ref 99–110)
CHOLESTEROL, TOTAL: 161 MG/DL (ref 0–199)
CO2: 28 MMOL/L (ref 21–32)
CREAT SERPL-MCNC: 0.8 MG/DL (ref 0.6–1.1)
EKG ATRIAL RATE: 74 BPM
EKG DIAGNOSIS: NORMAL
EKG P AXIS: 15 DEGREES
EKG P-R INTERVAL: 168 MS
EKG Q-T INTERVAL: 392 MS
EKG QRS DURATION: 88 MS
EKG QTC CALCULATION (BAZETT): 435 MS
EKG R AXIS: 26 DEGREES
EKG T AXIS: 34 DEGREES
EKG VENTRICULAR RATE: 74 BPM
GFR AFRICAN AMERICAN: >60
GFR NON-AFRICAN AMERICAN: >60
GLUCOSE BLD-MCNC: 128 MG/DL (ref 70–99)
HCG QUALITATIVE: NEGATIVE
HCT VFR BLD CALC: 36.2 % (ref 36–48)
HDLC SERPL-MCNC: 47 MG/DL (ref 40–60)
HEMATOLOGY PATH CONSULT: NO
HEMOGLOBIN: 11 G/DL (ref 12–16)
INR BLD: 1.1 (ref 0.86–1.14)
LDL CHOLESTEROL CALCULATED: 94 MG/DL
LEFT VENTRICULAR EJECTION FRACTION MODE: NORMAL
LV EF: 55 %
LV EF: 58 %
LVEF MODALITY: NORMAL
MCH RBC QN AUTO: 19.8 PG (ref 26–34)
MCHC RBC AUTO-ENTMCNC: 30.4 G/DL (ref 31–36)
MCV RBC AUTO: 65 FL (ref 80–100)
PDW BLD-RTO: 20 % (ref 12.4–15.4)
PLATELET # BLD: 362 K/UL (ref 135–450)
PMV BLD AUTO: 7.8 FL (ref 5–10.5)
POTASSIUM SERPL-SCNC: 4.2 MMOL/L (ref 3.5–5.1)
PROTHROMBIN TIME: 12.8 SEC (ref 10–13.2)
RBC # BLD: 5.58 M/UL (ref 4–5.2)
SODIUM BLD-SCNC: 136 MMOL/L (ref 136–145)
TRIGL SERPL-MCNC: 99 MG/DL (ref 0–150)
VLDLC SERPL CALC-MCNC: 20 MG/DL
WBC # BLD: 7.3 K/UL (ref 4–11)

## 2020-06-26 PROCEDURE — 2709999900 HC NON-CHARGEABLE SUPPLY

## 2020-06-26 PROCEDURE — 6360000004 HC RX CONTRAST MEDICATION

## 2020-06-26 PROCEDURE — 85610 PROTHROMBIN TIME: CPT

## 2020-06-26 PROCEDURE — 93010 ELECTROCARDIOGRAM REPORT: CPT | Performed by: INTERNAL MEDICINE

## 2020-06-26 PROCEDURE — C1760 CLOSURE DEV, VASC: HCPCS

## 2020-06-26 PROCEDURE — C1894 INTRO/SHEATH, NON-LASER: HCPCS

## 2020-06-26 PROCEDURE — 99153 MOD SED SAME PHYS/QHP EA: CPT

## 2020-06-26 PROCEDURE — 2500000003 HC RX 250 WO HCPCS

## 2020-06-26 PROCEDURE — 93971 EXTREMITY STUDY: CPT

## 2020-06-26 PROCEDURE — 93460 R&L HRT ART/VENTRICLE ANGIO: CPT | Performed by: INTERNAL MEDICINE

## 2020-06-26 PROCEDURE — 80061 LIPID PANEL: CPT

## 2020-06-26 PROCEDURE — 93005 ELECTROCARDIOGRAM TRACING: CPT | Performed by: INTERNAL MEDICINE

## 2020-06-26 PROCEDURE — 6370000000 HC RX 637 (ALT 250 FOR IP)

## 2020-06-26 PROCEDURE — 99152 MOD SED SAME PHYS/QHP 5/>YRS: CPT

## 2020-06-26 PROCEDURE — 85027 COMPLETE CBC AUTOMATED: CPT

## 2020-06-26 PROCEDURE — C8929 TTE W OR WO FOL WCON,DOPPLER: HCPCS

## 2020-06-26 PROCEDURE — 80048 BASIC METABOLIC PNL TOTAL CA: CPT

## 2020-06-26 PROCEDURE — 6360000002 HC RX W HCPCS

## 2020-06-26 PROCEDURE — C1769 GUIDE WIRE: HCPCS

## 2020-06-26 PROCEDURE — 84703 CHORIONIC GONADOTROPIN ASSAY: CPT

## 2020-06-26 PROCEDURE — C1887 CATHETER, GUIDING: HCPCS

## 2020-06-26 PROCEDURE — 93460 R&L HRT ART/VENTRICLE ANGIO: CPT

## 2020-06-26 RX ORDER — ONDANSETRON 2 MG/ML
4 INJECTION INTRAMUSCULAR; INTRAVENOUS EVERY 6 HOURS PRN
Status: DISCONTINUED | OUTPATIENT
Start: 2020-06-26 | End: 2020-06-26 | Stop reason: HOSPADM

## 2020-06-26 RX ORDER — FENTANYL CITRATE 50 UG/ML
INJECTION, SOLUTION INTRAMUSCULAR; INTRAVENOUS
Status: COMPLETED | OUTPATIENT
Start: 2020-06-26 | End: 2020-06-26

## 2020-06-26 RX ORDER — SODIUM CHLORIDE 9 MG/ML
INJECTION, SOLUTION INTRAVENOUS CONTINUOUS
Status: DISCONTINUED | OUTPATIENT
Start: 2020-06-26 | End: 2020-06-26 | Stop reason: HOSPADM

## 2020-06-26 RX ORDER — MIDAZOLAM HYDROCHLORIDE 5 MG/ML
INJECTION INTRAMUSCULAR; INTRAVENOUS
Status: COMPLETED | OUTPATIENT
Start: 2020-06-26 | End: 2020-06-26

## 2020-06-26 RX ORDER — SODIUM CHLORIDE 0.9 % (FLUSH) 0.9 %
10 SYRINGE (ML) INJECTION PRN
Status: DISCONTINUED | OUTPATIENT
Start: 2020-06-26 | End: 2020-06-26 | Stop reason: HOSPADM

## 2020-06-26 RX ORDER — FUROSEMIDE 40 MG/1
40 TABLET ORAL DAILY
Qty: 30 TABLET | Refills: 11 | Status: SHIPPED | OUTPATIENT
Start: 2020-06-26 | End: 2021-06-28

## 2020-06-26 RX ORDER — SODIUM CHLORIDE 0.9 % (FLUSH) 0.9 %
10 SYRINGE (ML) INJECTION EVERY 12 HOURS SCHEDULED
Status: DISCONTINUED | OUTPATIENT
Start: 2020-06-26 | End: 2020-06-26 | Stop reason: HOSPADM

## 2020-06-26 RX ORDER — ACETAMINOPHEN 325 MG/1
650 TABLET ORAL EVERY 4 HOURS PRN
Status: DISCONTINUED | OUTPATIENT
Start: 2020-06-26 | End: 2020-06-26 | Stop reason: HOSPADM

## 2020-06-26 RX ORDER — ASPIRIN 81 MG/1
243 TABLET, CHEWABLE ORAL ONCE
Status: COMPLETED | OUTPATIENT
Start: 2020-06-26 | End: 2020-06-26

## 2020-06-26 RX ADMIN — MIDAZOLAM HYDROCHLORIDE 1 MG: 5 INJECTION INTRAMUSCULAR; INTRAVENOUS at 10:40

## 2020-06-26 RX ADMIN — MIDAZOLAM HYDROCHLORIDE 1 MG: 5 INJECTION INTRAMUSCULAR; INTRAVENOUS at 10:52

## 2020-06-26 RX ADMIN — MIDAZOLAM HYDROCHLORIDE 1 MG: 5 INJECTION INTRAMUSCULAR; INTRAVENOUS at 10:29

## 2020-06-26 RX ADMIN — FENTANYL CITRATE 50 MCG: 50 INJECTION, SOLUTION INTRAMUSCULAR; INTRAVENOUS at 10:49

## 2020-06-26 RX ADMIN — FENTANYL CITRATE 50 MCG: 50 INJECTION, SOLUTION INTRAMUSCULAR; INTRAVENOUS at 10:52

## 2020-06-26 RX ADMIN — MIDAZOLAM HYDROCHLORIDE 1 MG: 5 INJECTION INTRAMUSCULAR; INTRAVENOUS at 10:23

## 2020-06-26 RX ADMIN — SODIUM CHLORIDE: 9 INJECTION, SOLUTION INTRAVENOUS at 09:27

## 2020-06-26 RX ADMIN — FENTANYL CITRATE 50 MCG: 50 INJECTION, SOLUTION INTRAMUSCULAR; INTRAVENOUS at 10:40

## 2020-06-26 RX ADMIN — FENTANYL CITRATE 50 MCG: 50 INJECTION, SOLUTION INTRAMUSCULAR; INTRAVENOUS at 10:23

## 2020-06-26 RX ADMIN — FENTANYL CITRATE 50 MCG: 50 INJECTION, SOLUTION INTRAMUSCULAR; INTRAVENOUS at 10:29

## 2020-06-26 RX ADMIN — ASPIRIN 243 MG: 81 TABLET, CHEWABLE ORAL at 09:28

## 2020-06-26 NOTE — H&P
Furoate-Vilanterol (BREO ELLIPTA) 200-25 MCG/INH AEPB Inhale 1 puff into the lungs daily  Patient not taking: Reported on 8/1/6519  MATHEW Alcantara CNP   Melatonin 10 MG TABS Take 1 tablet by mouth nightly as needed (sleep issues)  Gino Jaime MD   PREDNISONE PO Take by mouth  Historical Provider, MD   ipratropium-albuterol (DUONEB) 0.5-2.5 (3) MG/3ML SOLN nebulizer solution Inhale 3 mLs into the lungs 4 times daily  Gino Jaime MD   Multiple Vitamin (MULTI-VITAMIN DAILY PO) Take 1 tablet by mouth  Historical Provider, MD   Cyclobenzaprine HCl (FLEXERIL PO) Take 1 tablet by mouth as needed  Historical Provider, MD   levothyroxine (SYNTHROID) 112 MCG tablet Take 112 mcg by mouth daily  Historical Provider, MD   ferrous sulfate 325 (65 Fe) MG tablet Take 1 tablet by mouth every 48 hours  Patient not taking: Reported on 6/5/2020  Danilo Mercado DO   ibuprofen (ADVIL;MOTRIN) 200 MG tablet Take 600 mg by mouth as needed for Pain  Historical Provider, MD   sertraline (ZOLOFT) 100 MG tablet Take 100 mg by mouth daily  Historical Provider, MD   lisinopril (PRINIVIL;ZESTRIL) 5 MG tablet Take 5 mg by mouth daily  Historical Provider, MD   lamoTRIgine (LAMICTAL) 200 MG tablet Take 200 mg by mouth 2 times daily  Historical Provider, MD   diltiazem (CARDIZEM CD) 120 MG extended release capsule Take 1 capsule by mouth 2 times daily  Brien [place] MD   albuterol sulfate HFA (PROVENTIL HFA) 108 (90 Base) MCG/ACT inhaler Inhale 2 puffs into the lungs every 4 hours as needed for Wheezing or Shortness of Breath (Space out to every 6 hours as symptoms improve) Space out to every 6 hours as symptoms improve. MATHEW Hernández CNP   pantoprazole (PROTONIX) 40 MG tablet Take 40 mg by mouth daily. Historical Provider, MD   gabapentin (NEURONTIN) 600 MG tablet Take 600 mg by mouth 2 times daily.    Historical Provider, MD   montelukast (SINGULAIR) 10 MG tablet Take 10 mg by mouth every morning   Historical Provider, MD   aspirin 81 MG EC tablet Take 81 mg by mouth daily. Historical Provider, MD   acetaminophen (TYLENOL) 500 MG tablet Take 500 mg by mouth every 6 hours as needed. Historical Provider, MD       Social History     Tobacco Use    Smoking status: Former Smoker     Packs/day: 0.50     Years: 2.00     Pack years: 1.00     Last attempt to quit: 1986     Years since quittin.2    Smokeless tobacco: Never Used   Substance Use Topics    Alcohol use: No    Drug use: No          Allergies   Allergen Reactions    Bioflavonoids Anaphylaxis    Other      Nickel,citrus, pain meds?     Oxycodone-Acetaminophen Hives    Percocet [Oxycodone-Acetaminophen]      Can take VICODIN    Azithromycin Nausea And Vomiting and Rash    Nickel Hives, Other (See Comments) and Rash   ,   Past Medical History:   Diagnosis Date    Anemia     Anxiety     Depression     Fibromyalgia     GERD (gastroesophageal reflux disease)     Heart palpitations     Hiatal hernia     Hypertension     Irritable bowel syndrome     Mild intermittent asthma with exacerbation     Osteoarthritis     Panic attacks     Pneumonia     Pulmonary infiltrates 2019    Seasonal allergies     Seizure disorder ([facility] [address]     Sleep apnea     Spastic colon     Thyroid disease    ,   Past Surgical History:   Procedure Laterality Date    BLADDER SURGERY      BRAIN SURGERY      BRONCHOSCOPY N/A 2019    BRONCHOSCOPY ALVEOLAR LAVAGE performed by Pauly Garcia MD at [address] [place] [address]  2019    BRONCHOSCOPY THERAPUTIC ASPIRATION INITIAL performed by Pauly Garcia MD at [address] [place] [address] N/A 5/15/2019    BRONCHOSCOPY ALVEOLAR LAVAGE performed by Bobby Ybarra MD at [address] [place] [address]  5/15/2019    BRONCHOSCOPY THERAPUTIC ASPIRATION INITIAL performed by [facility]barra MD at [facility]  2009    hip    KNEE SURGERY      OTHER SURGICAL HISTORY 2013    cystoscopy, urethral dilatation    SHOULDER SURGERY      r rotator cuff repair    TOTAL HIP ARTHROPLASTY      Left    URETHRAL STRICTURE DILATATION     ,   Social History     Tobacco Use    Smoking status: Former Smoker     Packs/day: 0.50     Years: 2.00     Pack years: 1.00     Last attempt to quit: 1986     Years since quittin.2    Smokeless tobacco: Never Used   Substance Use Topics    Alcohol use: No    Drug use: No   ,   Family History   Problem Relation Age of Onset    Asthma Other     Asthma Other     Hypertension Father     Hypertension Sister     Cancer Neg Hx     Diabetes Neg Hx     Emphysema Neg Hx     Heart Failure Neg Hx    ,   Immunization History   Administered Date(s) Administered    Influenza, Quadv, IM, PF (6 mo and older Fluzone, Flulaval, Fluarix, and 3 yrs and older Afluria) 2018    Influenza, Quadv, Recombinant, IM PF (Flublok 18 yrs and older) 10/03/2019    Tdap (Boostrix, Adacel) 2015   ,   Health Maintenance   Topic Date Due    Pneumococcal 0-64 years Vaccine (1 of 1 - PPSV23) 10/27/1973    HIV screen  10/27/1982    Cervical cancer screen  10/27/1988    Shingles Vaccine (1 of 2) 10/27/2017    Colon cancer screen colonoscopy  10/27/2017    A1C test (Diabetic or Prediabetic)  2020    TSH testing  2020    Potassium monitoring  2021    Creatinine monitoring  2021    Breast cancer screen  2021    DTaP/Tdap/Td vaccine (2 - Td) 2025    Lipid screen  2025    Flu vaccine  Completed    Hepatitis A vaccine  Aged Out    Hepatitis B vaccine  Aged Out    Hib vaccine  Aged Out    Meningococcal (ACWY) vaccine  Aged Out     BP (!) 162/74   Pulse 92   Ht 5' (1.524 m)   Wt (!) 305 lb (138.3 kg)   LMP 2013   BMI 59.57 kg/m²     PHYSICAL EXAMINATION:  [ INSTRUCTIONS:  \"[x]\" Indicates a positive item  \"[]\" Indicates a negative item  -- DELETE ALL ITEMS NOT EXAMINED]  Vital Signs: (As obtained by patient/caregiver or practitioner observation)    Blood pressure- 132/74 Heart rate- 92   Respiratory rate-    Temperature-  Pulse oximetry-           Other pertinent observable physical exam findings-     Echocardiogram 4/8/19  Summary   Technically difficult examination due to patient body habitus. Normal left ventricle systolic function with a visually estimated ejection   fraction of 55-60%. No regional wall motion abnormalities are seen. Normal left ventricular diastolic filling pressure. The right ventricle appears mildly enlarged with normal systolic function. Mild mitral regurgitation. EKG 9/3/19  NSR    ASSESSMENT:  Right ventricular enlargement   Cardiomegaly with mild vascular congestion seen on chest Xray 5/20/2020. Chest pain - typical/atypical  SOB - likely multifactorial but possile cardiac  Left Leg swelling   Obesity   CXR s/o pulm edema. Need to further evaluate. Etiology cardiac vs noncardiac      Plan:   Stress test- TERENCE Myoview   Echocardiogram   Labs- BNP, CMP, CBC, fasting lipids   Venous dopplers left leg   Continue current medications   Check blood pressure at home weekly  Regular exercise and following a healthy diet encouraged   Follow up with me in 3-4 weeks.      Addendum  Stress test abnormal   Will proceed w/ cardiac cath

## 2020-06-26 NOTE — PRE SEDATION
Brief Pre-Op Note/Sedation Assessment      Gaby Sutton  1965  Cath Pool Rm/NONE      [id number]  9:09 AM    Planned Procedure: Cardiac Catheterization Procedure    Post Procedure Plan: Return to same level of care    Consent: I have discussed with the patient and/or the patient representative the indication, alternatives, and the possible risks and/or complications of the planned procedure and the anesthesia methods. The patient and/or patient representative appear to understand and agree to proceed. Chief Complaint: Chest Pain/Pressure  Dyspnea on Exertion      Indications for Cath Procedure:  Suspected CAD  Anginal Classification within 2 weeks:  CCS III - Symptoms with everyday living activities, i.e., moderate limitation  NYHA Heart Failure Class within 2 weeks: Class III - Symptoms of HF on less-than-ordinary exertion, Newly Diagnosed? Yes, Heart Failure Type: Diastolic  Is Cath Lab Visit Valve-related?: No  Surgical Risk: N/A  Functional Type: N/A    Anti- Anginal Meds within 2 weeks:   Yes: Ca Channel Blockers    Stress or Imaging Studies Performed:  Stress Test with SPECT Result: Positive:  inferior Risk/Extent of Ischemia:  Low      Vital Signs:  LMP 07/29/2013     Allergies: Allergies   Allergen Reactions    Bioflavonoids Anaphylaxis    Other      Nickel,citrus, pain meds?     Oxycodone-Acetaminophen Hives    Percocet [Oxycodone-Acetaminophen]      Can take VICODIN    Azithromycin Nausea And Vomiting and Rash    Nickel Hives, Other (See Comments) and Rash       Past Medical History:  Past Medical History:   Diagnosis Date    Anemia     Anxiety     Depression     Fibromyalgia     GERD (gastroesophageal reflux disease)     Heart palpitations     Hiatal hernia     Hypertension     Irritable bowel syndrome     Mild intermittent asthma with exacerbation     Osteoarthritis     Panic attacks     Pneumonia     Pulmonary infiltrates 4/20/2019    Seasonal allergies     Seizure disorder ([facility] [address]     Sleep apnea     Spastic colon     Thyroid disease          Surgical History:  Past Surgical History:   Procedure Laterality Date    BLADDER SURGERY      BRAIN SURGERY      BRONCHOSCOPY N/A 4/21/2019    BRONCHOSCOPY ALVEOLAR LAVAGE performed by Telly Bansal MD at [address] [place] [address]  4/21/2019    BRONCHOSCOPY THERAPUTIC ASPIRATION INITIAL performed by Telly Bansal MD at [address] [place] [address] N/A 5/15/2019    BRONCHOSCOPY ALVEOLAR LAVAGE performed by Shiela Pérez MD at [address] [place] [address]  5/15/2019    BRONCHOSCOPY THERAPUTIC ASPIRATION INITIAL performed by Shiela Pérez MD at [place] [address] JOINT REPLACEMENT  2009    hip    KNEE SURGERY      OTHER SURGICAL HISTORY  07/31/2013    cystoscopy, urethral dilatation    SHOULDER SURGERY      r rotator cuff repair    TOTAL HIP ARTHROPLASTY      Left    URETHRAL STRICTURE DILATATION           Medications:  Current Facility-Administered Medications   Medication Dose Route Frequency Provider Last Rate Last Dose    0.9 % sodium chloride infusion   Intravenous Continuous Ladi Bahena MD        aspirin chewable tablet 243 mg  243 mg Oral Once Ladi Bahena MD               Pre-Sedation:    Pre-Sedation Documentation and Exam:  I have assessed the patient and agree with the H&P present on the chart. Prior History of Anesthesia Complications:   none    Modified Mallampati:  II (soft palate, uvula, fauces visible)    ASA Classification:  Class 2 - A normal healthy patient with mild systemic disease      Tory Scale:   Activity:  2 - Able to move 4 extremities voluntarily on command  Respiration:  2 - Able to breathe deeply and cough freely  Circulation:  2 - BP+/- 20mmHg of normal  Consciousness:  2 - Fully awake  Oxygen Saturation (color):  2 - Able to maintain oxygen saturation >92% on room air    Sedation/Anesthesia Plan:  Guard the patient's safety and welfare. Minimize physical discomfort and pain. Minimize negative psychological responses to treatment by providing sedation and analgesia and maximize the potential amnesia. Patient to meet pre-procedure discharge plan.     Medication Planned:  midazolam intravenously    Patient is an appropriate candidate for plan of sedation: yes      Electronically signed by Tomas Coughlin MD on 6/26/2020 at 9:09 AM

## 2020-06-26 NOTE — BRIEF OP NOTE
Procedures: RHC, LHC, LVG, sedation    Normal coronary arteries  LVEF 55%  Pulm HTN w/ mild elev PCWP - see full cath report

## 2020-06-26 NOTE — POST SEDATION
Patient:  Farzana Gross   :   1965    A pre-sedation re-evaluation was performed immediately prior to beginning of  the procedure. Procedure: cardiac cath  Medications: Procedural sedation with minimal conscious sedation  Complications: None  Estimated Blood Loss: none  Specimens: Were not obtained        Anali Medication and Procedural Reconciliation:  I agree that the documented medications and procedures performed are true. The medications were given under my order. The procedures were performed under my direct supervision.

## 2020-07-10 NOTE — PROGRESS NOTES
2020    Chest Pain; Shortness of Breath; and 6 Month Follow-Up           TELEHEALTH EVALUATION -- Audio/Visual (During OMMFR-22 public health emergency)    HPI: Janel Robbins is a 48 y.o. female who is having a virtual visit today for hospital follow up. She presented as a new patient on 2020 for right ventricular enlargement seen on echocardiogram from 2019, and cardiomegaly with mild vascular congestion seen on chest Xray 2020. At this visit she had complaints of chest pain and SOB. She had a stress test on 2020 which was abnormal. She underwent a right and left heart cath on 2020 which showed normal coronaries with elevated PCWP. Her most recent echocardiogram from 2020 showed an EF of 55-60%. Today she states she has been feeling well overall. She continues to have some SOB with walking and going up stairs. Occ chest pain unchanged. She is taking Lasix 40 mg daily which helps her SOB some. She has sleep apnea and uses CPAP nightly. She is tolerating her medications. She has not been checking her blood pressure at home. She has looked into weight loss surgery in the past. Patient currently denies any weight gain, edema, palpitations, dizziness, and syncope. Janel Robbins (:  1965) has requested an audio/video evaluation for the following concern(s):   [x] Medications and dosages reviewed. ROS:  [x]Full ROS obtained and negative except as mentioned in HPI    Prior to Visit Medications    Medication Sig Taking?  Authorizing Provider   furosemide (LASIX) 40 MG tablet Take 1 tablet by mouth daily  Franklyn Johnson MD   Fluticasone Furoate-Vilanterol (BREO ELLIPTA) 200-25 MCG/INH AEPB Inhale 1 puff into the lungs daily  MATHEW Solis - CNP   ipratropium-albuterol (DUONEB) 0.5-2.5 (3) MG/3ML SOLN nebulizer solution Inhale 3 mLs into the lungs 4 times daily  Mateus Ortiz MD   Multiple Vitamin (MULTI-VITAMIN DAILY PO) Take 1 tablet by mouth  Historical Provider, MD   Cyclobenzaprine HCl (FLEXERIL PO) Take 1 tablet by mouth as needed  Historical Provider, MD   levothyroxine (SYNTHROID) 112 MCG tablet Take 112 mcg by mouth daily  Historical Provider, MD   ibuprofen (ADVIL;MOTRIN) 200 MG tablet Take 600 mg by mouth as needed for Pain  Historical Provider, MD   sertraline (ZOLOFT) 100 MG tablet Take 100 mg by mouth daily  Historical Provider, MD   lisinopril (PRINIVIL;ZESTRIL) 5 MG tablet Take 5 mg by mouth daily  Historical Provider, MD   lamoTRIgine (LAMICTAL) 200 MG tablet Take 200 mg by mouth 2 times daily  Historical Provider, MD   diltiazem (CARDIZEM CD) 120 MG extended release capsule Take 1 capsule by mouth 2 times daily  James Gray MD   albuterol sulfate HFA (PROVENTIL HFA) 108 (90 Base) MCG/ACT inhaler Inhale 2 puffs into the lungs every 4 hours as needed for Wheezing or Shortness of Breath (Space out to every 6 hours as symptoms improve) Space out to every 6 hours as symptoms improve. Alba Antonio, APRN - CNP   pantoprazole (PROTONIX) 40 MG tablet Take 40 mg by mouth daily. Historical Provider, MD   gabapentin (NEURONTIN) 600 MG tablet Take 600 mg by mouth 2 times daily. Historical Provider, MD   montelukast (SINGULAIR) 10 MG tablet Take 10 mg by mouth every morning   Historical Provider, MD   aspirin 81 MG EC tablet Take 81 mg by mouth daily. Historical Provider, MD   acetaminophen (TYLENOL) 500 MG tablet Take 500 mg by mouth every 6 hours as needed. Historical Provider, MD       Social History     Tobacco Use    Smoking status: Former Smoker     Packs/day: 0.50     Years: 2.00     Pack years: 1.00     Last attempt to quit: 1986     Years since quittin.2    Smokeless tobacco: Never Used   Substance Use Topics    Alcohol use: No    Drug use: No          Allergies   Allergen Reactions    Bioflavonoids Anaphylaxis    Other      Nickel,citrus, pain meds?     Oxycodone-Acetaminophen Hives    Percocet [Oxycodone-Acetaminophen] Heart Failure Neg Hx    ,   Immunization History   Administered Date(s) Administered    Influenza, Quadv, IM, PF (6 mo and older Fluzone, Flulaval, Fluarix, and 3 yrs and older Afluria) 09/26/2018    Influenza, Leartis Moore, Recombinant, IM PF (Flublok 18 yrs and older) 10/03/2019    Tdap (Boostrix, Adacel) 06/01/2015   ,   Health Maintenance   Topic Date Due    Pneumococcal 0-64 years Vaccine (1 of 1 - PPSV23) 10/27/1973    HIV screen  10/27/1982    Cervical cancer screen  10/27/1988    Shingles Vaccine (1 of 2) 10/27/2017    Colon cancer screen colonoscopy  10/27/2017    A1C test (Diabetic or Prediabetic)  04/07/2020    TSH testing  04/20/2020    Flu vaccine (1) 09/01/2020    Potassium monitoring  06/26/2021    Creatinine monitoring  06/26/2021    Breast cancer screen  08/09/2021    DTaP/Tdap/Td vaccine (2 - Td) 06/01/2025    Lipid screen  06/26/2025    Hepatitis A vaccine  Aged Out    Hepatitis B vaccine  Aged Out    Hib vaccine  Aged Out    Meningococcal (ACWY) vaccine  Aged Out     LMP 07/29/2013     PHYSICAL EXAMINATION:  [ INSTRUCTIONS:  \"[x]\" Indicates a positive item  \"[]\" Indicates a negative item  -- DELETE ALL ITEMS NOT EXAMINED]  Vital Signs: (As obtained by patient/caregiver or practitioner observation)    Blood pressure- NA Heart rate- NA   Respiratory rate-    Temperature-  Pulse oximetry-       Constitutional: [x] Appears well-developed and well-nourished [] No apparent distress      [] Abnormal-   Mental status  [x] Alert and awake  [] Oriented to person/place/time []Able to follow commands      Eyes:  EOM    [x]  Normal  [] Abnormal-  Sclera  [x]  Normal  [] Abnormal -         Discharge []  None visible  [] Abnormal -    HENT:   [x] Normocephalic, atraumatic.   [] Abnormal   [x] Mouth/Throat: Mucous membranes are moist.     External Ears [x] Normal  [] Abnormal-     Neck: [x] No visualized mass     Pulmonary/Chest: [x] Respiratory effort normal.  [] No visualized signs of difficulty breathing or respiratory distress        [] Abnormal-      Musculoskeletal:   [x] Normal gait with no signs of ataxia         [x] Normal range of motion of neck        [] Abnormal-       Neurological:        [x] No Facial Asymmetry (Cranial nerve 7 motor function) (limited exam to video visit)          [x] No gaze palsy        [] Abnormal-         Skin:        [x] No significant exanthematous lesions or discoloration noted on facial skin         [] Abnormal-            Psychiatric:       [x] Normal Affect [] No Hallucinations        [] Abnormal-     Other pertinent observable physical exam findings-     Echocardiogram 4/8/19  Summary   Technically difficult examination due to patient body habitus. Normal left ventricle systolic function with a visually estimated ejection   fraction of 55-60%. No regional wall motion abnormalities are seen. Normal left ventricular diastolic filling pressure. The right ventricle appears mildly enlarged with normal systolic function. Mild mitral regurgitation. EKG 9/3/19  NSR    Stress test 6/18/2020  Summary  Small sized basal lateral completely reversible defect consistent with  ischemia in the territory of the mid and distal LCx. Note there is  heterogeneous uptake of tracer activity and activity noted in small bowel  adjacent to inferior wall which may affect interpretation. LV function is  normal with uniform wall motion and ejection fraction of 68%. Lower risk  abnormal study. Clinical correlation recommended. Echocardiogram 6/26/2020  Summary   Normal left ventricular systolic function with ejection fraction of 55-60%. No regional wall motion abnormalites are seen. Left ventricular diastolic filling pressure is normal.   Compared to previous study from 04- no changes noted in left   ventricular function.     Right and left heart cath 6/26/2020  Procedures: RHC, LHC, LVG, sedation  Normal coronary arteries  LVEF 55%  Pulm HTN w/ mild elev PCWP Venous dopplers 6/26/2020  Summary   Within the limits of this exam, there is no evidence of deep or superficial  venous thrombosis in the left lower extremity or right common femoral vein. ASSESSMENT:  Right ventricular enlargement secondary to elev pressures due to obesity and sleep apnea  Cardiomegaly with mild vascular congestion seen on chest Xray 5/20/2020. Chest pain - typical/atypical. unchhanged  SOB - multifactorial. Primarily obesity and deconditioning. No direct cardiac component   Leg swelling   Obesity   KRYSTIAN- on CPAP   Volume overload due to obesity, KRYSTIAN. A little better w/ increased lasix. Plan:   Take lasix 40 mg in the morning   Discussed seeing weight loss team   Continue current medications   Check blood pressure at home weekly  Regular exercise and following a healthy diet encouraged   Follow up with NP in 6 months       Toño Chawla is a 48 y.o. female being evaluated by a Virtual Visit (video visit) encounter to address concerns as mentioned above. A caregiver was present when appropriate. Due to this being a TeleHealth encounter (During PMIML-94 public health emergency), evaluation of the following organ systems was limited: Vitals/Constitutional/EENT/Resp/CV/GI//MS/Neuro/Skin/Heme-Lymph-Imm. Pursuant to the emergency declaration under the [facility], [address] [place] [address] authority and the [facility] and [employer] Act, this Virtual Visit was conducted with patient's (and/or legal guardian's) consent, to reduce the patient's risk of exposure to COVID-19 and provide necessary medical care. The patient (and/or legal guardian) has also been advised to contact this office for worsening conditions or problems, and seek emergency medical treatment and/or call 911 if deemed necessary. Scribe's attestation:   This note was scribed in the presence of Dr. Lizette Trevino M.D. By Damir Heredia RN Services were provided through a video synchronous discussion virtually to substitute for in-person clinic visit. I am seeing the patient today from my office and the patient from their home. The scribes documentation has been prepared under my direction and personally reviewed by me in its entirety. I confirm that the note above accurately reflects all work, treatment, procedures, and medical decision making performed by me. Dr. Jimmy Chapman MD        --Kate Calvillo RN on 7/10/2020 at 9:54 AM             An electronic signature was used to authenticate this note.

## 2020-07-13 ENCOUNTER — VIRTUAL VISIT (OUTPATIENT)
Dept: CARDIOLOGY CLINIC | Age: 55
End: 2020-07-13
Payer: MEDICAID

## 2020-07-13 PROCEDURE — G8427 DOCREV CUR MEDS BY ELIG CLIN: HCPCS | Performed by: INTERNAL MEDICINE

## 2020-07-13 PROCEDURE — 3017F COLORECTAL CA SCREEN DOC REV: CPT | Performed by: INTERNAL MEDICINE

## 2020-07-13 PROCEDURE — 99214 OFFICE O/P EST MOD 30 MIN: CPT | Performed by: INTERNAL MEDICINE

## 2020-07-13 NOTE — PATIENT INSTRUCTIONS
Plan:   Take lasix 4 mg in the morning   Discussed seeing weight loss team   Continue current medications   Check blood pressure at home weekly  Regular exercise and following a healthy diet encouraged   Follow up with NP in 6 months

## 2020-08-27 NOTE — PROGRESS NOTES
[facility]  Advanced CHF/Pulmonary Hypertension   Cardiac Evaluation      Tania Sutton  YOB: 1965    Date of Visit:  9/1/20      Chief Complaint   Patient presents with    New Patient    Shortness of Breath    Edema         History of Present Illness:  Flash Menon is a 48 y.o. female who presents from referral from Dr Mikey Ramos for consultation and management of pulmonary HTN. She has a history of RV enlargement, palpitations, HTN and KRYSTIAN with CPAP. She had a stress test on 6/18/2020 which was abnormal. She underwent a right and left heart cath on 6/26/2020 which showed normal coronaries with elevated PCWP. Her most recent echocardiogram from 6/26/2020 showed an EF of 55-60%. She reports she had pneumonia last year and has needed supplemental oxygen at night due to low O2 sats. She was a smoker in her teens. She has a history of palpitations and is on diltiazem for it. She reports she has palpitations almost daily but has improved with diltiazem. She started on lasix in July 2020. She reports improved breathing and edema on the lasix. She reports she has been watching her diet but her weight has not changed. She denies CP, dizziness, fatigue or syncope. She reports her home -150. She has been on lisinopril for some time. She is thinking of weight loss surgery. Allergies   Allergen Reactions    Bioflavonoids Anaphylaxis    Other      Nickel,citrus, pain meds?     Oxycodone-Acetaminophen Hives    Percocet [Oxycodone-Acetaminophen]      Can take VICODIN    Azithromycin Nausea And Vomiting and Rash    Nickel Hives, Other (See Comments) and Rash     Current Outpatient Medications   Medication Sig Dispense Refill    furosemide (LASIX) 40 MG tablet Take 1 tablet by mouth daily 30 tablet 11    Fluticasone Furoate-Vilanterol (BREO ELLIPTA) 200-25 MCG/INH AEPB Inhale 1 puff into the lungs daily 1 each 2    ipratropium-albuterol (DUONEB) 0.5-2.5 (3) MG/3ML SOLN nebulizer solution Inhale 3 mLs into the lungs 4 times daily 360 mL 3    Multiple Vitamin (MULTI-VITAMIN DAILY PO) Take 1 tablet by mouth      levothyroxine (SYNTHROID) 112 MCG tablet Take 112 mcg by mouth daily      ibuprofen (ADVIL;MOTRIN) 200 MG tablet Take 600 mg by mouth as needed for Pain      sertraline (ZOLOFT) 100 MG tablet Take 100 mg by mouth daily      lisinopril (PRINIVIL;ZESTRIL) 5 MG tablet Take 5 mg by mouth daily      lamoTRIgine (LAMICTAL) 200 MG tablet Take 200 mg by mouth 2 times daily      diltiazem (CARDIZEM CD) 120 MG extended release capsule Take 1 capsule by mouth 2 times daily 30 capsule 0    albuterol sulfate HFA (PROVENTIL HFA) 108 (90 Base) MCG/ACT inhaler Inhale 2 puffs into the lungs every 4 hours as needed for Wheezing or Shortness of Breath (Space out to every 6 hours as symptoms improve) Space out to every 6 hours as symptoms improve. 1 Inhaler 0    pantoprazole (PROTONIX) 40 MG tablet Take 40 mg by mouth daily.  gabapentin (NEURONTIN) 600 MG tablet Take 600 mg by mouth 2 times daily.  montelukast (SINGULAIR) 10 MG tablet Take 10 mg by mouth every morning       aspirin 81 MG EC tablet Take 81 mg by mouth daily.  acetaminophen (TYLENOL) 500 MG tablet Take 500 mg by mouth every 6 hours as needed. No current facility-administered medications for this visit.         Past Medical History:   Diagnosis Date    Anemia     Anxiety     Depression     Fibromyalgia     GERD (gastroesophageal reflux disease)     Heart palpitations     Hiatal hernia     Hypertension     Irritable bowel syndrome     Mild intermittent asthma with exacerbation     Osteoarthritis     Panic attacks     Pneumonia     Pulmonary infiltrates 4/20/2019    Seasonal allergies     Seizure disorder (HCC)     Sleep apnea     Spastic colon     Thyroid disease      Past Surgical History:   Procedure Laterality Date    BLADDER SURGERY      BRAIN SURGERY      BRONCHOSCOPY N/A 4/21/2019 kg)     BP Readings from Last 3 Encounters:   09/01/20 118/70   06/05/20 (!) 162/74   11/14/19 138/89          Constitutional and General Appearance:   WD/WN in NAD, morbidly obese  HEENT:  NC/AT  WILNER  No problems with hearing  Skin:  Warm, dry  Respiratory:  · Normal excursion and expansion without use of accessory muscles  · Resp Auscultation: Normal breath sounds without dullness  Cardiovascular:  · The apical impulses not displaced  · Heart tones are crisp and normal  · Cervical veins are not engorged  · The carotid upstroke is normal in amplitude and contour without delay or bruit  · JVP less than 8 cm H2O  RRR with nl S1 and S2 without m,r,g  · Peripheral pulses are symmetrical and full  · There is no clubbing, cyanosis of the extremities. · No edema  · Femoral Arteries: 2+ and equal  · Pedal Pulses: 2+ and equal   Neck:  · No thyromegaly  Abdomen:  · No masses or tenderness  · Liver/Spleen: No Abnormalities Noted  Neurological/Psychiatric:  · Alert and oriented in all spheres  · Moves all extremities well  · Exhibits normal gait balance and coordination  · No abnormalities of mood, affect, memory, mentation, or behavior are noted    Echo: 06/26/20  Summary   Normal left ventricular systolic function with ejection fraction of 55-60%. No regional wall motion abnormalites are seen. Left ventricular diastolic filling pressure is normal.   Compared to previous study from 04- no changes noted in left   ventricular function. Assessment:    1. Chronic diastolic heart failure ([address]    2. Essential hypertension    3. SOB (shortness of breath)    4. Obstructive sleep apnea          Plan:  1. CMP, CBC, free t4, TSH, A1C and BNP  2. Weight Management referral   3. Increase lisinopril to 10 mg daily  4. Start spironolactone 25 mg on M,W, F and Sun  5. Follow up in 3 weeks with Elisha      QUALITY MEASURES  1. Tobacco Cessation Counseling: NA  2. Retake of BP if >140/90:   NA  3.  Documentation to PCP/referring for new patient:  Sent to PCP at close of office visit  4. CAD patient on anti-platelet: NA  5. CAD patient on STATIN therapy:  NA  6. Patient with CHF and aFib on anticoagulation:  NA     Scribe's attestation: This note was scribed in the presence of Duncan Castro M.D. By Radha Bragg RN     The scribe's documentation has been prepared under my direction and personally reviewed by me in its entirety. I confirm that the note above accurately reflects all work, treatment, procedures, and medical decision making performed by me. I appreciate the opportunity of cooperating in the care of this patient.     Duncan Castro M.D., [facility] [place]

## 2020-09-01 ENCOUNTER — OFFICE VISIT (OUTPATIENT)
Dept: CARDIOLOGY CLINIC | Age: 55
End: 2020-09-01
Payer: MEDICAID

## 2020-09-01 VITALS
WEIGHT: 293 LBS | DIASTOLIC BLOOD PRESSURE: 70 MMHG | BODY MASS INDEX: 57.52 KG/M2 | SYSTOLIC BLOOD PRESSURE: 118 MMHG | HEIGHT: 60 IN | HEART RATE: 99 BPM | OXYGEN SATURATION: 95 %

## 2020-09-01 PROCEDURE — 99245 OFF/OP CONSLTJ NEW/EST HI 55: CPT | Performed by: INTERNAL MEDICINE

## 2020-09-01 PROCEDURE — G8427 DOCREV CUR MEDS BY ELIG CLIN: HCPCS | Performed by: INTERNAL MEDICINE

## 2020-09-01 PROCEDURE — G8417 CALC BMI ABV UP PARAM F/U: HCPCS | Performed by: INTERNAL MEDICINE

## 2020-09-01 NOTE — LETTER
[facility]  Advanced CHF/Pulmonary Hypertension   Cardiac Evaluation      Nereyda Sutton  YOB: 1965    Date of Visit:  9/1/20      Chief Complaint   Patient presents with    New Patient    Shortness of Breath    Edema         History of Present Illness:  Shamir Crawford is a 48 y.o. female who presents from referral from Dr Jacoby Meadows for consultation and management of pulmonary HTN. She has a history of RV enlargement, palpitations, HTN and KRYSTIAN with CPAP. She had a stress test on 6/18/2020 which was abnormal. She underwent a right and left heart cath on 6/26/2020 which showed normal coronaries with elevated PCWP. Her most recent echocardiogram from 6/26/2020 showed an EF of 55-60%. She reports she had pneumonia last year and has needed supplemental oxygen at night due to low O2 sats. She was a smoker in her teens. She has a history of palpitations and is on diltiazem for it. She reports she has palpitations almost daily but has improved with diltiazem. She started on lasix in July 2020. She reports improved breathing and edema on the lasix. She reports she has been watching her diet but her weight has not changed. She denies CP, dizziness, fatigue or syncope. She reports her home -150. She has been on lisinopril for some time. She is thinking of weight loss surgery. Allergies   Allergen Reactions    Bioflavonoids Anaphylaxis    Other      Nickel,citrus, pain meds?     Oxycodone-Acetaminophen Hives    Percocet [Oxycodone-Acetaminophen]      Can take VICODIN    Azithromycin Nausea And Vomiting and Rash    Nickel Hives, Other (See Comments) and Rash     Current Outpatient Medications   Medication Sig Dispense Refill    furosemide (LASIX) 40 MG tablet Take 1 tablet by mouth daily 30 tablet 11    Fluticasone Furoate-Vilanterol (BREO ELLIPTA) 200-25 MCG/INH AEPB Inhale 1 puff into the lungs daily 1 each 2  ipratropium-albuterol (DUONEB) 0.5-2.5 (3) MG/3ML SOLN nebulizer solution Inhale 3 mLs into the lungs 4 times daily 360 mL 3    Multiple Vitamin (MULTI-VITAMIN DAILY PO) Take 1 tablet by mouth      levothyroxine (SYNTHROID) 112 MCG tablet Take 112 mcg by mouth daily      ibuprofen (ADVIL;MOTRIN) 200 MG tablet Take 600 mg by mouth as needed for Pain      sertraline (ZOLOFT) 100 MG tablet Take 100 mg by mouth daily      lisinopril (PRINIVIL;ZESTRIL) 5 MG tablet Take 5 mg by mouth daily      lamoTRIgine (LAMICTAL) 200 MG tablet Take 200 mg by mouth 2 times daily      diltiazem (CARDIZEM CD) 120 MG extended release capsule Take 1 capsule by mouth 2 times daily 30 capsule 0    albuterol sulfate HFA (PROVENTIL HFA) 108 (90 Base) MCG/ACT inhaler Inhale 2 puffs into the lungs every 4 hours as needed for Wheezing or Shortness of Breath (Space out to every 6 hours as symptoms improve) Space out to every 6 hours as symptoms improve. 1 Inhaler 0    pantoprazole (PROTONIX) 40 MG tablet Take 40 mg by mouth daily.  gabapentin (NEURONTIN) 600 MG tablet Take 600 mg by mouth 2 times daily.  montelukast (SINGULAIR) 10 MG tablet Take 10 mg by mouth every morning       aspirin 81 MG EC tablet Take 81 mg by mouth daily.  acetaminophen (TYLENOL) 500 MG tablet Take 500 mg by mouth every 6 hours as needed. No current facility-administered medications for this visit.         Past Medical History:   Diagnosis Date    Anemia     Anxiety     Depression     Fibromyalgia     GERD (gastroesophageal reflux disease)     Heart palpitations     Hiatal hernia     Hypertension     Irritable bowel syndrome     Mild intermittent asthma with exacerbation     Osteoarthritis     Panic attacks     Pneumonia     Pulmonary infiltrates 4/20/2019    Seasonal allergies     Seizure disorder (HCC)     Sleep apnea     Spastic colon     Thyroid disease      Past Surgical History:   Procedure Laterality Date  BLADDER SURGERY      BRAIN SURGERY      BRONCHOSCOPY N/A 2019    BRONCHOSCOPY ALVEOLAR LAVAGE performed by Krishna Jones MD at [address] [place] [address]  2019    BRONCHOSCOPY THERAPUTIC ASPIRATION INITIAL performed by Krishna Jones MD at [address] [place] [address] N/A 5/15/2019    BRONCHOSCOPY ALVEOLAR LAVAGE performed by Bernadine Guillen MD at [address] [place] [address]  5/15/2019    BRONCHOSCOPY THERAPUTIC ASPIRATION INITIAL performed by Bernadine Guillen MD at [place] [address] JOINT REPLACEMENT  2009    hip    KNEE SURGERY      OTHER SURGICAL HISTORY  2013    cystoscopy, urethral dilatation    SHOULDER SURGERY      r rotator cuff repair    TOTAL HIP ARTHROPLASTY      Left    URETHRAL STRICTURE DILATATION       Family History   Problem Relation Age of Onset    Asthma Other     Asthma Other     Hypertension Father     Hypertension Sister     Cancer Neg Hx     Diabetes Neg Hx     Emphysema Neg Hx     Heart Failure Neg Hx      Social History     Socioeconomic History    Marital status: Single     Spouse name: Not on file    Number of children: Not on file    Years of education: Not on file    Highest education level: Not on file   Occupational History    Not on file   Social Needs    Financial resource strain: Not on file    Food insecurity     Worry: Not on file     Inability: Not on file    Transportation needs     Medical: Not on file     Non-medical: Not on file   Tobacco Use    Smoking status: Former Smoker     Packs/day: 0.50     Years: 2.00     Pack years: 1.00     Last attempt to quit: 1986     Years since quittin.3    Smokeless tobacco: Never Used   Substance and Sexual Activity    Alcohol use: No    Drug use: No    Sexual activity: Not on file   Lifestyle    Physical activity     Days per week: 0 days     Minutes per session: 0 min    Stress: Not on file   Relationships    Social connections Talks on phone: Not on file     Gets together: Not on file     Attends Gnosticism service: Not on file     Active member of club or organization: Not on file     Attends meetings of clubs or organizations: Not on file     Relationship status: Not on file    Intimate partner violence     Fear of current or ex partner: Not on file     Emotionally abused: Not on file     Physically abused: Not on file     Forced sexual activity: Not on file   Other Topics Concern    Not on file   Social History Narrative    Not on file       Review of Systems:   · Constitutional: there has been no unanticipated weight loss. There's been no change in energy level, sleep pattern, or activity level. · Eyes: No visual changes or diplopia. No scleral icterus. · ENT: No Headaches, hearing loss or vertigo. No mouth sores or sore throat. · Cardiovascular: Reviewed in HPI  · Respiratory: No cough or wheezing, no sputum production. No hematemesis. · Gastrointestinal: No abdominal pain, appetite loss, blood in stools. No change in bowel or bladder habits. · Genitourinary: No dysuria, trouble voiding, or hematuria. · Musculoskeletal:  No gait disturbance, weakness or joint complaints. · Integumentary: No rash or pruritis. · Neurological: No headache, diplopia, change in muscle strength, numbness or tingling. No change in gait, balance, coordination, mood, affect, memory, mentation, behavior. · Psychiatric: No anxiety, no depression. · Endocrine: No malaise, fatigue or temperature intolerance. No excessive thirst, fluid intake, or urination. No tremor. · Hematologic/Lymphatic: No abnormal bruising or bleeding, blood clots or swollen lymph nodes. · Allergic/Immunologic: No nasal congestion or hives. Physical Examination:    Vitals:    09/01/20 0832   BP: 118/70   Pulse: 99   SpO2: 95%   Weight: (!) 314 lb (142.4 kg)   Height: 5' (1.524 m)     Body mass index is 61.32 kg/m².      Wt Readings from Last 3 Encounters: 09/01/20 (!) 314 lb (142.4 kg)   06/26/20 (!) 310 lb (140.6 kg)   06/05/20 (!) 305 lb (138.3 kg)     BP Readings from Last 3 Encounters:   09/01/20 118/70   06/05/20 (!) 162/74   11/14/19 138/89          Constitutional and General Appearance:   WD/WN in NAD, morbidly obese  HEENT:  NC/AT  WILNER  No problems with hearing  Skin:  Warm, dry  Respiratory:  · Normal excursion and expansion without use of accessory muscles  · Resp Auscultation: Normal breath sounds without dullness  Cardiovascular:  · The apical impulses not displaced  · Heart tones are crisp and normal  · Cervical veins are not engorged  · The carotid upstroke is normal in amplitude and contour without delay or bruit  · JVP less than 8 cm H2O  RRR with nl S1 and S2 without m,r,g  · Peripheral pulses are symmetrical and full  · There is no clubbing, cyanosis of the extremities. · No edema  · Femoral Arteries: 2+ and equal  · Pedal Pulses: 2+ and equal   Neck:  · No thyromegaly  Abdomen:  · No masses or tenderness  · Liver/Spleen: No Abnormalities Noted  Neurological/Psychiatric:  · Alert and oriented in all spheres  · Moves all extremities well  · Exhibits normal gait balance and coordination  · No abnormalities of mood, affect, memory, mentation, or behavior are noted    Echo: 06/26/20  Summary   Normal left ventricular systolic function with ejection fraction of 55-60%. No regional wall motion abnormalites are seen. Left ventricular diastolic filling pressure is normal.   Compared to previous study from 04- no changes noted in left   ventricular function. Assessment:    1. Chronic diastolic heart failure ([address]    2. Essential hypertension    3. SOB (shortness of breath)    4. Obstructive sleep apnea          Plan:  1. CMP, CBC, free t4, TSH, A1C and BNP  2. Weight Management referral   3. Increase lisinopril to 10 mg daily  4. Start spironolactone 25 mg on M,W, F and Sun  5.  Follow up in 3 weeks with [address] [place] [address] 1. Tobacco Cessation Counseling: NA  2. Retake of BP if >140/90:   NA  3. Documentation to PCP/referring for new patient:  Sent to PCP at close of office visit  4. CAD patient on anti-platelet: NA  5. CAD patient on STATIN therapy:  NA  6. Patient with CHF and aFib on anticoagulation:  NA     Scribe's attestation: This note was scribed in the presence of Teofilo Le M.D. By Gerard Rizo RN     The scribe's documentation has been prepared under my direction and personally reviewed by me in its entirety. I confirm that the note above accurately reflects all work, treatment, procedures, and medical decision making performed by me. I appreciate the opportunity of cooperating in the care of this patient.     Teofilo Le M.D., [facility]

## 2020-09-01 NOTE — PATIENT INSTRUCTIONS
Plan: 1. CMP, CBC, free t4, TSH, A1C and BNP  2. Weight Management referral   3. Increase lisinopril to 10 mg daily  4. Start spironolactone 25 mg on M,W, F and Sun  5.  Follow up in 3 weeks with [facility]

## 2020-09-02 ENCOUNTER — TELEPHONE (OUTPATIENT)
Dept: CARDIOLOGY CLINIC | Age: 55
End: 2020-09-02

## 2020-09-02 RX ORDER — SPIRONOLACTONE 25 MG/1
TABLET ORAL
Qty: 30 TABLET | Refills: 1 | Status: SHIPPED | OUTPATIENT
Start: 2020-09-02 | End: 2020-12-21

## 2020-09-02 RX ORDER — LISINOPRIL 10 MG/1
10 TABLET ORAL DAILY
Qty: 30 TABLET | Refills: 5 | Status: SHIPPED | OUTPATIENT
Start: 2020-09-02 | End: 2021-03-02

## 2020-09-02 NOTE — TELEPHONE ENCOUNTER
Patient called stating she was seen by Myriam Mead yesterday 9/1 and was suppose to have new prescriptions sent into the pharmacy but she is at the pharmacy and they do not have any prescriptions for her

## 2020-10-27 LAB
ALBUMIN SERPL-MCNC: 4.4 G/DL
ALP BLD-CCNC: 131 U/L
ALT SERPL-CCNC: 16 U/L
ANION GAP SERPL CALCULATED.3IONS-SCNC: NORMAL MMOL/L
AST SERPL-CCNC: 15 U/L
AVERAGE GLUCOSE: 126
B-TYPE NATRIURETIC PEPTIDE: 6.2 PG/ML
BASOPHILS ABSOLUTE: 0 /ΜL
BASOPHILS RELATIVE PERCENT: 0 %
BILIRUB SERPL-MCNC: 0.3 MG/DL (ref 0.1–1.4)
BUN BLDV-MCNC: 23 MG/DL
CALCIUM SERPL-MCNC: 9.2 MG/DL
CHLORIDE BLD-SCNC: 102 MMOL/L
CO2: 26 MMOL/L
CREAT SERPL-MCNC: 0.87 MG/DL
EOSINOPHILS ABSOLUTE: 0 /ΜL
EOSINOPHILS RELATIVE PERCENT: 0 %
GFR CALCULATED: 77
GLUCOSE BLD-MCNC: 114 MG/DL
HBA1C MFR BLD: 6 %
HCT VFR BLD CALC: 37.7 % (ref 36–46)
HEMOGLOBIN: 11.2 G/DL (ref 12–16)
IRON: 26
LYMPHOCYTES ABSOLUTE: 1.1 /ΜL
LYMPHOCYTES RELATIVE PERCENT: 15 %
MAGNESIUM: 2.2 MG/DL
MCH RBC QN AUTO: 19.8 PG
MCHC RBC AUTO-ENTMCNC: 29.7 G/DL
MCV RBC AUTO: 67 FL
MONOCYTES ABSOLUTE: 0.5 /ΜL
MONOCYTES RELATIVE PERCENT: 6 %
NEUTROPHILS ABSOLUTE: 5.9 /ΜL
NEUTROPHILS RELATIVE PERCENT: 78 %
PLATELET # BLD: 379 K/ΜL
PMV BLD AUTO: ABNORMAL FL
POTASSIUM SERPL-SCNC: 4.8 MMOL/L
PTH INTACT: 35
RBC # BLD: 5.66 10^6/ΜL
SODIUM BLD-SCNC: 139 MMOL/L
TOTAL IRON BINDING CAPACITY: 435
TOTAL PROTEIN: 6.3
VITAMIN B-12: 348
VITAMIN D 25-HYDROXY: 19.8
VITAMIN D2, 25 HYDROXY: NORMAL
VITAMIN D3,25 HYDROXY: NORMAL
WBC # BLD: 7.5 10^3/ML

## 2020-10-28 ENCOUNTER — TELEPHONE (OUTPATIENT)
Dept: CARDIOLOGY CLINIC | Age: 55
End: 2020-10-28

## 2020-10-28 NOTE — TELEPHONE ENCOUNTER
----- Message from MATHEW West CNP sent at 10/28/2020  8:11 AM EDT -----  Covering for KATRIN. Vitamin D level is low- recommend starting 2000 units of vitamin D3 over the counter daily  Iron levels are also low- recommend starting ferrous sulfate 325mg 1 tab daily with food in the am.   Other labs are stable.  Keep follow up

## 2020-11-03 NOTE — PROGRESS NOTES
Seasonal allergies, Seizure disorder [facility]), Sleep apnea, Spastic colon, and Thyroid disease. Surgical History:   has a past surgical history that includes Total hip arthroplasty; brain surgery; Urethra dilation; knee surgery; hip surgery; Bladder surgery; joint replacement (2009); other surgical history (07/31/2013); bronchoscopy (N/A, 4/21/2019); bronchoscopy (4/21/2019); shoulder surgery; bronchoscopy (N/A, 5/15/2019); and bronchoscopy (5/15/2019). Social History:   reports that she quit smoking about 34 years ago. She has a 1.00 pack-year smoking history. She has never used smokeless tobacco. She reports that she does not drink alcohol or use drugs. Family History:   Family History   Problem Relation Age of Onset    Asthma Other     Asthma Other     Hypertension Father     Hypertension Sister     Cancer Neg Hx     Diabetes Neg Hx     Emphysema Neg Hx     Heart Failure Neg Hx        Home Medications:  Prior to Admission medications    Medication Sig Start Date End Date Taking?  Authorizing Provider   Ferrous Sulfate (IRON PO) Take by mouth   Yes Historical Provider, MD   VITAMIN D PO Take by mouth   Yes Historical Provider, MD   lisinopril (PRINIVIL;ZESTRIL) 10 MG tablet Take 1 tablet by mouth daily 9/2/20  Yes Dhruv Pitt MD   spironolactone (ALDACTONE) 25 MG tablet One tablet on M,W, F, Sun 9/2/20  Yes Dhruv Pitt MD   furosemide (LASIX) 40 MG tablet Take 1 tablet by mouth daily 6/26/20 6/26/21 Yes Nasim Lowry MD   Fluticasone Furoate-Vilanterol (BREO ELLIPTA) 200-25 MCG/INH AEPB Inhale 1 puff into the lungs daily 3/11/21  Yes Severiano Speak, APRN - [facility]   ipratropium-albuterol (DUONEB) 0.5-2.5 (3) MG/3ML SOLN nebulizer solution Inhale 3 mLs into the lungs 4 times daily 5/8/19  Yes Barby Nevarez MD   Multiple Vitamin (MULTI-VITAMIN DAILY PO) Take 1 tablet by mouth   Yes Historical Provider, MD   levothyroxine (SYNTHROID) 112 MCG tablet Take 112 mcg by mouth daily 1/22/19  Yes Historical Provider, MD   ibuprofen (ADVIL;MOTRIN) 200 MG tablet Take 600 mg by mouth as needed for Pain   Yes Historical Provider, MD   sertraline (ZOLOFT) 100 MG tablet Take 100 mg by mouth daily   Yes Historical Provider, MD   lamoTRIgine (LAMICTAL) 200 MG tablet Take 200 mg by mouth 2 times daily   Yes Historical Provider, MD   diltiazem (CARDIZEM CD) 120 MG extended release capsule Take 1 capsule by mouth 2 times daily 11/25/18  Yes Suresh Urias MD   albuterol sulfate HFA (PROVENTIL HFA) 108 (90 Base) MCG/ACT inhaler Inhale 2 puffs into the lungs every 4 hours as needed for Wheezing or Shortness of Breath (Space out to every 6 hours as symptoms improve) Space out to every 6 hours as symptoms improve. 11/3/18  Yes MATHEW Hernández CNP   pantoprazole (PROTONIX) 40 MG tablet Take 40 mg by mouth daily. Yes Historical Provider, MD   gabapentin (NEURONTIN) 600 MG tablet Take 600 mg by mouth 2 times daily. Yes Historical Provider, MD   montelukast (SINGULAIR) 10 MG tablet Take 10 mg by mouth every morning    Yes Historical Provider, MD   aspirin 81 MG EC tablet Take 81 mg by mouth daily. Yes Historical Provider, MD   acetaminophen (TYLENOL) 500 MG tablet Take 500 mg by mouth every 6 hours as needed. Yes Historical Provider, MD        Allergies:  Bioflavonoids; Other; Oxycodone-acetaminophen; Percocet [oxycodone-acetaminophen]; Azithromycin; and Nickel     Review of Systems:   · Constitutional: there has been no unanticipated weight loss. · Eyes: No vision changes  · ENT: No Headaches, no nasal congestion. No mouth sores or sore throat. · Cardiovascular: Reviewed in HPI  · Respiratory: No cough or wheezing, no sputum production. · Gastrointestinal: No abdominal pain, no constipation or diarrhea  · Genitourinary: No dysuria, trouble voiding, or hematuria. · Musculoskeletal:  +weakness or joint complaints. · Integumentary: No rash or pruritis. · Neurological: No numbness or tingling. No weakness.  No tremor. · Psychiatric: No anxiety, no depression. · Endocrine:  No excessive thirst or urination. · Hematologic/Lymphatic: No abnormal bruising or bleeding, blood clots or swollen lymph nodes.     Physical Examination:    Vitals:    11/04/20 0914   BP: 122/74   Pulse: 92   SpO2: 96%   Weight: (!) 312 lb 8 oz (141.7 kg)   Height: 5' (1.524 m)        Constitutional and General Appearance: no apparent distress, morbidly obese  HEENT: non-icteric sclera, mask in place  Neck: JVP unable to assess due to body habitus  Respiratory:  · No use of accessory muscles  · Diminished breath sounds throughout, no wheezing, no crackles, no rhonchi  Cardiovascular:  · The apical impulses not displaced  · Heart tones are distant no murmur/rub/gallop  · Regular rate and rhythm, S1,S2 normal  · Radial pulses 2+ and equal bilaterally  · No edema  · Pedal Pulses: 2+ and equal   Abdomen:  · No masses or tenderness  · Liver: No Abnormalities Noted  Musculoskeletal/Skin:  · Walks with cane  · There is no clubbing, cyanosis of the extremities  · Skin is warm and dry  · Moves all extremities well  Neurological/Psychiatric:  · Alert and oriented in all spheres  · No abnormalities of mood, affect, memory, mentation, or behavior are noted    Lab Data:  CBC:   Lab Results   Component Value Date    WBC 7.5 10/27/2020    WBC 7.3 06/26/2020    WBC 6.6 06/09/2020    RBC 5.66 10/27/2020    RBC 5.58 06/26/2020    RBC 5.35 06/09/2020    HGB 11.2 10/27/2020    HGB 11.0 06/26/2020    HGB 10.8 06/09/2020    HCT 37.7 10/27/2020    HCT 36.2 06/26/2020    HCT 35.3 06/09/2020    MCV 67 10/27/2020    MCV 65.0 06/26/2020    MCV 66.1 06/09/2020    RDW 20.0 06/26/2020    RDW 19.9 06/09/2020    RDW 21.7 09/03/2019     10/27/2020     06/26/2020     06/09/2020     Iron:   Lab Results   Component Value Date    IRON 26 10/17/2020    TIBC 435 10/17/2020    FERRITIN 52.9 04/07/2019     BMP:   Lab Results   Component Value Date     10/17/2020  2020     2020    K 4.8 10/17/2020    K 4.2 2020    K 4.5 2020    K 4.1 2019    K 5.0 2019    K 4.7 2019     10/17/2020    CL 99 2020     2020    CO2 26 10/17/2020    CO2 28 2020    CO2 27 2020    PHOS 4.2 2010    BUN 23 10/17/2020    BUN 16 2020    BUN 12 2020    CREATININE 0.87 10/17/2020    CREATININE 0.8 2020    CREATININE 0.8 2020     BNP:   Lab Results   Component Value Date    PROBNP 35 2020    PROBNP 56 07/15/2019    PROBNP 74 2019     Lipids:   Lab Results   Component Value Date    CHOL 161 2020        Lab Results   Component Value Date    TRIG 99 2020        Lab Results   Component Value Date    HDL 47 2020        Lab Results   Component Value Date    LDLCALC 94 2020        Lab Results   Component Value Date    LABVLDL 20 2020      No results found for: CHOLHDLRATIO    EF:   Lab Results   Component Value Date    LVEF 55 2020    LVEFMODE Cardiac Cath 2020       Recent Testin20  Procedures: RHC, LHC, LVG, sedation     Normal coronary arteries  LVEF 55%  Pulm HTN w/ mild elev PCWP - see full cath report    Echo  Echo: 20  Summary   Normal left ventricular systolic function with ejection fraction of 55-60%.   No regional wall motion abnormalites are seen.   Left ventricular diastolic filling pressure is normal.   Compared to previous study from 2019 no changes noted in left ventricular function.     NYHA:   II  ACC/ AHA Stage:    C    Pertinent Problems:  · Chronic diastolic heart failure  · Hypertension  · Obstructive sleep apnea  · Palpitations. · Vitamin D deficiency  · Iron deficiency    Visit Diagnosis:    1. Chronic diastolic heart failure ([address]    2. Essential hypertension    3.  Palpitation      Plan:   Diet and fluid intake were extensively discussed today  Her recent lab results were reviewed in detail with her. 1. Continue daily weights  2. Heart monitor for 1 week- to evaluate the palpitations; will call you with results. 3. Obtain a scale and record your daily weight- if you gain 3lbs in day or 5lbs in a week may need to adjust the water pill. Notify the office. 4. Target lower sodium diet about 2500mg-3000mg a day  5. Track your total daily fluid intake and record. Recommend reducing your total daily fluid intake about 25% (targeting about 64 ounces a day). 6. Check labs next month to monitor potassium level  7. Follow up in 3 months or sooner if needed. QUALITY MEASURES  1. Tobacco Cessation Counseling: NA  2. Retake of BP if >140/90:   NA  3. Documentation to PCP/referring for new patient:  Sent to PCP at close of office visit  4. CAD patient on anti-platelet: NA  5. CAD patient on STATIN therapy:  NA  6. Patient with CHF and aFib on anticoagulation:  NA     I appreciate the opportunity for caring for this patient.      Mere Bermudez CNP, 11/4/2020, 9:25 AM

## 2020-11-04 ENCOUNTER — TELEPHONE (OUTPATIENT)
Dept: CARDIOLOGY CLINIC | Age: 55
End: 2020-11-04

## 2020-11-04 ENCOUNTER — OFFICE VISIT (OUTPATIENT)
Dept: CARDIOLOGY CLINIC | Age: 55
End: 2020-11-04
Payer: MEDICAID

## 2020-11-04 VITALS
BODY MASS INDEX: 57.52 KG/M2 | DIASTOLIC BLOOD PRESSURE: 74 MMHG | OXYGEN SATURATION: 96 % | WEIGHT: 293 LBS | SYSTOLIC BLOOD PRESSURE: 122 MMHG | HEART RATE: 92 BPM | HEIGHT: 60 IN

## 2020-11-04 PROCEDURE — 3017F COLORECTAL CA SCREEN DOC REV: CPT | Performed by: NURSE PRACTITIONER

## 2020-11-04 PROCEDURE — G8484 FLU IMMUNIZE NO ADMIN: HCPCS | Performed by: NURSE PRACTITIONER

## 2020-11-04 PROCEDURE — G8427 DOCREV CUR MEDS BY ELIG CLIN: HCPCS | Performed by: NURSE PRACTITIONER

## 2020-11-04 PROCEDURE — G8417 CALC BMI ABV UP PARAM F/U: HCPCS | Performed by: NURSE PRACTITIONER

## 2020-11-04 PROCEDURE — 99214 OFFICE O/P EST MOD 30 MIN: CPT | Performed by: NURSE PRACTITIONER

## 2020-11-04 PROCEDURE — 1036F TOBACCO NON-USER: CPT | Performed by: NURSE PRACTITIONER

## 2020-11-04 NOTE — LETTER
[address]   Cardiac Follow-up    Primary Care Doctor:  Mauri Linder MD    Chief Complaint   Patient presents with    New Patient    Congestive Heart Failure        History of Present Illness:   I had the pleasure of seeing Adelina Taylor in follow up for chronic diastolic heart failure, hypertension, KRYSTIAN. She has a history of RV enlargement, palpitations, HTN and KRYSTIAN with CPAP. She had a stress test on 6/18/2020 which was abnormal. She underwent a right and left heart cath on 6/26/2020 which showed normal coronaries with elevated PCWP. Her most recent echocardiogram from 6/26/2020 showed an EF of 55-60%. Since last visit lisinopril was increased to 10 mg daily, and she was started on spironolactone 25 mg on Monday Wednesday Friday and Sundays. She will take her water pills once she gets home. Uses cpap- most nights. She reports her CPAP pressures were recently adjusted. She continues with significant fatigue. Having palpitations not as much if she uses cpap. Feels heart skip a beat at times. Seems to happen with breathing heavy. She is trying to adjust her diet. Watching sugar intake. Drinks a lot of fluids, including water, including diet [employer]. She denies any bilateral lower extremity edema. She denies any chest pain  She has had pain of the right wrist-which she attributes to her carpal tunnel along with issues with her trigger finger. She was planning to have surgery this last April, that was postponed. Jenniffer Sutton describes symptoms including palpitations, fatigue but denies chest pain, edema, syncope. Home weights: Does not own a scale.     Past Medical History:   has a past medical history of Anemia, Anxiety, Depression, Fibromyalgia, GERD (gastroesophageal reflux disease), Heart palpitations, Hiatal hernia, Hypertension, Irritable bowel syndrome, Mild intermittent asthma with exacerbation, Osteoarthritis, Panic attacks, Pneumonia, Pulmonary infiltrates, Seasonal allergies, Seizure disorder ([facility] [address] Sleep apnea, Spastic colon, and Thyroid disease. Surgical History:   has a past surgical history that includes Total hip arthroplasty; brain surgery; Urethra dilation; knee surgery; hip surgery; Bladder surgery; joint replacement (2009); other surgical history (07/31/2013); bronchoscopy (N/A, 4/21/2019); bronchoscopy (4/21/2019); shoulder surgery; bronchoscopy (N/A, 5/15/2019); and bronchoscopy (5/15/2019). Social History:   reports that she quit smoking about 34 years ago. She has a 1.00 pack-year smoking history. She has never used smokeless tobacco. She reports that she does not drink alcohol or use drugs. Family History:   Family History   Problem Relation Age of Onset    Asthma Other     Asthma Other     Hypertension Father     Hypertension Sister     Cancer Neg Hx     Diabetes Neg Hx     Emphysema Neg Hx     Heart Failure Neg Hx        Home Medications:  Prior to Admission medications    Medication Sig Start Date End Date Taking?  Authorizing Provider   Ferrous Sulfate (IRON PO) Take by mouth   Yes Historical Provider, MD   VITAMIN D PO Take by mouth   Yes Historical Provider, MD   lisinopril (PRINIVIL;ZESTRIL) 10 MG tablet Take 1 tablet by mouth daily 9/2/20  Yes Filippo Brown MD   spironolactone (ALDACTONE) 25 MG tablet One tablet on M,W, F, Sun 9/2/20  Yes Filippo Brown MD   furosemide (LASIX) 40 MG tablet Take 1 tablet by mouth daily 6/26/20 6/26/21 Yes Harvie Goodpasture, MD   Fluticasone Furoate-Vilanterol (BREO ELLIPTA) 200-25 MCG/INH AEPB Inhale 1 puff into the lungs daily 3/93/55  Yes MATHEW Espino - CNP   ipratropium-albuterol (DUONEB) 0.5-2.5 (3) MG/3ML SOLN nebulizer solution Inhale 3 mLs into the lungs 4 times daily 5/8/19  Yes Ashely Spears MD   Multiple Vitamin (MULTI-VITAMIN DAILY PO) Take 1 tablet by mouth   Yes Historical Provider, MD   levothyroxine (SYNTHROID) 112 MCG tablet Take 112 mcg by mouth daily 1/22/19  Yes Historical Provider, MD   ibuprofen (ADVIL;MOTRIN) 200 MG tablet Take 600 mg by mouth as needed for Pain   Yes Historical Provider, MD   sertraline (ZOLOFT) 100 MG tablet Take 100 mg by mouth daily   Yes Historical Provider, MD   lamoTRIgine (LAMICTAL) 200 MG tablet Take 200 mg by mouth 2 times daily   Yes Historical Provider, MD   diltiazem (CARDIZEM CD) 120 MG extended release capsule Take 1 capsule by mouth 2 times daily 11/25/18  Yes Steven Rosa MD   albuterol sulfate HFA (PROVENTIL HFA) 108 (90 Base) MCG/ACT inhaler Inhale 2 puffs into the lungs every 4 hours as needed for Wheezing or Shortness of Breath (Space out to every 6 hours as symptoms improve) Space out to every 6 hours as symptoms improve. 11/3/18  Yes MATHEW Hernández CNP   pantoprazole (PROTONIX) 40 MG tablet Take 40 mg by mouth daily. Yes Historical Provider, MD   gabapentin (NEURONTIN) 600 MG tablet Take 600 mg by mouth 2 times daily. Yes Historical Provider, MD   montelukast (SINGULAIR) 10 MG tablet Take 10 mg by mouth every morning    Yes Historical Provider, MD   aspirin 81 MG EC tablet Take 81 mg by mouth daily. Yes Historical Provider, MD   acetaminophen (TYLENOL) 500 MG tablet Take 500 mg by mouth every 6 hours as needed. Yes Historical Provider, MD        Allergies:  Bioflavonoids; Other; Oxycodone-acetaminophen; Percocet [oxycodone-acetaminophen]; Azithromycin; and Nickel     Review of Systems:   · Constitutional: there has been no unanticipated weight loss. · Eyes: No vision changes  · ENT: No Headaches, no nasal congestion. No mouth sores or sore throat. · Cardiovascular: Reviewed in HPI  · Respiratory: No cough or wheezing, no sputum production. · Gastrointestinal: No abdominal pain, no constipation or diarrhea  · Genitourinary: No dysuria, trouble voiding, or hematuria. · Musculoskeletal:  +weakness or joint complaints. · Integumentary: No rash or pruritis. · Neurological: No numbness or tingling. No weakness. No tremor. · Psychiatric: No anxiety, no depression. · Endocrine:  No excessive thirst or urination. · Hematologic/Lymphatic: No abnormal bruising or bleeding, blood clots or swollen lymph nodes.     Physical Examination:    Vitals:    11/04/20 0914   BP: 122/74   Pulse: 92   SpO2: 96%   Weight: (!) 312 lb 8 oz (141.7 kg)   Height: 5' (1.524 m)        Constitutional and General Appearance: no apparent distress, morbidly obese  HEENT: non-icteric sclera, mask in place  Neck: JVP unable to assess due to body habitus  Respiratory:  · No use of accessory muscles  · Diminished breath sounds throughout, no wheezing, no crackles, no rhonchi  Cardiovascular:  · The apical impulses not displaced  · Heart tones are distant no murmur/rub/gallop  · Regular rate and rhythm, S1,S2 normal  · Radial pulses 2+ and equal bilaterally  · No edema  · Pedal Pulses: 2+ and equal   Abdomen:  · No masses or tenderness  · Liver: No Abnormalities Noted  Musculoskeletal/Skin:  · Walks with cane  · There is no clubbing, cyanosis of the extremities  · Skin is warm and dry  · Moves all extremities well  Neurological/Psychiatric:  · Alert and oriented in all spheres  · No abnormalities of mood, affect, memory, mentation, or behavior are noted    Lab Data:  CBC:   Lab Results   Component Value Date    WBC 7.5 10/27/2020    WBC 7.3 06/26/2020    WBC 6.6 06/09/2020    RBC 5.66 10/27/2020    RBC 5.58 06/26/2020    RBC 5.35 06/09/2020    HGB 11.2 10/27/2020    HGB 11.0 06/26/2020    HGB 10.8 06/09/2020    HCT 37.7 10/27/2020    HCT 36.2 06/26/2020    HCT 35.3 06/09/2020    MCV 67 10/27/2020    MCV 65.0 06/26/2020    MCV 66.1 06/09/2020    RDW 20.0 06/26/2020    RDW 19.9 06/09/2020    RDW 21.7 09/03/2019     10/27/2020     06/26/2020     06/09/2020     Iron:   Lab Results   Component Value Date    IRON 26 10/17/2020    TIBC 435 10/17/2020    FERRITIN 52.9 04/07/2019     BMP: Lab Results   Component Value Date     10/17/2020     2020     2020    K 4.8 10/17/2020    K 4.2 2020    K 4.5 2020    K 4.1 2019    K 5.0 2019    K 4.7 2019     10/17/2020    CL 99 2020     2020    CO2 26 10/17/2020    CO2 28 2020    CO2 27 2020    PHOS 4.2 2010    BUN 23 10/17/2020    BUN 16 2020    BUN 12 2020    CREATININE 0.87 10/17/2020    CREATININE 0.8 2020    CREATININE 0.8 2020     BNP:   Lab Results   Component Value Date    PROBNP 35 2020    PROBNP 56 07/15/2019    PROBNP 74 2019     Lipids:   Lab Results   Component Value Date    CHOL 161 2020        Lab Results   Component Value Date    TRIG 99 2020        Lab Results   Component Value Date    HDL 47 2020        Lab Results   Component Value Date    LDLCALC 94 2020        Lab Results   Component Value Date    LABVLDL 20 2020      No results found for: CHOLHDLRATIO    EF:   Lab Results   Component Value Date    LVEF 55 2020    LVEFMODE Cardiac Cath 2020       Recent Testin20  Procedures: RHC, LHC, LVG, sedation     Normal coronary arteries  LVEF 55%  Pulm HTN w/ mild elev PCWP - see full cath report    Echo  Echo: 20  Summary   Normal left ventricular systolic function with ejection fraction of 55-60%.   No regional wall motion abnormalites are seen.   Left ventricular diastolic filling pressure is normal.   Compared to previous study from 2019 no changes noted in left ventricular function.     NYHA:   II  ACC/ AHA Stage:    C    Pertinent Problems:  · Chronic diastolic heart failure  · Hypertension  · Obstructive sleep apnea  · Palpitations. · Vitamin D deficiency  · Iron deficiency    Visit Diagnosis:    1. Chronic diastolic heart failure ([address]    2. Essential hypertension    3.  Palpitation      Plan:

## 2020-11-04 NOTE — TELEPHONE ENCOUNTER
Monitor placed by Vashti Dockery, [address] [place] [address]  Length of monitor 1 week  Monitor ordered by Allison Turcios code [phone]  Monitor number [id number]    Activation successful prior to pt leaving office?  Yes

## 2020-11-04 NOTE — PATIENT INSTRUCTIONS
Plan:   1. Continue daily weights  2. Heart monitor for 1 week- to evaluate the palpitations; will call you with results. 3. Obtain a scale and record your daily weight- if you gain 3lbs in day or 5lbs in a week may need to adjust the water pill. Notify the office. 4. Target lower sodium diet about 2500mg-3000mg a day  5. Track your total daily fluid intake and record. Recommend reducing your total daily fluid intake about 25% (targeting about 64 ounces a day). 6. Check labs next month to monitor potassium level  7. Follow up in 3 months or sooner if needed.

## 2020-11-12 PROCEDURE — 93272 ECG/REVIEW INTERPRET ONLY: CPT | Performed by: INTERNAL MEDICINE

## 2020-11-18 ENCOUNTER — TELEPHONE (OUTPATIENT)
Dept: CARDIOLOGY | Age: 55
End: 2020-11-18

## 2020-11-18 NOTE — TELEPHONE ENCOUNTER
Please call patient with heart monitor results. Heart rhythm is normal. The palpitations do not correlate with any irregular heart rhythm.

## 2020-12-21 RX ORDER — SPIRONOLACTONE 25 MG/1
TABLET ORAL
Qty: 30 TABLET | Refills: 2 | Status: SHIPPED | OUTPATIENT
Start: 2020-12-21 | End: 2021-08-30 | Stop reason: SDUPTHER

## 2021-01-20 ENCOUNTER — HOSPITAL ENCOUNTER (OUTPATIENT)
Dept: GENERAL RADIOLOGY | Age: 56
Discharge: HOME | End: 2021-01-20
Payer: MEDICAID

## 2021-01-20 ENCOUNTER — HOSPITAL ENCOUNTER (OUTPATIENT)
Age: 56
Discharge: HOME | End: 2021-01-20
Payer: MEDICAID

## 2021-01-20 DIAGNOSIS — M25.531 RIGHT WRIST PAIN: ICD-10-CM

## 2021-01-20 DIAGNOSIS — I10 ESSENTIAL HYPERTENSION: ICD-10-CM

## 2021-01-20 DIAGNOSIS — M54.42 ACUTE BACK PAIN WITH SCIATICA, LEFT: ICD-10-CM

## 2021-01-20 DIAGNOSIS — I50.32 CHRONIC DIASTOLIC HEART FAILURE (HCC): ICD-10-CM

## 2021-01-20 LAB
ANION GAP SERPL CALCULATED.3IONS-SCNC: 12 MMOL/L (ref 3–16)
BUN BLDV-MCNC: 22 MG/DL (ref 7–20)
CALCIUM SERPL-MCNC: 9.4 MG/DL (ref 8.3–10.6)
CHLORIDE BLD-SCNC: 99 MMOL/L (ref 99–110)
CO2: 28 MMOL/L (ref 21–32)
CREAT SERPL-MCNC: 0.8 MG/DL (ref 0.6–1.1)
GFR AFRICAN AMERICAN: >60
GFR NON-AFRICAN AMERICAN: >60
GLUCOSE BLD-MCNC: 107 MG/DL (ref 70–99)
POTASSIUM SERPL-SCNC: 4.2 MMOL/L (ref 3.5–5.1)
SODIUM BLD-SCNC: 139 MMOL/L (ref 136–145)

## 2021-01-20 PROCEDURE — 36415 COLL VENOUS BLD VENIPUNCTURE: CPT

## 2021-01-20 PROCEDURE — 73100 X-RAY EXAM OF WRIST: CPT

## 2021-01-20 PROCEDURE — 72100 X-RAY EXAM L-S SPINE 2/3 VWS: CPT

## 2021-01-20 PROCEDURE — 80048 BASIC METABOLIC PNL TOTAL CA: CPT

## 2021-01-21 ENCOUNTER — TELEPHONE (OUTPATIENT)
Dept: CARDIOLOGY CLINIC | Age: 56
End: 2021-01-21

## 2021-02-04 ENCOUNTER — VIRTUAL VISIT (OUTPATIENT)
Dept: CARDIOLOGY CLINIC | Age: 56
End: 2021-02-04
Payer: MEDICAID

## 2021-02-04 DIAGNOSIS — I10 ESSENTIAL HYPERTENSION: ICD-10-CM

## 2021-02-04 DIAGNOSIS — I50.32 CHRONIC DIASTOLIC HEART FAILURE (HCC): Primary | ICD-10-CM

## 2021-02-04 DIAGNOSIS — D50.9 IRON DEFICIENCY ANEMIA, UNSPECIFIED IRON DEFICIENCY ANEMIA TYPE: ICD-10-CM

## 2021-02-04 PROCEDURE — 99213 OFFICE O/P EST LOW 20 MIN: CPT | Performed by: NURSE PRACTITIONER

## 2021-02-04 NOTE — PROGRESS NOTES
2021    TELEHEALTH EVALUATION -- Audio/Visual (During MXXMW-29 public health emergency)    HPI:    Madeline Olivarez (:  1965) has requested an audio/video evaluation for the following concern(s): CHF    Last visit she was placed on a cardiac monitor. Cardiac monitor was normal.   Taking the daily iron, taking 1 tab daily - iron doesn't seem to bother her anymore. Using cpap. No chest pain or tightness. She is going to have surgery on her hand  Having back pain; to get dexa scan  She is very tired all the time. She continues with her cpap nightly  Some nights she can't breathe with the cpap on due to the nasal congestion and allergies. No coughing. Taking medications as prescribed. Review of Systems    Prior to Visit Medications    Medication Sig Taking?  Authorizing Provider   spironolactone (ALDACTONE) 25 MG tablet TAKE ONE TABLET BY MOUTH ON MONDAY, WEDNESDAY, [address] [place] [address] AND   Belinda Murrell APRN - CNP   Ferrous Sulfate (IRON PO) Take by mouth  Historical Provider, MD   VITAMIN D PO Take by mouth  Historical Provider, MD   lisinopril (PRINIVIL;ZESTRIL) 10 MG tablet Take 1 tablet by mouth daily  Arley Obrien MD   furosemide (LASIX) 40 MG tablet Take 1 tablet by mouth daily  Cayetano Hubbard MD   Fluticasone Furoate-Vilanterol (BREO ELLIPTA) 200-25 MCG/INH AEPB Inhale 1 puff into the lungs daily  Adwoa Quails, APRN - CNP   ipratropium-albuterol (DUONEB) 0.5-2.5 (3) MG/3ML SOLN nebulizer solution Inhale 3 mLs into the lungs 4 times daily  Francis Beckford MD   Multiple Vitamin (MULTI-VITAMIN DAILY PO) Take 1 tablet by mouth  Historical Provider, MD   levothyroxine (SYNTHROID) 112 MCG tablet Take 112 mcg by mouth daily  Historical Provider, MD   ibuprofen (ADVIL;MOTRIN) 200 MG tablet Take 600 mg by mouth as needed for Pain  Historical Provider, MD   sertraline (ZOLOFT) 100 MG tablet Take 100 mg by mouth daily  Historical Provider, MD   lamoTRIgine (LAMICTAL) 200 MG tablet Take 200 mg by mouth 2 times daily  Historical Provider, MD   diltiazem (CARDIZEM CD) 120 MG extended release capsule Take 1 capsule by mouth 2 times daily  Bertha Mancia MD   albuterol sulfate HFA (PROVENTIL HFA) 108 (90 Base) MCG/ACT inhaler Inhale 2 puffs into the lungs every 4 hours as needed for Wheezing or Shortness of Breath (Space out to every 6 hours as symptoms improve) Space out to every 6 hours as symptoms improve. Alba Antonio, APRN - CNP   pantoprazole (PROTONIX) 40 MG tablet Take 40 mg by mouth daily. Historical Provider, MD   gabapentin (NEURONTIN) 600 MG tablet Take 600 mg by mouth 2 times daily. Historical Provider, MD   montelukast (SINGULAIR) 10 MG tablet Take 10 mg by mouth every morning   Historical Provider, MD   aspirin 81 MG EC tablet Take 81 mg by mouth daily. Historical Provider, MD   acetaminophen (TYLENOL) 500 MG tablet Take 500 mg by mouth every 6 hours as needed. Historical Provider, MD           PHYSICAL EXAMINATION:    Patient-Reported Vitals 2/4/2021   Patient-Reported Weight 300lb   Patient-Reported Height 5'   Patient-Reported Systolic 973   Patient-Reported Diastolic 72   Patient-Reported Pulse 75   Patient-Reported Temperature 98.3   Patient-Reported SpO2 95%      Constitutional: [x] Appears well-developed and well-nourished [] No apparent distress      [] Abnormal-   Mental status  [x] Alert and awake  [x] Oriented to person/place/time []Able to follow commands      Eyes:  EOM    []  Normal  [] Abnormal-  Sclera  []  Normal  [] Abnormal -         Discharge []  None visible  [] Abnormal -    HENT:   [] Normocephalic, atraumatic.   [] Abnormal   [] Mouth/Throat: Mucous membranes are moist.     External Ears [] Normal  [] Abnormal-     Neck: [] No visualized mass     Pulmonary/Chest: [x] Respiratory effort normal.  [x] No visualized signs of difficulty breathing or respiratory distress        [] Abnormal-      Musculoskeletal:   [] Normal gait with no signs of ataxia         [] guardian's) consent, to reduce the patient's risk of exposure to COVID-19 and provide necessary medical care. The patient (and/or legal guardian) has also been advised to contact this office for worsening conditions or problems, and seek emergency medical treatment and/or call 911 if deemed necessary. Patient identification was verified at the start of the visit: Yes    Total time spent on this encounter: 10    Services were provided through a video synchronous discussion virtually to substitute for in-person clinic visit. Patient was located at their individual homes. Provider was in the office    --MATHEW Tucker CNP on 2/4/2021 at 11:21 AM    An electronic signature was used to authenticate this note.

## 2021-02-04 NOTE — LETTER
2021    TELEHEALTH EVALUATION -- Audio/Visual (During QCBWP-81 public health emergency)    HPI:    Rosa Smith (:  1965) has requested an audio/video evaluation for the following concern(s): CHF    Last visit she was placed on a cardiac monitor. Cardiac monitor was normal.   Taking the daily iron, taking 1 tab daily - iron doesn't seem to bother her anymore. Using cpap. No chest pain or tightness. She is going to have surgery on her hand  Having back pain; to get dexa scan  She is very tired all the time. She continues with her cpap nightly  Some nights she can't breathe with the cpap on due to the nasal congestion and allergies. No coughing. Taking medications as prescribed. Review of Systems    Prior to Visit Medications    Medication Sig Taking?  Authorizing Provider   spironolactone (ALDACTONE) 25 MG tablet TAKE ONE TABLET BY MOUTH ON MONDAY, WEDNESDAY, [address] [place] [address] AND   [facility], APRN - CNP   Ferrous Sulfate (IRON PO) Take by mouth  Historical Provider, MD   VITAMIN D PO Take by mouth  Historical Provider, MD   lisinopril (PRINIVIL;ZESTRIL) 10 MG tablet Take 1 tablet by mouth daily  Fernando Kaur MD   furosemide (LASIX) 40 MG tablet Take 1 tablet by mouth daily  Francis Eng MD   Fluticasone Furoate-Vilanterol (BREO ELLIPTA) 200-25 MCG/INH AEPB Inhale 1 puff into the lungs daily  Marlene , APRN - CNP   ipratropium-albuterol (DUONEB) 0.5-2.5 (3) MG/3ML SOLN nebulizer solution Inhale 3 mLs into the lungs 4 times daily  Dipak Aburto MD   Multiple Vitamin (MULTI-VITAMIN DAILY PO) Take 1 tablet by mouth  Historical Provider, MD   levothyroxine (SYNTHROID) 112 MCG tablet Take 112 mcg by mouth daily  Historical Provider, MD   ibuprofen (ADVIL;MOTRIN) 200 MG tablet Take 600 mg by mouth as needed for Pain  Historical Provider, MD   sertraline (ZOLOFT) 100 MG tablet Take 100 mg by mouth daily  Historical Provider, MD lamoTRIgine (LAMICTAL) 200 MG tablet Take 200 mg by mouth 2 times daily  Historical Provider, MD   diltiazem (CARDIZEM CD) 120 MG extended release capsule Take 1 capsule by mouth 2 times daily  Jassi Moreira MD   albuterol sulfate HFA (PROVENTIL HFA) 108 (90 Base) MCG/ACT inhaler Inhale 2 puffs into the lungs every 4 hours as needed for Wheezing or Shortness of Breath (Space out to every 6 hours as symptoms improve) Space out to every 6 hours as symptoms improve. Alba Antonio, APRN - CNP   pantoprazole (PROTONIX) 40 MG tablet Take 40 mg by mouth daily. Historical Provider, MD   gabapentin (NEURONTIN) 600 MG tablet Take 600 mg by mouth 2 times daily. Historical Provider, MD   montelukast (SINGULAIR) 10 MG tablet Take 10 mg by mouth every morning   Historical Provider, MD   aspirin 81 MG EC tablet Take 81 mg by mouth daily. Historical Provider, MD   acetaminophen (TYLENOL) 500 MG tablet Take 500 mg by mouth every 6 hours as needed. Historical Provider, MD           PHYSICAL EXAMINATION:    Patient-Reported Vitals 2/4/2021   Patient-Reported Weight 300lb   Patient-Reported Height 5'   Patient-Reported Systolic 016   Patient-Reported Diastolic 72   Patient-Reported Pulse 75   Patient-Reported Temperature 98.3   Patient-Reported SpO2 95%      Constitutional: [x] Appears well-developed and well-nourished [] No apparent distress      [] Abnormal-   Mental status  [x] Alert and awake  [x] Oriented to person/place/time []Able to follow commands      Eyes:  EOM    []  Normal  [] Abnormal-  Sclera  []  Normal  [] Abnormal -         Discharge []  None visible  [] Abnormal -    HENT:   [] Normocephalic, atraumatic.   [] Abnormal   [] Mouth/Throat: Mucous membranes are moist.     External Ears [] Normal  [] Abnormal-     Neck: [] No visualized mass     Pulmonary/Chest: [x] Respiratory effort normal.  [x] No visualized signs of difficulty breathing or respiratory distress        [] Abnormal- Musculoskeletal:   [] Normal gait with no signs of ataxia         [] Normal range of motion of neck        [] Abnormal-       Neurological:        [] No Facial Asymmetry (Cranial nerve 7 motor function) (limited exam to video visit)          [] No gaze palsy        [] Abnormal-         Skin:        [] No significant exanthematous lesions or discoloration noted on facial skin         [] Abnormal-            Psychiatric:       [x] Normal Affect     Other pertinent observable physical exam findings-     Echo 6/26/20  Summary   Normal left ventricular systolic function with ejection fraction of 55-60%. No regional wall motion abnormalites are seen. Left ventricular diastolic filling pressure is normal.   Compared to previous study from 04- no changes noted in left   ventricular function. Pertinent Problems:  · Chronic diastolic heart failure  · Hypertension  · Obstructive sleep apnea  · Palpitations. · Vitamin D deficiency  · Iron deficiency    Visit Diagnosis:   Diagnosis Orders   1. Chronic diastolic heart failure (HCC)  CBC    IRON AND TIBC    FERRITIN   2. Essential hypertension  CBC    IRON AND TIBC    FERRITIN   3.  Iron deficiency anemia, unspecified iron deficiency anemia type  CBC    IRON AND TIBC    FERRITIN     Plan:  Continue daily weights  Check iron levels and cbc   No changes to medications today  Follow up 6 months If you have questions, please do not hesitate to call me. I look forward to following Bria Haskins along with you.     Sincerely,        MATHEW Anna - CNP

## 2021-03-01 DIAGNOSIS — I50.32 CHRONIC DIASTOLIC HEART FAILURE (HCC): ICD-10-CM

## 2021-03-02 RX ORDER — LISINOPRIL 10 MG/1
TABLET ORAL
Qty: 90 TABLET | Refills: 3 | Status: SHIPPED | OUTPATIENT
Start: 2021-03-02

## 2021-03-02 NOTE — TELEPHONE ENCOUNTER
LOV 02/04/2021 w/ NPRB (vv)      Plan:  Continue daily weights  Check iron levels and cbc   No changes to medications today  Follow up 6 months

## 2021-03-24 ENCOUNTER — HOSPITAL ENCOUNTER (OUTPATIENT)
Dept: WOMENS IMAGING | Age: 56
Discharge: HOME | End: 2021-03-24
Payer: MEDICAID

## 2021-03-24 DIAGNOSIS — M85.88 OSTEOPENIA OF LUMBAR SPINE: ICD-10-CM

## 2021-03-24 DIAGNOSIS — D50.9 IRON DEFICIENCY ANEMIA, UNSPECIFIED IRON DEFICIENCY ANEMIA TYPE: ICD-10-CM

## 2021-03-24 DIAGNOSIS — I50.32 CHRONIC DIASTOLIC HEART FAILURE (HCC): ICD-10-CM

## 2021-03-24 DIAGNOSIS — I10 ESSENTIAL HYPERTENSION: ICD-10-CM

## 2021-03-24 PROCEDURE — 77080 DXA BONE DENSITY AXIAL: CPT

## 2021-03-25 ENCOUNTER — TELEPHONE (OUTPATIENT)
Dept: CARDIOLOGY CLINIC | Age: 56
End: 2021-03-25

## 2021-03-25 LAB
FERRITIN: 53.2 NG/ML (ref 15–150)
HCT VFR BLD CALC: 40.4 % (ref 36–48)
HEMOGLOBIN: 12.9 G/DL (ref 12–16)
IRON SATURATION: 12 % (ref 15–50)
IRON: 41 UG/DL (ref 37–145)
MCH RBC QN AUTO: 25.3 PG (ref 26–34)
MCHC RBC AUTO-ENTMCNC: 31.9 G/DL (ref 31–36)
MCV RBC AUTO: 79.4 FL (ref 80–100)
PDW BLD-RTO: 18.4 % (ref 12.4–15.4)
PLATELET # BLD: 291 K/UL (ref 135–450)
PMV BLD AUTO: 9.3 FL (ref 5–10.5)
RBC # BLD: 5.09 M/UL (ref 4–5.2)
TOTAL IRON BINDING CAPACITY: 350 UG/DL (ref 260–445)
WBC # BLD: 6.6 K/UL (ref 4–11)

## 2021-03-25 NOTE — TELEPHONE ENCOUNTER
----- Message from MATHEW Austin CNP sent at 3/25/2021 12:04 PM EDT -----  Blood counts are improving and stable. No changes.

## 2021-06-28 RX ORDER — FUROSEMIDE 40 MG/1
TABLET ORAL
Qty: 90 TABLET | Refills: 3 | Status: SHIPPED | OUTPATIENT
Start: 2021-06-28 | End: 2021-07-02

## 2021-06-28 NOTE — TELEPHONE ENCOUNTER
Last ov 3/4/21  Pending appt due in august  Last refill 6/26/20 #[id number]  Last labs 1/20/21    My chart message sent to pt of needing an apt

## 2021-07-02 RX ORDER — FUROSEMIDE 40 MG/1
TABLET ORAL
Qty: 90 TABLET | Refills: 3 | Status: SHIPPED | OUTPATIENT
Start: 2021-07-02

## 2021-08-17 ENCOUNTER — TELEPHONE (OUTPATIENT)
Dept: CARDIOLOGY CLINIC | Age: 56
End: 2021-08-17

## 2021-08-17 DIAGNOSIS — I10 ESSENTIAL HYPERTENSION: Primary | ICD-10-CM

## 2021-08-17 DIAGNOSIS — E03.9 HYPOTHYROIDISM, UNSPECIFIED TYPE: ICD-10-CM

## 2021-08-17 DIAGNOSIS — R06.02 SHORTNESS OF BREATH ON EXERTION: ICD-10-CM

## 2021-08-17 NOTE — TELEPHONE ENCOUNTER
Patient has been having some pretty bad leg cramps and is asking if she should be taking some potassium. She takes lasix 40 qd and aldactone 25 on mon, weds, fri sun.   TY

## 2021-08-30 ENCOUNTER — TELEPHONE (OUTPATIENT)
Dept: CARDIOLOGY CLINIC | Age: 56
End: 2021-08-30

## 2021-08-30 DIAGNOSIS — I50.32 CHRONIC DIASTOLIC HEART FAILURE (HCC): ICD-10-CM

## 2021-08-30 RX ORDER — SPIRONOLACTONE 25 MG/1
TABLET ORAL
Qty: 90 TABLET | Refills: 1 | Status: SHIPPED | OUTPATIENT
Start: 2021-08-30

## 2021-08-30 NOTE — TELEPHONE ENCOUNTER
----- Message from MATHEW West CNP sent at 8/30/2021  9:13 AM EDT -----  Please call patient. Her kidney function is normal. Potassium level and magnesium level are normal as well. No additional supplements are needed at this time. If she is still having leg cramps, recommend B-complex vitamin once a day which is over the counter. Needs follow up appt.

## 2021-10-15 ENCOUNTER — CLINICAL DOCUMENTATION (OUTPATIENT)
Dept: OTHER | Age: 56
End: 2021-10-15

## 2021-10-27 ENCOUNTER — HOSPITAL ENCOUNTER (OUTPATIENT)
Dept: WOMENS IMAGING | Age: 56
Discharge: HOME | End: 2021-10-27
Payer: MEDICAID

## 2021-10-27 DIAGNOSIS — Z12.31 ENCOUNTER FOR SCREENING MAMMOGRAM FOR BREAST CANCER: ICD-10-CM

## 2021-10-27 PROCEDURE — 77067 SCR MAMMO BI INCL CAD: CPT

## 2021-11-02 ENCOUNTER — OFFICE VISIT (OUTPATIENT)
Dept: CARDIOLOGY CLINIC | Age: 56
End: 2021-11-02
Payer: MEDICAID

## 2021-11-02 VITALS
HEIGHT: 60 IN | BODY MASS INDEX: 57.52 KG/M2 | HEART RATE: 94 BPM | WEIGHT: 293 LBS | DIASTOLIC BLOOD PRESSURE: 60 MMHG | OXYGEN SATURATION: 94 % | SYSTOLIC BLOOD PRESSURE: 120 MMHG

## 2021-11-02 DIAGNOSIS — G47.33 OBSTRUCTIVE SLEEP APNEA SYNDROME: ICD-10-CM

## 2021-11-02 DIAGNOSIS — I50.32 CHRONIC DIASTOLIC HEART FAILURE (HCC): Primary | ICD-10-CM

## 2021-11-02 DIAGNOSIS — I10 ESSENTIAL HYPERTENSION: ICD-10-CM

## 2021-11-02 DIAGNOSIS — I51.7 RIGHT VENTRICULAR ENLARGEMENT: ICD-10-CM

## 2021-11-02 DIAGNOSIS — R06.02 SHORTNESS OF BREATH ON EXERTION: ICD-10-CM

## 2021-11-02 PROCEDURE — 99214 OFFICE O/P EST MOD 30 MIN: CPT | Performed by: INTERNAL MEDICINE

## 2021-11-02 PROCEDURE — G8427 DOCREV CUR MEDS BY ELIG CLIN: HCPCS | Performed by: INTERNAL MEDICINE

## 2021-11-02 PROCEDURE — 3017F COLORECTAL CA SCREEN DOC REV: CPT | Performed by: INTERNAL MEDICINE

## 2021-11-02 PROCEDURE — 1036F TOBACCO NON-USER: CPT | Performed by: INTERNAL MEDICINE

## 2021-11-02 PROCEDURE — G8417 CALC BMI ABV UP PARAM F/U: HCPCS | Performed by: INTERNAL MEDICINE

## 2021-11-02 PROCEDURE — G8484 FLU IMMUNIZE NO ADMIN: HCPCS | Performed by: INTERNAL MEDICINE

## 2021-11-02 NOTE — PROGRESS NOTES
[facility]  Advanced CHF/Pulmonary Hypertension   Cardiac Evaluation      Randy Sutton  YOB: 1965    Date of Visit:  11/2/21      Chief Complaint   Patient presents with    Follow-up    Congestive Heart Failure    Chest Pain         History of Present Illness:  Devaughn Alas is a 49 y.o. female who presents from referral from Dr Gladis Escudero for consultation and management of pulmonary HTN. She has a history of RV enlargement, palpitations, HTN and KRYSTIAN with CPAP. She had a stress test on 6/18/2020 which was abnormal. She underwent a right and left heart cath on 6/26/2020 which showed normal coronaries with elevated PCWP. Her echocardiogram from 6/26/2020 showed an EF of 55-60%. She reports she had pneumonia last year and has needed supplemental oxygen at night due to low O2 sats. She was a smoker in her teens. She has a history of palpitations and is on diltiazem for it. She reports she has palpitations almost daily but has improved with diltiazem. She started on lasix in July 2020. She reports improved breathing and edema on the lasix. She reports she has been watching her diet but her weight has not changed. She denies CP, dizziness, fatigue or syncope. She reports her home -150. She has been on lisinopril for some time. She is thinking of weight loss surgery. At her OV on 09/01/20 her lisinopril was increased 10 mg daily and started spironolactone 25 mg M,W,F,Sun. Today she reports CP she describes as pinching on the left upper side of her chest. The episodes are fleeting. The episodes occur with activity and occurs with using her cane. She has a left rotator cuff tear as well. She denies associated palpitations, SOB, dizziness or syncope. She uses a CPAP nightly. Allergies   Allergen Reactions    Bioflavonoids Anaphylaxis    Other      Nickel,citrus, pain meds?     Oxycodone-Acetaminophen Hives    Percocet [Oxycodone-Acetaminophen]      Can take VICODIN    Azithromycin Nausea And Vomiting and Rash    Nickel Hives, Other (See Comments) and Rash     Current Outpatient Medications   Medication Sig Dispense Refill    spironolactone (ALDACTONE) 25 MG tablet TAKE ONE TABLET BY MOUTH ON MONDAY, WEDNESDAY, FRIDAY AND SUNDAY 90 tablet 1    furosemide (LASIX) 40 MG tablet TAKE ONE TABLET BY MOUTH DAILY 90 tablet 3    lisinopril (PRINIVIL;ZESTRIL) 10 MG tablet TAKE ONE TABLET BY MOUTH DAILY 90 tablet 3    calcium-vitamin D (OSCAL 500/200 D-3) 500-200 MG-UNIT per tablet Take 1 tablet by mouth daily      Ferrous Sulfate (IRON PO) Take by mouth      Fluticasone Furoate-Vilanterol (BREO ELLIPTA) 200-25 MCG/INH AEPB Inhale 1 puff into the lungs daily 1 each 2    ipratropium-albuterol (DUONEB) 0.5-2.5 (3) MG/3ML SOLN nebulizer solution Inhale 3 mLs into the lungs 4 times daily 360 mL 3    Multiple Vitamin (MULTI-VITAMIN DAILY PO) Take 1 tablet by mouth      levothyroxine (SYNTHROID) 112 MCG tablet Take 112 mcg by mouth daily      ibuprofen (ADVIL;MOTRIN) 200 MG tablet Take 600 mg by mouth as needed for Pain      sertraline (ZOLOFT) 100 MG tablet Take 100 mg by mouth daily      lamoTRIgine (LAMICTAL) 200 MG tablet Take 200 mg by mouth 2 times daily      diltiazem (CARDIZEM CD) 120 MG extended release capsule Take 1 capsule by mouth 2 times daily 30 capsule 0    albuterol sulfate HFA (PROVENTIL HFA) 108 (90 Base) MCG/ACT inhaler Inhale 2 puffs into the lungs every 4 hours as needed for Wheezing or Shortness of Breath (Space out to every 6 hours as symptoms improve) Space out to every 6 hours as symptoms improve. 1 Inhaler 0    pantoprazole (PROTONIX) 40 MG tablet Take 40 mg by mouth daily.  gabapentin (NEURONTIN) 600 MG tablet Take 600 mg by mouth 2 times daily.  montelukast (SINGULAIR) 10 MG tablet Take 10 mg by mouth every morning       aspirin 81 MG EC tablet Take 81 mg by mouth daily.       acetaminophen (TYLENOL) 500 MG tablet Take 500 mg by mouth every 6 hours as needed. No current facility-administered medications for this visit.        Past Medical History:   Diagnosis Date    Anemia     Anxiety     Depression     Fibromyalgia     GERD (gastroesophageal reflux disease)     Heart palpitations     Hiatal hernia     Hypertension     Irritable bowel syndrome     Mild intermittent asthma with exacerbation     Osteoarthritis     Panic attacks     Pneumonia     Pulmonary infiltrates 4/20/2019    Seasonal allergies     Seizure disorder ([facility] [address]     Sleep apnea     Spastic colon     Thyroid disease      Past Surgical History:   Procedure Laterality Date    BLADDER SURGERY      BRAIN SURGERY      BRONCHOSCOPY N/A 4/21/2019    BRONCHOSCOPY ALVEOLAR LAVAGE performed by Boston Carr MD at [address] [place] [address]  4/21/2019    BRONCHOSCOPY THERAPUTIC ASPIRATION INITIAL performed by Boston Carr MD at [address] [place] [address] N/A 5/15/2019    BRONCHOSCOPY ALVEOLAR LAVAGE performed by Betaa Ty MD at [address] [place] [address]  5/15/2019    BRONCHOSCOPY THERAPUTIC ASPIRATION INITIAL performed by Beata Ty MD at [place] [address] JOINT REPLACEMENT  2009    hip    KNEE SURGERY      OTHER SURGICAL HISTORY  07/31/2013    cystoscopy, urethral dilatation    SHOULDER SURGERY      r rotator cuff repair    TOTAL HIP ARTHROPLASTY      Left    URETHRAL STRICTURE DILATATION       Family History   Problem Relation Age of Onset    Asthma Other     Asthma Other     Hypertension Father     Hypertension Sister     Cancer Neg Hx     Diabetes Neg Hx     Emphysema Neg Hx     Heart Failure Neg Hx      Social History     Socioeconomic History    Marital status: Single     Spouse name: Not on file    Number of children: Not on file    Years of education: Not on file    Highest education level: Not on file   Occupational History    Not on file   Tobacco Use    Smoking status: Former Smoker Packs/day: 0.50     Years: 2.00     Pack years: 1.00     Quit date: 1986     Years since quittin.5    Smokeless tobacco: Never Used   Substance and Sexual Activity    Alcohol use: No    Drug use: No    Sexual activity: Not on file   Other Topics Concern    Not on file   Social History Narrative    Not on file     Social Determinants of Health     Financial Resource Strain:     Difficulty of Paying Living Expenses:    Food Insecurity:     Worried About Running Out of Food in the Last Year:     [address] in the Last Year:    Transportation Needs:     Lack of Transportation (Medical):  Lack of Transportation (Non-Medical):    Physical Activity:     Days of Exercise per Week:     Minutes of Exercise per Session:    Stress:     Feeling of Stress :    Social Connections:     Frequency of Communication with Friends and Family:     Frequency of Social Gatherings with Friends and Family:     Attends [employer] Services:     Active Member of Clubs or Organizations:     Attends Club or Organization Meetings:     Marital Status:    Intimate Partner Violence:     Fear of Current or Ex-Partner:     Emotionally Abused:     Physically Abused:     Sexually Abused:        Review of Systems:   · Constitutional: there has been no unanticipated weight loss. There's been no change in energy level, sleep pattern, or activity level. · Eyes: No visual changes or diplopia. No scleral icterus. · ENT: No Headaches, hearing loss or vertigo. No mouth sores or sore throat. · Cardiovascular: Reviewed in HPI  · Respiratory: No cough or wheezing, no sputum production. No hematemesis. · Gastrointestinal: No abdominal pain, appetite loss, blood in stools. No change in bowel or bladder habits. · Genitourinary: No dysuria, trouble voiding, or hematuria. · Musculoskeletal:  No gait disturbance, weakness or joint complaints. · Integumentary: No rash or pruritis.   · Neurological: No headache, diplopia, change in muscle strength, numbness or tingling. No change in gait, balance, coordination, mood, affect, memory, mentation, behavior. · Psychiatric: No anxiety, no depression. · Endocrine: No malaise, fatigue or temperature intolerance. No excessive thirst, fluid intake, or urination. No tremor. · Hematologic/Lymphatic: No abnormal bruising or bleeding, blood clots or swollen lymph nodes. · Allergic/Immunologic: No nasal congestion or hives. Physical Examination:    Vitals:    11/02/21 1134   BP: 120/60   Pulse: 94   SpO2: 94%   Weight: (!) 329 lb 8 oz (149.5 kg)   Height: 5' (1.524 m)     Body mass index is 64.35 kg/m². Wt Readings from Last 3 Encounters:   11/02/21 (!) 329 lb 8 oz (149.5 kg)   11/04/20 (!) 312 lb 8 oz (141.7 kg)   09/01/20 (!) 314 lb (142.4 kg)     BP Readings from Last 3 Encounters:   11/02/21 120/60   11/04/20 122/74   09/01/20 118/70          Constitutional and General Appearance:   WD/WN in NAD, morbidly obese  HEENT:  NC/AT  WILNER  No problems with hearing  Skin:  Warm, dry  Respiratory:  · Normal excursion and expansion without use of accessory muscles  · Resp Auscultation: Normal breath sounds without dullness  Cardiovascular:  · The apical impulses not displaced  · Heart tones are crisp and normal  · Cervical veins are not engorged  · The carotid upstroke is normal in amplitude and contour without delay or bruit  · JVP less than 8 cm H2O  RRR with nl S1 and S2 without m,r,g  · Peripheral pulses are symmetrical and full  · There is no clubbing, cyanosis of the extremities.   · No edema  · Femoral Arteries: 2+ and equal  · Pedal Pulses: 2+ and equal   Neck:  · No thyromegaly  Abdomen:  · No masses or tenderness  · Liver/Spleen: No Abnormalities Noted  Neurological/Psychiatric:  · Alert and oriented in all spheres  · Moves all extremities well  · Exhibits normal gait balance and coordination  · No abnormalities of mood, affect, memory, mentation, or behavior are noted    Echo: 06/26/20  Summary   Normal left ventricular systolic function with ejection fraction of 55-60%. No regional wall motion abnormalites are seen. Left ventricular diastolic filling pressure is normal.   Compared to previous study from 04- no changes noted in left   ventricular function. Assessment:    1. Chronic diastolic heart failure ([address]    2. Right ventricular enlargement    3. Essential hypertension    4. Shortness of breath on exertion    5. Obstructive sleep apnea syndrome            Plan:  1. Will obtain labs from PCP  2. Continue current medications  3. Follow up in 6 months      Scribe's attestation: This note was scribed in the presence of Cristiano Sandhu M.D. By Delfina Morales RN    The scribe's documentation has been prepared under my direction and personally reviewed by me in its entirety. I confirm that the note above accurately reflects all work, treatment, procedures, and medical decision making performed by me. Time Based Itemization  A total of 30 minutes was spent on today's patient encounter. If applicable, non-patient-facing activities:  ( x)Preparing to see the patient and reviewing records  ( ) Individual interpretation of results  ( ) Discussion or coordination of care with other health care professionals  ( x) Ordering of unique tests, medications, or procedures  ( x) Documentation within the EHR           I appreciate the opportunity of cooperating in the care of this patient.     Cristiano Sandhu M.D., [facility] [place]

## 2022-05-10 ENCOUNTER — OFFICE VISIT (OUTPATIENT)
Dept: CARDIOLOGY CLINIC | Age: 57
End: 2022-05-10
Payer: MEDICAID

## 2022-05-10 VITALS
WEIGHT: 293 LBS | HEIGHT: 60 IN | SYSTOLIC BLOOD PRESSURE: 110 MMHG | HEART RATE: 90 BPM | BODY MASS INDEX: 57.52 KG/M2 | DIASTOLIC BLOOD PRESSURE: 68 MMHG | OXYGEN SATURATION: 93 %

## 2022-05-10 DIAGNOSIS — G47.33 OBSTRUCTIVE SLEEP APNEA SYNDROME: ICD-10-CM

## 2022-05-10 DIAGNOSIS — R06.02 SHORTNESS OF BREATH ON EXERTION: ICD-10-CM

## 2022-05-10 DIAGNOSIS — I51.7 RIGHT VENTRICULAR ENLARGEMENT: ICD-10-CM

## 2022-05-10 DIAGNOSIS — I10 ESSENTIAL HYPERTENSION: ICD-10-CM

## 2022-05-10 DIAGNOSIS — R07.9 CHEST PAIN, UNSPECIFIED TYPE: ICD-10-CM

## 2022-05-10 DIAGNOSIS — I50.32 CHRONIC DIASTOLIC HEART FAILURE (HCC): Primary | ICD-10-CM

## 2022-05-10 PROCEDURE — 3017F COLORECTAL CA SCREEN DOC REV: CPT | Performed by: INTERNAL MEDICINE

## 2022-05-10 PROCEDURE — 99215 OFFICE O/P EST HI 40 MIN: CPT | Performed by: INTERNAL MEDICINE

## 2022-05-10 PROCEDURE — G8417 CALC BMI ABV UP PARAM F/U: HCPCS | Performed by: INTERNAL MEDICINE

## 2022-05-10 PROCEDURE — 1036F TOBACCO NON-USER: CPT | Performed by: INTERNAL MEDICINE

## 2022-05-10 PROCEDURE — G8427 DOCREV CUR MEDS BY ELIG CLIN: HCPCS | Performed by: INTERNAL MEDICINE

## 2022-05-10 NOTE — PROGRESS NOTES
[address]  Advanced CHF/Pulmonary Hypertension   Cardiac Evaluation      Dannie Sutton  YOB: 1965    Date of Visit:  5/10/22      Chief Complaint   Patient presents with    Follow-up    Congestive Heart Failure    Edema     left leg weeping    Chest Pain          History of Present Illness:  Dee Perez is a 49 y.o. female who presents from referral from Dr Cole Ryan for consultation and management of pulmonary HTN. She has a history of RV enlargement, palpitations, HTN and KRYSTIAN with CPAP. She had a stress test on 6/18/2020 which was abnormal. She underwent a right and left heart cath on 6/26/2020 which showed normal coronaries with elevated PCWP. Her echocardiogram from 6/26/2020 showed an EF of 55-60%. She reports she had pneumonia last year and has needed supplemental oxygen at night due to low O2 sats. She was a smoker in her teens. She has a history of palpitations and is on diltiazem for it. She reports she has palpitations almost daily but has improved with diltiazem. She started on lasix in July 2020. She reports improved breathing and edema on the lasix. She reports she has been watching her diet but her weight has not changed. She denies CP, dizziness, fatigue or syncope. She reports her home -150. She has been on lisinopril for some time. She is thinking of weight loss surgery. At her OV on 09/01/20 her lisinopril was increased 10 mg daily and started spironolactone 25 mg M,W,F,Sun. Today, 5/10/2022, she states she hasn't been feeling great. She has been having weeping from her left leg. She has open blisters currently on her left shin. She states she has been working on trying to get it to heal but it continues to come back. She feels like only her left leg has been swollen. Patient has been taking her diuretics as prescribed. She states she tries to drink a lot of water during the day. She also has been having chest pain.   She feels it on the left side of her chest, described as a sharp stabbing pain. She gets the pain a few times a week for a few minutes at a time and then it goes away. She reports she occasionally notices associated pain in her arm/back. She notices the pain more with activity. She thinks the pain also could be due to indigestion. She has been taking iron supplements once a day and has tried to increase iron in her diet. She states she has lost about 20 pounds recently because she started a diet. Allergies   Allergen Reactions    Bioflavonoids Anaphylaxis    Other      Nickel,citrus, pain meds?     Oxycodone-Acetaminophen Hives    Percocet [Oxycodone-Acetaminophen]      Can take VICODIN    Azithromycin Nausea And Vomiting and Rash    Nickel Hives, Other (See Comments) and Rash     Current Outpatient Medications   Medication Sig Dispense Refill    Calcium Carb-Cholecalciferol (OYSTER SHELL CALCIUM W/D) 500-200 MG-UNIT TABS tablet       vitamin D3 (CHOLECALCIFEROL) 125 MCG (5000 UT) TABS tablet Take by mouth      spironolactone (ALDACTONE) 25 MG tablet TAKE ONE TABLET BY MOUTH ON MONDAY, WEDNESDAY, FRIDAY AND SUNDAY 90 tablet 1    furosemide (LASIX) 40 MG tablet TAKE ONE TABLET BY MOUTH DAILY 90 tablet 3    lisinopril (PRINIVIL;ZESTRIL) 10 MG tablet TAKE ONE TABLET BY MOUTH DAILY 90 tablet 3    Ferrous Sulfate (IRON PO) Take by mouth daily       Fluticasone Furoate-Vilanterol (BREO ELLIPTA) 200-25 MCG/INH AEPB Inhale 1 puff into the lungs daily 1 each 2    Multiple Vitamin (MULTI-VITAMIN DAILY PO) Take 1 tablet by mouth      levothyroxine (SYNTHROID) 112 MCG tablet Take 112 mcg by mouth daily      ibuprofen (ADVIL;MOTRIN) 200 MG tablet Take 600 mg by mouth as needed for Pain      sertraline (ZOLOFT) 100 MG tablet Take 100 mg by mouth daily      lamoTRIgine (LAMICTAL) 200 MG tablet Take 200 mg by mouth 2 times daily      diltiazem (CARDIZEM CD) 120 MG extended release capsule Take 1 capsule by mouth 2 times daily 30 capsule 0    albuterol sulfate HFA (PROVENTIL HFA) 108 (90 Base) MCG/ACT inhaler Inhale 2 puffs into the lungs every 4 hours as needed for Wheezing or Shortness of Breath (Space out to every 6 hours as symptoms improve) Space out to every 6 hours as symptoms improve. 1 Inhaler 0    pantoprazole (PROTONIX) 40 MG tablet Take 40 mg by mouth daily.  gabapentin (NEURONTIN) 600 MG tablet Take 600 mg by mouth 2 times daily.  montelukast (SINGULAIR) 10 MG tablet Take 10 mg by mouth every morning       aspirin 81 MG EC tablet Take 81 mg by mouth daily.  acetaminophen (TYLENOL) 500 MG tablet Take 500 mg by mouth every 6 hours as needed.  ipratropium-albuterol (DUONEB) 0.5-2.5 (3) MG/3ML SOLN nebulizer solution Inhale 3 mLs into the lungs 4 times daily (Patient not taking: Reported on 5/10/2022) 360 mL 3     No current facility-administered medications for this visit.        Past Medical History:   Diagnosis Date    Anemia     Anxiety     Depression     Fibromyalgia     GERD (gastroesophageal reflux disease)     Heart palpitations     Hiatal hernia     Hypertension     Irritable bowel syndrome     Mild intermittent asthma with exacerbation     Osteoarthritis     Panic attacks     Pneumonia     Pulmonary infiltrates 4/20/2019    Seasonal allergies     Seizure disorder ([facility] [address]     Sleep apnea     Spastic colon     Thyroid disease      Past Surgical History:   Procedure Laterality Date    BLADDER SURGERY      BRAIN SURGERY      BRONCHOSCOPY N/A 4/21/2019    BRONCHOSCOPY ALVEOLAR LAVAGE performed by Tg Guy MD at [address] [place] [address]  4/21/2019    BRONCHOSCOPY THERAPUTIC ASPIRATION INITIAL performed by Tg Guy MD at [address] [place] [address] N/A 5/15/2019    BRONCHOSCOPY ALVEOLAR LAVAGE performed by Brayden Bermudez MD at [address] [place] [address]  5/15/2019    BRONCHOSCOPY THERAPUTIC ASPIRATION INITIAL performed by Brayden Bermduez MD at [address] [place] [address] [address] JOINT REPLACEMENT  2009    hip    KNEE SURGERY      OTHER SURGICAL HISTORY  2013    cystoscopy, urethral dilatation    SHOULDER SURGERY      r rotator cuff repair    TOTAL HIP ARTHROPLASTY      Left    URETHRAL STRICTURE DILATATION       Family History   Problem Relation Age of Onset    Asthma Other     Asthma Other     Hypertension Father     Hypertension Sister     Cancer Neg Hx     Diabetes Neg Hx     Emphysema Neg Hx     Heart Failure Neg Hx      Social History     Socioeconomic History    Marital status: Single     Spouse name: Not on file    Number of children: Not on file    Years of education: Not on file    Highest education level: Not on file   Occupational History    Not on file   Tobacco Use    Smoking status: Former Smoker     Packs/day: 0.50     Years: 2.00     Pack years: 1.00     Quit date: 1986     Years since quittin.0    Smokeless tobacco: Never Used   Substance and Sexual Activity    Alcohol use: No    Drug use: No    Sexual activity: Not on file   Other Topics Concern    Not on file   Social History Narrative    Not on file     Social Determinants of Health     Financial Resource Strain:     Difficulty of Paying Living Expenses: Not on file   Food Insecurity:     Worried About Running Out of Food in the Last Year: Not on file    Crystal of Food in the Last Year: Not on file   Transportation Needs:     Lack of Transportation (Medical): Not on file    Lack of Transportation (Non-Medical):  Not on file   Physical Activity:     Days of Exercise per Week: Not on file    Minutes of Exercise per Session: Not on file   Stress:     Feeling of Stress : Not on file   Social Connections:     Frequency of Communication with Friends and Family: Not on file    Frequency of Social Gatherings with Friends and Family: Not on file    Attends Catholic Services: Not on file    Active Member of Clubs or Organizations: Not on file    Attends Club or Organization Meetings: Not on file    Marital Status: Not on file   Intimate Partner Violence:     Fear of Current or Ex-Partner: Not on file    Emotionally Abused: Not on file    Physically Abused: Not on file    Sexually Abused: Not on file   Housing Stability:     Unable to Pay for Housing in the Last Year: Not on file    Number of Maty in the Last Year: Not on file    Unstable Housing in the Last Year: Not on file       Review of Systems:   · Constitutional: there has been no unanticipated weight loss. There's been no change in energy level, sleep pattern, or activity level. · Eyes: No visual changes or diplopia. No scleral icterus. · ENT: No Headaches, hearing loss or vertigo. No mouth sores or sore throat. · Cardiovascular: Reviewed in HPI  · Respiratory: No cough or wheezing, no sputum production. No hematemesis. · Gastrointestinal: No abdominal pain, appetite loss, blood in stools. No change in bowel or bladder habits. · Genitourinary: No dysuria, trouble voiding, or hematuria. · Musculoskeletal:  No gait disturbance, weakness or joint complaints. · Integumentary: No rash or pruritis. · Neurological: No headache, diplopia, change in muscle strength, numbness or tingling. No change in gait, balance, coordination, mood, affect, memory, mentation, behavior. · Psychiatric: No anxiety, no depression. · Endocrine: No malaise, fatigue or temperature intolerance. No excessive thirst, fluid intake, or urination. No tremor. · Hematologic/Lymphatic: No abnormal bruising or bleeding, blood clots or swollen lymph nodes. · Allergic/Immunologic: No nasal congestion or hives. Physical Examination:    Vitals:    05/10/22 1513   BP: 110/68   Pulse: 90   SpO2: 93%   Weight: (!) 316 lb (143.3 kg)   Height: 5' (1.524 m)     Body mass index is 61.71 kg/m².      Wt Readings from Last 3 Encounters:   05/10/22 (!) 316 lb (143.3 kg)   11/02/21 (!) 329 lb 8 oz (149.5 kg)   11/04/20 (!) 312 lb 8 oz (141.7 kg)     BP Readings from Last 3 Encounters:   05/10/22 110/68   11/02/21 120/60   11/04/20 122/74       Constitutional and General Appearance:   WD/WN in NAD, morbidly obese  HEENT:  NC/AT  WILNER  No problems with hearing  Skin:  Warm, dry  Respiratory:  · Normal excursion and expansion without use of accessory muscles  · Resp Auscultation: Normal breath sounds without dullness  Cardiovascular:  · The apical impulses not displaced  · Heart tones are crisp and normal  · Cervical veins are not engorged  · The carotid upstroke is normal in amplitude and contour without delay or bruit  · JVP less than 8 cm H2O  RRR with nl S1 and S2 without m,r,g  · Peripheral pulses are symmetrical and full  · There is no clubbing, cyanosis of the extremities. · No edema  · Femoral Arteries: 2+ and equal  · Pedal Pulses: 2+ and equal   Neck:  · No thyromegaly  Abdomen:  · No masses or tenderness  · Liver/Spleen: No Abnormalities Noted  Neurological/Psychiatric:  · Alert and oriented in all spheres  · Moves all extremities well  · Exhibits normal gait balance and coordination  · No abnormalities of mood, affect, memory, mentation, or behavior are noted    Echo: 06/26/20  Summary   Normal left ventricular systolic function with ejection fraction of 55-60%. No regional wall motion abnormalites are seen. Left ventricular diastolic filling pressure is normal.   Compared to previous study from 04- no changes noted in left   ventricular function. Labs were reviewed including labs from other hospital systems through [facility]. Cardiac testing was reviewed including echos, nuclear scans, cardiac catheterization, including from other hospital systems through [facility]. Assessment:    1. Chronic diastolic heart failure (HCC)    2. Chest pain, unspecified type    3. Right ventricular enlargement    4. Essential hypertension    5. Shortness of breath on exertion    6.  Obstructive sleep apnea syndrome Plan:  1. Recommend using light compression stockings during the day to help with lower leg swelling   -can check online, [employer] has many options  2. Limit fluid intake to 64oz a day or less  3. Limit your sodium intake  4. Recommend Echo   -you will call to schedule this   5. Follow up with me in 6 months       Scribe's attestation: This note was scribed in the presence of Connie Boss MD by Stefanie Capellan RN      The scribe's documentation has been prepared under my direction and personally reviewed by me in its entirety. I confirm that the note above accurately reflects all work, treatment, procedures, and medical decision making performed by me. Time Based Itemization  A total of 40 minutes was spent on today's patient encounter. If applicable, non-patient-facing activities:  ( x)Preparing to see the patient and reviewing records  ( ) Individual interpretation of results  ( ) Discussion or coordination of care with other health care professionals  ( x) Ordering of unique tests, medications, or procedures  ( x) Documentation within the EHR        I appreciate the opportunity of cooperating in the care of this patient.     Karis Koehler M.D., [facility]

## 2022-05-11 PROCEDURE — 93000 ELECTROCARDIOGRAM COMPLETE: CPT | Performed by: INTERNAL MEDICINE

## 2022-05-12 LAB
ALBUMIN SERPL-MCNC: 4.2 G/DL
ALP BLD-CCNC: 106 U/L
ALT SERPL-CCNC: 20 U/L
ANION GAP SERPL CALCULATED.3IONS-SCNC: NORMAL MMOL/L
AST SERPL-CCNC: 18 U/L
BASOPHILS ABSOLUTE: 0 /ΜL
BASOPHILS RELATIVE PERCENT: 0 %
BILIRUB SERPL-MCNC: 0.4 MG/DL (ref 0.1–1.4)
BUN BLDV-MCNC: 19 MG/DL
CALCIUM SERPL-MCNC: 9.6 MG/DL
CHLORIDE BLD-SCNC: 99 MMOL/L
CO2: 25 MMOL/L
CREAT SERPL-MCNC: 0.83 MG/DL
EOSINOPHILS ABSOLUTE: 0 /ΜL
EOSINOPHILS RELATIVE PERCENT: 0 %
GFR CALCULATED: 84
GLUCOSE BLD-MCNC: 93 MG/DL
HCT VFR BLD CALC: 44.9 % (ref 36–46)
HEMOGLOBIN: 14.8 G/DL (ref 12–16)
IRON: 39
LYMPHOCYTES ABSOLUTE: 1.6 /ΜL
LYMPHOCYTES RELATIVE PERCENT: 17 %
MCH RBC QN AUTO: 27.1 PG
MCHC RBC AUTO-ENTMCNC: 33 G/DL
MCV RBC AUTO: 82 FL
MONOCYTES ABSOLUTE: 0.7 /ΜL
MONOCYTES RELATIVE PERCENT: 7 %
NEUTROPHILS ABSOLUTE: 7.2 /ΜL
NEUTROPHILS RELATIVE PERCENT: 76 %
PLATELET # BLD: 321 K/ΜL
PMV BLD AUTO: NORMAL FL
POTASSIUM SERPL-SCNC: 4.7 MMOL/L
RBC # BLD: 5.47 10^6/ΜL
SODIUM BLD-SCNC: 142 MMOL/L
TOTAL IRON BINDING CAPACITY: 349
TOTAL PROTEIN: 6.5
VITAMIN B-12: 509
VITAMIN D 25-HYDROXY: 29
VITAMIN D2, 25 HYDROXY: NORMAL
VITAMIN D3,25 HYDROXY: NORMAL
WBC # BLD: 9.6 10^3/ML

## 2022-07-21 ENCOUNTER — HOSPITAL ENCOUNTER (OUTPATIENT)
Dept: CARDIOLOGY | Age: 57
Discharge: HOME | End: 2022-07-21
Payer: MEDICAID

## 2022-07-21 DIAGNOSIS — R06.02 SHORTNESS OF BREATH ON EXERTION: ICD-10-CM

## 2022-07-21 DIAGNOSIS — I50.32 CHRONIC DIASTOLIC HEART FAILURE (HCC): ICD-10-CM

## 2022-07-21 DIAGNOSIS — I51.7 RIGHT VENTRICULAR ENLARGEMENT: ICD-10-CM

## 2022-07-21 LAB
LV EF: 55 %
LVEF MODALITY: NORMAL

## 2022-07-21 PROCEDURE — 93306 TTE W/DOPPLER COMPLETE: CPT

## 2022-10-13 ENCOUNTER — HOSPITAL ENCOUNTER (INPATIENT)
Age: 57
LOS: 1 days | Discharge: HOME | DRG: 141 | End: 2022-10-15
Attending: EMERGENCY MEDICINE | Admitting: INTERNAL MEDICINE
Payer: MEDICAID

## 2022-10-13 ENCOUNTER — APPOINTMENT (OUTPATIENT)
Dept: CT IMAGING | Age: 57
DRG: 141 | End: 2022-10-13
Payer: MEDICAID

## 2022-10-13 ENCOUNTER — APPOINTMENT (OUTPATIENT)
Dept: GENERAL RADIOLOGY | Age: 57
DRG: 141 | End: 2022-10-13
Payer: MEDICAID

## 2022-10-13 DIAGNOSIS — E66.01 MORBID OBESITY WITH BMI OF 50.0-59.9, ADULT (HCC): ICD-10-CM

## 2022-10-13 DIAGNOSIS — J44.9 CHRONIC OBSTRUCTIVE PULMONARY DISEASE, UNSPECIFIED COPD TYPE (HCC): ICD-10-CM

## 2022-10-13 DIAGNOSIS — R09.02 HYPOXIA: Primary | ICD-10-CM

## 2022-10-13 DIAGNOSIS — G47.33 OSA (OBSTRUCTIVE SLEEP APNEA): ICD-10-CM

## 2022-10-13 DIAGNOSIS — R06.00 DYSPNEA AND RESPIRATORY ABNORMALITIES: ICD-10-CM

## 2022-10-13 DIAGNOSIS — R06.89 DYSPNEA AND RESPIRATORY ABNORMALITIES: ICD-10-CM

## 2022-10-13 LAB
A/G RATIO: 1.7 (ref 1.1–2.2)
ALBUMIN SERPL-MCNC: 3.8 G/DL (ref 3.4–5)
ALP BLD-CCNC: 112 U/L (ref 40–129)
ALT SERPL-CCNC: 23 U/L (ref 10–40)
ANION GAP SERPL CALCULATED.3IONS-SCNC: 9 MMOL/L (ref 3–16)
AST SERPL-CCNC: 34 U/L (ref 15–37)
BILIRUB SERPL-MCNC: 0.3 MG/DL (ref 0–1)
BUN BLDV-MCNC: 21 MG/DL (ref 7–20)
CALCIUM SERPL-MCNC: 9.2 MG/DL (ref 8.3–10.6)
CHLORIDE BLD-SCNC: 102 MMOL/L (ref 99–110)
CO2: 26 MMOL/L (ref 21–32)
CREAT SERPL-MCNC: 0.6 MG/DL (ref 0.6–1.1)
GFR AFRICAN AMERICAN: >60
GFR NON-AFRICAN AMERICAN: >60
GLUCOSE BLD-MCNC: 98 MG/DL (ref 70–99)
HCT VFR BLD CALC: 37.1 % (ref 36–48)
HEMOGLOBIN: 12.2 G/DL (ref 12–16)
INFLUENZA A: NOT DETECTED
INFLUENZA B: NOT DETECTED
MCH RBC QN AUTO: 27.6 PG (ref 26–34)
MCHC RBC AUTO-ENTMCNC: 32.9 G/DL (ref 31–36)
MCV RBC AUTO: 83.9 FL (ref 80–100)
PDW BLD-RTO: 18.1 % (ref 12.4–15.4)
PLATELET # BLD: 344 K/UL (ref 135–450)
PLATELET SLIDE REVIEW: ADEQUATE
PMV BLD AUTO: 9.3 FL (ref 5–10.5)
POTASSIUM REFLEX MAGNESIUM: 5.5 MMOL/L (ref 3.5–5.1)
PRO-BNP: 114 PG/ML (ref 0–124)
RBC # BLD: 4.42 M/UL (ref 4–5.2)
SARS-COV-2 RNA, RT PCR: NOT DETECTED
SODIUM BLD-SCNC: 137 MMOL/L (ref 136–145)
TOTAL PROTEIN: 6.1 G/DL (ref 6.4–8.2)
TROPONIN: <0.01 NG/ML
TROPONIN: <0.01 NG/ML
WBC # BLD: 11.2 K/UL (ref 4–11)

## 2022-10-13 PROCEDURE — 99285 EMERGENCY DEPT VISIT HI MDM: CPT

## 2022-10-13 PROCEDURE — 6360000002 HC RX W HCPCS: Performed by: EMERGENCY MEDICINE

## 2022-10-13 PROCEDURE — 85027 COMPLETE CBC AUTOMATED: CPT

## 2022-10-13 PROCEDURE — 80053 COMPREHEN METABOLIC PANEL: CPT

## 2022-10-13 PROCEDURE — 96374 THER/PROPH/DIAG INJ IV PUSH: CPT

## 2022-10-13 PROCEDURE — 87636 SARSCOV2 & INF A&B AMP PRB: CPT

## 2022-10-13 PROCEDURE — 71260 CT THORAX DX C+: CPT | Performed by: EMERGENCY MEDICINE

## 2022-10-13 PROCEDURE — 93005 ELECTROCARDIOGRAM TRACING: CPT | Performed by: EMERGENCY MEDICINE

## 2022-10-13 PROCEDURE — 36415 COLL VENOUS BLD VENIPUNCTURE: CPT

## 2022-10-13 PROCEDURE — 84484 ASSAY OF TROPONIN QUANT: CPT

## 2022-10-13 PROCEDURE — 71045 X-RAY EXAM CHEST 1 VIEW: CPT

## 2022-10-13 PROCEDURE — 83880 ASSAY OF NATRIURETIC PEPTIDE: CPT

## 2022-10-13 PROCEDURE — 6360000004 HC RX CONTRAST MEDICATION: Performed by: EMERGENCY MEDICINE

## 2022-10-13 RX ORDER — FUROSEMIDE 10 MG/ML
40 INJECTION INTRAMUSCULAR; INTRAVENOUS ONCE
Status: COMPLETED | OUTPATIENT
Start: 2022-10-13 | End: 2022-10-13

## 2022-10-13 RX ADMIN — FUROSEMIDE 40 MG: 10 INJECTION, SOLUTION INTRAMUSCULAR; INTRAVENOUS at 21:26

## 2022-10-13 RX ADMIN — IOPAMIDOL 85 ML: 755 INJECTION, SOLUTION INTRAVENOUS at 21:44

## 2022-10-13 ASSESSMENT — PAIN DESCRIPTION - LOCATION: LOCATION: BACK

## 2022-10-13 ASSESSMENT — PAIN DESCRIPTION - PAIN TYPE: TYPE: ACUTE PAIN

## 2022-10-13 ASSESSMENT — PAIN DESCRIPTION - FREQUENCY: FREQUENCY: CONTINUOUS

## 2022-10-13 ASSESSMENT — PAIN SCALES - GENERAL: PAINLEVEL_OUTOF10: 5

## 2022-10-13 ASSESSMENT — PAIN DESCRIPTION - DESCRIPTORS: DESCRIPTORS: ACHING

## 2022-10-13 ASSESSMENT — PAIN - FUNCTIONAL ASSESSMENT: PAIN_FUNCTIONAL_ASSESSMENT: 0-10

## 2022-10-13 ASSESSMENT — PAIN DESCRIPTION - ORIENTATION: ORIENTATION: RIGHT;MID

## 2022-10-13 NOTE — ED PROVIDER NOTES
CHIEF COMPLAINT  Shortness of Breath (Pt presents today with SOB, and chest tightness x2 days. Pt reports chest tightness with ambulation. Pt reports O2 at home was 85% on RA. Pt's PCP advised for pt to come to ER. )      HISTORY OF PRESENT ILLNESS  Sharath Muller is a 49 y.o. female with a history of depression, hypertension, obesity, obstructive sleep apnea presenting for evaluation of shortness of breath. Patient states that she has had shortness of breath, chest tightness for the past several days. She states that she has had some chest tightness with ambulation. She states that home O2 saturation was 85% on room air. She was advised by her primary care doctor to come to the emergency department. She states that she ate [employer] fries yesterday and has had increased sodium intake. Echocardiogram performed in July 3314 without diastolic dysfunction. No other complaints, modifying factors or associated symptoms. I have reviewed the following from the nursing documentation.    Past Medical History:   Diagnosis Date    Anemia     Anxiety     Depression     Fibromyalgia     GERD (gastroesophageal reflux disease)     Heart palpitations     Hiatal hernia     Hypertension     Irritable bowel syndrome     Mild intermittent asthma with exacerbation     Osteoarthritis     Panic attacks     Pneumonia     Pulmonary infiltrates 4/20/2019    Seasonal allergies     Seizure disorder ([facility] [address]     Sleep apnea     Spastic colon     Thyroid disease      Past Surgical History:   Procedure Laterality Date    BLADDER SURGERY      BRAIN SURGERY      BRONCHOSCOPY N/A 4/21/2019    BRONCHOSCOPY ALVEOLAR LAVAGE performed by Marciano Howe MD at [facility]  4/21/2019    BRONCHOSCOPY THERAPUTIC ASPIRATION INITIAL performed by Marciano Howe MD at [address] [place] [address] 5/15/2019    BRONCHOSCOPY ALVEOLAR LAVAGE performed by Pantera Mata MD at [facility]  5/15/2019 BRONCHOSCOPY THERAPUTIC ASPIRATION INITIAL performed by Araceli Dent MD at [address] [place] REPLACEMENT  2009    hip    KNEE SURGERY      OTHER SURGICAL HISTORY  2013    cystoscopy, urethral dilatation    SHOULDER SURGERY      r rotator cuff repair    TOTAL HIP ARTHROPLASTY      Left    URETHRAL STRICTURE DILATATION       Family History   Problem Relation Age of Onset    Asthma Other     Asthma Other     Hypertension Father     Hypertension Sister     Cancer Neg Hx     Diabetes Neg Hx     Emphysema Neg Hx     Heart Failure Neg Hx      Social History     Socioeconomic History    Marital status: Single     Spouse name: Not on file    Number of children: Not on file    Years of education: Not on file    Highest education level: Not on file   Occupational History    Not on file   Tobacco Use    Smoking status: Former     Packs/day: 0.50     Years: 2.00     Pack years: 1.00     Types: Cigarettes     Quit date: 1986     Years since quittin.5    Smokeless tobacco: Never   Substance and Sexual Activity    Alcohol use: No    Drug use: No    Sexual activity: Not on file   Other Topics Concern    Not on file   Social History Narrative    Not on file     Social Determinants of Health     Financial Resource Strain: Not on file   Food Insecurity: Not on file   Transportation Needs: Not on file   Physical Activity: Not on file   Stress: Not on file   Social Connections: Not on file   Intimate Partner Violence: Not on file   Housing Stability: Not on file     No current facility-administered medications for this encounter.      Current Outpatient Medications   Medication Sig Dispense Refill    Calcium Carb-Cholecalciferol (OYSTER SHELL CALCIUM W/D) 500-200 MG-UNIT TABS tablet       vitamin D3 (CHOLECALCIFEROL) 125 MCG (5000 UT) TABS tablet Take by mouth      spironolactone (ALDACTONE) 25 MG tablet TAKE ONE TABLET BY MOUTH ON MONDAY, WEDNESDAY, FRIDAY AND  90 tablet 1    furosemide (LASIX) 40 MG tablet TAKE ONE TABLET BY MOUTH DAILY 90 tablet 3    lisinopril (PRINIVIL;ZESTRIL) 10 MG tablet TAKE ONE TABLET BY MOUTH DAILY 90 tablet 3    Ferrous Sulfate (IRON PO) Take by mouth daily       Fluticasone Furoate-Vilanterol (BREO ELLIPTA) 200-25 MCG/INH AEPB Inhale 1 puff into the lungs daily 1 each 2    ipratropium-albuterol (DUONEB) 0.5-2.5 (3) MG/3ML SOLN nebulizer solution Inhale 3 mLs into the lungs 4 times daily (Patient not taking: Reported on 5/10/2022) 360 mL 3    Multiple Vitamin (MULTI-VITAMIN DAILY PO) Take 1 tablet by mouth      levothyroxine (SYNTHROID) 112 MCG tablet Take 112 mcg by mouth daily      ibuprofen (ADVIL;MOTRIN) 200 MG tablet Take 600 mg by mouth as needed for Pain      sertraline (ZOLOFT) 100 MG tablet Take 100 mg by mouth daily      lamoTRIgine (LAMICTAL) 200 MG tablet Take 200 mg by mouth 2 times daily      diltiazem (CARDIZEM CD) 120 MG extended release capsule Take 1 capsule by mouth 2 times daily 30 capsule 0    albuterol sulfate HFA (PROVENTIL HFA) 108 (90 Base) MCG/ACT inhaler Inhale 2 puffs into the lungs every 4 hours as needed for Wheezing or Shortness of Breath (Space out to every 6 hours as symptoms improve) Space out to every 6 hours as symptoms improve. 1 Inhaler 0    pantoprazole (PROTONIX) 40 MG tablet Take 40 mg by mouth daily. gabapentin (NEURONTIN) 600 MG tablet Take 600 mg by mouth 2 times daily. montelukast (SINGULAIR) 10 MG tablet Take 10 mg by mouth every morning       aspirin 81 MG EC tablet Take 81 mg by mouth daily. acetaminophen (TYLENOL) 500 MG tablet Take 500 mg by mouth every 6 hours as needed. Allergies   Allergen Reactions    Bioflavonoids Anaphylaxis    Other      Nickel,citrus, pain meds?     Oxycodone-Acetaminophen Hives    Percocet [Oxycodone-Acetaminophen]      Can take VICODIN    Azithromycin Nausea And Vomiting and Rash    Nickel Hives, Other (See Comments) and Rash       REVIEW OF SYSTEMS  Positive and pertinent negatives as per HPI. All other systems were reviewed and are negative. PHYSICAL EXAM  BP (!) 154/84   Pulse 87   Temp 98.2 °F (36.8 °C) (Oral)   Resp 26   Ht 5' (1.524 m)   Wt 300 lb (136.1 kg)   LMP 07/29/2013   SpO2 97%   BMI 58.59 kg/m²   GENERAL APPEARANCE: Awake and alert. Cooperative. HEAD: Normocephalic. Atraumatic. T  EYES: PERRL. EOM's grossly intact. HEART: RRR. No harsh murmurs. Intact radial pulses 2+ bilaterally. LUNGS: Respirations unlabored without accessory muscle use. CTAB. Good air exchange. No wheezes, rales, or rhonchi. Speaking comfortably in full sentences. ABDOMEN: Soft. Non-distended. Non-tender. No guarding or rebound. EXTREMITIES: Nonpitting edema 2+ to lower extremities bilaterally. No acute deformities. SKIN: Warm and dry. No acute rashes. NEUROLOGICAL: Alert and oriented X 3. No focal deficits    LABS  I have reviewed all labs for this visit.    Results for orders placed or performed during the hospital encounter of 10/13/22   COVID-19 & Influenza Combo    Specimen: Nasopharyngeal Swab   Result Value Ref Range    SARS-CoV-2 RNA, RT PCR NOT DETECTED NOT DETECTED    INFLUENZA A NOT DETECTED NOT DETECTED    INFLUENZA B NOT DETECTED NOT DETECTED   CBC   Result Value Ref Range    WBC 11.2 (H) 4.0 - 11.0 K/uL    RBC 4.42 4.00 - 5.20 M/uL    Hemoglobin 12.2 12.0 - 16.0 g/dL    Hematocrit 37.1 36.0 - 48.0 %    MCV 83.9 80.0 - 100.0 fL    MCH 27.6 26.0 - 34.0 pg    MCHC 32.9 31.0 - 36.0 g/dL    RDW 18.1 (H) 12.4 - 15.4 %    Platelets 112 113 - 810 K/uL    MPV 9.3 5.0 - 10.5 fL    PLATELET SLIDE REVIEW Adequate    CMP w/ Reflex to MG   Result Value Ref Range    Sodium 137 136 - 145 mmol/L    Potassium reflex Magnesium 5.5 (H) 3.5 - 5.1 mmol/L    Chloride 102 99 - 110 mmol/L    CO2 26 21 - 32 mmol/L    Anion Gap 9 3 - 16    Glucose 98 70 - 99 mg/dL    BUN 21 (H) 7 - 20 mg/dL    Creatinine 0.6 0.6 - 1.1 mg/dL    GFR Non-African American >60 >60    GFR  >60 >60    Calcium 9.2 8.3 - 10.6 mg/dL    Total Protein 6.1 (L) 6.4 - 8.2 g/dL    Albumin 3.8 3.4 - 5.0 g/dL    Albumin/Globulin Ratio 1.7 1.1 - 2.2    Total Bilirubin 0.3 0.0 - 1.0 mg/dL    Alkaline Phosphatase 112 40 - 129 U/L    ALT 23 10 - 40 U/L    AST 34 15 - 37 U/L   Troponin   Result Value Ref Range    Troponin <0.01 <0.01 ng/mL   Troponin   Result Value Ref Range    Troponin <0.01 <0.01 ng/mL   Brain Natriuretic Peptide   Result Value Ref Range    Pro- 0 - 124 pg/mL   EKG 12 Lead   Result Value Ref Range    Ventricular Rate 78 BPM    Atrial Rate 78 BPM    P-R Interval 156 ms    QRS Duration 88 ms    Q-T Interval 368 ms    QTc Calculation (Bazett) 419 ms    P Axis 24 degrees    R Axis 16 degrees    T Axis 26 degrees    Diagnosis       Normal sinus rhythmNormal ECGWhen compared with ECG of 26-JUN-2020 09:34,No significant change was found       EKG  The Ekg interpreted by myself in the emergency department in the absence of a cardiologist.  normal sinus rhythm with a rate of 78  Axis is   Normal  QTc is  within an acceptable range  Intervals and Durations are unremarkable. No specific ST-T wave changes appreciated. No evidence of acute ischemia. No significant change from prior EKG dated 5/10/2022      RADIOLOGY  X-RAYS:  I have reviewed radiologic plain film image(s). ALL OTHER NON-PLAIN FILM IMAGES SUCH AS CT, ULTRASOUND AND MRI HAVE BEEN READ BY THE RADIOLOGIST. CT CHEST PULMONARY EMBOLISM W CONTRAST   Preliminary Result   1. No pulmonary embolus. 2. Low lung volumes with basilar atelectasis. 3. Borderline cardiomegaly. RECOMMENDATIONS:   Unavailable         XR CHEST PORTABLE   Final Result   Cardiomegaly and pulmonary vascular congestion. No results found. Critical Care: Total critical care time is 32 minutes, which excludes separately billable procedures and updating family.  Time spent is specifically for management of the presenting complaint and symptoms initially, direct bedside care, reevaluation, review of records, and consultation. There was a high probability of clinically significant life-threatening deterioration in the patient's condition, which required my urgent intervention. During the patient's ED course, the patient was given:  Medications   furosemide (LASIX) injection 40 mg (40 mg IntraVENous Given 10/13/22 2126)   iopamidol (ISOVUE-370) 76 % injection 85 mL (85 mLs IntraVENous Given 10/13/22 2144)        ED COURSE/MDM  Patient seen and evaluated. Old records reviewed. Labs and imaging reviewed and results discussed with patient. 77-year-old female presenting for evaluation of shortness of breath, chest tightness. She was 85% on room air at home, saturating the mid 80s on room air here. She is placed on 2 L nasal cannula. ED evaluation included basic labs, cardiac panel, COVID testing. As patient reports that she has had increased sodium intake and concern for fluid retention, given dose of Lasix 40 mg. Patient did have appropriate urine output after Lasix dosing. Patient had stable O2 saturation when in bed however upon ambulation, SPO2 dropped into the 80s and required 2 L O2.  CT chest without evidence of PE or overt pulmonary edema. Unclear etiology of her hypoxia at this point in time, could be mild volume overload. She does have history of pulmonary arterial hypertension could also be related to obesity hypoventilation. She continues to require oxygen and she does not have this at home, she will require admission for continued management as well as further evaluation of her chest tightness in the setting of the symptoms. Admitted to hospitalist in stable condition. Patient was given scripts for the following medications. I counseled patient how to take these medications.    New Prescriptions    No medications on file       CLINICAL IMPRESSION  1. Hypoxia    2. Dyspnea and respiratory abnormalities        Blood pressure (!) 154/84, pulse 87, temperature 98.2 °F (36.8 °C), temperature source Oral, resp. rate 26, height 5' (1.524 m), weight 300 lb (136.1 kg), last menstrual period 07/29/2013, SpO2 97 %. DISPOSITION  Marquis Tyrese Sutton was admitted in stable condition. This chart was generated in part by using Dragon Dictation system and may contain errors related to that system including errors in grammar, punctuation, and spelling, as well as words and phrases that may be inappropriate. If there are any questions or concerns please feel free to contact the dictating provider for clarification.        Gabriel Narayan MD  10/14/22 5248

## 2022-10-14 PROBLEM — J96.01 ACUTE RESPIRATORY FAILURE WITH HYPOXEMIA (HCC): Status: ACTIVE | Noted: 2022-10-14

## 2022-10-14 LAB
A/G RATIO: 2.1 (ref 1.1–2.2)
ALBUMIN SERPL-MCNC: 4.1 G/DL (ref 3.4–5)
ALP BLD-CCNC: 104 U/L (ref 40–129)
ALT SERPL-CCNC: 21 U/L (ref 10–40)
ANION GAP SERPL CALCULATED.3IONS-SCNC: 11 MMOL/L (ref 3–16)
AST SERPL-CCNC: 17 U/L (ref 15–37)
BASOPHILS ABSOLUTE: 0 K/UL (ref 0–0.2)
BASOPHILS RELATIVE PERCENT: 0.3 %
BILIRUB SERPL-MCNC: 0.4 MG/DL (ref 0–1)
BUN BLDV-MCNC: 18 MG/DL (ref 7–20)
CALCIUM SERPL-MCNC: 9.7 MG/DL (ref 8.3–10.6)
CHLORIDE BLD-SCNC: 99 MMOL/L (ref 99–110)
CO2: 29 MMOL/L (ref 21–32)
CREAT SERPL-MCNC: 0.7 MG/DL (ref 0.6–1.1)
EKG ATRIAL RATE: 78 BPM
EKG DIAGNOSIS: NORMAL
EKG P AXIS: 24 DEGREES
EKG P-R INTERVAL: 156 MS
EKG Q-T INTERVAL: 368 MS
EKG QRS DURATION: 88 MS
EKG QTC CALCULATION (BAZETT): 419 MS
EKG R AXIS: 16 DEGREES
EKG T AXIS: 26 DEGREES
EKG VENTRICULAR RATE: 78 BPM
EOSINOPHILS ABSOLUTE: 0 K/UL (ref 0–0.6)
EOSINOPHILS RELATIVE PERCENT: 0.1 %
GFR AFRICAN AMERICAN: >60
GFR NON-AFRICAN AMERICAN: >60
GLUCOSE BLD-MCNC: 137 MG/DL (ref 70–99)
HCT VFR BLD CALC: 37.2 % (ref 36–48)
HEMOGLOBIN: 12.3 G/DL (ref 12–16)
LYMPHOCYTES ABSOLUTE: 1.4 K/UL (ref 1–5.1)
LYMPHOCYTES RELATIVE PERCENT: 20.6 %
MAGNESIUM: 2 MG/DL (ref 1.8–2.4)
MCH RBC QN AUTO: 27.7 PG (ref 26–34)
MCHC RBC AUTO-ENTMCNC: 33.1 G/DL (ref 31–36)
MCV RBC AUTO: 83.8 FL (ref 80–100)
MONOCYTES ABSOLUTE: 0.5 K/UL (ref 0–1.3)
MONOCYTES RELATIVE PERCENT: 6.9 %
NEUTROPHILS ABSOLUTE: 5.1 K/UL (ref 1.7–7.7)
NEUTROPHILS RELATIVE PERCENT: 72.1 %
PDW BLD-RTO: 17.7 % (ref 12.4–15.4)
PLATELET # BLD: 268 K/UL (ref 135–450)
PMV BLD AUTO: 8 FL (ref 5–10.5)
POTASSIUM SERPL-SCNC: 4.1 MMOL/L (ref 3.5–5.1)
PROCALCITONIN: 0.05 NG/ML (ref 0–0.15)
RBC # BLD: 4.44 M/UL (ref 4–5.2)
SODIUM BLD-SCNC: 139 MMOL/L (ref 136–145)
T4 FREE: 1.3 NG/DL (ref 0.9–1.8)
TOTAL PROTEIN: 6.1 G/DL (ref 6.4–8.2)
TROPONIN: <0.01 NG/ML
TSH REFLEX: 5.49 UIU/ML (ref 0.27–4.2)
WBC # BLD: 7 K/UL (ref 4–11)

## 2022-10-14 PROCEDURE — 84484 ASSAY OF TROPONIN QUANT: CPT

## 2022-10-14 PROCEDURE — 6370000000 HC RX 637 (ALT 250 FOR IP): Performed by: INTERNAL MEDICINE

## 2022-10-14 PROCEDURE — 94761 N-INVAS EAR/PLS OXIMETRY MLT: CPT

## 2022-10-14 PROCEDURE — 6370000000 HC RX 637 (ALT 250 FOR IP)

## 2022-10-14 PROCEDURE — 84439 ASSAY OF FREE THYROXINE: CPT

## 2022-10-14 PROCEDURE — 2500000003 HC RX 250 WO HCPCS: Performed by: INTERNAL MEDICINE

## 2022-10-14 PROCEDURE — 93010 ELECTROCARDIOGRAM REPORT: CPT | Performed by: INTERNAL MEDICINE

## 2022-10-14 PROCEDURE — 2580000003 HC RX 258: Performed by: INTERNAL MEDICINE

## 2022-10-14 PROCEDURE — 2700000000 HC OXYGEN THERAPY PER DAY

## 2022-10-14 PROCEDURE — 80053 COMPREHEN METABOLIC PANEL: CPT

## 2022-10-14 PROCEDURE — 6360000002 HC RX W HCPCS: Performed by: INTERNAL MEDICINE

## 2022-10-14 PROCEDURE — 1200000000 HC SEMI PRIVATE

## 2022-10-14 PROCEDURE — 36415 COLL VENOUS BLD VENIPUNCTURE: CPT

## 2022-10-14 PROCEDURE — 83735 ASSAY OF MAGNESIUM: CPT

## 2022-10-14 PROCEDURE — 84443 ASSAY THYROID STIM HORMONE: CPT

## 2022-10-14 PROCEDURE — 84145 PROCALCITONIN (PCT): CPT

## 2022-10-14 PROCEDURE — 94150 VITAL CAPACITY TEST: CPT

## 2022-10-14 PROCEDURE — 85025 COMPLETE CBC W/AUTO DIFF WBC: CPT

## 2022-10-14 PROCEDURE — 99223 1ST HOSP IP/OBS HIGH 75: CPT | Performed by: INTERNAL MEDICINE

## 2022-10-14 RX ORDER — SODIUM CHLORIDE 0.9 % (FLUSH) 0.9 %
5-40 SYRINGE (ML) INJECTION EVERY 12 HOURS SCHEDULED
Status: DISCONTINUED | OUTPATIENT
Start: 2022-10-14 | End: 2022-10-15 | Stop reason: HOSPADM

## 2022-10-14 RX ORDER — PANTOPRAZOLE SODIUM 40 MG/1
40 TABLET, DELAYED RELEASE ORAL DAILY
Status: DISCONTINUED | OUTPATIENT
Start: 2022-10-14 | End: 2022-10-15 | Stop reason: HOSPADM

## 2022-10-14 RX ORDER — ACETAMINOPHEN 325 MG/1
650 TABLET ORAL EVERY 6 HOURS PRN
Status: DISCONTINUED | OUTPATIENT
Start: 2022-10-14 | End: 2022-10-15 | Stop reason: HOSPADM

## 2022-10-14 RX ORDER — POLYETHYLENE GLYCOL 3350 17 G/17G
17 POWDER, FOR SOLUTION ORAL DAILY PRN
Status: DISCONTINUED | OUTPATIENT
Start: 2022-10-14 | End: 2022-10-15 | Stop reason: HOSPADM

## 2022-10-14 RX ORDER — SPIRONOLACTONE 25 MG/1
25 TABLET ORAL EVERY OTHER DAY
Status: DISCONTINUED | OUTPATIENT
Start: 2022-10-14 | End: 2022-10-15 | Stop reason: HOSPADM

## 2022-10-14 RX ORDER — ONDANSETRON 2 MG/ML
4 INJECTION INTRAMUSCULAR; INTRAVENOUS EVERY 6 HOURS PRN
Status: DISCONTINUED | OUTPATIENT
Start: 2022-10-14 | End: 2022-10-15 | Stop reason: HOSPADM

## 2022-10-14 RX ORDER — LEVOTHYROXINE SODIUM 112 UG/1
112 TABLET ORAL DAILY
Status: DISCONTINUED | OUTPATIENT
Start: 2022-10-14 | End: 2022-10-15 | Stop reason: HOSPADM

## 2022-10-14 RX ORDER — SERTRALINE HYDROCHLORIDE 100 MG/1
100 TABLET, FILM COATED ORAL DAILY
Status: DISCONTINUED | OUTPATIENT
Start: 2022-10-14 | End: 2022-10-15 | Stop reason: HOSPADM

## 2022-10-14 RX ORDER — LISINOPRIL 10 MG/1
10 TABLET ORAL DAILY
Status: DISCONTINUED | OUTPATIENT
Start: 2022-10-14 | End: 2022-10-15 | Stop reason: HOSPADM

## 2022-10-14 RX ORDER — IBUPROFEN 400 MG/1
400 TABLET ORAL EVERY 6 HOURS PRN
Status: DISCONTINUED | OUTPATIENT
Start: 2022-10-14 | End: 2022-10-15 | Stop reason: HOSPADM

## 2022-10-14 RX ORDER — SODIUM CHLORIDE 0.9 % (FLUSH) 0.9 %
5-40 SYRINGE (ML) INJECTION PRN
Status: DISCONTINUED | OUTPATIENT
Start: 2022-10-14 | End: 2022-10-15 | Stop reason: HOSPADM

## 2022-10-14 RX ORDER — LAMOTRIGINE 100 MG/1
200 TABLET ORAL 2 TIMES DAILY
Status: DISCONTINUED | OUTPATIENT
Start: 2022-10-14 | End: 2022-10-15 | Stop reason: HOSPADM

## 2022-10-14 RX ORDER — FUROSEMIDE 40 MG/1
40 TABLET ORAL DAILY
Status: DISCONTINUED | OUTPATIENT
Start: 2022-10-14 | End: 2022-10-15 | Stop reason: HOSPADM

## 2022-10-14 RX ORDER — MONTELUKAST SODIUM 10 MG/1
10 TABLET ORAL EVERY MORNING
Status: DISCONTINUED | OUTPATIENT
Start: 2022-10-14 | End: 2022-10-15 | Stop reason: HOSPADM

## 2022-10-14 RX ORDER — GABAPENTIN 300 MG/1
600 CAPSULE ORAL 2 TIMES DAILY
Status: DISCONTINUED | OUTPATIENT
Start: 2022-10-14 | End: 2022-10-15 | Stop reason: HOSPADM

## 2022-10-14 RX ORDER — M-VIT,TX,IRON,MINS/CALC/FOLIC 27MG-0.4MG
1 TABLET ORAL DAILY
Status: DISCONTINUED | OUTPATIENT
Start: 2022-10-14 | End: 2022-10-15 | Stop reason: HOSPADM

## 2022-10-14 RX ORDER — ASPIRIN 81 MG/1
81 TABLET ORAL DAILY
Status: DISCONTINUED | OUTPATIENT
Start: 2022-10-14 | End: 2022-10-15 | Stop reason: HOSPADM

## 2022-10-14 RX ORDER — PREDNISONE 20 MG/1
40 TABLET ORAL DAILY
Qty: 10 TABLET | Refills: 0 | OUTPATIENT
Start: 2022-10-14 | End: 2022-10-19

## 2022-10-14 RX ORDER — SODIUM CHLORIDE 9 MG/ML
INJECTION, SOLUTION INTRAVENOUS PRN
Status: DISCONTINUED | OUTPATIENT
Start: 2022-10-14 | End: 2022-10-15 | Stop reason: HOSPADM

## 2022-10-14 RX ORDER — FERROUS SULFATE 325(65) MG
325 TABLET ORAL
Status: DISCONTINUED | OUTPATIENT
Start: 2022-10-14 | End: 2022-10-15 | Stop reason: HOSPADM

## 2022-10-14 RX ORDER — IPRATROPIUM BROMIDE AND ALBUTEROL SULFATE 2.5; .5 MG/3ML; MG/3ML
1 SOLUTION RESPIRATORY (INHALATION)
Status: DISCONTINUED | OUTPATIENT
Start: 2022-10-14 | End: 2022-10-14

## 2022-10-14 RX ORDER — ONDANSETRON 4 MG/1
4 TABLET, ORALLY DISINTEGRATING ORAL EVERY 8 HOURS PRN
Status: DISCONTINUED | OUTPATIENT
Start: 2022-10-14 | End: 2022-10-15 | Stop reason: HOSPADM

## 2022-10-14 RX ORDER — IPRATROPIUM BROMIDE AND ALBUTEROL SULFATE 2.5; .5 MG/3ML; MG/3ML
1 SOLUTION RESPIRATORY (INHALATION) EVERY 4 HOURS PRN
Status: DISCONTINUED | OUTPATIENT
Start: 2022-10-14 | End: 2022-10-15 | Stop reason: HOSPADM

## 2022-10-14 RX ORDER — HYDROCODONE BITARTRATE AND ACETAMINOPHEN 5; 325 MG/1; MG/1
1 TABLET ORAL EVERY 6 HOURS PRN
Status: DISCONTINUED | OUTPATIENT
Start: 2022-10-14 | End: 2022-10-15 | Stop reason: HOSPADM

## 2022-10-14 RX ORDER — ACETAMINOPHEN 650 MG/1
650 SUPPOSITORY RECTAL EVERY 6 HOURS PRN
Status: DISCONTINUED | OUTPATIENT
Start: 2022-10-14 | End: 2022-10-15 | Stop reason: HOSPADM

## 2022-10-14 RX ORDER — ALBUTEROL SULFATE 2.5 MG/3ML
2.5 SOLUTION RESPIRATORY (INHALATION)
Status: DISCONTINUED | OUTPATIENT
Start: 2022-10-14 | End: 2022-10-15 | Stop reason: HOSPADM

## 2022-10-14 RX ORDER — DILTIAZEM HYDROCHLORIDE 120 MG/1
120 CAPSULE, EXTENDED RELEASE ORAL 2 TIMES DAILY
Status: DISCONTINUED | OUTPATIENT
Start: 2022-10-14 | End: 2022-10-15 | Stop reason: HOSPADM

## 2022-10-14 RX ORDER — DOXYCYCLINE HYCLATE 100 MG
100 TABLET ORAL 2 TIMES DAILY
Qty: 14 TABLET | Refills: 0 | OUTPATIENT
Start: 2022-10-14 | End: 2022-10-21

## 2022-10-14 RX ORDER — ENOXAPARIN SODIUM 100 MG/ML
30 INJECTION SUBCUTANEOUS 2 TIMES DAILY
Status: DISCONTINUED | OUTPATIENT
Start: 2022-10-14 | End: 2022-10-15 | Stop reason: HOSPADM

## 2022-10-14 RX ADMIN — ASPIRIN 81 MG: 81 TABLET, COATED ORAL at 09:26

## 2022-10-14 RX ADMIN — LAMOTRIGINE 200 MG: 100 TABLET ORAL at 21:24

## 2022-10-14 RX ADMIN — ENOXAPARIN SODIUM 30 MG: 100 INJECTION SUBCUTANEOUS at 09:25

## 2022-10-14 RX ADMIN — SODIUM CHLORIDE, PRESERVATIVE FREE 10 ML: 5 INJECTION INTRAVENOUS at 21:25

## 2022-10-14 RX ADMIN — GABAPENTIN 600 MG: 300 CAPSULE ORAL at 09:26

## 2022-10-14 RX ADMIN — LISINOPRIL 10 MG: 10 TABLET ORAL at 09:26

## 2022-10-14 RX ADMIN — DOXYCYCLINE 100 MG: 100 INJECTION, POWDER, LYOPHILIZED, FOR SOLUTION INTRAVENOUS at 21:44

## 2022-10-14 RX ADMIN — ENOXAPARIN SODIUM 30 MG: 100 INJECTION SUBCUTANEOUS at 21:24

## 2022-10-14 RX ADMIN — FERROUS SULFATE TAB 325 MG (65 MG ELEMENTAL FE) 325 MG: 325 (65 FE) TAB at 12:07

## 2022-10-14 RX ADMIN — DILTIAZEM HYDROCHLORIDE 120 MG: 120 CAPSULE, EXTENDED RELEASE ORAL at 09:26

## 2022-10-14 RX ADMIN — GABAPENTIN 600 MG: 300 CAPSULE ORAL at 21:24

## 2022-10-14 RX ADMIN — FUROSEMIDE 40 MG: 40 TABLET ORAL at 09:26

## 2022-10-14 RX ADMIN — PANTOPRAZOLE SODIUM 40 MG: 40 TABLET, DELAYED RELEASE ORAL at 08:28

## 2022-10-14 RX ADMIN — MONTELUKAST SODIUM 10 MG: 10 TABLET, COATED ORAL at 09:26

## 2022-10-14 RX ADMIN — SPIRONOLACTONE 25 MG: 25 TABLET ORAL at 09:25

## 2022-10-14 RX ADMIN — DOXYCYCLINE 100 MG: 100 INJECTION, POWDER, LYOPHILIZED, FOR SOLUTION INTRAVENOUS at 08:34

## 2022-10-14 RX ADMIN — LAMOTRIGINE 200 MG: 100 TABLET ORAL at 09:26

## 2022-10-14 RX ADMIN — SODIUM CHLORIDE, PRESERVATIVE FREE 10 ML: 5 INJECTION INTRAVENOUS at 08:35

## 2022-10-14 RX ADMIN — SERTRALINE 100 MG: 100 TABLET, FILM COATED ORAL at 09:26

## 2022-10-14 RX ADMIN — IBUPROFEN 400 MG: 400 TABLET, FILM COATED ORAL at 15:41

## 2022-10-14 RX ADMIN — DILTIAZEM HYDROCHLORIDE 120 MG: 120 CAPSULE, EXTENDED RELEASE ORAL at 21:24

## 2022-10-14 RX ADMIN — MULTIPLE VITAMINS W/ MINERALS TAB 1 TABLET: TAB at 09:26

## 2022-10-14 RX ADMIN — LEVOTHYROXINE SODIUM 112 MCG: 112 TABLET ORAL at 08:28

## 2022-10-14 ASSESSMENT — PAIN DESCRIPTION - LOCATION: LOCATION: GENERALIZED

## 2022-10-14 ASSESSMENT — PAIN SCALES - GENERAL
PAINLEVEL_OUTOF10: 3
PAINLEVEL_OUTOF10: 0

## 2022-10-14 NOTE — PROGRESS NOTES
Admission questions and assessment complete; see flow sheet. Scheduled medications administered; See MAR. IV infusing without difficulty. O2 2 LPM nc in place. Pt denies pain at this time. Pt denies any needs at this time.  Pt educated on use of call light and to call out with needs, verbalized understanding, bed in low locked position for pt safety

## 2022-10-14 NOTE — ED NOTES
Chief Complaint   Patient presents with    Shortness of Breath     Pt presents today with SOB, and chest tightness x2 days. Pt reports chest tightness with ambulation. Pt reports O2 at home was 85% on RA. Pt's PCP advised for pt to come to ER. MEWS Score: 1/ Level of Consciousness: Alert (0)    Vitals:    10/13/22 2130 10/13/22 2330 10/13/22 2339 10/14/22 0000   BP: (!) 154/84  133/69 (!) 141/68   Pulse: 87  78 82   Resp: 26  18    Temp:   98 °F (36.7 °C)    TempSrc:       SpO2: 95% 97% 98% 93%   Weight:       Height:              Allergies   Allergen Reactions    Bioflavonoids Anaphylaxis    Other      Nickel,citrus, pain meds? Oxycodone-Acetaminophen Hives    Percocet [Oxycodone-Acetaminophen]      Can take VICODIN    Azithromycin Nausea And Vomiting and Rash    Nickel Hives, Other (See Comments) and Rash       No current facility-administered medications for this encounter.      Current Outpatient Medications   Medication Sig Dispense Refill    Calcium Carb-Cholecalciferol (OYSTER SHELL CALCIUM W/D) 500-200 MG-UNIT TABS tablet       vitamin D3 (CHOLECALCIFEROL) 125 MCG (5000 UT) TABS tablet Take by mouth      spironolactone (ALDACTONE) 25 MG tablet TAKE ONE TABLET BY MOUTH ON MONDAY, WEDNESDAY, FRIDAY AND SUNDAY 90 tablet 1    furosemide (LASIX) 40 MG tablet TAKE ONE TABLET BY MOUTH DAILY 90 tablet 3    lisinopril (PRINIVIL;ZESTRIL) 10 MG tablet TAKE ONE TABLET BY MOUTH DAILY 90 tablet 3    Ferrous Sulfate (IRON PO) Take by mouth daily       Fluticasone Furoate-Vilanterol (BREO ELLIPTA) 200-25 MCG/INH AEPB Inhale 1 puff into the lungs daily (Patient not taking: Reported on 10/14/2022) 1 each 2    ipratropium-albuterol (DUONEB) 0.5-2.5 (3) MG/3ML SOLN nebulizer solution Inhale 3 mLs into the lungs 4 times daily (Patient not taking: Reported on 5/10/2022) 360 mL 3    Multiple Vitamin (MULTI-VITAMIN DAILY PO) Take 1 tablet by mouth      levothyroxine (SYNTHROID) 112 MCG tablet Take 112 mcg by mouth daily ibuprofen (ADVIL;MOTRIN) 200 MG tablet Take 600 mg by mouth as needed for Pain      sertraline (ZOLOFT) 100 MG tablet Take 100 mg by mouth daily      lamoTRIgine (LAMICTAL) 200 MG tablet Take 200 mg by mouth 2 times daily      diltiazem (CARDIZEM CD) 120 MG extended release capsule Take 1 capsule by mouth 2 times daily 30 capsule 0    albuterol sulfate HFA (PROVENTIL HFA) 108 (90 Base) MCG/ACT inhaler Inhale 2 puffs into the lungs every 4 hours as needed for Wheezing or Shortness of Breath (Space out to every 6 hours as symptoms improve) Space out to every 6 hours as symptoms improve. 1 Inhaler 0    pantoprazole (PROTONIX) 40 MG tablet Take 40 mg by mouth daily. gabapentin (NEURONTIN) 600 MG tablet Take 600 mg by mouth 2 times daily. montelukast (SINGULAIR) 10 MG tablet Take 10 mg by mouth every morning       aspirin 81 MG EC tablet Take 81 mg by mouth daily. acetaminophen (TYLENOL) 500 MG tablet Take 500 mg by mouth every 6 hours as needed. List of medications patient is currently taking is complete. Source of medications in list are completed med list with pt. .       Patient Active Problem List   Diagnosis    Morbid obesity with BMI of 50.0-59.9, adult (HCC)    KRYSTIAN (obstructive sleep apnea)    Hypothyroid    Seizure disorder (HCC)    Essential hypertension    Chronic GERD    Hypoxia    Shortness of breath on exertion    Pleural effusion on left    Pulmonary infiltrates    Atelectasis    Ineffective airway clearance    Acute respiratory failure with hypoxia (HCC)    Right ventricular enlargement    Iron deficiency anemia    Acute respiratory failure (HCC)    Mild intermittent asthma with exacerbation    Chest pain    Leg swelling    Morbid obesity (HCC)    Numbness and tingling in both hands    Carpal tunnel syndrome, bilateral    Acute respiratory failure with hypoxemia ([facility] [address]                     Vitals:    10/13/22 2130 10/13/22 2330 10/13/22 2339 10/14/22 0000   BP: (!) 154/84  133/69 (!) 141/68   Pulse: 87  78 82   Resp: 26  18    Temp:   98 °F (36.7 °C)    TempSrc:       SpO2: 95% 97% 98% 93%   Weight:       Height:              -----------------------------------------------------------------------------------------    Pt was updated about admission. *To be filled out after report is complete*    Bed assignment:     Report called to  on . Pertinent labs, allergies, medications and conditions were reviewed. Present Skin issues include . Patient belongings that will be going with the patient during admission include . Emergency contact  Family  was notified of admission.      Electronically signed by Nori Segura RN on 10/14/22 at 12:55 AM EDT       Nori Segura RN  10/14/22 0307

## 2022-10-14 NOTE — CONSULTS
[facility]                                 Pulmonary Consult /Progress Note :                                                                  CHIEF COMPLAINT: SOB     Consulting provider: Dr Melina Braun    HPI:   Pt presents today with SOB, and chest tightness x2 days. Pt reports chest tightness with ambulation. Pt reports O2 at home was 85% on RA. Pt's PCP advised for pt to ER    She follow with Dr Isaac Montes De Oca for asthma ,smoke for about 5 years only y    She use albuterol .  Suppose Flovent but she is not sally  She has KRYSTIAN and suppose to be on and she is not able to use the mask  PHTN though last echo could not determine pressurs     PFT 2019 restrictive   Krystian nd should be on CPAP 10     Past Medical History:   Diagnosis Date    Anemia     Anxiety     Depression     Fibromyalgia     GERD (gastroesophageal reflux disease)     Heart palpitations     Hiatal hernia     Hypertension     Irritable bowel syndrome     Mild intermittent asthma with exacerbation     Osteoarthritis     Panic attacks     Pneumonia     Pulmonary infiltrates 4/20/2019    Seasonal allergies     Seizure disorder ([facility] [address]     Sleep apnea     Spastic colon     Thyroid disease        Past Surgical History:        Procedure Laterality Date    BLADDER SURGERY      BRAIN SURGERY      BRONCHOSCOPY N/A 4/21/2019    BRONCHOSCOPY ALVEOLAR LAVAGE performed by Immanuel Kinsey MD at [facility]  4/21/2019    BRONCHOSCOPY THERAPUTIC ASPIRATION INITIAL performed by Immanuel Kinsey MD at [facility] N/A 5/15/2019    BRONCHOSCOPY ALVEOLAR LAVAGE performed by Araceli Dent MD at [facility]  5/15/2019    BRONCHOSCOPY THERAPUTIC ASPIRATION INITIAL performed by Araceli Dent MD at [place] [address]  2009    hip    KNEE SURGERY      OTHER SURGICAL HISTORY  07/31/2013    cystoscopy, urethral dilatation    SHOULDER SURGERY      r rotator cuff repair    TOTAL HIP ARTHROPLASTY      Left    URETHRAL STRICTURE DILATATION         Allergies:  is allergic to bioflavonoids, other, oxycodone-acetaminophen, percocet [oxycodone-acetaminophen], azithromycin, and nickel. Social History:    TOBACCO:   reports that she quit smoking about 36 years ago. She has a 1.00 pack-year smoking history. She has never used smokeless tobacco.  ETOH:   reports no history of alcohol use.       Family History:       Problem Relation Age of Onset    Asthma Other     Asthma Other     Hypertension Father     Hypertension Sister     Cancer Neg Hx     Diabetes Neg Hx     Emphysema Neg Hx     Heart Failure Neg Hx        Current Medications:    Current Facility-Administered Medications:     aspirin EC tablet 81 mg, 81 mg, Oral, Daily, Floresita Yoo MD    HYDROcodone-acetaminophen (NORCO) 5-325 MG per tablet 1 tablet, 1 tablet, Oral, Q6H PRN, Floresita Yoo MD    doxycycline (VIBRAMYCIN) 100 mg in dextrose 5 % 100 mL IVPB, 100 mg, IntraVENous, Q12H, Floresita Yoo MD    dilTIAZem [facility]) extended release capsule 120 mg, 120 mg, Oral, BID, Floresita Yoo MD    ferrous sulfate (IRON 325) tablet 325 mg, 325 mg, Oral, Lunch, Floresita Yoo MD    furosemide (LASIX) tablet 40 mg, 40 mg, Oral, Daily, Floresita Yoo MD    gabapentin (NEURONTIN) capsule 600 mg, 600 mg, Oral, BID, Floresita Yoo MD    lamoTRIgine (LAMICTAL) tablet 200 mg, 200 mg, Oral, BID, Floresita Yoo MD    levothyroxine (SYNTHROID) tablet 112 mcg, 112 mcg, Oral, Daily, Floresita Yoo MD, 112 mcg at 10/14/22 9410    lisinopril (PRINIVIL;ZESTRIL) tablet 10 mg, 10 mg, Oral, Daily, Floresita Yoo MD    montelukast (SINGULAIR) tablet 10 mg, 10 mg, Oral, QAM, Floresita Yoo MD    therapeutic multivitamin-minerals 1 tablet, 1 tablet, Oral, Daily, Floresita Yoo MD    pantoprazole (PROTONIX) tablet 40 mg, 40 mg, Oral, Daily, Floresita Yoo MD, 40 mg at 10/14/22 6525 sertraline (ZOLOFT) tablet 100 mg, 100 mg, Oral, Daily, Marques Bhakta MD    spironolactone (ALDACTONE) tablet 25 mg, 25 mg, Oral, Every Other Day, Marques Bhakta MD    sodium chloride flush 0.9 % injection 5-40 mL, 5-40 mL, IntraVENous, 2 times per day, Marques Bhakta MD    sodium chloride flush 0.9 % injection 5-40 mL, 5-40 mL, IntraVENous, PRN, Marques Bhakta MD    0.9 % sodium chloride infusion, , IntraVENous, PRN, Marques Bhakta MD    ondansetron (ZOFRAN-ODT) disintegrating tablet 4 mg, 4 mg, Oral, Q8H PRN **OR** ondansetron (ZOFRAN) injection 4 mg, 4 mg, IntraVENous, Q6H PRN, Marques Bhakta MD    polyethylene glycol (GLYCOLAX) packet 17 g, 17 g, Oral, Daily PRN, Marques Bhakta MD    enoxaparin Sodium (LOVENOX) injection 30 mg, 30 mg, SubCUTAneous, BID, Marques Bhakta MD    acetaminophen (TYLENOL) tablet 650 mg, 650 mg, Oral, Q6H PRN **OR** acetaminophen (TYLENOL) suppository 650 mg, 650 mg, Rectal, Q6H PRN, Marques Bhakta MD    albuterol (PROVENTIL) nebulizer solution 2.5 mg, 2.5 mg, Nebulization, Q2H PRN, Marques Bhakta MD    ipratropium-albuterol (DUONEB) nebulizer solution 1 ampule, 1 ampule, Inhalation, Q4H PRN, Collin Broderick MD      REVIEW OF SYSTEMS:  Constitutional: Negative for fever  HENT: Negative for sore throat  Eyes: Negative for redness   Respiratory: + dyspnea, cough  Cardiovascular: Negative for chest pain  Gastrointestinal: Negative for vomiting, diarrhea   Genitourinary: Negative for hematuria   Musculoskeletal: Negative for arthralgias   Skin: Negative for rash  Neurological: Negative for syncope  Hematological: Negative for adenopathy  Psychiatric/Behavorial: Negative for anxiety      Objective:   PHYSICAL EXAM:    Blood pressure 126/81, pulse 76, temperature 97.7 °F (36.5 °C), temperature source Oral, resp. rate 18, height 5' (1.524 m), weight (!) 315 lb 6.4 oz (143.1 kg), last menstrual period 07/29/2013, SpO2 96 %.' on RA  Gen: No distress. Eyes: PERRL. No sclera icterus. No conjunctival injection. ENT: No discharge. Pharynx clear. Neck: Trachea midline. No obvious mass. Resp: rhonchi bilateral   CV: Regular rate. Regular rhythm. No murmur or rub. No edema. GI: Non-tender. Non-distended. No hernia. Skin: Warm and dry. No nodule on exposed extremities. Lymph: No cervical LAD. No supraclavicular LAD. M/S: No cyanosis. No joint deformity. No clubbing. Neuro: Awake. Alert. Moves all four extremities. Psych: Oriented x 3. No anxiety. DATA reviewed by me:   CXR  CT reviewed ,no acute finding      LABS/IMAGING:    CBC:  Lab Results   Component Value Date    WBC 7.0 10/14/2022    HGB 12.3 10/14/2022    HCT 37.2 10/14/2022    MCV 83.8 10/14/2022     10/14/2022    LYMPHOPCT 20.6 10/14/2022    RBC 4.44 10/14/2022    MCH 27.7 10/14/2022    MCHC 33.1 10/14/2022    RDW 17.7 (H) 10/14/2022    NEUTOPHILPCT 72.1 10/14/2022    MONOPCT 6.9 10/14/2022    EOSPCT 0 04/01/2022    BASOPCT 0.3 10/14/2022    NEUTROABS 5.1 10/14/2022    LYMPHSABS 1.4 10/14/2022    MONOSABS 0.5 10/14/2022    EOSABS 0.0 10/14/2022    BASOSABS 0.0 10/14/2022       Recent Labs     10/14/22  0455 10/13/22  1958   WBC 7.0 11.2*   HGB 12.3 12.2   HCT 37.2 37.1   MCV 83.8 83.9    344       BMP:   Recent Labs     10/13/22  1958 10/14/22  0455    139   K 5.5* 4.1    99   CO2 26 29   BUN 21* 18   CREATININE 0.6 0.7       MG:   Lab Results   Component Value Date/Time    MG 2.00 10/14/2022 04:55 AM     Ca/Phos:   Lab Results   Component Value Date    CALCIUM 9.7 10/14/2022    PHOS 4.2 05/13/2010     LIVER PROFILE:   Recent Labs     10/13/22  1958 10/14/22  0455   AST 34 17   ALT 23 21   BILITOT 0.3 0.4   ALKPHOS 112 104       PT/INR: No results for input(s): PROTIME, INR in the last 72 hours. APTT: No results for input(s): APTT in the last 72 hours.     Cardiac Enzymes:  Lab Results   Component Value Date    TROPONINI <0.01 10/14/2022       Assessment: -KRYSTIAN  -Morbid obesity  -B asthma   -Modertae Hypoxia   -atelectsisi       Plan:      SOB and hypoxia related to asthma and KRYSTIAN/OHS   Need O2 at home    CT shows no PE pr pneumonia   Most likely obesity hypoventilation with asthma   *-BD  *-ICS   *-NIMV  *- diuresis   *_procal C-reactive protein   *- doxy   *-observe another 24 h and if stable ok for discharge and to be discharge on home O2       Thank you very much for allowing me to participate in the care of this pleasant patient , should you have any questions ,please do not hesitate to contact me      Elio Lino MD,[facility]  Pulmonary&Critical Care Medicine    [facility] Garcia    NOTE: This report was transcribed using voice recognition software. Every effort was made to ensure accuracy; however, inadvertent computerized transcription errors may be present.

## 2022-10-14 NOTE — CARE COORDINATION
Case Management Assessment  Initial Evaluation      Patient Name: Fermin Ortega  YOB: 1965  Diagnosis: Dyspnea and respiratory abnormalities [R06.00, R06.89]  Hypoxia [R09.02]  Acute respiratory failure with hypoxemia ([address] [J96.01]  Date / Time: 10/13/2022  6:51 PM    Admission status/Date: Inpatient 10/14/2022  Chart Reviewed: Yes      Patient Interviewed: Yes   Family Interviewed:  No      Hospitalization in the last 30 days:  No      Health Care Decision Maker :   Primary Decision Maker: Rene Rdz - Parent - [phone]    Secondary Decision Maker: Kay Jenn - Brother/Sister - [phone]      Who do you trust or have selected to make healthcare decisions for you      Met with: Patient at bedside. Current PCP: Geeta Jean MD     Financial  Commercial  Precert required for SNF : Y         3 night stay required - N    ADLS  Support Systems/Care Needs: Friends/Neighbors  Transportation: self   Meal Preparation: self    Housing  Living Arrangements: Lives in home alone  Steps: 0 ROSANGELA  Intent for return to present living arrangements: Yes  Identified Issues: On supplemental o2 with no home use. Home Care Information  Active with Home Health Care : No Agency:(Services)  Type of Home Care Services: None  Passport/Waiver : No  :                      Phone Number:    Passport/Waiver Services: n/a           Durable Medical Equiptment   DME Provider: n/a   Equipment:   Walker_x__Cane_x__RTS___ BSC___Shower Chair__x_Hospital Bed___W/C____Other________  02 at ____Liter(s)---wears(frequency)_______ [facility] ___ CPAP_x__ BiPap___   N/A____  States does not use CPAP.     Home O2 Use :  No    If No for home O2---if presently on O2 during hospitalization:  Yes  if yes CM to follow for potential DC O2 need  Informed of need for care provider to bring portable home O2 tank on day of discharge for nursing to connect prior to leaving:   Not Indicated  Verbalized agreement/Understanding:   Not Indicated    Community Service Affiliation  Dialysis:  No    Agency:  Location:  Dialysis Schedule:  Phone:   Fax: Other Community Services: n/a     DISCHARGE PLAN: Explained Case Management role/services. CM reviewed chart and met with patient at bedside to discuss discharge needs and plan. Patient lives in home alone. IPTA and active . Plans return home at discharge. Patient requiring supplement at 2L with no home use. SpO2 91% with ambulation. Patient states previously (approximately 1 year ago) had home o2 via [employer]. Sticky note left for treatment team regarding o2 walk test. CM will follow for potential discharge o2 needs.     Demario Romero RN

## 2022-10-14 NOTE — PROGRESS NOTES
.Pulse oximetry assessment   92% at rest on room air (if 88% or less, skip next steps)  83% while ambulating on room air  94% at rest on 2 LPM  91% while ambulating on 2 LPM

## 2022-10-14 NOTE — ED NOTES
Attempted to ambulate pt on room air. Pt is 88% on room air while sitting at bedside. Dr. Marni Lock notified.       Mary Flores RN  10/13/22 1852

## 2022-10-14 NOTE — H&P
Hospital Medicine History & Physical      PCP: Elvia Rodrigues MD    Date of Service: Pt seen/examined on 10/14/22 and admitted on 10/14/22 to Inpatient    Chief Complaint   Patient presents with    Shortness of Breath     Pt presents today with SOB, and chest tightness x2 days. Pt reports chest tightness with ambulation. Pt reports O2 at home was 85% on RA. Pt's PCP advised for pt to come to ER. History Of Present Illness: The patient is a 49 y.o. female with PMH below, presents with SOB/OH, chest tightness, hypoxia, high salt intake recently, increased leg edema. Pt reports that she has been increasingly SOB the last 3d. She says that she has had some tightness in her chest w/ ambulation which improves w/ rest.  No CP at rest.  She also notes that her sats at home have been as low as 85% at home. Her PCP sent her to the ER. She says she thinks her legs have been more edematous than normal.  She also notes that she has had several high salt food items that last few days.       Past Medical History:        Diagnosis Date    Anemia     Anxiety     Depression     Fibromyalgia     GERD (gastroesophageal reflux disease)     Heart palpitations     Hiatal hernia     Hypertension     Irritable bowel syndrome     Mild intermittent asthma with exacerbation     Osteoarthritis     Panic attacks     Pneumonia     Pulmonary infiltrates 4/20/2019    Seasonal allergies     Seizure disorder ([address]     Sleep apnea     Spastic colon     Thyroid disease        Past Surgical History:        Procedure Laterality Date    BLADDER SURGERY      BRAIN SURGERY      BRONCHOSCOPY N/A 4/21/2019    BRONCHOSCOPY ALVEOLAR LAVAGE performed by Genevieve Sams MD at [facility]  4/21/2019    BRONCHOSCOPY THERAPUTIC ASPIRATION INITIAL performed by Genevieve Sams MD at [address] [place] [address] 5/15/2019    BRONCHOSCOPY ALVEOLAR LAVAGE performed by Aylin Mittal MD at [facility] 5/15/2019    BRONCHOSCOPY THERAPUTIC ASPIRATION INITIAL performed by Rolo Rodríguez MD at [place] [address]  2009    hip    KNEE SURGERY      OTHER SURGICAL HISTORY  07/31/2013    cystoscopy, urethral dilatation    SHOULDER SURGERY      r rotator cuff repair    TOTAL HIP ARTHROPLASTY      Left    URETHRAL STRICTURE DILATATION         Medications Prior to Admission:    Prior to Admission medications    Medication Sig Start Date End Date Taking?  Authorizing Provider   Calcium Carb-Cholecalciferol (OYSTER SHELL CALCIUM W/D) 500-200 MG-UNIT TABS tablet  4/30/22   Historical Provider, MD   vitamin D3 (CHOLECALCIFEROL) 125 MCG (5000 UT) TABS tablet Take by mouth 10/26/20   Historical Provider, MD   spironolactone (ALDACTONE) 25 MG tablet TAKE ONE TABLET BY MOUTH ON MONDAY, WEDNESDAY, FRIDAY AND SUNDAY 8/30/21   MATHEW South CNP   furosemide (LASIX) 40 MG tablet TAKE ONE TABLET BY MOUTH DAILY 7/2/21   Tiffanie Rivas MD   lisinopril (PRINIVIL;ZESTRIL) 10 MG tablet TAKE ONE TABLET BY MOUTH DAILY 3/2/21   MATHEW South CNP   Ferrous Sulfate (IRON PO) Take by mouth daily     Historical Provider, MD   Fluticasone Furoate-Vilanterol (BREO ELLIPTA) 200-25 MCG/INH AEPB Inhale 1 puff into the lungs daily 6/40/15   MATHEW Guzmán CNP   ipratropium-albuterol (DUONEB) 0.5-2.5 (3) MG/3ML SOLN nebulizer solution Inhale 3 mLs into the lungs 4 times daily  Patient not taking: Reported on 5/10/2022 5/8/19   Rolo Rodríguez MD   Multiple Vitamin (MULTI-VITAMIN DAILY PO) Take 1 tablet by mouth    Historical Provider, MD   levothyroxine (SYNTHROID) 112 MCG tablet Take 112 mcg by mouth daily 1/22/19   Historical Provider, MD   ibuprofen (ADVIL;MOTRIN) 200 MG tablet Take 600 mg by mouth as needed for Pain    Historical Provider, MD   sertraline (ZOLOFT) 100 MG tablet Take 100 mg by mouth daily    Historical Provider, MD   lamoTRIgine (LAMICTAL) 200 MG tablet Take 200 mg by mouth 2 times daily    Historical Provider, MD   diltiazem (CARDIZEM CD) 120 MG extended release capsule Take 1 capsule by mouth 2 times daily 11/25/18   Nanette Montgomery MD   albuterol sulfate HFA (PROVENTIL HFA) 108 (90 Base) MCG/ACT inhaler Inhale 2 puffs into the lungs every 4 hours as needed for Wheezing or Shortness of Breath (Space out to every 6 hours as symptoms improve) Space out to every 6 hours as symptoms improve. 11/3/18   Alba Antonio APRN - CNP   pantoprazole (PROTONIX) 40 MG tablet Take 40 mg by mouth daily. Historical Provider, MD   gabapentin (NEURONTIN) 600 MG tablet Take 600 mg by mouth 2 times daily. Historical Provider, MD   montelukast (SINGULAIR) 10 MG tablet Take 10 mg by mouth every morning     Historical Provider, MD   aspirin 81 MG EC tablet Take 81 mg by mouth daily. Historical Provider, MD   acetaminophen (TYLENOL) 500 MG tablet Take 500 mg by mouth every 6 hours as needed. Historical Provider, MD       Allergies:  Bioflavonoids, Other, Oxycodone-acetaminophen, Percocet [oxycodone-acetaminophen], Azithromycin, and Nickel    Social History:    TOBACCO:   reports that she quit smoking about 36 years ago. She has a 1.00 pack-year smoking history. She has never used smokeless tobacco.  ETOH:   reports no history of alcohol use. Family History:  Reviewed in detail and negative for DM, Early CAD, Cancer (except as below). Positive as follows:        Problem Relation Age of Onset    Asthma Other     Asthma Other     Hypertension Father     Hypertension Sister     Cancer Neg Hx     Diabetes Neg Hx     Emphysema Neg Hx     Heart Failure Neg Hx        REVIEW OF SYSTEMS:   Pertinent positives/negatives as follows: SOB/OH, chest tightness, hypoxia, high salt intake recently, increased leg edema, and as discussed in HPI, otherwise a complete ROS performed and all other systems are negative.     PHYSICAL EXAM PERFORMED:  BP (!) 141/68   Pulse 82   Temp 98 °F (36.7 °C) Pulmonary Arteries: Pulmonary arteries are adequately opacified for evaluation. No evidence of intraluminal filling defect to suggest pulmonary embolism. Main pulmonary artery is normal in caliber. Mediastinum: The heart is at the upper limits of normal in size. No pericardial effusion. The aorta and arch branches are normal in course and caliber. No pathologically enlarged mediastinal or hilar nodes. Lungs/pleura: Lungs demonstrate patchy ground-glass opacities in the lung bases likely representing atelectasis as images were obtained during expiration. No dense consolidation or pleural effusion. No pulmonary edema. No pneumothorax. Upper Abdomen: Bilateral adrenal adenomas measure 2.7 cm on the right and 2.9 cm on the left. No dedicated follow-up is needed. Soft Tissues/Bones: No acute bone or soft tissue abnormality. 1. No pulmonary embolus. 2. Low lung volumes with basilar atelectasis. 3. Borderline cardiomegaly.  RECOMMENDATIONS: Unavailable       EKG:    EKG 12 Lead [[id number]]    Collected: 10/13/22 1914    Updated: 10/13/22 1916     Ventricular Rate 78 BPM    Atrial Rate 78 BPM    P-R Interval 156 ms    QRS Duration 88 ms    Q-T Interval 368 ms    QTc Calculation (Bazett) 419 ms    P Axis 24 degrees    R Axis 16 degrees    T Axis 26 degrees    Diagnosis Normal sinus rhythmNormal ECGWhen compared with ECG of 26-JUN-2020 09:34,No significant change was found       Transthoracic Echocardiography Report (TTE)      Demographics      Patient Name       Stephie Starr      Date of Study      07/21/2022         Gender              Female      Patient Number     [id number]         Date of Birth       1965      Visit Number       [id number]          Age                 49 year(s)      Accession Number   [id number]         Room Number         OP      Corporate ID       [id number]            [address] [place] [address]      Ordering Physician Mattie Grace    [facility] Demond Calvillo MD, [facility] [place]           Physician           Preet MANUEL MD     Procedure     Type of Study      TTE procedure:ECHOCARDIOGRAM COMPLETE 2D W DOPPLER W COLOR. Procedure Date  Date: 07/21/2022 Start: 12:33 PM     Study Location: [facility]  Technical Quality: Adequate visualization     Indications:Dyspnea/SOB. Patient Status: Routine     Height: 60 inches Weight: 316.01 pounds BSA: 2.27 m2 BMI: 61.71 kg/m2     BP: 110/68 mmHg      Conclusions      Summary   Technically difficult examination. Normal left ventricle systolic function with an estimated ejection fraction   of 55%. No regional wall motion abnormalities are seen. Normal left ventricular diastolic filling pressure. Mild mitral, pulmonic and tricuspid regurgitation. Signature      ------------------------------------------------------------------   Electronically signed by Leticia Hastings MD (Interpreting   physician) on 07/21/2022 at 03:03 PM   ------------------------------------------------------------------       CBC:  Recent Labs     10/13/22  1958   WBC 11.2*   HGB 12.2   HCT 37.1         RENAL  Recent Labs     10/13/22  1958      K 5.5*      CO2 26   BUN 21*   CREATININE 0.6   GLUCOSE 98     Hemoglobin a1c:  Lab Results   Component Value Date    LABA1C 6.0 10/17/2020    LABA1C 6.2 04/07/2019       LFT'S:  Recent Labs     10/13/22  1958   AST 34   ALT 23   BILITOT 0.3   ALKPHOS 112     CARDIAC ENZYMES:   Recent Labs     10/13/22  1958 10/13/22  2044   TROPONINI <0.01 <0.01     Lab Results   Component Value Date    PROBNP 114 10/13/2022    PROBNP 35 06/09/2020    PROBNP 56 25/92/9449     PHYSICIAN CERTIFICATION  I certify that Fermin Ortega is expected to be hospitalized for 2 midnights based on the following assessment and plan:    ASSESSMENT/PLAN:  Acute respiratory failure with hypoxia.   Etiology unclear but DDx includes: aeCOPD, probable MOHS/KRYSTIAN components and possible volume OL (2/2 recent increased Na intake). Lasix given in ED, however, no pulm edema on CT/CXR. Will treat as aeCOPD for now. Supplemental O2 to maintain SPO2 ? 92%, continuous pulse ox. Satting 80% w/ ambulation and 84% at arrival on RA in the ER. Currently requiring 2 L O2. Patient normally not on O2 at home. Wean as tolerated. Pulm c/s. aeCOPD, IV solumedrol, IV doxy, PRN/LYDIA intensive NEB therapy. Check respiratory culture and procalcitonin. Hold home regimen for now. IS q2h WA. Hyperkalemia, 5.5 but hemolyzed so may be pseudo. Repeat ordered. Chest tightness/pain, atypical.  Think likely not ACS. Perhaps 2/2 resp status, elevated BP at arrival.  Improving. Tele and serial trops. Defer to DD for Cards c/s for further testing. Hx KRYSTIAN, non-compliant w/ NIV. Likely contributing to resp status. Hypothyroidism, continue home levothyroxine, check TSH. Morbid Obesity (BMI is 58.6 kg/m²). Likely contributing to resp status. Counseled wt loss. Complicating assessment and treatment. Placing patient at risk for multiple comorbidities as well as early death and contributing to the patients presentation. DVT Prophylaxis: Lx  Diet: gen  Code Status: Full Code   PT/OT Eval Status: Will order if needed and as patient condition allows  Dispo - Admit to inpatient     Mirza Armenta MD    Thank you Olena Barrera MD for the opportunity to be involved in this patient's care. If you have any questions or concerns please feel free to contact me via the [employer] at [phone]. This chart was generated using the [employer] system. I created this record but it may contain dictation errors given the limitations of this technology.

## 2022-10-14 NOTE — PROGRESS NOTES
Handoff report and transfer of care given at bedside to Lisa Okeefe. Patient in stable condition, denies needs/concerns at this time. Call light within reach.

## 2022-10-14 NOTE — PROGRESS NOTES
Progress Note    Admit Date:  10/13/2022    -pmhx of anxiety, depression, HTN, seizure disorder, KRYSTIAN, thyroid disease   -she is suppose to be on oxygen at home but took herself off of it at home  -presented for SOB     Subjective:  Ms. Sarah Christianson today is feeling a lot better. Chest tightness has resolved and she is breathing much better today. Still requiring 2 L O2, desating with ambulation     Objective:   Patient Vitals for the past 4 hrs:   BP Temp Temp src Pulse Resp   10/14/22 0756 126/81 97.7 °F (36.5 °C) Oral 76 18          Intake/Output Summary (Last 24 hours) at 10/14/2022 1126  Last data filed at 10/14/2022 8840  Gross per 24 hour   Intake 240 ml   Output --   Net 240 ml       Physical Exam:    Gen: No distress. Alert. Pleasant female   Eyes: PERRL. No sclera icterus. No conjunctival injection. Neck: No JVD. Trachea midline. Resp: No accessory muscle use. No crackles. No wheezes. No rhonchi. +Diminished breath sounds bilaterally   CV: Regular rate. Regular rhythm. No murmur. No rub. Trace bilateral edema. Peripheral Pulses: +2 palpable, equal bilaterally   GI: Non-tender. Non-distended. Normal bowel sounds. Skin: Warm and dry. No nodule on exposed extremities. No rash on exposed extremities. M/S: No cyanosis. No joint deformity. No clubbing. Neuro: Awake. Grossly nonfocal    Psych: Oriented x 3. No anxiety or agitation.          Medications:  aspirin, 81 mg, Daily  dilTIAZem, 120 mg, BID  ferrous sulfate, 325 mg, Lunch  furosemide, 40 mg, Daily  gabapentin, 600 mg, BID  lamoTRIgine, 200 mg, BID  levothyroxine, 112 mcg, Daily  lisinopril, 10 mg, Daily  montelukast, 10 mg, QAM  therapeutic multivitamin-minerals, 1 tablet, Daily  pantoprazole, 40 mg, Daily  sertraline, 100 mg, Daily  spironolactone, 25 mg, Every Other Day  sodium chloride flush, 5-40 mL, 2 times per day  enoxaparin, 30 mg, BID  doxycycline (VIBRAMYCIN) IV, 100 mg, Q12H      PRN Medications:  HYDROcodone 5 mg - acetaminophen, 1 tablet, Q6H PRN  sodium chloride flush, 5-40 mL, PRN  sodium chloride, , PRN  ondansetron, 4 mg, Q8H PRN   Or  ondansetron, 4 mg, Q6H PRN  polyethylene glycol, 17 g, Daily PRN  acetaminophen, 650 mg, Q6H PRN   Or  acetaminophen, 650 mg, Q6H PRN  albuterol, 2.5 mg, Q2H PRN  ipratropium-albuterol, 1 ampule, Q4H PRN          Data:  CBC:   Recent Labs     10/13/22  1958 10/14/22  0455   WBC 11.2* 7.0   HGB 12.2 12.3   HCT 37.1 37.2   MCV 83.9 83.8    268     BMP:   Recent Labs     10/13/22  1958 10/14/22  0455    139   K 5.5* 4.1    99   CO2 26 29   BUN 21* 18   CREATININE 0.6 0.7     LIVER PROFILE:   Recent Labs     10/13/22  1958 10/14/22  0455   AST 34 17   ALT 23 21   BILITOT 0.3 0.4   ALKPHOS 112 104     PT/INR: No results for input(s): PROTIME, INR in the last 72 hours. CULTURES  COVID and Flu not detected      RADIOLOGY  CT CHEST PULMONARY EMBOLISM W CONTRAST   Final Result   1. No pulmonary embolus. 2. Low lung volumes with basilar atelectasis. 3. Borderline cardiomegaly. XR CHEST PORTABLE   Final Result   Cardiomegaly and pulmonary vascular congestion. Echocardiogram from 7/21/22   Summary   Technically difficult examination. Normal left ventricle systolic function with an estimated ejection fraction   of 55%. No regional wall motion abnormalities are seen. Normal left ventricular diastolic filling pressure. Mild mitral, pulmonic and tricuspid regurgitation.       Assessment/Plan:  #Acute on chronic hypoxic respiratory failure   #Dyspnea upon exertion   -took herself off home O2 a couple months ago, requiring 2 L O2 here   -continue to wean as tolerated   -will likely need to go home with O2   -could be multifactorial 2/2 to pulm HTN, KRYSTIAN,CHF, COPD/Asthma    -echo from July 2022 as above   -CTA chest without PE  -on IV solumedrol   -IV doxycyline   -sputum culture pending   -lasix IV given   -continue daily lasix and aldactone   -continue inhalers -pulmonology consulted     #Chest tightness   -likely 2/2 to above, low suspicion for ACS   -trop <0.01 x 2   -EKG without acute abnormalities   -on tele   -has resolved today     #Hyperkalemia   -5.5 on presentation   -resolved today  -continue to monitor     #Pulmonary HTN    #Chronic diastolic HF   -on lasix and aldactone   -last echo as above   -daily weights, I and Os   -low sodium diet     #Palpitations   -on cardizem   -follows with cards     #HTN   -on lisinopril and cardizem     #KRYSTIAN   -on CPAP, non-compliant     #Hypothyroidism   -continue synthroid   -TSH 5.49  -T4 pending    #Hx of seizures   -on lamictal     #Depression   -on zoloft     #KRYSTIAN   -noncompliant with CPAP   -nightly O2     #GERD   -on PPI     #CYRIL   -on ferrous sulfate     #Neuropathy   -on gabapentin     #Morbid obesity   -BMI 58.6  -recommend weight loss and dietary changes     DVT Prophylaxis: Lovenox   Diet: ADULT DIET;  Regular  Code Status: Full Code    Sherill Jeans, PA  10/14/22  2:39 PM

## 2022-10-14 NOTE — PROGRESS NOTES
AM Shift assessment completed. Pt is alert and oriented. Vital signs are WNL. Respirations are even & easy. Patient on 2 L o2 at this time; none at home noted. No complaints voiced. Pt denies needs at this time. SR up x 2 and bed in low position. Call light is within reach. Will monitor. .Bedside Mobility Assessment Tool (BMAT):     Assessment Level 1- Sit and Shake    1. From a semi-reclined position, ask patient to sit up and rotate to a seated position at the side of the bed. Can use the bedrail. 2. Ask patient to reach out and grab your hand and shake making sure patient reaches across his/her midline. Pass- Patient is able to come to a seated position, maintain core strength. Maintains seated balance while reaching across midline. Move on to Assessment Level 2. Assessment Level 2- Stretch and Point   1. With patient in seated position at the side of the bed, have patient place both feet on the floor (or stool) with knees no higher than hips. 2. Ask patient to stretch one leg and straighten the knee, then bend the ankle/flex and point the toes. If appropriate, repeat with the other leg. Pass- Patient is able to demonstrate appropriate quad strength on intended weight bearing limb(s). Move onto Assessment Level 3. Assessment Level 3- Stand   1. Ask patient to elevate off the bed or chair (seated to standing) using an assistive device (cane, bedrail). 2. Patient should be able to raise buttocks off be and hold for a count of five. May repeat once. Pass- Patient maintains standing stability for at least 5 seconds, proceed to assessment level 4. Assessment Level 4- Walk   1. Ask patient to march in place at bedside. 2. Then ask patient to advance step and return each foot. Some medical conditions may render a patient from stepping backwards, use your best clinical judgement.    Pass- Patient demonstrates balance while shifting weight and ability to step, takes independent steps, does not use assistive device patient is MOBILITY LEVEL 4.       Mobility Level- 4

## 2022-10-14 NOTE — PROGRESS NOTES
RT Inhaler-Nebulizer Bronchodilator Protocol Note    There is a bronchodilator order in the chart from a provider indicating to follow the RT Bronchodilator Protocol and there is an Initiate RT Inhaler-Nebulizer Bronchodilator Protocol order as well (see protocol at bottom of note). CXR Findings:  XR CHEST PORTABLE    Result Date: 10/13/2022  Cardiomegaly and pulmonary vascular congestion. The findings from the last RT Protocol Assessment were as follows:   History Pulmonary Disease: Smoker 15 pack years or more  Respiratory Pattern: Regular pattern and RR 12-20 bpm  Breath Sounds: Slightly diminished and/or crackles  Cough: Strong, spontaneous, non-productive  Indication for Bronchodilator Therapy: None  Bronchodilator Assessment Score: 3    Aerosolized bronchodilator medication orders have been revised according to the RT Inhaler-Nebulizer Bronchodilator Protocol below. Respiratory Therapist to perform RT Therapy Protocol Assessment initially then follow the protocol. Repeat RT Therapy Protocol Assessment PRN for score 0-3 or on second treatment, BID, and PRN for scores above 3. No Indications - adjust the frequency to every 6 hours PRN wheezing or bronchospasm, if no treatments needed after 48 hours then discontinue using Per Protocol order mode. If indication present, adjust the RT bronchodilator orders based on the Bronchodilator Assessment Score as indicated below. Use Inhaler orders unless patient has one or more of the following: on home nebulizer, not able to hold breath for 10 seconds, is not alert and oriented, cannot activate and use MDI correctly, or respiratory rate 25 breaths per minute or more, then use the equivalent nebulizer order(s) with same Frequency and PRN reasons based on the score. If a patient is on this medication at home then do not decrease Frequency below that used at home.     0-3 - enter or revise RT bronchodilator order(s) to equivalent RT Bronchodilator order with Frequency of every 4 hours PRN for wheezing or increased work of breathing using Per Protocol order mode. 4-6 - enter or revise RT Bronchodilator order(s) to two equivalent RT bronchodilator orders with one order with BID Frequency and one order with Frequency of every 4 hours PRN wheezing or increased work of breathing using Per Protocol order mode. 7-10 - enter or revise RT Bronchodilator order(s) to two equivalent RT bronchodilator orders with one order with TID Frequency and one order with Frequency of every 4 hours PRN wheezing or increased work of breathing using Per Protocol order mode. 11-13 - enter or revise RT Bronchodilator order(s) to one equivalent RT bronchodilator order with QID Frequency and an Albuterol order with Frequency of every 4 hours PRN wheezing or increased work of breathing using Per Protocol order mode. Greater than 13 - enter or revise RT Bronchodilator order(s) to one equivalent RT bronchodilator order with every 4 hours Frequency and an Albuterol order with Frequency of every 2 hours PRN wheezing or increased work of breathing using Per Protocol order mode.          Electronically signed by Linda Andujar RCP on 10/14/2022 at 4:38 AM

## 2022-10-14 NOTE — ED NOTES
Chief Complaint   Patient presents with    Shortness of Breath     Pt presents today with SOB, and chest tightness x2 days. Pt reports chest tightness with ambulation. Pt reports O2 at home was 85% on RA. Pt's PCP advised for pt to come to ER. MEWS Score: 1/ Level of Consciousness: Alert (0)    Vitals:    10/13/22 2130 10/13/22 2330 10/13/22 2339 10/14/22 0000   BP: (!) 154/84  133/69 (!) 141/68   Pulse: 87  78 82   Resp: 26  18    Temp:   98 °F (36.7 °C)    TempSrc:       SpO2: 95% 97% 98% 93%   Weight:       Height:              Allergies   Allergen Reactions    Bioflavonoids Anaphylaxis    Other      Nickel,citrus, pain meds? Oxycodone-Acetaminophen Hives    Percocet [Oxycodone-Acetaminophen]      Can take VICODIN    Azithromycin Nausea And Vomiting and Rash    Nickel Hives, Other (See Comments) and Rash       No current facility-administered medications for this encounter.      Current Outpatient Medications   Medication Sig Dispense Refill    Calcium Carb-Cholecalciferol (OYSTER SHELL CALCIUM W/D) 500-200 MG-UNIT TABS tablet       vitamin D3 (CHOLECALCIFEROL) 125 MCG (5000 UT) TABS tablet Take by mouth      spironolactone (ALDACTONE) 25 MG tablet TAKE ONE TABLET BY MOUTH ON MONDAY, WEDNESDAY, FRIDAY AND SUNDAY 90 tablet 1    furosemide (LASIX) 40 MG tablet TAKE ONE TABLET BY MOUTH DAILY 90 tablet 3    lisinopril (PRINIVIL;ZESTRIL) 10 MG tablet TAKE ONE TABLET BY MOUTH DAILY 90 tablet 3    Ferrous Sulfate (IRON PO) Take by mouth daily       Fluticasone Furoate-Vilanterol (BREO ELLIPTA) 200-25 MCG/INH AEPB Inhale 1 puff into the lungs daily 1 each 2    ipratropium-albuterol (DUONEB) 0.5-2.5 (3) MG/3ML SOLN nebulizer solution Inhale 3 mLs into the lungs 4 times daily (Patient not taking: Reported on 5/10/2022) 360 mL 3    Multiple Vitamin (MULTI-VITAMIN DAILY PO) Take 1 tablet by mouth      levothyroxine (SYNTHROID) 112 MCG tablet Take 112 mcg by mouth daily      ibuprofen (ADVIL;MOTRIN) 200 MG tablet Take 600 mg by mouth as needed for Pain      sertraline (ZOLOFT) 100 MG tablet Take 100 mg by mouth daily      lamoTRIgine (LAMICTAL) 200 MG tablet Take 200 mg by mouth 2 times daily      diltiazem (CARDIZEM CD) 120 MG extended release capsule Take 1 capsule by mouth 2 times daily 30 capsule 0    albuterol sulfate HFA (PROVENTIL HFA) 108 (90 Base) MCG/ACT inhaler Inhale 2 puffs into the lungs every 4 hours as needed for Wheezing or Shortness of Breath (Space out to every 6 hours as symptoms improve) Space out to every 6 hours as symptoms improve. 1 Inhaler 0    pantoprazole (PROTONIX) 40 MG tablet Take 40 mg by mouth daily. gabapentin (NEURONTIN) 600 MG tablet Take 600 mg by mouth 2 times daily. montelukast (SINGULAIR) 10 MG tablet Take 10 mg by mouth every morning       aspirin 81 MG EC tablet Take 81 mg by mouth daily. acetaminophen (TYLENOL) 500 MG tablet Take 500 mg by mouth every 6 hours as needed. List of medications patient is currently taking is complete. Source of medications in list are meds were completed with pt.        Patient Active Problem List   Diagnosis    Morbid obesity with BMI of 50.0-59.9, adult (HCC)    KRYSTIAN (obstructive sleep apnea)    Hypothyroid    Seizure disorder (HCC)    Essential hypertension    Chronic GERD    Hypoxia    Shortness of breath on exertion    Pleural effusion on left    Pulmonary infiltrates    Atelectasis    Ineffective airway clearance    Acute respiratory failure with hypoxia (HCC)    Right ventricular enlargement    Iron deficiency anemia    Acute respiratory failure (HCC)    Mild intermittent asthma with exacerbation    Chest pain    Leg swelling    Morbid obesity (HCC)    Numbness and tingling in both hands    Carpal tunnel syndrome, bilateral                     Vitals:    10/13/22 2130 10/13/22 2330 10/13/22 2339 10/14/22 0000   BP: (!) 154/84  133/69 (!) 141/68   Pulse: 87  78 82   Resp: 26  18    Temp:   98 °F (36.7 °C)    TempSrc: SpO2: 95% 97% 98% 93%   Weight:       Height:              -----------------------------------------------------------------------------------------    Pt was updated about admission. *To be filled out after report is complete*    Bed assignment: 220    Report called to [facility] on 10/14. Pertinent labs, allergies, medications and conditions were reviewed. Present Skin issues include . Patient belongings that will be going with the patient during admission include pt has personal bag with personal belongings. Emergency contact family was notified of admission.      Electronically signed by Genaro Nguyen RN on 10/14/22 at 2:04 AM EDT      Genaro Nguyen RN  10/14/22 4441

## 2022-10-14 NOTE — PROGRESS NOTES
Patient resting at this time. Eyes closed; respirations easy and unlabored. Calll light within reach.

## 2022-10-14 NOTE — PROGRESS NOTES
PM Shift report given to [facility] RN at Bedside. Patient is stable. All end of shift needs have been met. No further assistance needed at this time.

## 2022-10-14 NOTE — PROGRESS NOTES
Consult has been called to Dr. Bob haas on 10/14/22. Spoke with dr mann via perfect serve.  7:46 AM    Ioana Esparza  10/14/2022

## 2022-10-15 VITALS
RESPIRATION RATE: 16 BRPM | TEMPERATURE: 97.9 F | SYSTOLIC BLOOD PRESSURE: 137 MMHG | BODY MASS INDEX: 57.52 KG/M2 | OXYGEN SATURATION: 95 % | HEART RATE: 70 BPM | HEIGHT: 60 IN | WEIGHT: 293 LBS | DIASTOLIC BLOOD PRESSURE: 77 MMHG

## 2022-10-15 LAB
ANION GAP SERPL CALCULATED.3IONS-SCNC: 10 MMOL/L (ref 3–16)
BASOPHILS ABSOLUTE: 0 K/UL (ref 0–0.2)
BASOPHILS RELATIVE PERCENT: 0.2 %
BUN BLDV-MCNC: 18 MG/DL (ref 7–20)
CALCIUM SERPL-MCNC: 9.2 MG/DL (ref 8.3–10.6)
CHLORIDE BLD-SCNC: 101 MMOL/L (ref 99–110)
CO2: 28 MMOL/L (ref 21–32)
CREAT SERPL-MCNC: 0.7 MG/DL (ref 0.6–1.1)
EOSINOPHILS ABSOLUTE: 0 K/UL (ref 0–0.6)
EOSINOPHILS RELATIVE PERCENT: 0.1 %
GFR AFRICAN AMERICAN: >60
GFR NON-AFRICAN AMERICAN: >60
GLUCOSE BLD-MCNC: 131 MG/DL (ref 70–99)
HCT VFR BLD CALC: 38.1 % (ref 36–48)
HEMOGLOBIN: 12.3 G/DL (ref 12–16)
LYMPHOCYTES ABSOLUTE: 1.6 K/UL (ref 1–5.1)
LYMPHOCYTES RELATIVE PERCENT: 23.1 %
MCH RBC QN AUTO: 27.6 PG (ref 26–34)
MCHC RBC AUTO-ENTMCNC: 32.4 G/DL (ref 31–36)
MCV RBC AUTO: 85.2 FL (ref 80–100)
MONOCYTES ABSOLUTE: 0.5 K/UL (ref 0–1.3)
MONOCYTES RELATIVE PERCENT: 7.9 %
NEUTROPHILS ABSOLUTE: 4.7 K/UL (ref 1.7–7.7)
NEUTROPHILS RELATIVE PERCENT: 68.7 %
PDW BLD-RTO: 17.7 % (ref 12.4–15.4)
PLATELET # BLD: 266 K/UL (ref 135–450)
PMV BLD AUTO: 7.1 FL (ref 5–10.5)
POTASSIUM REFLEX MAGNESIUM: 4.5 MMOL/L (ref 3.5–5.1)
RBC # BLD: 4.47 M/UL (ref 4–5.2)
SODIUM BLD-SCNC: 139 MMOL/L (ref 136–145)
WBC # BLD: 6.8 K/UL (ref 4–11)

## 2022-10-15 PROCEDURE — 80048 BASIC METABOLIC PNL TOTAL CA: CPT

## 2022-10-15 PROCEDURE — 2580000003 HC RX 258: Performed by: INTERNAL MEDICINE

## 2022-10-15 PROCEDURE — 2500000003 HC RX 250 WO HCPCS: Performed by: INTERNAL MEDICINE

## 2022-10-15 PROCEDURE — 85025 COMPLETE CBC W/AUTO DIFF WBC: CPT

## 2022-10-15 PROCEDURE — 36415 COLL VENOUS BLD VENIPUNCTURE: CPT

## 2022-10-15 PROCEDURE — 2700000000 HC OXYGEN THERAPY PER DAY

## 2022-10-15 PROCEDURE — 99239 HOSP IP/OBS DSCHRG MGMT >30: CPT | Performed by: INTERNAL MEDICINE

## 2022-10-15 PROCEDURE — 6360000002 HC RX W HCPCS: Performed by: INTERNAL MEDICINE

## 2022-10-15 PROCEDURE — 94761 N-INVAS EAR/PLS OXIMETRY MLT: CPT

## 2022-10-15 PROCEDURE — 99232 SBSQ HOSP IP/OBS MODERATE 35: CPT | Performed by: INTERNAL MEDICINE

## 2022-10-15 PROCEDURE — 6370000000 HC RX 637 (ALT 250 FOR IP): Performed by: INTERNAL MEDICINE

## 2022-10-15 RX ORDER — IPRATROPIUM BROMIDE AND ALBUTEROL SULFATE 2.5; .5 MG/3ML; MG/3ML
3 SOLUTION RESPIRATORY (INHALATION) EVERY 4 HOURS PRN
Qty: 360 ML | Refills: 0 | Status: SHIPPED | OUTPATIENT
Start: 2022-10-15

## 2022-10-15 RX ORDER — DOXYCYCLINE HYCLATE 100 MG
100 TABLET ORAL 2 TIMES DAILY
Qty: 14 TABLET | Refills: 0 | Status: SHIPPED | OUTPATIENT
Start: 2022-10-15 | End: 2022-10-22

## 2022-10-15 RX ADMIN — SODIUM CHLORIDE: 9 INJECTION, SOLUTION INTRAVENOUS at 08:55

## 2022-10-15 RX ADMIN — PANTOPRAZOLE SODIUM 40 MG: 40 TABLET, DELAYED RELEASE ORAL at 06:53

## 2022-10-15 RX ADMIN — ASPIRIN 81 MG: 81 TABLET, COATED ORAL at 08:58

## 2022-10-15 RX ADMIN — MONTELUKAST SODIUM 10 MG: 10 TABLET, COATED ORAL at 08:58

## 2022-10-15 RX ADMIN — SODIUM CHLORIDE, PRESERVATIVE FREE 10 ML: 5 INJECTION INTRAVENOUS at 08:55

## 2022-10-15 RX ADMIN — ENOXAPARIN SODIUM 30 MG: 100 INJECTION SUBCUTANEOUS at 08:58

## 2022-10-15 RX ADMIN — DOXYCYCLINE 100 MG: 100 INJECTION, POWDER, LYOPHILIZED, FOR SOLUTION INTRAVENOUS at 08:57

## 2022-10-15 RX ADMIN — GABAPENTIN 600 MG: 300 CAPSULE ORAL at 08:58

## 2022-10-15 RX ADMIN — LISINOPRIL 10 MG: 10 TABLET ORAL at 08:58

## 2022-10-15 RX ADMIN — MULTIPLE VITAMINS W/ MINERALS TAB 1 TABLET: TAB at 08:58

## 2022-10-15 RX ADMIN — LEVOTHYROXINE SODIUM 112 MCG: 112 TABLET ORAL at 06:53

## 2022-10-15 RX ADMIN — FUROSEMIDE 40 MG: 40 TABLET ORAL at 08:58

## 2022-10-15 RX ADMIN — LAMOTRIGINE 200 MG: 100 TABLET ORAL at 08:58

## 2022-10-15 RX ADMIN — DILTIAZEM HYDROCHLORIDE 120 MG: 120 CAPSULE, EXTENDED RELEASE ORAL at 08:57

## 2022-10-15 RX ADMIN — SERTRALINE 100 MG: 100 TABLET, FILM COATED ORAL at 08:57

## 2022-10-15 RX ADMIN — FERROUS SULFATE TAB 325 MG (65 MG ELEMENTAL FE) 325 MG: 325 (65 FE) TAB at 11:26

## 2022-10-15 ASSESSMENT — PAIN SCALES - GENERAL: PAINLEVEL_OUTOF10: 0

## 2022-10-15 NOTE — CARE COORDINATION
DISCHARGE ORDER  Date/Time 10/15/2022 11:30 AM  Completed by: Linda De La Cruz RN, Case Management    Patient Name: Haja Meredith      : 1965  Admitting Diagnosis: Dyspnea and respiratory abnormalities [R06.00, R06.89]  Hypoxia [R09.02]  Acute respiratory failure with hypoxemia ([address] [J96.01]      Admit order Date and Status:10/14/22 inpt  (verify MD's last order for status of admission)      Noted discharge order. If applicable PT/OT recommendation at Discharge: N/A  DME recommendation by PT/OT:N/A  Confirmed discharge plan  : Yes  with whom patient  If pt confirmed DC plan does family need to be contacted by CM No   Discharge Plan: Order for dc noted. Spoke with pt who cont plan to return home. Noted need for home O2. Noted has used [facility] in past and will use again. Spoke with Cecilia uQick at [facility] and she will arrange home O2 set up. [facility] can give E-tank and POC and let pt dc. States they will deliver concentrator on 10/16. Pt aware. Chart reviewed and no other dc needs identified. Date of Last IMM Given: N/A    Reviewed chart. Role of discharge planner explained and patient verbalized understanding. Discharge order is noted. Has Home O2 in place on admit:  No  Informed of need to bring portable home O2 tank on day of discharge for nursing to connect prior to leaving:   Not Indicated  Verbalized agreement/Understanding:   Not Indicated  Pt is being d/c'd to home today. Pt's O2 sats are 84% on RA with exertion and 93% on 2L NC with exertion. Discharge timeout done with nsg, CM and pt. All discharge needs and concerns addressed.

## 2022-10-15 NOTE — PROGRESS NOTES
Sat O2-93% RA at rest  Sat O2-84% RA with ambulation. Sat O2-93% -2L with ambulation.    Sat O2-94%-2L at rest.

## 2022-10-15 NOTE — PROGRESS NOTES
Prescriptions: Duoneb, doxy-paper scripts and discharge instructions given. Pt verbalized understanding denies any questions/ needs at this time.  Transport called to transport pt to vehicle for discharge home

## 2022-10-15 NOTE — PLAN OF CARE
Problem: Discharge Planning  Goal: Discharge to home or other facility with appropriate resources  10/15/2022 0907 by Dann Mauro RN  Outcome: Progressing  10/15/2022 0737 by Ruthie Hyman RN  Outcome: Progressing     Problem: Pain  Goal: Verbalizes/displays adequate comfort level or baseline comfort level  10/15/2022 0907 by Dann Mauro RN  Outcome: Progressing  10/15/2022 0737 by Ruthie Hyman RN  Outcome: Progressing

## 2022-10-15 NOTE — PROGRESS NOTES
[facility]                                 Pulmonary Consult /Progress Note :                                                                  CHIEF COMPLAINT: SOB   HPI  Doing great   On 2 L   Going home   Feels much better  No SOB   No CP        LABS/IMAGING:    CBC:  Lab Results   Component Value Date    WBC 6.8 10/15/2022    HGB 12.3 10/15/2022    HCT 38.1 10/15/2022    MCV 85.2 10/15/2022     10/15/2022    LYMPHOPCT 23.1 10/15/2022    RBC 4.47 10/15/2022    MCH 27.6 10/15/2022    MCHC 32.4 10/15/2022    RDW 17.7 (H) 10/15/2022    NEUTOPHILPCT 68.7 10/15/2022    MONOPCT 7.9 10/15/2022    EOSPCT 0 04/01/2022    BASOPCT 0.2 10/15/2022    NEUTROABS 4.7 10/15/2022    LYMPHSABS 1.6 10/15/2022    MONOSABS 0.5 10/15/2022    EOSABS 0.0 10/15/2022    BASOSABS 0.0 10/15/2022       Recent Labs     10/15/22  0605 10/14/22  0455 10/13/22  1958   WBC 6.8 7.0 11.2*   HGB 12.3 12.3 12.2   HCT 38.1 37.2 37.1   MCV 85.2 83.8 83.9    268 344         BMP:   Recent Labs     10/13/22  1958 10/14/22  0455 10/15/22  0605    139 139   K 5.5* 4.1 4.5    99 101   CO2 26 29 28   BUN 21* 18 18   CREATININE 0.6 0.7 0.7         MG:   Lab Results   Component Value Date/Time    MG 2.00 10/14/2022 04:55 AM     Ca/Phos:   Lab Results   Component Value Date    CALCIUM 9.2 10/15/2022    PHOS 4.2 05/13/2010     LIVER PROFILE:   Recent Labs     10/13/22  1958 10/14/22  0455   AST 34 17   ALT 23 21   BILITOT 0.3 0.4   ALKPHOS 112 104         PT/INR: No results for input(s): PROTIME, INR in the last 72 hours. APTT: No results for input(s): APTT in the last 72 hours.     Cardiac Enzymes:  Lab Results   Component Value Date    TROPONINI <0.01 10/14/2022       Assessment:       -KRYSTIAN  -Morbid obesity  -B asthma   -Modertae Hypoxia   -atelectsisi       Plan:      SOB and hypoxia related to asthma and KRYSTIAN/OHS   Need O2 at home    CT shows no PE pr pneumonia   Most likely obesity hypoventilation with asthma   *-BD  *-ICS   *-NIMV  *- diuresis   *_procal C-reactive protein   *- doxy   *-discharge plan on o2 and follow with her pulmonologist      Thank you very much for allowing me to participate in the care of this pleasant patient , should you have any questions ,please do not hesitate to contact me      Re Lino MD,[facility]  Pulmonary&Critical Care Medicine    Rocky [facility]    NOTE: This report was transcribed using voice recognition software. Every effort was made to ensure accuracy; however, inadvertent computerized transcription errors may be present.

## 2022-10-18 NOTE — DISCHARGE SUMMARY
Name:  Sejal Roberto  Room:  4892/4239-38  MRN:    [id number]    Discharge Summary      This discharge summary is in conjunction with a complete physical exam done on the day of discharge. Discharging Physician: Dr. Ho Marie MD     Admit: 10/13/2022  Discharge:  10/15/2022    HPI taken from admission H&P:    The patient is a 49 y.o. female with PMH below, presents with SOB/OH, chest tightness, hypoxia, high salt intake recently, increased leg edema. Pt reports that she has been increasingly SOB the last 3d. She says that she has had some tightness in her chest w/ ambulation which improves w/ rest.  No CP at rest.  She also notes that her sats at home have been as low as 85% at home. Her PCP sent her to the ER. She says she thinks her legs have been more edematous than normal.  She also notes that she has had several high salt food items that last few days. Diagnoses this Admission and Hospital Course     #Acute on chronic hypoxic respiratory failure   #Dyspnea upon exertion   -took herself off home O2 a couple months ago, requiring 2 L O2 here   -continue to wean as tolerated   -will likely need to go home with O2   -could be multifactorial 2/2 to pulm HTN, KRYSTIAN,CHF, COPD/Asthma    -echo from July 2022 as above   -CTA chest without PE  -on IV solumedrol   -IV doxycyline   -sputum culture pending   -lasix IV given   -continue daily lasix and aldactone   -continue inhalers   -pulmonology consulted     ->  Patient is feeling better today. no SOB or wheezing today. Desaturates while asleep; she also desaturates with activity. She needs home O2 set up ; I discussed this with patient face to face. Discharged home on home oxygen and doxycycline and inhaled bronchodilators.  .  Patient needs follow-up with pulmonologist as outpatient for formal sleep study    #Chest tightness   -likely 2/2 to above, low suspicion for ACS   -trop <0.01 x 2   -EKG without acute abnormalities   -on tele   -has resolved today      #Hyperkalemia   -5.5 on presentation   -resolved today  -continue to monitor      #Pulmonary HTN  #Chronic diastolic HF   -on lasix and aldactone   -last echo as below   -daily weights, I and Os   -low sodium diet      #Palpitations   -on cardizem   -follows with cards      #HTN   -on lisinopril and cardizem      #KRYSTIAN   -on CPAP, non-compliant      #Hypothyroidism   -continue synthroid   -TSH 5.49  -T4 pending     #Hx of seizures   -on lamictal      #Depression   -on zoloft      #KRYSTIAN   -noncompliant with CPAP   -nightly O2      #GERD   -on PPI      #CYRIL   -on ferrous sulfate      #Neuropathy   -on gabapentin      #Morbid obesity   -BMI 58.6  -recommend weight loss and dietary changes      Procedures (Please Review Full Report for Details)  none    Consults    Pulmonology      Physical Exam at Discharge:    /77   Pulse 70   Temp 97.9 °F (36.6 °C) (Oral)   Resp 16   Ht 5' (1.524 m)   Wt (!) 315 lb 6.4 oz (143.1 kg)   LMP 07/29/2013   SpO2 95%   BMI 61.60 kg/m²   Gen: No distress. Alert. Pleasant female . Obese . Eyes: PERRL. No sclera icterus. No conjunctival injection. Neck: No JVD. Trachea midline. Resp: No accessory muscle use. No crackles. No wheezes. No rhonchi. +Diminished breath sounds bilaterally   CV: Regular rate. Regular rhythm. No murmur. No rub. Trace bilateral edema. Peripheral Pulses: +2 palpable, equal bilaterally   GI: Non-tender. Non-distended. Normal bowel sounds. Skin: Warm and dry. No nodule on exposed extremities. No rash on exposed extremities. M/S: No cyanosis. No joint deformity. No clubbing. Neuro: Awake. Grossly nonfocal    Psych: Oriented x 3. No anxiety or agitation.      CBC:         Recent Labs     10/13/22  1958 10/14/22  0455 10/15/22  0605   WBC 11.2* 7.0 6.8   HGB 12.2 12.3 12.3   HCT 37.1 37.2 38.1   MCV 83.9 83.8 85.2    268 266         BMP:         Recent Labs     10/13/22  1958 10/14/22  0455 10/15/22  0605    139 139   K 5.5* 4.1 4.5    99 101   CO2 26 29 28   BUN 21* 18 18   CREATININE 0.6 0.7 0.7         LIVER PROFILE:        Recent Labs     10/13/22  1958 10/14/22  0455   AST 34 17   ALT 23 21   BILITOT 0.3 0.4   ALKPHOS 112 104         PT/INR: No results for input(s): PROTIME, INR in the last 72 hours. CULTURES  COVID and Flu not detected       RADIOLOGY  CT CHEST PULMONARY EMBOLISM W CONTRAST   Final Result   1. No pulmonary embolus. 2. Low lung volumes with basilar atelectasis. 3. Borderline cardiomegaly. XR CHEST PORTABLE   Final Result   Cardiomegaly and pulmonary vascular congestion. Echocardiogram from 7/21/22   Summary   Technically difficult examination. Normal left ventricle systolic function with an estimated ejection fraction   of 55%. No regional wall motion abnormalities are seen. Normal left ventricular diastolic filling pressure. Mild mitral, pulmonic and tricuspid regurgitation. Discharge Medications     Medication List        START taking these medications      doxycycline hyclate 100 MG tablet  Commonly known as: VIBRA-TABS  Take 1 tablet by mouth 2 times daily for 7 days  Notes to patient: Doxycycline (Vibramycin, Doxy, Vibra Tabs)  Use: Treat infection  Side effects: nausea, vomiting, diarrhea & rash            CHANGE how you take these medications      ipratropium-albuterol 0.5-2.5 (3) MG/3ML Soln nebulizer solution  Commonly known as: DUONEB  Inhale 3 mLs into the lungs every 4 hours as needed for Shortness of Breath  What changed:   when to take this  reasons to take this            CONTINUE taking these medications      acetaminophen 500 MG tablet  Commonly known as: TYLENOL     albuterol sulfate  (90 Base) MCG/ACT inhaler  Commonly known as: Proventil HFA  Inhale 2 puffs into the lungs every 4 hours as needed for Wheezing or Shortness of Breath (Space out to every 6 hours as symptoms improve) Space out to every 6 hours as symptoms improve.      aspirin 81 MG EC tablet     dilTIAZem 120 MG extended release capsule  Commonly known as: CARDIZEM CD  Take 1 capsule by mouth 2 times daily     furosemide 40 MG tablet  Commonly known as: LASIX  TAKE ONE TABLET BY MOUTH DAILY     gabapentin 600 MG tablet  Commonly known as: NEURONTIN     ibuprofen 200 MG tablet  Commonly known as: ADVIL;MOTRIN     IRON PO     lamoTRIgine 200 MG tablet  Commonly known as: LAMICTAL     levothyroxine 112 MCG tablet  Commonly known as: SYNTHROID     lisinopril 10 MG tablet  Commonly known as: PRINIVIL;ZESTRIL  TAKE ONE TABLET BY MOUTH DAILY     montelukast 10 MG tablet  Commonly known as: SINGULAIR     MULTI-VITAMIN DAILY PO     oyster shell calcium w/D 500-200 MG-UNIT Tabs tablet     pantoprazole 40 MG tablet  Commonly known as: PROTONIX     sertraline 100 MG tablet  Commonly known as: ZOLOFT     spironolactone 25 MG tablet  Commonly known as: ALDACTONE  TAKE ONE TABLET BY MOUTH ON MONDAY, WEDNESDAY, FRIDAY AND SUNDAY     vitamin D3 125 MCG (5000 UT) Tabs tablet  Commonly known as: CHOLECALCIFEROL            STOP taking these medications      Fluticasone furoate-vilanterol 200-25 MCG/INH Aepb inhaler  Commonly known as: Breo Ellipta               Where to Get Your Medications        You can get these medications from any pharmacy    Bring a paper prescription for each of these medications  doxycycline hyclate 100 MG tablet  ipratropium-albuterol 0.5-2.5 (3) MG/3ML Soln nebulizer solution           Discharged in stable condition to home. Total time 35 minutes. > 50%  dominated by counseling and coordination of care. Home O2 arranged       Follow Up: Follow up with PCP in 1 week. Pulmonology as discussed.      Wolfgang Santos MD

## 2022-10-19 PROBLEM — J44.9 CHRONIC OBSTRUCTIVE PULMONARY DISEASE (HCC): Status: ACTIVE | Noted: 2022-10-19

## 2022-10-20 NOTE — PROGRESS NOTES
Physician Progress Note      Rl Corcoran  CSN #:                  [id number]  :                       1965  ADMIT DATE:       10/13/2022 6:51 PM  Lincoln Yi DATE:        10/15/2022 1:38 PM  RESPONDING  PROVIDER #:        Mally Ellington MD          QUERY TEXT:    Pt admitted with ae COPD and has CHF documented. If possible, please document   in progress notes and discharge summary further specificity regarding the type   and acuity of CHF:      The medical record reflects the following:  Risk Factors: Acute on chronic hypoxic respiratory failure, Chronic diastolic   HF  Clinical Indicators: D/C note-  Acute on chronic hypoxic respiratory failure-   could be multifactorial 2/2 to pulm HTN, KRYSTIAN,CHF, COPD/Asthma  ECHO- ejection fraction of 55%.     Treatment: IV Lasix, pulmonology consult, monitor labs/images, aldactone,   daily weights, I and Os    Thank You Rupinder Cassidy RN, [email or web address]  Options provided:  -- Acute on Chronic Diastolic CHF/HFpEF  -- Chronic Diastolic CHF/HFpEF only  -- Other - I will add my own diagnosis  -- Disagree - Not applicable / Not valid  -- Disagree - Clinically unable to determine / Unknown  -- Refer to Clinical Documentation Reviewer    PROVIDER RESPONSE TEXT:    This patient has chronic diastolic CHF/HFpEF only.     Query created by: Lauren Don on 10/19/2022 4:31 PM      Electronically signed by:  Mally Ellington MD 10/20/2022 9:13 AM

## 2023-01-14 ENCOUNTER — HOSPITAL ENCOUNTER (INPATIENT)
Age: 58
LOS: 4 days | Discharge: HOME | DRG: 194 | End: 2023-01-18
Attending: STUDENT IN AN ORGANIZED HEALTH CARE EDUCATION/TRAINING PROGRAM | Admitting: INTERNAL MEDICINE
Payer: MEDICAID

## 2023-01-14 ENCOUNTER — APPOINTMENT (OUTPATIENT)
Dept: GENERAL RADIOLOGY | Age: 58
DRG: 194 | End: 2023-01-14
Payer: MEDICAID

## 2023-01-14 DIAGNOSIS — R06.09 DOE (DYSPNEA ON EXERTION): ICD-10-CM

## 2023-01-14 DIAGNOSIS — R68.89 INCREASED OXYGEN DEMAND: ICD-10-CM

## 2023-01-14 DIAGNOSIS — J96.21 ACUTE ON CHRONIC RESPIRATORY FAILURE WITH HYPOXEMIA (HCC): Primary | ICD-10-CM

## 2023-01-14 PROBLEM — I50.9 ACUTE HEART FAILURE, UNSPECIFIED HEART FAILURE TYPE (HCC): Status: ACTIVE | Noted: 2023-01-14

## 2023-01-14 LAB
A/G RATIO: 1.9 (ref 1.1–2.2)
ALBUMIN SERPL-MCNC: 4.3 G/DL (ref 3.4–5)
ALP BLD-CCNC: 100 U/L (ref 40–129)
ALT SERPL-CCNC: 20 U/L (ref 10–40)
ANION GAP SERPL CALCULATED.3IONS-SCNC: 9 MMOL/L (ref 3–16)
AST SERPL-CCNC: 17 U/L (ref 15–37)
BASOPHILS ABSOLUTE: 0 K/UL (ref 0–0.2)
BASOPHILS RELATIVE PERCENT: 0.4 %
BILIRUB SERPL-MCNC: 0.4 MG/DL (ref 0–1)
BUN BLDV-MCNC: 15 MG/DL (ref 7–20)
CALCIUM SERPL-MCNC: 9.3 MG/DL (ref 8.3–10.6)
CHLORIDE BLD-SCNC: 101 MMOL/L (ref 99–110)
CO2: 31 MMOL/L (ref 21–32)
CREAT SERPL-MCNC: 0.7 MG/DL (ref 0.6–1.1)
EOSINOPHILS ABSOLUTE: 0 K/UL (ref 0–0.6)
EOSINOPHILS RELATIVE PERCENT: 0.2 %
GFR SERPL CREATININE-BSD FRML MDRD: >60 ML/MIN/{1.73_M2}
GLUCOSE BLD-MCNC: 127 MG/DL (ref 70–99)
HCT VFR BLD CALC: 37.1 % (ref 36–48)
HEMOGLOBIN: 12.1 G/DL (ref 12–16)
INFLUENZA A: NOT DETECTED
INFLUENZA B: NOT DETECTED
LYMPHOCYTES ABSOLUTE: 1.2 K/UL (ref 1–5.1)
LYMPHOCYTES RELATIVE PERCENT: 15.7 %
MCH RBC QN AUTO: 27.8 PG (ref 26–34)
MCHC RBC AUTO-ENTMCNC: 32.7 G/DL (ref 31–36)
MCV RBC AUTO: 85.1 FL (ref 80–100)
MONOCYTES ABSOLUTE: 0.5 K/UL (ref 0–1.3)
MONOCYTES RELATIVE PERCENT: 6.4 %
NEUTROPHILS ABSOLUTE: 5.9 K/UL (ref 1.7–7.7)
NEUTROPHILS RELATIVE PERCENT: 77.3 %
PDW BLD-RTO: 16.4 % (ref 12.4–15.4)
PLATELET # BLD: 282 K/UL (ref 135–450)
PMV BLD AUTO: 7.7 FL (ref 5–10.5)
POTASSIUM SERPL-SCNC: 3.9 MMOL/L (ref 3.5–5.1)
PRO-BNP: 107 PG/ML (ref 0–124)
RBC # BLD: 4.35 M/UL (ref 4–5.2)
SARS-COV-2 RNA, RT PCR: NOT DETECTED
SODIUM BLD-SCNC: 141 MMOL/L (ref 136–145)
TOTAL PROTEIN: 6.6 G/DL (ref 6.4–8.2)
TROPONIN: <0.01 NG/ML
WBC # BLD: 7.7 K/UL (ref 4–11)

## 2023-01-14 PROCEDURE — 6370000000 HC RX 637 (ALT 250 FOR IP): Performed by: INTERNAL MEDICINE

## 2023-01-14 PROCEDURE — 71045 X-RAY EXAM CHEST 1 VIEW: CPT

## 2023-01-14 PROCEDURE — 93005 ELECTROCARDIOGRAM TRACING: CPT | Performed by: NURSE PRACTITIONER

## 2023-01-14 PROCEDURE — 80053 COMPREHEN METABOLIC PANEL: CPT

## 2023-01-14 PROCEDURE — 83880 ASSAY OF NATRIURETIC PEPTIDE: CPT

## 2023-01-14 PROCEDURE — 99285 EMERGENCY DEPT VISIT HI MDM: CPT

## 2023-01-14 PROCEDURE — 85025 COMPLETE CBC W/AUTO DIFF WBC: CPT

## 2023-01-14 PROCEDURE — 87636 SARSCOV2 & INF A&B AMP PRB: CPT

## 2023-01-14 PROCEDURE — 6360000002 HC RX W HCPCS: Performed by: INTERNAL MEDICINE

## 2023-01-14 PROCEDURE — 1200000000 HC SEMI PRIVATE

## 2023-01-14 PROCEDURE — 94761 N-INVAS EAR/PLS OXIMETRY MLT: CPT

## 2023-01-14 PROCEDURE — 2580000003 HC RX 258: Performed by: INTERNAL MEDICINE

## 2023-01-14 PROCEDURE — 2700000000 HC OXYGEN THERAPY PER DAY

## 2023-01-14 PROCEDURE — 84484 ASSAY OF TROPONIN QUANT: CPT

## 2023-01-14 RX ORDER — MAGNESIUM SULFATE IN WATER 40 MG/ML
2000 INJECTION, SOLUTION INTRAVENOUS PRN
Status: DISCONTINUED | OUTPATIENT
Start: 2023-01-14 | End: 2023-01-18 | Stop reason: HOSPADM

## 2023-01-14 RX ORDER — GABAPENTIN 300 MG/1
600 CAPSULE ORAL 2 TIMES DAILY
Status: DISCONTINUED | OUTPATIENT
Start: 2023-01-14 | End: 2023-01-18 | Stop reason: HOSPADM

## 2023-01-14 RX ORDER — PANTOPRAZOLE SODIUM 40 MG/1
40 TABLET, DELAYED RELEASE ORAL DAILY
Status: DISCONTINUED | OUTPATIENT
Start: 2023-01-15 | End: 2023-01-18 | Stop reason: HOSPADM

## 2023-01-14 RX ORDER — FERROUS SULFATE 325(65) MG
325 TABLET ORAL 2 TIMES DAILY
COMMUNITY
Start: 2019-04-26

## 2023-01-14 RX ORDER — SODIUM CHLORIDE 0.9 % (FLUSH) 0.9 %
10 SYRINGE (ML) INJECTION EVERY 12 HOURS SCHEDULED
Status: DISCONTINUED | OUTPATIENT
Start: 2023-01-14 | End: 2023-01-18 | Stop reason: HOSPADM

## 2023-01-14 RX ORDER — SODIUM CHLORIDE 9 MG/ML
INJECTION, SOLUTION INTRAVENOUS PRN
Status: DISCONTINUED | OUTPATIENT
Start: 2023-01-14 | End: 2023-01-18 | Stop reason: HOSPADM

## 2023-01-14 RX ORDER — ASPIRIN 81 MG/1
81 TABLET ORAL DAILY
Status: DISCONTINUED | OUTPATIENT
Start: 2023-01-15 | End: 2023-01-18 | Stop reason: HOSPADM

## 2023-01-14 RX ORDER — ALBUTEROL SULFATE 90 UG/1
2 AEROSOL, METERED RESPIRATORY (INHALATION) EVERY 4 HOURS PRN
Status: DISCONTINUED | OUTPATIENT
Start: 2023-01-14 | End: 2023-01-18 | Stop reason: HOSPADM

## 2023-01-14 RX ORDER — ONDANSETRON 2 MG/ML
4 INJECTION INTRAMUSCULAR; INTRAVENOUS EVERY 6 HOURS PRN
Status: DISCONTINUED | OUTPATIENT
Start: 2023-01-14 | End: 2023-01-18 | Stop reason: HOSPADM

## 2023-01-14 RX ORDER — DILTIAZEM HYDROCHLORIDE 120 MG/1
120 CAPSULE, COATED, EXTENDED RELEASE ORAL 2 TIMES DAILY
Status: DISCONTINUED | OUTPATIENT
Start: 2023-01-14 | End: 2023-01-18 | Stop reason: HOSPADM

## 2023-01-14 RX ORDER — LISINOPRIL 10 MG/1
10 TABLET ORAL DAILY
Status: DISCONTINUED | OUTPATIENT
Start: 2023-01-15 | End: 2023-01-18 | Stop reason: HOSPADM

## 2023-01-14 RX ORDER — NYSTATIN 100000 [USP'U]/G
10000 POWDER TOPICAL DAILY
COMMUNITY
Start: 2019-03-26

## 2023-01-14 RX ORDER — MONTELUKAST SODIUM 10 MG/1
10 TABLET ORAL EVERY MORNING
Status: DISCONTINUED | OUTPATIENT
Start: 2023-01-15 | End: 2023-01-18 | Stop reason: HOSPADM

## 2023-01-14 RX ORDER — PIMECROLIMUS 10 MG/G
1 CREAM TOPICAL DAILY
COMMUNITY
Start: 2022-12-14

## 2023-01-14 RX ORDER — FUROSEMIDE 10 MG/ML
40 INJECTION INTRAMUSCULAR; INTRAVENOUS 2 TIMES DAILY
Status: DISCONTINUED | OUTPATIENT
Start: 2023-01-15 | End: 2023-01-18 | Stop reason: HOSPADM

## 2023-01-14 RX ORDER — IPRATROPIUM BROMIDE AND ALBUTEROL SULFATE 2.5; .5 MG/3ML; MG/3ML
3 SOLUTION RESPIRATORY (INHALATION) EVERY 4 HOURS PRN
Status: DISCONTINUED | OUTPATIENT
Start: 2023-01-14 | End: 2023-01-18 | Stop reason: HOSPADM

## 2023-01-14 RX ORDER — ACETAMINOPHEN 650 MG/1
650 SUPPOSITORY RECTAL EVERY 6 HOURS PRN
Status: DISCONTINUED | OUTPATIENT
Start: 2023-01-14 | End: 2023-01-18 | Stop reason: HOSPADM

## 2023-01-14 RX ORDER — SODIUM CHLORIDE 0.9 % (FLUSH) 0.9 %
10 SYRINGE (ML) INJECTION PRN
Status: DISCONTINUED | OUTPATIENT
Start: 2023-01-14 | End: 2023-01-18 | Stop reason: HOSPADM

## 2023-01-14 RX ORDER — POTASSIUM CHLORIDE 7.45 MG/ML
10 INJECTION INTRAVENOUS PRN
Status: DISCONTINUED | OUTPATIENT
Start: 2023-01-14 | End: 2023-01-18 | Stop reason: HOSPADM

## 2023-01-14 RX ORDER — LEVOTHYROXINE SODIUM 112 UG/1
112 TABLET ORAL DAILY
Status: DISCONTINUED | OUTPATIENT
Start: 2023-01-15 | End: 2023-01-16

## 2023-01-14 RX ORDER — ACETAMINOPHEN 325 MG/1
650 TABLET ORAL EVERY 6 HOURS PRN
Status: DISCONTINUED | OUTPATIENT
Start: 2023-01-14 | End: 2023-01-18 | Stop reason: HOSPADM

## 2023-01-14 RX ORDER — PROMETHAZINE HYDROCHLORIDE 25 MG/1
12.5 TABLET ORAL EVERY 6 HOURS PRN
Status: DISCONTINUED | OUTPATIENT
Start: 2023-01-14 | End: 2023-01-18 | Stop reason: HOSPADM

## 2023-01-14 RX ORDER — LAMOTRIGINE 100 MG/1
200 TABLET ORAL 2 TIMES DAILY
Status: DISCONTINUED | OUTPATIENT
Start: 2023-01-14 | End: 2023-01-18 | Stop reason: HOSPADM

## 2023-01-14 RX ORDER — ENOXAPARIN SODIUM 100 MG/ML
30 INJECTION SUBCUTANEOUS 2 TIMES DAILY
Status: DISCONTINUED | OUTPATIENT
Start: 2023-01-14 | End: 2023-01-15

## 2023-01-14 RX ADMIN — SERTRALINE HYDROCHLORIDE 100 MG: 50 TABLET ORAL at 23:13

## 2023-01-14 RX ADMIN — ENOXAPARIN SODIUM 30 MG: 100 INJECTION SUBCUTANEOUS at 23:14

## 2023-01-14 RX ADMIN — LAMOTRIGINE 200 MG: 100 TABLET ORAL at 23:13

## 2023-01-14 RX ADMIN — SODIUM CHLORIDE, PRESERVATIVE FREE 10 ML: 5 INJECTION INTRAVENOUS at 23:14

## 2023-01-14 RX ADMIN — CEFAZOLIN 2000 MG: 10 INJECTION, POWDER, FOR SOLUTION INTRAVENOUS at 21:57

## 2023-01-14 RX ADMIN — DILTIAZEM HYDROCHLORIDE 120 MG: 120 CAPSULE, COATED, EXTENDED RELEASE ORAL at 23:14

## 2023-01-14 RX ADMIN — GABAPENTIN 600 MG: 300 CAPSULE ORAL at 23:13

## 2023-01-14 ASSESSMENT — PAIN - FUNCTIONAL ASSESSMENT: PAIN_FUNCTIONAL_ASSESSMENT: NONE - DENIES PAIN

## 2023-01-14 ASSESSMENT — LIFESTYLE VARIABLES
HOW OFTEN DO YOU HAVE A DRINK CONTAINING ALCOHOL: NEVER
HOW MANY STANDARD DRINKS CONTAINING ALCOHOL DO YOU HAVE ON A TYPICAL DAY: PATIENT DOES NOT DRINK
HOW OFTEN DO YOU HAVE A DRINK CONTAINING ALCOHOL: NEVER

## 2023-01-15 PROBLEM — R09.02 EXERCISE HYPOXEMIA: Status: ACTIVE | Noted: 2023-01-15

## 2023-01-15 PROBLEM — J98.4 RESTRICTIVE LUNG DISEASE: Status: ACTIVE | Noted: 2023-01-15

## 2023-01-15 PROBLEM — R09.89 PULMONARY VENOUS CONGESTION: Status: ACTIVE | Noted: 2023-01-15

## 2023-01-15 LAB
AMPHETAMINE SCREEN, URINE: POSITIVE
BARBITURATE SCREEN URINE: ABNORMAL
BASOPHILS ABSOLUTE: 0 K/UL (ref 0–0.2)
BASOPHILS RELATIVE PERCENT: 0.4 %
BENZODIAZEPINE SCREEN, URINE: ABNORMAL
CANNABINOID SCREEN URINE: ABNORMAL
COCAINE METABOLITE SCREEN URINE: ABNORMAL
D DIMER: 0.7 UG/ML FEU (ref 0–0.6)
EKG ATRIAL RATE: 79 BPM
EKG DIAGNOSIS: NORMAL
EKG P AXIS: 27 DEGREES
EKG P-R INTERVAL: 168 MS
EKG Q-T INTERVAL: 370 MS
EKG QRS DURATION: 96 MS
EKG QTC CALCULATION (BAZETT): 424 MS
EKG R AXIS: 23 DEGREES
EKG T AXIS: 30 DEGREES
EKG VENTRICULAR RATE: 79 BPM
EOSINOPHILS ABSOLUTE: 0 K/UL (ref 0–0.6)
EOSINOPHILS RELATIVE PERCENT: 0.1 %
FENTANYL SCREEN, URINE: ABNORMAL
HCT VFR BLD CALC: 36.6 % (ref 36–48)
HEMOGLOBIN: 11.6 G/DL (ref 12–16)
LYMPHOCYTES ABSOLUTE: 1.8 K/UL (ref 1–5.1)
LYMPHOCYTES RELATIVE PERCENT: 25.5 %
Lab: ABNORMAL
MAGNESIUM: 2.2 MG/DL (ref 1.8–2.4)
MCH RBC QN AUTO: 27.3 PG (ref 26–34)
MCHC RBC AUTO-ENTMCNC: 31.6 G/DL (ref 31–36)
MCV RBC AUTO: 86.4 FL (ref 80–100)
METHADONE SCREEN, URINE: ABNORMAL
MONOCYTES ABSOLUTE: 0.5 K/UL (ref 0–1.3)
MONOCYTES RELATIVE PERCENT: 7.5 %
NEUTROPHILS ABSOLUTE: 4.8 K/UL (ref 1.7–7.7)
NEUTROPHILS RELATIVE PERCENT: 66.5 %
OPIATE SCREEN URINE: ABNORMAL
OXYCODONE URINE: ABNORMAL
PDW BLD-RTO: 16.9 % (ref 12.4–15.4)
PH UA: 6
PHENCYCLIDINE SCREEN URINE: POSITIVE
PLATELET # BLD: 253 K/UL (ref 135–450)
PMV BLD AUTO: 7.8 FL (ref 5–10.5)
PROCALCITONIN: 0.07 NG/ML (ref 0–0.15)
RBC # BLD: 4.24 M/UL (ref 4–5.2)
WBC # BLD: 7.2 K/UL (ref 4–11)

## 2023-01-15 PROCEDURE — 94761 N-INVAS EAR/PLS OXIMETRY MLT: CPT

## 2023-01-15 PROCEDURE — 93010 ELECTROCARDIOGRAM REPORT: CPT | Performed by: INTERNAL MEDICINE

## 2023-01-15 PROCEDURE — 36415 COLL VENOUS BLD VENIPUNCTURE: CPT

## 2023-01-15 PROCEDURE — 6370000000 HC RX 637 (ALT 250 FOR IP): Performed by: INTERNAL MEDICINE

## 2023-01-15 PROCEDURE — 80307 DRUG TEST PRSMV CHEM ANLYZR: CPT

## 2023-01-15 PROCEDURE — 84443 ASSAY THYROID STIM HORMONE: CPT

## 2023-01-15 PROCEDURE — 2580000003 HC RX 258: Performed by: INTERNAL MEDICINE

## 2023-01-15 PROCEDURE — 1200000000 HC SEMI PRIVATE

## 2023-01-15 PROCEDURE — 84145 PROCALCITONIN (PCT): CPT

## 2023-01-15 PROCEDURE — 99254 IP/OBS CNSLTJ NEW/EST MOD 60: CPT | Performed by: INTERNAL MEDICINE

## 2023-01-15 PROCEDURE — 83735 ASSAY OF MAGNESIUM: CPT

## 2023-01-15 PROCEDURE — 6360000002 HC RX W HCPCS: Performed by: INTERNAL MEDICINE

## 2023-01-15 PROCEDURE — 85025 COMPLETE CBC W/AUTO DIFF WBC: CPT

## 2023-01-15 PROCEDURE — 85379 FIBRIN DEGRADATION QUANT: CPT

## 2023-01-15 PROCEDURE — 2700000000 HC OXYGEN THERAPY PER DAY

## 2023-01-15 PROCEDURE — 2500000003 HC RX 250 WO HCPCS: Performed by: INTERNAL MEDICINE

## 2023-01-15 RX ORDER — HEPARIN SODIUM 5000 [USP'U]/ML
5000 INJECTION, SOLUTION INTRAVENOUS; SUBCUTANEOUS EVERY 8 HOURS SCHEDULED
Status: DISCONTINUED | OUTPATIENT
Start: 2023-01-15 | End: 2023-01-18 | Stop reason: HOSPADM

## 2023-01-15 RX ORDER — PIMECROLIMUS 10 MG/G
CREAM TOPICAL 2 TIMES DAILY
Status: DISCONTINUED | OUTPATIENT
Start: 2023-01-15 | End: 2023-01-18 | Stop reason: HOSPADM

## 2023-01-15 RX ADMIN — ASPIRIN 81 MG: 81 TABLET, COATED ORAL at 09:01

## 2023-01-15 RX ADMIN — LAMOTRIGINE 200 MG: 100 TABLET ORAL at 20:08

## 2023-01-15 RX ADMIN — LEVOTHYROXINE SODIUM 112 MCG: 112 TABLET ORAL at 09:00

## 2023-01-15 RX ADMIN — ENOXAPARIN SODIUM 30 MG: 100 INJECTION SUBCUTANEOUS at 09:00

## 2023-01-15 RX ADMIN — LISINOPRIL 10 MG: 10 TABLET ORAL at 09:01

## 2023-01-15 RX ADMIN — DILTIAZEM HYDROCHLORIDE 120 MG: 120 CAPSULE, COATED, EXTENDED RELEASE ORAL at 09:01

## 2023-01-15 RX ADMIN — GABAPENTIN 600 MG: 300 CAPSULE ORAL at 20:08

## 2023-01-15 RX ADMIN — SERTRALINE HYDROCHLORIDE 100 MG: 50 TABLET ORAL at 20:05

## 2023-01-15 RX ADMIN — SODIUM CHLORIDE, PRESERVATIVE FREE 10 ML: 5 INJECTION INTRAVENOUS at 20:04

## 2023-01-15 RX ADMIN — CEFAZOLIN 2000 MG: 10 INJECTION, POWDER, FOR SOLUTION INTRAVENOUS at 05:08

## 2023-01-15 RX ADMIN — FUROSEMIDE 40 MG: 10 INJECTION, SOLUTION INTRAMUSCULAR; INTRAVENOUS at 18:37

## 2023-01-15 RX ADMIN — HEPARIN SODIUM 5000 UNITS: 5000 INJECTION INTRAVENOUS; SUBCUTANEOUS at 20:08

## 2023-01-15 RX ADMIN — PIMECROLIMUS: 10 CREAM TOPICAL at 09:03

## 2023-01-15 RX ADMIN — ACETAMINOPHEN 325MG 650 MG: 325 TABLET ORAL at 15:32

## 2023-01-15 RX ADMIN — FUROSEMIDE 40 MG: 10 INJECTION, SOLUTION INTRAMUSCULAR; INTRAVENOUS at 10:01

## 2023-01-15 RX ADMIN — CEFAZOLIN 2000 MG: 10 INJECTION, POWDER, FOR SOLUTION INTRAVENOUS at 13:29

## 2023-01-15 RX ADMIN — PIMECROLIMUS: 10 CREAM TOPICAL at 20:09

## 2023-01-15 RX ADMIN — PANTOPRAZOLE SODIUM 40 MG: 40 TABLET, DELAYED RELEASE ORAL at 09:00

## 2023-01-15 RX ADMIN — SODIUM CHLORIDE, PRESERVATIVE FREE 10 ML: 5 INJECTION INTRAVENOUS at 09:04

## 2023-01-15 RX ADMIN — MICONAZOLE NITRATE: 2 POWDER TOPICAL at 09:04

## 2023-01-15 RX ADMIN — LAMOTRIGINE 200 MG: 100 TABLET ORAL at 09:00

## 2023-01-15 RX ADMIN — GABAPENTIN 600 MG: 300 CAPSULE ORAL at 09:01

## 2023-01-15 RX ADMIN — DILTIAZEM HYDROCHLORIDE 120 MG: 120 CAPSULE, COATED, EXTENDED RELEASE ORAL at 20:07

## 2023-01-15 RX ADMIN — MICONAZOLE NITRATE: 2 POWDER TOPICAL at 20:09

## 2023-01-15 RX ADMIN — MONTELUKAST SODIUM 10 MG: 10 TABLET ORAL at 09:00

## 2023-01-15 ASSESSMENT — PAIN DESCRIPTION - LOCATION: LOCATION: HEAD

## 2023-01-15 ASSESSMENT — PAIN DESCRIPTION - FREQUENCY: FREQUENCY: INTERMITTENT

## 2023-01-15 ASSESSMENT — PAIN DESCRIPTION - ONSET: ONSET: SUDDEN

## 2023-01-15 ASSESSMENT — PAIN SCALES - GENERAL
PAINLEVEL_OUTOF10: 4
PAINLEVEL_OUTOF10: 0

## 2023-01-15 ASSESSMENT — PAIN DESCRIPTION - ORIENTATION: ORIENTATION: OTHER (COMMENT)

## 2023-01-15 ASSESSMENT — PAIN - FUNCTIONAL ASSESSMENT: PAIN_FUNCTIONAL_ASSESSMENT: ACTIVITIES ARE NOT PREVENTED

## 2023-01-15 ASSESSMENT — PAIN DESCRIPTION - DESCRIPTORS: DESCRIPTORS: POUNDING

## 2023-01-15 ASSESSMENT — PAIN DESCRIPTION - PAIN TYPE: TYPE: ACUTE PAIN

## 2023-01-15 NOTE — ED PROVIDER NOTES
[facility] C3 TELE/MED SURG/ONC  EMERGENCY DEPARTMENT ENCOUNTER        Pt Name: Brittany Ramirez  MRN: [id number]  [place] 1965  Date of evaluation: 1/14/2023  Provider: MATHEW Thakkar - CNP  PCP: Cynthia Umana MD  Note Started: 12:32 AM EST 1/15/23       I have seen and evaluated this patient with my supervising physician Dr. Con Claude   Patient presents with    Shortness of Breath     SOB x4 days. Denies CP. Wears 2L at baseline, states she has had to increase to 3L and is still dropping SpO2 to the 80s with ambulation but states it goes back up with rest. SpO2 88% on 3L after transferring from wheelchair to bed. HISTORY OF PRESENT ILLNESS: 1 or more Elements     History from : Patient    Limitations to history : None    Brittany Ramirez is a 56 y.o. female who presents shortness of breath. Patient has a history of dyspnea on exertion states that over the last 4 days has been worse, she wears 2 L of oxygen at baseline, states that she is had increasing to 3 and it still was dropping into the 80s with anytime she gets up and moves around. Denies dionisio chest pain with it but states that she does have some chest tightness when she gets up to move around. She does see pulmonology. Patient got hypoxic to 88% on 3 L just transferring from the wheelchair to the bed. She denies any symptoms at rest she states that when she stops moving all of her symptoms go away and she feels fine. Nursing Notes were all reviewed and agreed with or any disagreements were addressed in the HPI. REVIEW OF SYSTEMS :      Review of Systems    Positives and Pertinent negatives as per HPI.      SURGICAL HISTORY     Past Surgical History:   Procedure Laterality Date    BLADDER SURGERY      BRAIN SURGERY      BRONCHOSCOPY N/A 4/21/2019    BRONCHOSCOPY ALVEOLAR LAVAGE performed by Batool Gifford MD at [facility]  4/21/2019    BRONCHOSCOPY THERAPUTIC ASPIRATION INITIAL performed by Tg Guy MD at [address] [place] [address] N/A 5/15/2019    BRONCHOSCOPY ALVEOLAR LAVAGE performed by Brayden Bermudez MD at [address] [place] [address]  5/15/2019    BRONCHOSCOPY THERAPUTIC ASPIRATION INITIAL performed by Brayden Bermudez MD at [place] [address]  2009    hip    KNEE SURGERY      OTHER SURGICAL HISTORY  07/31/2013    cystoscopy, urethral dilatation    SHOULDER SURGERY      r rotator cuff repair    TOTAL HIP ARTHROPLASTY      Left    URETHRAL STRICTURE DILATATION         CURRENTMEDICATIONS       Current Discharge Medication List        CONTINUE these medications which have NOT CHANGED    Details   ferrous sulfate (IRON 325) 325 (65 Fe) MG tablet Take 325 mg by mouth in the morning and at bedtime      nystatin (MYCOSTATIN) 200329 UNIT/GM powder Apply 10,000 Units topically daily      Multiple Vitamins-Minerals (MULTI ADULT GUMMIES PO) Take 50 mg by mouth daily      pimecrolimus (ELIDEL) 1 % cream Apply 1 application topically daily      ipratropium-albuterol (DUONEB) 0.5-2.5 (3) MG/3ML SOLN nebulizer solution Inhale 3 mLs into the lungs every 4 hours as needed for Shortness of Breath  Qty: 360 mL, Refills: 0      Calcium Carb-Cholecalciferol (OYSTER SHELL CALCIUM W/D) 500-200 MG-UNIT TABS tablet       vitamin D3 (CHOLECALCIFEROL) 125 MCG (5000 UT) TABS tablet Take by mouth      spironolactone (ALDACTONE) 25 MG tablet TAKE ONE TABLET BY MOUTH ON MONDAY, WEDNESDAY, FRIDAY AND SUNDAY  Qty: 90 tablet, Refills: 1    Associated Diagnoses: Chronic diastolic heart failure (HCC)      furosemide (LASIX) 40 MG tablet TAKE ONE TABLET BY MOUTH DAILY  Qty: 90 tablet, Refills: 3      lisinopril (PRINIVIL;ZESTRIL) 10 MG tablet TAKE ONE TABLET BY MOUTH DAILY  Qty: 90 tablet, Refills: 3    Associated Diagnoses: Chronic diastolic heart failure (HCC)      levothyroxine (SYNTHROID) 112 MCG tablet Take 112 mcg by mouth daily      ibuprofen (ADVIL;MOTRIN) 200 MG tablet Take 600 mg by mouth as needed for Pain      sertraline (ZOLOFT) 100 MG tablet Take 100 mg by mouth daily      lamoTRIgine (LAMICTAL) 200 MG tablet Take 200 mg by mouth 2 times daily      diltiazem (CARDIZEM CD) 120 MG extended release capsule Take 1 capsule by mouth 2 times daily  Qty: 30 capsule, Refills: 0      albuterol sulfate HFA (PROVENTIL HFA) 108 (90 Base) MCG/ACT inhaler Inhale 2 puffs into the lungs every 4 hours as needed for Wheezing or Shortness of Breath (Space out to every 6 hours as symptoms improve) Space out to every 6 hours as symptoms improve. Qty: 1 Inhaler, Refills: 0      pantoprazole (PROTONIX) 40 MG tablet Take 40 mg by mouth daily. gabapentin (NEURONTIN) 600 MG tablet Take 600 mg by mouth 2 times daily. montelukast (SINGULAIR) 10 MG tablet Take 10 mg by mouth every morning       aspirin 81 MG EC tablet Take 81 mg by mouth daily. acetaminophen (TYLENOL) 500 MG tablet Take 500 mg by mouth every 6 hours as needed.              ALLERGIES     Bioflavonoids, Other, Oxycodone-acetaminophen, Percocet [oxycodone-acetaminophen], Azithromycin, and Nickel    FAMILYHISTORY       Family History   Problem Relation Age of Onset    Asthma Other     Asthma Other     Hypertension Father     Hypertension Sister     Cancer Neg Hx     Diabetes Neg Hx     Emphysema Neg Hx     Heart Failure Neg Hx         SOCIAL HISTORY       Social History     Tobacco Use    Smoking status: Former     Packs/day: 0.50     Years: 2.00     Pack years: 1.00     Types: Cigarettes     Quit date: 1986     Years since quittin.7    Smokeless tobacco: Never   Substance Use Topics    Alcohol use: No    Drug use: No       SCREENINGS        [place] Coma Scale  Eye Opening: Spontaneous  Best Verbal Response: Oriented  Best Motor Response: Obeys commands  [place] Coma Scale Score: 15                CIWA Assessment  BP: (!) 152/75  Heart Rate: 87           PHYSICAL EXAM  1 or more Elements ED Triage Vitals [01/14/23 1845]   BP Temp Temp Source Heart Rate Resp SpO2 Height Weight   (!) 166/94 98.7 °F (37.1 °C) Oral 93 22 93 % 5' (1.524 m) 300 lb (136.1 kg)       Physical Exam    Lungs clear to auscultation, no wheezing. Heart was regular rate and rhythm. Abdomen was obese but soft and nontender. Extremities are atraumatic, no objective edema although difficult to assess given patient body habitus. DIAGNOSTIC RESULTS   LABS:    Labs Reviewed   CBC WITH AUTO DIFFERENTIAL - Abnormal; Notable for the following components:       Result Value    RDW 16.4 (*)     All other components within normal limits   COMPREHENSIVE METABOLIC PANEL - Abnormal; Notable for the following components:    Glucose 127 (*)     All other components within normal limits   COVID-19 & INFLUENZA COMBO   TROPONIN   BRAIN NATRIURETIC PEPTIDE   URINALYSIS   CBC WITH AUTO DIFFERENTIAL   URINE DRUG SCREEN   D-DIMER, QUANTITATIVE   MAGNESIUM       When ordered only abnormal lab results are displayed. All other labs were within normal range or not returned as of this dictation. EKG: When ordered, EKG's are interpreted by the Emergency Department Physician in the absence of a cardiologist.  Please see their note for interpretation of EKG. RADIOLOGY:   Non-plain film images such as CT, Ultrasound and MRI are read by the radiologist. Plain radiographic images are visualized and preliminarily interpreted by the ED Provider with the below findings:      Interpretation per the Radiologist below, if available at the time of this note:    XR CHEST PORTABLE   Final Result   Enlarged cardiac silhouette with suggestion of mild to moderate pulmonary   vascular congestion. VL Extremity Venous Left    (Results Pending)     XR CHEST PORTABLE    Result Date: 1/14/2023  EXAMINATION: ONE XRAY VIEW OF THE CHEST 1/14/2023 7:20 pm COMPARISON: 10/13/2022.  HISTORY: ORDERING SYSTEM PROVIDED HISTORY: pain or SOB TECHNOLOGIST PROVIDED HISTORY: Reason for exam:->pain or SOB Reason for Exam: sob FINDINGS: Evaluation of left lower lung field is limited due to overlying soft tissues. The cardiac silhouette is enlarged, appearing grossly similar to the prior study. There is suggestion of mild to moderate pulmonary vascular congestion. No dense airspace opacity, large pleural effusion or pneumothorax is seen. Enlarged cardiac silhouette with suggestion of mild to moderate pulmonary vascular congestion. No results found. PROCEDURES   Unless otherwise noted below, none     Procedures    CRITICAL CARE TIME (.cctime)   I personally saw the patient and independently provided 43 minutes of non-concurrent critical care out of the total shared critical care time provided not including separate billable procedure. The critical condition I managed or considered was acute respiratory failure, increased oxygen demand     PAST MEDICAL HISTORY      has a past medical history of Anemia, Anxiety, Depression, Fibromyalgia, GERD (gastroesophageal reflux disease), Heart palpitations, Hiatal hernia, Hypertension, Irritable bowel syndrome, Mild intermittent asthma with exacerbation, Osteoarthritis, Panic attacks, Pneumonia, Pulmonary infiltrates (4/20/2019), Seasonal allergies, Seizure disorder ([facility] [address] Sleep apnea, Spastic colon, and Thyroid disease.          EMERGENCY DEPARTMENT COURSE and DIFFERENTIAL DIAGNOSIS/MDM:   Vitals:    Vitals:    01/14/23 1845 01/14/23 2151 01/14/23 2246 01/14/23 2249   BP: (!) 166/94 117/89  (!) 152/75   Pulse: 93 78  87   Resp: 22 16  20   Temp: 98.7 °F (37.1 °C) 98.6 °F (37 °C)  98.3 °F (36.8 °C)   TempSrc: Oral Oral  Oral   SpO2: 93% 94%     Weight: 300 lb (136.1 kg)  (!) 322 lb 9.6 oz (146.3 kg)    Height: 5' (1.524 m)  5' (1.524 m)        Patient was given the following medications:  Medications   sodium chloride flush 0.9 % injection 10 mL (10 mLs IntraVENous Given 1/14/23 0657)   sodium chloride flush 0.9 % injection 10 mL (has no administration in time range)   0.9 % sodium chloride infusion (has no administration in time range)   potassium chloride 10 mEq/100 mL IVPB (Peripheral Line) (has no administration in time range)   magnesium sulfate 2000 mg in 50 mL IVPB premix (has no administration in time range)   enoxaparin Sodium (LOVENOX) injection 30 mg (30 mg SubCUTAneous Given 1/14/23 2314)   promethazine (PHENERGAN) tablet 12.5 mg (has no administration in time range)     Or   ondansetron (ZOFRAN) injection 4 mg (has no administration in time range)   acetaminophen (TYLENOL) tablet 650 mg (has no administration in time range)     Or   acetaminophen (TYLENOL) suppository 650 mg (has no administration in time range)   furosemide (LASIX) injection 40 mg (has no administration in time range)   albuterol sulfate HFA (PROVENTIL;VENTOLIN;PROAIR) 108 (90 Base) MCG/ACT inhaler 2 puff (has no administration in time range)   aspirin EC tablet 81 mg (has no administration in time range)   dilTIAZem (CARDIZEM CD) extended release capsule 120 mg (120 mg Oral Given 1/14/23 2314)   gabapentin (NEURONTIN) capsule 600 mg (600 mg Oral Given 1/14/23 2313)   ipratropium-albuterol (DUONEB) nebulizer solution 3 mL (has no administration in time range)   lamoTRIgine (LAMICTAL) tablet 200 mg (200 mg Oral Given 1/14/23 2313)   levothyroxine (SYNTHROID) tablet 112 mcg (has no administration in time range)   lisinopril (PRINIVIL;ZESTRIL) tablet 10 mg (has no administration in time range)   montelukast (SINGULAIR) tablet 10 mg (has no administration in time range)   pantoprazole (PROTONIX) tablet 40 mg (has no administration in time range)   sertraline (ZOLOFT) tablet 100 mg (100 mg Oral Given 1/14/23 2313)   ceFAZolin (ANCEF) 2000 mg in dextrose 5 % 100 mL IVPB (0 mg IntraVENous Stopped 1/14/23 2247)             Is this patient to be included in the SEP-1 Core Measure due to severe sepsis or septic shock?   No   Exclusion criteria - the patient is NOT to be included  for SEP-1 Core Measure due to: Infection is not suspected    CONSULTS: (Who and What was discussed)  IP CONSULT TO HEART FAILURE NURSE/COORDINATOR  IP CONSULT TO DIETITIAN  IP CONSULT TO PULMONOLOGY  Discussion with Other Profesionals : None    Social Determinants : None    Records Reviewed : None    CC/HPI Summary, DDx, ED Course, and Reassessment: Patient presented to the emergency room today with complaints of dyspnea on exertion, patient with a history of dyspnea on exertion, patient has had an increase in oxygen demand she states that when she gets up and moves around at home her oxygen drops into the 80s, here she dropped to 88% on 3 L just moving from wheelchair to bed. At rest she has no complaints her work-up showed negative COVID negative flu, no leukocytosis, normal electrolytes. Chest x-ray was unremarkable. Given her new oxygen demand I do feel the patient would benefit from admission to the hospital, I did consider pulmonary embolus however she has received a CT of her chest in the last 6 months which was negative and she is not tachycardic and has no chest discomfort here. Patient was agreeable with plan for admission. Patient evaluated by the attending who agrees with          I am the Primary Clinician of Record. FINAL IMPRESSION      1. OH (dyspnea on exertion)    2. Increased oxygen demand          DISPOSITION/PLAN     DISPOSITION Admitted    PATIENT REFERRED TO:  No follow-up provider specified.     DISCHARGE MEDICATIONS:  Current Discharge Medication List          DISCONTINUED MEDICATIONS:  Current Discharge Medication List        STOP taking these medications       Multiple Vitamin (MULTI-VITAMIN DAILY PO) Comments:   Reason for Stopping:                      (Please note that portions of this note were completed with a voice recognition program.  Efforts were made to edit the dictations but occasionally words are mis-transcribed.)    MATHEW Pritchett CNP (electronically signed) Elder Vilchis, MATHEW - CNP  01/15/23 4815

## 2023-01-15 NOTE — PLAN OF CARE
Problem: Safety - Adult  Goal: Free from fall injury  1/15/2023 0955 by Harjinder Meeks LPN  Outcome: Progressing  Flowsheets (Taken 1/15/2023 0955)  Free From Fall Injury: Instruct family/caregiver on patient safety  1/14/2023 2306 by Nacho Urban RN  Outcome: Progressing     Problem: ABCDS Injury Assessment  Goal: Absence of physical injury  1/15/2023 0955 by Harjinder Meeks LPN  Outcome: Progressing  Flowsheets (Taken 1/15/2023 0955)  Absence of Physical Injury: Implement safety measures based on patient assessment  1/14/2023 2306 by Nacho Urban RN  Outcome: Progressing

## 2023-01-15 NOTE — H&P
Hospital Medicine History & Physical      PCP: Ashlyn Dailey MD    Date of Admission: 1/14/2023    Date of Service: Pt seen/examined on 1/14/2023    Pt seen/examined face to face on and admitted as inpatient with expected LOS to be two days but can change depending on diagnostic work up and treatment response. Chief Complaint:    Chief Complaint   Patient presents with    Shortness of Breath     SOB x4 days. Denies CP. Wears 2L at baseline, states she has had to increase to 3L and is still dropping SpO2 to the 80s with ambulation but states it goes back up with rest. SpO2 88% on 3L after transferring from wheelchair to bed. History Of Present Illness:      56 y.o. female who presented to [facility] with past medical history of depression, GERD, fibromyalgia, hypertension, IBS, sleep apnea noncompliant with CPAP, hypothyroidism, class III obesity presented to the ED with chief complaint of shortness of breath      Patient reports that she has oxygen at home and you normally use it with ambulation outputs 2 L when on exertion reports that today she was walking and oxygen went down to 83% with symptoms of shortness of breath patient had to stop which her oxygen did increase after stopping by reports that the shortness of breath is worse specially when she lays flat reports that her lower extremity swelling has increased specially on the left side with more redness better than normal.  Patient does report eating a lot of salt in addition to drinking a lot of pop daily. Patient reports he does have sleep apnea does not use CPAP for a while now. Patient otherwise reports compliance with medication has been taking Lasix as stopped taking spironolactone because reported that she is out of prescription and has tried to refill it but has not gotten another prescription for it.   Patient takes inhaled steroids does not know if she has asthma no lung disease no acute cough phlegm production      Past Medical History:          Diagnosis Date    Anemia     Anxiety     Depression     Fibromyalgia     GERD (gastroesophageal reflux disease)     Heart palpitations     Hiatal hernia     Hypertension     Irritable bowel syndrome     Mild intermittent asthma with exacerbation     Osteoarthritis     Panic attacks     Pneumonia     Pulmonary infiltrates 4/20/2019    Seasonal allergies     [facility]     Sleep apnea     Spastic colon     Thyroid disease        Past Surgical History:          Procedure Laterality Date    BLADDER SURGERY      BRAIN SURGERY      BRONCHOSCOPY N/A 4/21/2019    BRONCHOSCOPY ALVEOLAR LAVAGE performed by Mari Humphrey MD at Replaced by [facility]  4/21/2019    BRONCHOSCOPY THERAPUTIC ASPIRATION INITIAL performed by Mari Humphrey MD at Replaced by [facility] N/A 5/15/2019    BRONCHOSCOPY ALVEOLAR LAVAGE performed by Barby Nevarez MD at Replaced by [facility]  5/15/2019    BRONCHOSCOPY THERAPUTIC ASPIRATION INITIAL performed by Barby Nevarez MD at [place] [address]  2009    hip    KNEE SURGERY      OTHER SURGICAL HISTORY  07/31/2013    cystoscopy, urethral dilatation    SHOULDER SURGERY      r rotator cuff repair    TOTAL HIP ARTHROPLASTY      Left    URETHRAL STRICTURE DILATATION         Medications Prior to Admission:      Prior to Admission medications    Medication Sig Start Date End Date Taking?  Authorizing Provider   ferrous sulfate (IRON 325) 325 (65 Fe) MG tablet Take 325 mg by mouth in the morning and at bedtime 4/26/19  Yes Historical Provider, MD   Multiple Vitamins-Minerals (MULTI ADULT GUMMIES PO) Take 50 mg by mouth daily    Historical Provider, MD   ipratropium-albuterol (DUONEB) 0.5-2.5 (3) MG/3ML SOLN nebulizer solution Inhale 3 mLs into the lungs every 4 hours as needed for Shortness of Breath 10/15/22   Ashley De Jesus MD   Calcium Carb-Cholecalciferol (OYSTER SHELL CALCIUM W/D) 500-200 MG-UNIT TABS tablet  4/30/22   Historical Provider, MD   vitamin D3 (CHOLECALCIFEROL) 125 MCG (5000 UT) TABS tablet Take by mouth 10/26/20   Historical Provider, MD   spironolactone (ALDACTONE) 25 MG tablet TAKE ONE TABLET BY MOUTH ON MONDAY, WEDNESDAY, FRIDAY AND SUNDAY 8/30/21   MATHEW Saleem CNP   furosemide (LASIX) 40 MG tablet TAKE ONE TABLET BY MOUTH DAILY 7/2/21   Abdon Tan MD   lisinopril (PRINIVIL;ZESTRIL) 10 MG tablet TAKE ONE TABLET BY MOUTH DAILY 3/2/21   MATHEW Saleem CNP   levothyroxine (SYNTHROID) 112 MCG tablet Take 112 mcg by mouth daily 1/22/19   Historical Provider, MD   ibuprofen (ADVIL;MOTRIN) 200 MG tablet Take 600 mg by mouth as needed for Pain    Historical Provider, MD   sertraline (ZOLOFT) 100 MG tablet Take 100 mg by mouth daily    Historical Provider, MD   lamoTRIgine (LAMICTAL) 200 MG tablet Take 200 mg by mouth 2 times daily    Historical Provider, MD   diltiazem (CARDIZEM CD) 120 MG extended release capsule Take 1 capsule by mouth 2 times daily 11/25/18   Abhi Carrillo MD   albuterol sulfate HFA (PROVENTIL HFA) 108 (90 Base) MCG/ACT inhaler Inhale 2 puffs into the lungs every 4 hours as needed for Wheezing or Shortness of Breath (Space out to every 6 hours as symptoms improve) Space out to every 6 hours as symptoms improve. 11/3/18   MATHEW Hernández - CNP   pantoprazole (PROTONIX) 40 MG tablet Take 40 mg by mouth daily. Historical Provider, MD   gabapentin (NEURONTIN) 600 MG tablet Take 600 mg by mouth 2 times daily. Historical Provider, MD   montelukast (SINGULAIR) 10 MG tablet Take 10 mg by mouth every morning     Historical Provider, MD   aspirin 81 MG EC tablet Take 81 mg by mouth daily. Historical Provider, MD   acetaminophen (TYLENOL) 500 MG tablet Take 500 mg by mouth every 6 hours as needed.     Historical Provider, MD       Allergies:  Bioflavonoids, Other, Oxycodone-acetaminophen, Percocet [oxycodone-acetaminophen], Azithromycin, and Nickel    Social History:          TOBACCO:   reports that she quit smoking about 36 years ago. She has a 1.00 pack-year smoking history. She has never used smokeless tobacco.  ETOH:   reports no history of alcohol use. E-cigarette/Vaping       Questions Responses    E-cigarette/Vaping Use     Start Date     Passive Exposure     Quit Date     Counseling Given     Comments               Family History:      Family History reviewed with patient, and does not pertain and non-contributory to the current illness        Problem Relation Age of Onset    Asthma Other     Asthma Other     Hypertension Father     Hypertension Sister     Cancer Neg Hx     Diabetes Neg Hx     Emphysema Neg Hx     Heart Failure Neg Hx        REVIEW OF SYSTEMS:     Constitutional:  No Fever, No Chills, No Night Sweats  ENT/Mouth:  No Nasal Congestion,  No Hoarseness, No new mouth lesion  Eyes:  No Eye Pain, No Redness, No Discharge  Cardiovascular:  No Chest Pain, +Orthopnea, No Palpitations  Respiratory:  No Cough, No Sputum, + Dyspnea  Gastrointestinal: No Vomiting, No Diarrhea, No abdominal pain  Genitourinary: No Urinary Frequency, No Hematuria, No Urinary pain  Musculoskeletal:  No worsening Arthralgias, No worsening Myalgias  Skin:  No new Skin Lesions, No new skin rash  Neuro:  No new weakness, No new numbness. Psych:  No suicial ideation, No Violence ideation    PHYSICAL EXAM PERFORMED:    BP (!) 152/75   Pulse 87   Temp 98.3 °F (36.8 °C) (Oral)   Resp 20   Ht 5' (1.524 m)   Wt (!) 322 lb 9.6 oz (146.3 kg)   LMP 07/29/2013   SpO2 94%   BMI 63.00 kg/m²     General appearance:  mild acute distress, appears older than stated age  HEENT:   atraumatic, sclera anicteric, Conjunctivae clear. Neck: Supple,Trachea midline, no goiter  Respiratory:minimal accessory muscle usage, Normal respiratory effort.  Clear to auscultation, bilaterally without wheezing  Cardiovascular:  Regular rate and rhythm, capillary refill 2 seconds  Abdomen: Soft, non-tender, non-distended with normal bowel sounds. Musculoskeletal:  No clubbing, cyanosis. Left leg erythematous, warm minimally tender edematous> right  Skin: turgor normal.  No new rashes or lesions. Neurologic: Alert and oriented x4, no new focal sensory/motor deficits. Labs:     Recent Labs     01/14/23 1927   WBC 7.7   HGB 12.1   HCT 37.1        Recent Labs     01/14/23 1927      K 3.9      CO2 31   BUN 15   CREATININE 0.7   CALCIUM 9.3     Recent Labs     01/14/23 1927   AST 17   ALT 20   BILITOT 0.4   ALKPHOS 100     No results for input(s): INR in the last 72 hours. Recent Labs     01/14/23 1927   TROPONINI <0.01       Urinalysis:      Lab Results   Component Value Date/Time    NITRU Negative 09/03/2019 06:35 PM    WBCUA 6-10 11/25/2018 02:48 PM    BACTERIA 1+ 11/02/2018 09:08 PM    RBCUA 0-2 11/25/2018 02:48 PM    BLOODU Negative 09/03/2019 06:35 PM    SPECGRAV 1.015 09/03/2019 06:35 PM    GLUCOSEU Negative 09/03/2019 06:35 PM       Radiology:     CXR: I have reviewed the CXR with the following interpretation:   Enlarged cardiomegaly with vascular congestion  EKG:  I have reviewed the EKG with the following interpretation:   Normal sinus rhythm  XR CHEST PORTABLE   Final Result   Enlarged cardiac silhouette with suggestion of mild to moderate pulmonary   vascular congestion.              ASSESSMENT AND PLAN:    Active Hospital Problems    Diagnosis Date Noted    Acute heart failure, unspecified heart failure type ([facility] [I50.9] 01/14/2023     Priority: Medium     Acute on chronic hypoxic respiratory failure,  Patient baseline at 2 L  Patient is requiring increased oxygen requirement on ambulation  Chest x-ray showing enlarged cardiomegaly with vascular congestion  Currently on 3 L  COVID and flu negative  Echo to check for pulmonary hypertension given untreated KRYSTIAN  Reviewed last PFT showing no obstructive disease suspect to be some restrictive component from obesity  Pulmonary consulted, much appreciated    Left leg cellulitis:  US r/u dvt  Ancef initiated    Suspected acute HFpEF:  Into the echo ordered  Patient stopped taking spironolactone and noncompliant with diet  Lasix initiated  Strict I's and O's, Daily weights  Continued home medications  Heart failure protocol set ordered  Continue to check response       Asthma: Controlled, not in exacerbation, continue home medication  GERD: Continue home medication  Essential Hypertension: Reviewed patient's medications and plan is to continue home medication  Sleep apnea: Not treated, deferred CPAP  Hypothyroidism: Continue home medication  Depression: continue home medications  Class III obesity: Complicating assessment and treatment. Placing patient at risk for multiple co-morbidities as well as early death and contributing to the patient's presentation. Education, and counseling    Face-to-face discussion with the primary ER physician in regards to symptoms, history, physical exam, diagnosis and treatment, collaborative decision was to admit the patient.     Diet: NPO except meds ordered    DVT Prophylaxis: lovenox    Dispo:   Expected LOS of two days         Katrinka Frankel, DO

## 2023-01-15 NOTE — PROGRESS NOTES
Shift assessment completed and charted. VSS. A/O x4. Pt denies using in street drugs and doesn't know why UA came back positive. Pt states she ambulates with a cane or walker at home. Oxygen demand is only when moving. Diet was changed this AM to regular low sodium per MD. Pt instructed to still call out when needing to ambulate as bed check is on. Morning meds given per [facility] and denies any further needs at this time.

## 2023-01-15 NOTE — PROGRESS NOTES
Hospitalist Progress Note      PCP: Nilesh Yuan MD    Date of Admission: 1/14/2023    Chief Complaint: Shortness of breath    Hospital Course: Presented with shortness of breath. Suspected CHF. Cardiology consulted. Based on previous data, diastolic CHF is suspected. Also noted cellulitis of lower extremity and started empirically on Ancef. No significant changes overnight. Subjective: No chest pain. No shortness of breath, no nausea, no vomiting, no abdominal pain      Medications:  Reviewed    Infusion Medications    sodium chloride       Scheduled Medications    miconazole   Topical BID    pimecrolimus   Topical BID    sodium chloride flush  10 mL IntraVENous 2 times per day    enoxaparin  30 mg SubCUTAneous BID    furosemide  40 mg IntraVENous BID    aspirin  81 mg Oral Daily    dilTIAZem  120 mg Oral BID    gabapentin  600 mg Oral BID    lamoTRIgine  200 mg Oral BID    levothyroxine  112 mcg Oral Daily    lisinopril  10 mg Oral Daily    montelukast  10 mg Oral QAM    pantoprazole  40 mg Oral Daily    sertraline  100 mg Oral Nightly    ceFAZolin  2,000 mg IntraVENous Q8H     PRN Meds: sodium chloride flush, sodium chloride, potassium chloride, magnesium sulfate, promethazine **OR** ondansetron, acetaminophen **OR** acetaminophen, albuterol sulfate HFA, ipratropium-albuterol      Intake/Output Summary (Last 24 hours) at 1/15/2023 1208  Last data filed at 1/15/2023 1201  Gross per 24 hour   Intake 240 ml   Output 1500 ml   Net -1260 ml       Physical Exam Performed:    /72   Pulse 68   Temp 97.6 °F (36.4 °C) (Oral)   Resp 18   Ht 5' (1.524 m)   Wt (!) 322 lb 9.6 oz (146.3 kg)   LMP 07/29/2013   SpO2 97%   BMI 63.00 kg/m²     General appearance: No apparent distress, appears stated age and cooperative. HEENT: Pupils equal, round, and reactive to light. Conjunctivae/corneas clear. Neck: Supple, with full range of motion. No jugular venous distention. Trachea midline.   Respiratory: Normal respiratory effort. Bibasilar inspiratory crackles. Cardiovascular: Regular rate and rhythm with normal S1/S2 without murmurs, rubs or gallops. Abdomen: Soft, non-tender, non-distended with normal bowel sounds. Musculoskeletal: No clubbing, cyanosis. 1+ edema bilaterally. Full range of motion without deformity. Skin: Skin color, texture, turgor normal.  Lower extremity erythema  Neurologic:  Neurovascularly intact without any focal sensory/motor deficits. Cranial nerves: II-XII intact, grossly non-focal.  Psychiatric: Alert and oriented, thought content appropriate, normal insight  Capillary Refill: Brisk, 3 seconds, normal   Peripheral Pulses: +2 palpable, equal bilaterally       Labs:   Recent Labs     01/14/23  1927 01/15/23  0534   WBC 7.7 7.2   HGB 12.1 11.6*   HCT 37.1 36.6    253     Recent Labs     01/14/23 1927      K 3.9      CO2 31   BUN 15   CREATININE 0.7   CALCIUM 9.3     Recent Labs     01/14/23 1927   AST 17   ALT 20   BILITOT 0.4   ALKPHOS 100     No results for input(s): INR in the last 72 hours. Recent Labs     01/14/23 1927   TROPONINI <0.01       Urinalysis:      Lab Results   Component Value Date/Time    NITRU Negative 09/03/2019 06:35 PM    WBCUA 6-10 11/25/2018 02:48 PM    BACTERIA 1+ 11/02/2018 09:08 PM    RBCUA 0-2 11/25/2018 02:48 PM    BLOODU Negative 09/03/2019 06:35 PM    SPECGRAV 1.015 09/03/2019 06:35 PM    GLUCOSEU Negative 09/03/2019 06:35 PM       Radiology:  XR CHEST PORTABLE   Final Result   Enlarged cardiac silhouette with suggestion of mild to moderate pulmonary   vascular congestion.          VL Extremity Venous Left    (Results Pending)       IP CONSULT TO HEART FAILURE NURSE/COORDINATOR  IP CONSULT TO DIETITIAN  IP CONSULT TO PULMONOLOGY    Assessment/Plan:    Active Hospital Problems    Diagnosis     Acute heart failure, unspecified heart failure type ([facility] [I50.9]      Priority: Medium     PLAN    Acute CHF, suspected diastolic  Started on IV diuretics.  Low-sodium diet.  Troponin negative.  Continue to monitor with intake output and daily weights.    Acute hypoxic respiratory failure  Probably secondary to above.  Noted underlying history of asthma and pulmonary hypertension.  Pulmonology consulted.    Asthma  No wheezes.  Known asthma with restrictive lung disease with contribution from severe obesity.  Continue supportive management and oxygen supplementation.  Defer to pulmonology    Suspected lower extremity cellulitis  Continue IV cefazolin for now pending improvement.  Could be hyperpigmentation related to venous insufficiency as well.    Hypothyroidism  Continue Synthroid.  Check TSH, as edema and circulatory issues are potentially part of uncontrolled thyroid disease.  Need to rule out uncontrolled hypothyroidism    Hypertension  Stable and controlled blood pressure.  Continue same management.  Renal function is normal.    Discussed with the patient.  Questions answered    DVT Prophylaxis: Lovenox  Diet: ADULT DIET; Regular; Low Sodium (2 gm)  Code Status: Full Code  PT/OT Eval Status: Ordered    Dispo -inpatient, probably 2 or 3 more days    Appropriate for A1 Discharge Unit: Ml OROSCO MD

## 2023-01-15 NOTE — CONSULTS
INPATIENT PULMONARY CRITICAL CARE CONSULT NOTE      Chief Complaint/Referring Provider:  Patient is being seen at the request of Dr. Ivonne Rdz for a consultation for ambulatory hypoxemia ,normal PFT      Presenting HPI: Patient came to the hospital because of shortness of breath    As per the ER provider-Kassy Sutton is a 56 y.o. female who presents shortness of breath. Patient has a history of dyspnea on exertion states that over the last 4 days has been worse, she wears 2 L of oxygen at baseline, states that she is had increasing to 3 and it still was dropping into the 80s with anytime she gets up and moves around. Denies dionisio chest pain with it but states that she does have some chest tightness when she gets up to move around. She does see pulmonology. Patient got hypoxic to 88% on 3 L just transferring from the wheelchair to the bed. She denies any symptoms at rest she states that when she stops moving all of her symptoms go away and she feels fine.   Patient states that she came to the hospital because of worsening shortness of breath and also patient states it was noticed lately that whenever she ambulates her oxygen saturation drops to low 80s but patient states that when she stops and sits for 30 seconds or so treatment was up to 96%, along with a shortness of breath the patient was having some coughing without any significant expectoration, patient did not have any chest pain or palpitations, no fever or chills, no epistaxis or hemoptysis, no sore throat or difficulty in swallowing, no coughing or choking while eating, patient does not have any significant abdominal pain nausea vomiting, patient has gained about 20 pounds in last 6 months time, patient states that she has a bone disease for which reason she uses a cane for walking, patient also has been diagnosed as having sleep apnea in the past and patient was seeing Dr. Chirag Sarabia at [facility] initially and patient was on CPAP/BiPAP but patient states that she could not tolerate any mask on top of her nose or fullface mask and patient did not have any follow-up for any nasal pillow and patient states that she lost follow-up with Dr. Becky Terrazas; patient does not have any change in them environment any sick contacts, patient does feel chest tightness, leg edema especially of the left leg patient does not have any confusion lethargy, no other pertinent review of system of concern       Patient Active Problem List    Diagnosis Date Noted    Pulmonary venous congestion 01/15/2023    Exercise hypoxemia 01/15/2023    Restrictive lung disease 01/15/2023    Acute heart failure, unspecified heart failure type ([address] 01/14/2023    Chronic obstructive pulmonary disease ([address] 10/19/2022    Acute respiratory failure with hypoxemia ([address] 10/14/2022    Carpal tunnel syndrome, bilateral 02/20/2020    Numbness and tingling in both hands 01/30/2020    Mild intermittent asthma with exacerbation     Chest pain     Leg swelling     Morbid obesity ([address]     Shortness of breath on exertion 04/20/2019    Pleural effusion on left 04/20/2019    Pulmonary infiltrates 04/20/2019    Atelectasis 04/20/2019    Ineffective airway clearance 04/20/2019    Acute respiratory failure with hypoxia ([address] 04/20/2019    Right ventricular enlargement 04/20/2019    Iron deficiency anemia 04/20/2019    Acute respiratory failure ([address] 04/20/2019    Hypoxia 04/07/2019    Essential hypertension 03/16/2017    Chronic GERD 03/16/2017    Morbid obesity with body mass index (BMI) of 60.0 to 69.9 in adult ([address] 04/23/2014    KRYSTIAN (obstructive sleep apnea) 04/23/2014    Hypothyroid 04/23/2014    Seizure disorder ([address] 04/23/2014       Past Medical History:   Diagnosis Date    Anemia     Anxiety     Depression     Fibromyalgia     GERD (gastroesophageal reflux disease)     Heart palpitations     Hiatal hernia     Hypertension     Irritable bowel syndrome     Mild intermittent asthma with exacerbation     Osteoarthritis Panic attacks     Pneumonia     Pulmonary infiltrates 2019    Seasonal allergies     Seizure disorder [facility]     Sleep apnea     Spastic colon     Thyroid disease         Past Surgical History:   Procedure Laterality Date    BLADDER SURGERY      BRAIN SURGERY      BRONCHOSCOPY N/A 2019    BRONCHOSCOPY ALVEOLAR LAVAGE performed by Tg Guy MD at [address] [place] [address]  2019    BRONCHOSCOPY THERAPUTIC ASPIRATION INITIAL performed by Tg Guy MD at [address] [place] [address] N/A 5/15/2019    BRONCHOSCOPY ALVEOLAR LAVAGE performed by Brayden Bermudez MD at [address] [place] [address]  5/15/2019    BRONCHOSCOPY THERAPUTIC ASPIRATION INITIAL performed by Brayden Bermudez MD at [place] [id number]      hip    KNEE SURGERY      OTHER SURGICAL HISTORY  2013    cystoscopy, urethral dilatation    SHOULDER SURGERY      r rotator cuff repair    TOTAL HIP ARTHROPLASTY      Left    URETHRAL STRICTURE DILATATION          Family History   Problem Relation Age of Onset    Asthma Other     Asthma Other     Hypertension Father     Hypertension Sister     Cancer Neg Hx     Diabetes Neg Hx     Emphysema Neg Hx     Heart Failure Neg Hx         Social History     Tobacco Use    Smoking status: Former     Packs/day: 0.50     Years: 2.00     Pack years: 1.00     Types: Cigarettes     Quit date: 1986     Years since quittin.7    Smokeless tobacco: Never   Substance Use Topics    Alcohol use: No        Allergies   Allergen Reactions    Bioflavonoids Anaphylaxis    Other      Nickel,citrus, pain meds? Oxycodone-Acetaminophen Hives    Percocet [Oxycodone-Acetaminophen]      Can take VICODIN    Azithromycin Nausea And Vomiting and Rash    Nickel Hives, Other (See Comments) and Rash               Physical Exam:  Blood pressure 111/71, pulse 71, temperature 97.9 °F (36.6 °C), temperature source Oral, resp.  rate 18, height 5' (1.524 m), weight (!) 322 lb 9.6 oz (146.3 kg), last menstrual period 07/29/2013, SpO2 96 %.'     Constitutional:  No acute distress. while lying in bed    HENT:  Oropharynx is clear and moist. No thyromegaly. Eyes:  Conjunctivae are normal. Pupils equal, round, and reactive to light. No scleral icterus. Neck: . No tracheal deviation present. No obvious thyroid mass. short enlarged neck    Cardiovascular: Normal rate, regular rhythm, normal heart sounds. No right ventricular heave. 1+ lower extremity edema. Pulmonary/Chest: No wheezes. Bibasilar rales. Chest wall is not dull to percussion. No accessory muscle usage or stridor. Decreased BSI   Abdominal: Soft. Bowel sounds present. No distension or hernia. No tenderness. Morbidly obese  Musculoskeletal: No cyanosis. No clubbing. No obvious joint deformity. Lymphadenopathy: No cervical or supraclavicular adenopathy. Skin: Skin is warm and dry. No rash or nodules on the exposed extremities. stasis changes   Psychiatric: Normal mood and affect. Behavior is normal.  No anxiety. Neurologic: Alert, awake and oriented. PERRL. Speech fluent        Results:  CBC:   Recent Labs     01/14/23  1927 01/15/23  0534   WBC 7.7 7.2   HGB 12.1 11.6*   HCT 37.1 36.6   MCV 85.1 86.4    253     BMP:   Recent Labs     01/14/23 1927      K 3.9      CO2 31   BUN 15   CREATININE 0.7     LIVER PROFILE:   Recent Labs     01/14/23 1927   AST 17   ALT 20   BILITOT 0.4   ALKPHOS 100       UA:  Recent Labs     01/15/23  0027   PHUR 6.0       Imaging:  I have reviewed radiology images personally. XR CHEST PORTABLE   Final Result   Enlarged cardiac silhouette with suggestion of mild to moderate pulmonary   vascular congestion. VL Extremity Venous Left    (Results Pending)     XR CHEST PORTABLE    Result Date: 1/14/2023  EXAMINATION: ONE XRAY VIEW OF THE CHEST 1/14/2023 7:20 pm COMPARISON: 10/13/2022.  HISTORY: ORDERING SYSTEM PROVIDED HISTORY: pain or SOB TECHNOLOGIST PROVIDED HISTORY: Reason for exam:->pain or SOB Reason for Exam: sob FINDINGS: Evaluation of left lower lung field is limited due to overlying soft tissues. The cardiac silhouette is enlarged, appearing grossly similar to the prior study. There is suggestion of mild to moderate pulmonary vascular congestion. No dense airspace opacity, large pleural effusion or pneumothorax is seen. Enlarged cardiac silhouette with suggestion of mild to moderate pulmonary vascular congestion. Echocardiogram:July 2021-Summary   Technically difficult examination. Normal left ventricle systolic function with an estimated ejection fraction   of 55%. No regional wall motion abnormalities are seen. Normal left ventricular diastolic filling pressure. Mild mitral, pulmonic and tricuspid regurgitation. PFT:   Latest Reference Range & Units 4/25/19 16:36   DLCO %Pred % 301   FEV1 %Pred-Post % 108   FEV1 %Pred-Pre % 100   FEV1/FVC-Post % 1.43   FEV1/FVC-Pre % 1.07   FVC %Pred-Post L 55   FVC %Pred-Pre % 50   TLC %Pred % 27      Latest Reference Range & Units 10/14/22 04:55   TSH 0.27 - 4.20 uIU/mL 5.49 (H)         Assessment:  Principal Problem:    Acute heart failure, unspecified heart failure type ([facility])  Active Problems:    Pulmonary venous congestion    Exercise hypoxemia    Restrictive lung disease    Morbid obesity with body mass index (BMI) of 60.0 to 69.9 in adult ([facility])    KRYSTIAN (obstructive sleep apnea)    Hypothyroid  Resolved Problems:    * No resolved hospital problems.  *          Plan:   Oxygen supplementation to keep saturation between 90 and 94 was only  Please titrate down the oxygen as per the above parameters  Pulmonary toilet  Patient may need evaluation for home oxygen  Patient has pulmonary venous congestion with fluid overload which may be causing patient to have hypoxemia  Patient has been started on Lasix twice daily which can be continued  Consider Aldactone/Zaroxolyn if deemed appropriate in addition to Lasix  Patient has a stasis dermatitis and does not appear to have cellulitis of the legs at this time-we will discontinue Ancef for now and reassess if need be  Patient does not have any fever or white count at this time  Patient has a significant history of lung disease which may be because of morbid obesity  Patient does have history of KRYSTIAN and was on CPAP but has not been using it because of intolerance  Patient was told that she needs to follow-up with Dr. Rodriguez in the office regarding that to see about any alternative symptoms of masks available  Monitor input output and BMP  Correct electrolytes on whenever necessary basis  Patient does not need any bronchodilators or any antibiotics or steroids from pulmonary standpoint of view  Synthroid as per IM as per TSH  Antihypertensives as per IM  Patient needs to lose weight-if patient cannot do that with conservative management patient is to consider evaluation by bariatric surgeon if deemed appropriate  PUD and DVT prophylaxis          Electronically signed by:  MARILYNN MO MD    1/15/2023    5:19 PM.

## 2023-01-15 NOTE — PROGRESS NOTES
4 Eyes Skin Assessment     The patient is being assess for  Admission    I agree that 2 RN's have performed a thorough Head to Toe Skin Assessment on the patient. ALL assessment sites listed below have been assessed. Areas assessed by both nurses:   [x]   Head, Face, and Ears   [x]   Shoulders, Back, and Chest  [x]   Arms, Elbows, and Hands   [x]   Coccyx, Sacrum, and Ischum  [x]   Legs, Feet, and Heels        Does the Patient have Skin Breakdown?   No         Sumit Prevention initiated:  Yes   Wound Care Orders initiated:  No      [facility] nurse consulted for Pressure Injury (Stage 3,4, Unstageable, DTI, NWPT, and Complex wounds):  No      Nurse 1 eSignature: Electronically signed by Carlos Eduardo Russo RN on 1/14/23 at 11:42 PM EST    Nurse 2 eSignature: Electronically signed by Neva Oden RN on 1/14/23 at 11:46 PM EST

## 2023-01-15 NOTE — PROGRESS NOTES
Pt AOx4, VSS, shift assessment completed and charted. Pt denying pain currently, only c/o SOB when ambulating. Pt normally on 2L NC at home when asleep or ambulating, and has since been bumped up to 3L NC and tolerating. Breath sounds are clear and diminished. Pt with LLE rash and/or cellulitis, left MD CRISTY aware. IVABX given per eMAR. Pt ambulating SBA with cane and remains in droplet plus precautions. Pt NPO since midnight. Pt denying further needs, and was in stable condition when this RN left the room. Bed is low, locked, alarmed, and call light/bedside table within reach. All care per orders.  Electronically signed by Kathleen Salgado RN on 1/15/2023 at 2:35 AM

## 2023-01-15 NOTE — PROGRESS NOTES
PS to [address] [place] [address] @ 21  - \"Hey there. Pt has an at home medication called Pimecrolimus 1% cream that she uses for her face once a day. I am sending it to pharmacy to get a label, can I get an order for it? Also, may I also get an order for some nystatin powder? She uses that at home daily. Thank you very much. \"    [address] [place] [address] @ [phone] - \"just let me know when the med rec is updated and I will\"    Med rec completed.

## 2023-01-15 NOTE — PROGRESS NOTES
01/15/23 0038   RT Protocol   History Pulmonary Disease 2   Respiratory pattern 0   Breath sounds 2   Cough 0   Indications for Bronchodilator Therapy On home bronchodilators   Bronchodilator Assessment Score 4

## 2023-01-15 NOTE — RT PROTOCOL NOTE
RT Inhaler-Nebulizer Bronchodilator Protocol Note    There is a bronchodilator order in the chart from a provider indicating to follow the RT Bronchodilator Protocol and there is an Initiate RT Inhaler-Nebulizer Bronchodilator Protocol order as well (see protocol at bottom of note). CXR Findings:  XR CHEST PORTABLE    Result Date: 1/14/2023  Enlarged cardiac silhouette with suggestion of mild to moderate pulmonary vascular congestion. The findings from the last RT Protocol Assessment were as follows:   History Pulmonary Disease: Chronic pulmonary disease  Respiratory Pattern: Regular pattern and RR 12-20 bpm  Breath Sounds: Slightly diminished and/or crackles  Cough: Strong, spontaneous, non-productive  Indication for Bronchodilator Therapy: On home bronchodilators  Bronchodilator Assessment Score: 4    Aerosolized bronchodilator medication orders have been revised according to the RT Inhaler-Nebulizer Bronchodilator Protocol below. Respiratory Therapist to perform RT Therapy Protocol Assessment initially then follow the protocol. Repeat RT Therapy Protocol Assessment PRN for score 0-3 or on second treatment, BID, and PRN for scores above 3. No Indications - adjust the frequency to every 6 hours PRN wheezing or bronchospasm, if no treatments needed after 48 hours then discontinue using Per Protocol order mode. If indication present, adjust the RT bronchodilator orders based on the Bronchodilator Assessment Score as indicated below. Use Inhaler orders unless patient has one or more of the following: on home nebulizer, not able to hold breath for 10 seconds, is not alert and oriented, cannot activate and use MDI correctly, or respiratory rate 25 breaths per minute or more, then use the equivalent nebulizer order(s) with same Frequency and PRN reasons based on the score. If a patient is on this medication at home then do not decrease Frequency below that used at home.     0-3 - enter or revise RT bronchodilator order(s) to equivalent RT Bronchodilator order with Frequency of every 4 hours PRN for wheezing or increased work of breathing using Per Protocol order mode. 4-6 - enter or revise RT Bronchodilator order(s) to two equivalent RT bronchodilator orders with one order with BID Frequency and one order with Frequency of every 4 hours PRN wheezing or increased work of breathing using Per Protocol order mode. 7-10 - enter or revise RT Bronchodilator order(s) to two equivalent RT bronchodilator orders with one order with TID Frequency and one order with Frequency of every 4 hours PRN wheezing or increased work of breathing using Per Protocol order mode. 11-13 - enter or revise RT Bronchodilator order(s) to one equivalent RT bronchodilator order with QID Frequency and an Albuterol order with Frequency of every 4 hours PRN wheezing or increased work of breathing using Per Protocol order mode. Greater than 13 - enter or revise RT Bronchodilator order(s) to one equivalent RT bronchodilator order with every 4 hours Frequency and an Albuterol order with Frequency of every 2 hours PRN wheezing or increased work of breathing using Per Protocol order mode. RT to enter RT Home Evaluation for COPD & MDI Assessment order using Per Protocol order mode.     Electronically signed by Jerod Louis RCP on 1/15/2023 at 12:38 AM

## 2023-01-16 ENCOUNTER — APPOINTMENT (OUTPATIENT)
Dept: VASCULAR LAB | Age: 58
DRG: 194 | End: 2023-01-16
Payer: MEDICAID

## 2023-01-16 ENCOUNTER — TELEPHONE (OUTPATIENT)
Dept: PULMONOLOGY | Age: 58
End: 2023-01-16

## 2023-01-16 PROBLEM — I27.20 PULMONARY HYPERTENSION (HCC): Status: ACTIVE | Noted: 2023-01-16

## 2023-01-16 PROBLEM — R06.09 DOE (DYSPNEA ON EXERTION): Status: ACTIVE | Noted: 2023-01-16

## 2023-01-16 PROBLEM — E66.2 OBESITY HYPOVENTILATION SYNDROME (HCC): Status: ACTIVE | Noted: 2023-01-16

## 2023-01-16 PROBLEM — J45.909 PERSISTENT ASTHMA WITHOUT COMPLICATION: Status: ACTIVE | Noted: 2023-01-16

## 2023-01-16 PROBLEM — J96.22 ACUTE ON CHRONIC RESPIRATORY FAILURE WITH HYPOXIA AND HYPERCAPNIA (HCC): Status: ACTIVE | Noted: 2023-01-16

## 2023-01-16 PROBLEM — J96.21 ACUTE ON CHRONIC RESPIRATORY FAILURE WITH HYPOXIA AND HYPERCAPNIA (HCC): Status: ACTIVE | Noted: 2023-01-16

## 2023-01-16 LAB
BASE EXCESS ARTERIAL: 8.5 MMOL/L (ref -3–3)
BASOPHILS ABSOLUTE: 0 K/UL (ref 0–0.2)
BASOPHILS RELATIVE PERCENT: 0.6 %
CARBOXYHEMOGLOBIN ARTERIAL: 0.8 % (ref 0–1.5)
EOSINOPHILS ABSOLUTE: 0 K/UL (ref 0–0.6)
EOSINOPHILS RELATIVE PERCENT: 0 %
HCO3 ARTERIAL: 34.9 MMOL/L (ref 21–29)
HCT VFR BLD CALC: 38 % (ref 36–48)
HEMOGLOBIN, ART, EXTENDED: 13.9 G/DL (ref 12–16)
HEMOGLOBIN: 12 G/DL (ref 12–16)
LV EF: 58 %
LVEF MODALITY: NORMAL
LYMPHOCYTES ABSOLUTE: 1.5 K/UL (ref 1–5.1)
LYMPHOCYTES RELATIVE PERCENT: 22.3 %
MAGNESIUM: 2.3 MG/DL (ref 1.8–2.4)
MCH RBC QN AUTO: 27.1 PG (ref 26–34)
MCHC RBC AUTO-ENTMCNC: 31.6 G/DL (ref 31–36)
MCV RBC AUTO: 85.7 FL (ref 80–100)
METHEMOGLOBIN ARTERIAL: 0.3 %
MONOCYTES ABSOLUTE: 0.5 K/UL (ref 0–1.3)
MONOCYTES RELATIVE PERCENT: 6.9 %
NEUTROPHILS ABSOLUTE: 4.8 K/UL (ref 1.7–7.7)
NEUTROPHILS RELATIVE PERCENT: 70.2 %
O2 SAT, ARTERIAL: 89.3 %
O2 THERAPY: ABNORMAL
PCO2 ARTERIAL: 55.6 MMHG (ref 35–45)
PDW BLD-RTO: 16.5 % (ref 12.4–15.4)
PH ARTERIAL: 7.42 (ref 7.35–7.45)
PLATELET # BLD: 263 K/UL (ref 135–450)
PMV BLD AUTO: 8.1 FL (ref 5–10.5)
PO2 ARTERIAL: 57.1 MMHG (ref 75–108)
RBC # BLD: 4.44 M/UL (ref 4–5.2)
TCO2 ARTERIAL: 36.6 MMOL/L
TSH SERPL DL<=0.05 MIU/L-ACNC: 5.46 UIU/ML (ref 0.27–4.2)
WBC # BLD: 6.8 K/UL (ref 4–11)

## 2023-01-16 PROCEDURE — 2700000000 HC OXYGEN THERAPY PER DAY

## 2023-01-16 PROCEDURE — 6360000002 HC RX W HCPCS: Performed by: INTERNAL MEDICINE

## 2023-01-16 PROCEDURE — 85025 COMPLETE CBC W/AUTO DIFF WBC: CPT

## 2023-01-16 PROCEDURE — 99232 SBSQ HOSP IP/OBS MODERATE 35: CPT | Performed by: INTERNAL MEDICINE

## 2023-01-16 PROCEDURE — 93308 TTE F-UP OR LMTD: CPT

## 2023-01-16 PROCEDURE — 36600 WITHDRAWAL OF ARTERIAL BLOOD: CPT

## 2023-01-16 PROCEDURE — 6370000000 HC RX 637 (ALT 250 FOR IP): Performed by: INTERNAL MEDICINE

## 2023-01-16 PROCEDURE — 94761 N-INVAS EAR/PLS OXIMETRY MLT: CPT

## 2023-01-16 PROCEDURE — 93971 EXTREMITY STUDY: CPT

## 2023-01-16 PROCEDURE — 83735 ASSAY OF MAGNESIUM: CPT

## 2023-01-16 PROCEDURE — 2580000003 HC RX 258: Performed by: INTERNAL MEDICINE

## 2023-01-16 PROCEDURE — 82803 BLOOD GASES ANY COMBINATION: CPT

## 2023-01-16 PROCEDURE — 1200000000 HC SEMI PRIVATE

## 2023-01-16 PROCEDURE — 36415 COLL VENOUS BLD VENIPUNCTURE: CPT

## 2023-01-16 RX ORDER — LEVOTHYROXINE SODIUM 0.12 MG/1
125 TABLET ORAL DAILY
Status: DISCONTINUED | OUTPATIENT
Start: 2023-01-17 | End: 2023-01-18 | Stop reason: HOSPADM

## 2023-01-16 RX ADMIN — GABAPENTIN 600 MG: 300 CAPSULE ORAL at 09:32

## 2023-01-16 RX ADMIN — HEPARIN SODIUM 5000 UNITS: 5000 INJECTION INTRAVENOUS; SUBCUTANEOUS at 05:14

## 2023-01-16 RX ADMIN — HEPARIN SODIUM 5000 UNITS: 5000 INJECTION INTRAVENOUS; SUBCUTANEOUS at 22:49

## 2023-01-16 RX ADMIN — PIMECROLIMUS: 10 CREAM TOPICAL at 22:54

## 2023-01-16 RX ADMIN — ACETAMINOPHEN 325MG 650 MG: 325 TABLET ORAL at 12:54

## 2023-01-16 RX ADMIN — DILTIAZEM HYDROCHLORIDE 120 MG: 120 CAPSULE, COATED, EXTENDED RELEASE ORAL at 09:33

## 2023-01-16 RX ADMIN — PANTOPRAZOLE SODIUM 40 MG: 40 TABLET, DELAYED RELEASE ORAL at 09:32

## 2023-01-16 RX ADMIN — DILTIAZEM HYDROCHLORIDE 120 MG: 120 CAPSULE, COATED, EXTENDED RELEASE ORAL at 22:52

## 2023-01-16 RX ADMIN — HEPARIN SODIUM 5000 UNITS: 5000 INJECTION INTRAVENOUS; SUBCUTANEOUS at 15:14

## 2023-01-16 RX ADMIN — GABAPENTIN 600 MG: 300 CAPSULE ORAL at 22:52

## 2023-01-16 RX ADMIN — SODIUM CHLORIDE, PRESERVATIVE FREE 10 ML: 5 INJECTION INTRAVENOUS at 09:33

## 2023-01-16 RX ADMIN — FUROSEMIDE 40 MG: 10 INJECTION, SOLUTION INTRAMUSCULAR; INTRAVENOUS at 09:31

## 2023-01-16 RX ADMIN — LAMOTRIGINE 200 MG: 100 TABLET ORAL at 22:51

## 2023-01-16 RX ADMIN — ASPIRIN 81 MG: 81 TABLET, COATED ORAL at 09:32

## 2023-01-16 RX ADMIN — LAMOTRIGINE 200 MG: 100 TABLET ORAL at 09:31

## 2023-01-16 RX ADMIN — MICONAZOLE NITRATE: 2 POWDER TOPICAL at 09:35

## 2023-01-16 RX ADMIN — LEVOTHYROXINE SODIUM 112 MCG: 112 TABLET ORAL at 09:31

## 2023-01-16 RX ADMIN — SERTRALINE HYDROCHLORIDE 100 MG: 50 TABLET ORAL at 22:52

## 2023-01-16 RX ADMIN — LISINOPRIL 10 MG: 10 TABLET ORAL at 09:32

## 2023-01-16 RX ADMIN — MICONAZOLE NITRATE: 2 POWDER TOPICAL at 22:53

## 2023-01-16 RX ADMIN — PIMECROLIMUS: 10 CREAM TOPICAL at 09:34

## 2023-01-16 RX ADMIN — SODIUM CHLORIDE, PRESERVATIVE FREE 10 ML: 5 INJECTION INTRAVENOUS at 22:57

## 2023-01-16 RX ADMIN — MONTELUKAST SODIUM 10 MG: 10 TABLET ORAL at 09:31

## 2023-01-16 ASSESSMENT — PAIN SCALES - GENERAL
PAINLEVEL_OUTOF10: 7
PAINLEVEL_OUTOF10: 0
PAINLEVEL_OUTOF10: 8

## 2023-01-16 ASSESSMENT — PAIN DESCRIPTION - ONSET: ONSET: ON-GOING

## 2023-01-16 ASSESSMENT — PAIN - FUNCTIONAL ASSESSMENT
PAIN_FUNCTIONAL_ASSESSMENT: PREVENTS OR INTERFERES SOME ACTIVE ACTIVITIES AND ADLS
PAIN_FUNCTIONAL_ASSESSMENT: PREVENTS OR INTERFERES SOME ACTIVE ACTIVITIES AND ADLS

## 2023-01-16 ASSESSMENT — PAIN DESCRIPTION - LOCATION
LOCATION: BACK
LOCATION: KNEE;BACK

## 2023-01-16 ASSESSMENT — PAIN DESCRIPTION - PAIN TYPE: TYPE: ACUTE PAIN;CHRONIC PAIN

## 2023-01-16 ASSESSMENT — PAIN DESCRIPTION - FREQUENCY: FREQUENCY: CONTINUOUS

## 2023-01-16 ASSESSMENT — PAIN DESCRIPTION - DESCRIPTORS
DESCRIPTORS: ACHING
DESCRIPTORS: ACHING

## 2023-01-16 ASSESSMENT — PAIN DESCRIPTION - ORIENTATION
ORIENTATION: MID
ORIENTATION: LEFT;RIGHT;MID

## 2023-01-16 NOTE — PROGRESS NOTES
Pt AOx4, VSS, shift assessment completed and charted. Pt denying pain currently, only c/o SOB when ambulating. Pt normally on 2L NC at home when asleep or ambulating, and has since been bumped up to 3L NC and tolerating. Will wean as tolerated per orders. Breath sounds are clear and diminished. Pt with LLE rash, band-aid applied to open area per pt request. Pt ambulating independently with cane. Pt denying further needs, and was in stable condition when this RN left the room. Bed is low, locked, alarmed, and call light/bedside table within reach. All care per orders.  Electronically signed by Kem Cortez RN on 1/16/2023 at 4:17 AM

## 2023-01-16 NOTE — PROGRESS NOTES
Per Dr. Jung Arzola. O2 removed @ 1199. Dr. Jung Arzola would like ABG drawn 30-60 min after removal. Called RT to let them know. Will give them a call back around 1330 sofia. Pt SpO2 floating 88-91% RA while sitting.

## 2023-01-16 NOTE — CONSULTS
Nutrition Education    Consult received for CHF diet education. Provided pt with written and verbal instruction on HF nutrition therapy. Discussed low sodium diet, daily weights, and fluid restriction. Pt voiced understanding. Pt reports that she tends to salt her food and endorses eating higher sodium foods frequently such as potato chips and ham. Discussed ways to limit sodium intake. Time spent: 10 minutes    Educated on CHF diet  Learners: Patient  Readiness: Acceptance  Method: Explanation and Handout  Response: Verbalizes Understanding  Contact name and number provided.     Arpita Hunter RD, LD  Contact Number: [phone]

## 2023-01-16 NOTE — DISCHARGE INSTRUCTIONS
Heart Failure Resources:    Heart Failure Interactive Workbook:   Go to [email or web address].com/aha-heartfailure for a Free Heart Failure Interactive Workbook provided by [employer]. This interactive workbook will provide information on Healthier Living with Heart Failure. Please copy and paste link into search bar. Use your mouse to scroll through the pages. HF [place] meghann:   Heart Failure Free smart phone meghann available for iPhone and Android download. Use your phone to track sodium intake, fluid intake, symptoms, and weight. Low Sodium Diet:  Go to www. [employer]. org website for [employer] which is Low Sodium! [employer] is a dialysis company, but this website offers free seasonal cookbooks. Each quarter, they will release 25-30 new recipes with a breakdown of calories, sodium, and glucose. Recipes:   Go to www.[email or web address]/recipes website for free recipes.

## 2023-01-16 NOTE — PROGRESS NOTES
INPATIENT PULMONARY CRITICAL CARE PROGRESS NOTE      Reason for visit: Hypoxemia. Consult for 1/15/2023 for low oxygen saturation, normal PFT. Past asthma, KRYSTIAN noncompliant with CPAP, suspected pulmonary hypertension, morbid obesity and multiple other medical problems. Recent increase in exertional dyspnea, significant desaturation on home O2 at 2 LPM.  She had turned up the oxygen flow at home, back to her home oxygen flow at 2 LPM with SaO2 89%. She denied cough, wheezing, sick contacts. Weight gain of about 50 pounds over the last few years, left leg swelling    She has developed a skin rash over the last 1 year, has an appointment at [facility] in May    On home O2 at 2 LPM, [facility]    PFT 4/25/2019  FVC 1.5 L, 49% predicted, FEV1 1.2 L, 50% predicted with ratio of 80%. There was no response to bronchodilator. TLC 41% predicted, ERV 5% predicted. DLCO likely erroneous    6-minute walk test 4/45/19  Reduced walk distance, required oxygen at 3 LPM to maintain adequate saturation    Echocardiogram 5/10/2022  LVEF 55%, normal LV diastolic function. Mild MR, TR, [place]. Technically difficult study. SUBJECTIVE: Afebrile and hemodynamically stable, on 2 LPM O2 with SaO2 89%. Denies cough, wheezing. She has been moving around without much shortness of breath. She denies chest pain, cough. Physical Exam:  Blood pressure (!) 170/80, pulse 90, temperature 98.4 °F (36.9 °C), temperature source Oral, resp. rate 18, height 5' (1.524 m), weight (!) 317 lb 12.8 oz (144.2 kg), last menstrual period 07/29/2013, SpO2 96 %.'   Constitutional: Pleasant, short, morbidly obese. In no acute distress. HENT:  Oropharynx is clear and moist. No oral lesions. Class III airway, mild residual tonsillar tissue  Eyes:  Conjunctivae are normal. Pupils equal, round, and reactive to light. No scleral icterus. Neck: Short and large neck, no JVD. No tracheal deviation present. No obvious thyroid mass.    Cardiovascular: Normal rate, regular rhythm, normal heart sounds. No lower extremity edema. Pulmonary/Chest: No wheezes. No rales. Diminished air entry. No accessory muscle usage or stridor. Abdominal: Deferred, large protuberant abdomen. Musculoskeletal: No cyanosis. No clubbing. No obvious joint deformity. Lymphadenopathy: No cervical or supraclavicular adenopathy. Skin: Skin is warm and dry. Erythematous nodular rash on her face, no angiomas in the oral mucosa. Psychiatric: Normal mood and affect. Behavior is normal.  No anxiety. Neurologic: Alert, awake and oriented. PERRL. Speech fluent. No obvious neurologic deficit, detailed exam not performed        Results:  CBC:   Recent Labs     01/14/23  1927 01/15/23  0534 01/16/23  0615   WBC 7.7 7.2 6.8   HGB 12.1 11.6* 12.0   HCT 37.1 36.6 38.0   MCV 85.1 86.4 85.7    253 263     BMP:   Recent Labs     01/14/23 1927      K 3.9      CO2 31   BUN 15   CREATININE 0.7     LIVER PROFILE:   Recent Labs     01/14/23 1927   AST 17   ALT 20   BILITOT 0.4   ALKPHOS 100       UA:  Recent Labs     01/15/23  0027   PHUR 6.0       Imaging:  I have reviewed radiology images personally. XR CHEST PORTABLE   Final Result   Enlarged cardiac silhouette with suggestion of mild to moderate pulmonary   vascular congestion. VL Extremity Venous Left    (Results Pending)     XR CHEST PORTABLE    Result Date: 1/14/2023  EXAMINATION: ONE XRAY VIEW OF THE CHEST 1/14/2023 7:20 pm COMPARISON: 10/13/2022. HISTORY: ORDERING SYSTEM PROVIDED HISTORY: pain or SOB TECHNOLOGIST PROVIDED HISTORY: Reason for exam:->pain or SOB Reason for Exam: sob FINDINGS: Evaluation of left lower lung field is limited due to overlying soft tissues. The cardiac silhouette is enlarged, appearing grossly similar to the prior study. There is suggestion of mild to moderate pulmonary vascular congestion. No dense airspace opacity, large pleural effusion or pneumothorax is seen.      Enlarged cardiac silhouette with suggestion of mild to moderate pulmonary vascular congestion. Assessment and plan:  Acute on chronic respiratory failure. On home O2 at 2 LPM, requiring 3 LPM O2 still having exertional desaturation on presentation. It appears she required oxygen at 3 LPM in 2019 on her walk test.  Unclear whether she clearly has increased oxygen needs. Acute heart failure, unspecified heart failure type ([address] CXR is rather nonspecific, BNP normal.  Echocardiogram showed normal LVEF and diastolic dysfunction in May 2022. Would consider CT chest.  Primary team felt she has HFpEF, had stopped spironolactone and was noncompliant with diet. Was placed on Lasix. Bronchial asthma. Diagnosis in the past, CT showed possible atelectasis versus air trapping. She says she did have wheezing frequently, has been on Flovent discus and rescue albuterol  KRYSTIAN. Was followed by Dr. Jeanine Shay at [facility] [place], [place]. At that time she was on auto CPAP 6-20 cmH2O. Although her compliance was excellent, she had good control of sleep apnea. Her AHI dropped from 38/h to 6.6/h. CPAP pressure 90% of the time was 10.2 cm H2O. She was due to see Dr. Jeanine Shay in follow-up to assess her APAP. She does wishes to see Dr. Jeanine hSay at our practice, was made aware that he is with [facility]) at present. ?  Obesity hypoventilation. Kept her off oxygen for now, will obtain ABG on room air. Restrictive lung disease. Likely due to obesity. Cellulitis left leg. On Ancef. Unlikely to be DVT  Morbid obesity with body mass index (BMI) of 60.0 to 69.9 in adult [facility])  Hypothyroidism, essential hypertension. Per IM  DVT prophylaxis. Lovenox.       Discussed with patient, nursing           Electronically signed by:  Juany Stuart MD    1/16/2023    12:19 PM.

## 2023-01-16 NOTE — TELEPHONE ENCOUNTER
----- Message from Gust Collet, MD sent at 1/15/2023  5:25 PM EST -----  Follow-up with Dr. Micah Back in 3 to 4 weeks time

## 2023-01-16 NOTE — PROGRESS NOTES
Perfect serve message sent to Dr. Clive Fuentes, \"No BMP since 1/14 - Do you want to order? Thanks. \", awaiting response.

## 2023-01-16 NOTE — CARE COORDINATION
CASE MANAGEMENT INITIAL ASSESSMENT      Reviewed chart and completed assessment with patient:bedside  Family present: none  Explained Case Management role/services. Primary contact information:[facility] Decision Maker :   Primary Decision Maker: Kenya Barrera - Brother/Sister - [phone]    Secondary Decision Maker: Judi Catherine - Parent - [phone]    Secondary Decision Maker: Guillermina Habermann - Brother/Sister - [phone]          Can this person be reached and be able to respond quickly, such as within a few minutes or hours? Yes    Admit date/status:1/14/23  Diagnosis:OH   Is this a Readmission?:  No      Insurance:Raheem Reza   Precert required for SNF: Yes       3 night stay required: No    Living arrangements, Adls, care needs, prior to admission:lives in apartment alone with dog. Durable Medical Equipment at home:  Walker__Cane__RTS__ BSC__Shower Chair__  02_x 2-3LNC [employer]_ HHN__ CPAP__  BiPap__  Hospital Bed__ W/C___ Other_grab bars. ____    Services in the home and/or outpatient, prior to admission:none    Current PCP:Kyle                                Medications Prescription coverage? yes    Transportation needs: none     PT/OT recs:none    Hospital Exemption Notification (HEN):needed for SNF    Barriers to discharge:none    Plan/comments:spoke with patient. Plans on returning home at discharge. Reported is active . Currently on 2 LNC O2 which is baseline. Will continue to follow for needs. Daphne Lerma RN  On IV lasix, need doppler studies.      ECOC on chart for MD signature

## 2023-01-16 NOTE — TELEPHONE ENCOUNTER
This patient was dismissed from [facility] in 2019 due to no shows. Please advise what to do.

## 2023-01-16 NOTE — PLAN OF CARE
Problem: Safety - Adult  Goal: Free from fall injury  1/15/2023 2053 by Blanca Wilhelm RN  Outcome: Progressing  Flowsheets (Taken 1/15/2023 0955)  Free From Fall Injury: Instruct family/caregiver on patient safety     Problem: ABCDS Injury Assessment  Goal: Absence of physical injury  1/15/2023 2053 by Blanca Wilhelm RN  Outcome: Progressing  Flowsheets (Taken 1/15/2023 0955)  Absence of Physical Injury: Implement safety measures based on patient assessment     Problem: Pain  Goal: Verbalizes/displays adequate comfort level or baseline comfort level  Outcome: Progressing  Flowsheets (Taken 1/15/2023 1930)  Verbalizes/displays adequate comfort level or baseline comfort level:   Encourage patient to monitor pain and request assistance   Assess pain using appropriate pain scale   Administer analgesics based on type and severity of pain and evaluate response   Implement non-pharmacological measures as appropriate and evaluate response   Consider cultural and social influences on pain and pain management   Notify Licensed Independent Practitioner if interventions unsuccessful or patient reports new pain

## 2023-01-16 NOTE — PROGRESS NOTES
Hospitalist Progress Note      PCP: Chirag Camacho MD    Date of Admission: 1/14/2023    Chief Complaint: Shortness of breath    Hospital Course: Presented with shortness of breath. Suspected CHF. Cardiology consulted. Based on previous data, diastolic CHF is suspected. Also noted cellulitis of lower extremity and started empirically on Ancef. This was later stopped due to no evidence of infection. Feels slightly better. Able to ambulate    Subjective: No chest pain. No shortness of breath at rest, no nausea, no vomiting, no abdominal pain      Medications:  Reviewed    Infusion Medications    sodium chloride       Scheduled Medications    miconazole   Topical BID    pimecrolimus   Topical BID    heparin (porcine)  5,000 Units SubCUTAneous 3 times per day    sodium chloride flush  10 mL IntraVENous 2 times per day    furosemide  40 mg IntraVENous BID    aspirin  81 mg Oral Daily    dilTIAZem  120 mg Oral BID    gabapentin  600 mg Oral BID    lamoTRIgine  200 mg Oral BID    levothyroxine  112 mcg Oral Daily    lisinopril  10 mg Oral Daily    montelukast  10 mg Oral QAM    pantoprazole  40 mg Oral Daily    sertraline  100 mg Oral Nightly     PRN Meds: sodium chloride flush, sodium chloride, potassium chloride, magnesium sulfate, promethazine **OR** ondansetron, acetaminophen **OR** acetaminophen, albuterol sulfate HFA, ipratropium-albuterol      Intake/Output Summary (Last 24 hours) at 1/16/2023 1146  Last data filed at 1/16/2023 1036  Gross per 24 hour   Intake 600 ml   Output 4300 ml   Net -3700 ml         Physical Exam Performed:    BP (!) 170/80   Pulse 90   Temp 98.4 °F (36.9 °C) (Oral)   Resp 18   Ht 5' (1.524 m)   Wt (!) 317 lb 12.8 oz (144.2 kg)   LMP 07/29/2013   SpO2 96%   BMI 62.07 kg/m²     General appearance: No apparent distress, appears stated age and cooperative. HEENT: Pupils equal, round, and reactive to light. Conjunctivae/corneas clear. Neck: Supple, with full range of motion. No jugular venous distention. Trachea midline. Respiratory:  Normal respiratory effort. Bibasilar inspiratory crackles. Cardiovascular: Regular rate and rhythm with normal S1/S2 without murmurs, rubs or gallops. Abdomen: Soft, non-tender, non-distended with normal bowel sounds. Musculoskeletal: No clubbing, cyanosis. 1+ edema bilaterally. Full range of motion without deformity. Skin: Skin color, texture, turgor normal.  Lower extremity erythema is stable and more consistent with chronic hyperpigmentation  Neurologic:  Neurovascularly intact without any focal sensory/motor deficits. Cranial nerves: II-XII intact, grossly non-focal.  Psychiatric: Alert and oriented, thought content appropriate, normal insight  Capillary Refill: Brisk, 3 seconds, normal   Peripheral Pulses: +2 palpable, equal bilaterally     I examined the patient today (01/16/23). Physical exam is similar to yesterday (1/15). Labs:   Recent Labs     01/14/23  1927 01/15/23  0534 01/16/23  0615   WBC 7.7 7.2 6.8   HGB 12.1 11.6* 12.0   HCT 37.1 36.6 38.0    253 263       Recent Labs     01/14/23 1927      K 3.9      CO2 31   BUN 15   CREATININE 0.7   CALCIUM 9.3       Recent Labs     01/14/23 1927   AST 17   ALT 20   BILITOT 0.4   ALKPHOS 100       No results for input(s): INR in the last 72 hours. Recent Labs     01/14/23 1927   TROPONINI <0.01         Urinalysis:      Lab Results   Component Value Date/Time    NITRU Negative 09/03/2019 06:35 PM    WBCUA 6-10 11/25/2018 02:48 PM    BACTERIA 1+ 11/02/2018 09:08 PM    RBCUA 0-2 11/25/2018 02:48 PM    BLOODU Negative 09/03/2019 06:35 PM    SPECGRAV 1.015 09/03/2019 06:35 PM    GLUCOSEU Negative 09/03/2019 06:35 PM       Radiology:  XR CHEST PORTABLE   Final Result   Enlarged cardiac silhouette with suggestion of mild to moderate pulmonary   vascular congestion.          VL Extremity Venous Left    (Results Pending)       IP CONSULT TO HEART FAILURE NURSE/COORDINATOR  IP CONSULT TO DIETITIAN  IP CONSULT TO PULMONOLOGY    Assessment/Plan:    Active Hospital Problems    Diagnosis     Pulmonary venous congestion [R09.89]      Priority: Medium    Exercise hypoxemia [R09.02]      Priority: Medium    Restrictive lung disease [J98.4]      Priority: Medium    Acute heart failure, unspecified heart failure type ([facility] [I50.9]      Priority: Medium    Morbid obesity with body mass index (BMI) of 60.0 to 69.9 in adult ([facility] [E66.01, Z68.44]     KRYSTIAN (obstructive sleep apnea) [G47.33]     Hypothyroid [E03.9]      PLAN    Acute CHF, suspected diastolic  Started on IV diuretics. Low-sodium diet. Troponin negative. Continue to monitor with intake output and daily weights. Clinically better. Echocardiogram shows pulmonary hypertension and normal left ventricular ejection fraction. We may still be looking at diastolic CHF. Acute hypoxic respiratory failure  Probably secondary to above. Noted underlying history of asthma and pulmonary hypertension. Pulmonology consulted. Input appreciated. Oxygen requirement improving. Asthma  No wheezes. Known asthma with restrictive lung disease with contribution from severe obesity. Continue supportive management and oxygen supplementation. Defer to pulmonology. No new issues overnight    Suspected lower extremity cellulitis  More consistent with chronic stasis dermatitis with no active infection. Antibiotics stopped. Hypothyroidism  Continue Synthroid. TSH shows suboptimal control with above desired levels. I will adjust Synthroid. Even a borderline poor control of hypothyroidism can have negative consequences in the patient's particular clinical picture. Hypertension  Stable and controlled blood pressure. Continue same management. Renal function is normal.    Discussed with the patient. Questions answered    DVT Prophylaxis: Lovenox  Diet: ADULT DIET; Regular;  Low Sodium (2 gm)  Code Status: Full Code  PT/OT Eval Status: Ordered    Dispo -inpatient, pending pulmonology recommendations.  May be another day    Appropriate for A1 Discharge Unit: Ml OROSCO MD

## 2023-01-16 NOTE — PROGRESS NOTES
Abg sent via tube system. 01/16/23 1349   Oxygen Therapy/Pulse Ox   O2 Therapy Room air   Blood Gas  Performed? Yes   $ABG $ABG   Dakotah's Test #1 Not assessed   Site #1 Right Brachial   Site Prepped #1 Yes   Number of Attempts #1 1   Pressure Held #1 Yes   Complications #1 None   Post-procedure #1 Standard   Specimen Status #1 To lab   How Tolerated?  Tolerated well

## 2023-01-17 ENCOUNTER — TELEPHONE (OUTPATIENT)
Dept: PULMONOLOGY | Age: 58
End: 2023-01-17

## 2023-01-17 LAB
A/G RATIO: 2.4 (ref 1.1–2.2)
ALBUMIN SERPL-MCNC: 4.8 G/DL (ref 3.4–5)
ALP BLD-CCNC: 104 U/L (ref 40–129)
ALT SERPL-CCNC: 18 U/L (ref 10–40)
ANION GAP SERPL CALCULATED.3IONS-SCNC: 11 MMOL/L (ref 3–16)
AST SERPL-CCNC: 20 U/L (ref 15–37)
BASOPHILS ABSOLUTE: 0 K/UL (ref 0–0.2)
BASOPHILS RELATIVE PERCENT: 0.6 %
BILIRUB SERPL-MCNC: 0.6 MG/DL (ref 0–1)
BUN BLDV-MCNC: 16 MG/DL (ref 7–20)
CALCIUM SERPL-MCNC: 9.7 MG/DL (ref 8.3–10.6)
CHLORIDE BLD-SCNC: 97 MMOL/L (ref 99–110)
CO2: 32 MMOL/L (ref 21–32)
CREAT SERPL-MCNC: 0.7 MG/DL (ref 0.6–1.1)
EOSINOPHILS ABSOLUTE: 0 K/UL (ref 0–0.6)
EOSINOPHILS RELATIVE PERCENT: 0.1 %
GFR SERPL CREATININE-BSD FRML MDRD: >60 ML/MIN/{1.73_M2}
GLUCOSE BLD-MCNC: 133 MG/DL (ref 70–99)
HCT VFR BLD CALC: 41 % (ref 36–48)
HEMOGLOBIN: 13.1 G/DL (ref 12–16)
LYMPHOCYTES ABSOLUTE: 1.3 K/UL (ref 1–5.1)
LYMPHOCYTES RELATIVE PERCENT: 16.5 %
MAGNESIUM: 2.5 MG/DL (ref 1.8–2.4)
MCH RBC QN AUTO: 27.4 PG (ref 26–34)
MCHC RBC AUTO-ENTMCNC: 31.9 G/DL (ref 31–36)
MCV RBC AUTO: 85.8 FL (ref 80–100)
MONOCYTES ABSOLUTE: 0.5 K/UL (ref 0–1.3)
MONOCYTES RELATIVE PERCENT: 6.6 %
NEUTROPHILS ABSOLUTE: 6 K/UL (ref 1.7–7.7)
NEUTROPHILS RELATIVE PERCENT: 76.2 %
PDW BLD-RTO: 16.1 % (ref 12.4–15.4)
PLATELET # BLD: 314 K/UL (ref 135–450)
PMV BLD AUTO: 8.3 FL (ref 5–10.5)
POTASSIUM SERPL-SCNC: 4.5 MMOL/L (ref 3.5–5.1)
PRO-BNP: 47 PG/ML (ref 0–124)
RBC # BLD: 4.77 M/UL (ref 4–5.2)
SODIUM BLD-SCNC: 140 MMOL/L (ref 136–145)
TOTAL PROTEIN: 6.8 G/DL (ref 6.4–8.2)
WBC # BLD: 7.9 K/UL (ref 4–11)

## 2023-01-17 PROCEDURE — 6370000000 HC RX 637 (ALT 250 FOR IP): Performed by: INTERNAL MEDICINE

## 2023-01-17 PROCEDURE — 36415 COLL VENOUS BLD VENIPUNCTURE: CPT

## 2023-01-17 PROCEDURE — 6360000002 HC RX W HCPCS: Performed by: INTERNAL MEDICINE

## 2023-01-17 PROCEDURE — 2580000003 HC RX 258: Performed by: INTERNAL MEDICINE

## 2023-01-17 PROCEDURE — 80053 COMPREHEN METABOLIC PANEL: CPT

## 2023-01-17 PROCEDURE — 1200000000 HC SEMI PRIVATE

## 2023-01-17 PROCEDURE — 2700000000 HC OXYGEN THERAPY PER DAY

## 2023-01-17 PROCEDURE — 94761 N-INVAS EAR/PLS OXIMETRY MLT: CPT

## 2023-01-17 PROCEDURE — 83735 ASSAY OF MAGNESIUM: CPT

## 2023-01-17 PROCEDURE — 83880 ASSAY OF NATRIURETIC PEPTIDE: CPT

## 2023-01-17 PROCEDURE — 85025 COMPLETE CBC W/AUTO DIFF WBC: CPT

## 2023-01-17 PROCEDURE — 99232 SBSQ HOSP IP/OBS MODERATE 35: CPT | Performed by: INTERNAL MEDICINE

## 2023-01-17 RX ADMIN — GABAPENTIN 600 MG: 300 CAPSULE ORAL at 10:42

## 2023-01-17 RX ADMIN — PIMECROLIMUS: 10 CREAM TOPICAL at 22:20

## 2023-01-17 RX ADMIN — DILTIAZEM HYDROCHLORIDE 120 MG: 120 CAPSULE, COATED, EXTENDED RELEASE ORAL at 10:39

## 2023-01-17 RX ADMIN — Medication 10 ML: at 16:20

## 2023-01-17 RX ADMIN — SODIUM CHLORIDE, PRESERVATIVE FREE 10 ML: 5 INJECTION INTRAVENOUS at 22:23

## 2023-01-17 RX ADMIN — SODIUM CHLORIDE, PRESERVATIVE FREE 10 ML: 5 INJECTION INTRAVENOUS at 10:47

## 2023-01-17 RX ADMIN — MICONAZOLE NITRATE: 2 POWDER TOPICAL at 22:24

## 2023-01-17 RX ADMIN — DILTIAZEM HYDROCHLORIDE 120 MG: 120 CAPSULE, COATED, EXTENDED RELEASE ORAL at 22:20

## 2023-01-17 RX ADMIN — HEPARIN SODIUM 5000 UNITS: 5000 INJECTION INTRAVENOUS; SUBCUTANEOUS at 05:42

## 2023-01-17 RX ADMIN — LEVOTHYROXINE SODIUM 125 MCG: 0.12 TABLET ORAL at 10:41

## 2023-01-17 RX ADMIN — GABAPENTIN 600 MG: 300 CAPSULE ORAL at 22:20

## 2023-01-17 RX ADMIN — MICONAZOLE NITRATE: 2 POWDER TOPICAL at 10:52

## 2023-01-17 RX ADMIN — PIMECROLIMUS: 10 CREAM TOPICAL at 11:04

## 2023-01-17 RX ADMIN — LAMOTRIGINE 200 MG: 100 TABLET ORAL at 10:40

## 2023-01-17 RX ADMIN — HEPARIN SODIUM 5000 UNITS: 5000 INJECTION INTRAVENOUS; SUBCUTANEOUS at 22:19

## 2023-01-17 RX ADMIN — PANTOPRAZOLE SODIUM 40 MG: 40 TABLET, DELAYED RELEASE ORAL at 10:42

## 2023-01-17 RX ADMIN — ASPIRIN 81 MG: 81 TABLET, COATED ORAL at 10:42

## 2023-01-17 RX ADMIN — LAMOTRIGINE 200 MG: 100 TABLET ORAL at 22:20

## 2023-01-17 RX ADMIN — SERTRALINE HYDROCHLORIDE 100 MG: 50 TABLET ORAL at 22:20

## 2023-01-17 RX ADMIN — HEPARIN SODIUM 5000 UNITS: 5000 INJECTION INTRAVENOUS; SUBCUTANEOUS at 15:35

## 2023-01-17 RX ADMIN — MONTELUKAST SODIUM 10 MG: 10 TABLET ORAL at 11:04

## 2023-01-17 RX ADMIN — LISINOPRIL 10 MG: 10 TABLET ORAL at 10:41

## 2023-01-17 RX ADMIN — FUROSEMIDE 40 MG: 10 INJECTION, SOLUTION INTRAMUSCULAR; INTRAVENOUS at 16:12

## 2023-01-17 ASSESSMENT — PAIN SCALES - GENERAL
PAINLEVEL_OUTOF10: 0
PAINLEVEL_OUTOF10: 0

## 2023-01-17 NOTE — PROGRESS NOTES
Physician Progress Note      Crys Rodriguez  CSN #:                  [id number]  :                       1965  ADMIT DATE:       2023 6:40 PM  [address] [place] DATE:  RESPONDING  PROVIDER #:        Sourav Sommers MD          QUERY TEXT:    Patient admitted with SOB. Noted documentation of acute on chronic respiratory   failure. In order to support the diagnosis of acute respiratory failure,   please include additional clinical indicators in your documentation. Or   please document if the diagnosis of acute respiratory failure has been ruled   out after further study. The medical record reflects the following:  Risk Factors: CHF, asthma, obesity, O2 home baseline 2L  Clinical Indicators: per ED- OH and increased O2 demand, no described   increased work of breathing or distress. Per VS flow sheet- max RR 22 and pt   maintained on 2-3 L during admission. Lowest sat down to 85-89% when on RA, O2   sat 91-95% when on 2L NC. No blood gases done  Treatment: O2, supportive monitored care, diuresis, pulmonary consult    Acute Respiratory Failure Clinical Indicators per [employer] Training Guide and   Quick Reference Guide:  pO2 < 60 mmHg or SpO2 (pulse oximetry) < 91% breathing room air  pCO2 > 50 and pH < 7.35  P/F ratio (pO2 / FIO2) < 300  pO2 decrease or pCO2 increase by 10 mmHg from baseline (if known)  Supplemental oxygen of 40% or more  Presence of respiratory distress, tachypnea, dyspnea, shortness of breath,   wheezing  Unable to speak in complete sentences  Use of accessory muscles to breathe  Extreme anxiety and feeling of impending doom  Tripod position  Confusion/altered mental status/obtunded    Thank you,  Dre Mendez RN, BSN  [email or web address]  Options provided:  -- Acute Respiratory Failure as evidenced by, Please document evidence.   -- Acute Respiratory Failure ruled out after study and Chronic Respiratory   Failure confirmed  -- Other - I will add my own diagnosis  -- Disagree - Not applicable / Not valid  -- Disagree - Clinically unable to determine / Unknown  -- Refer to Clinical Documentation Reviewer    PROVIDER RESPONSE TEXT:    Acute Respiratory Failure ruled out after study and Chronic Respiratory   Failure confirmed.     Query created by: Airam Mcfadden on 1/17/2023 3:44 PM      Electronically signed by:  Jason Alcaraz MD 1/17/2023 4:43 PM

## 2023-01-17 NOTE — CARE COORDINATION
Spoke with patient and nursing. Stated will be d/c'd today. Patient stated will have a ride home and has portable O2 tank here. Would like to get aide services at home. May be able to go thru [facility] for assistance. Would be beneficial to have PCP write letter to support need. Patient states does have multiple family members to assist she just hates imposing on them. Shelby Fernández RN

## 2023-01-17 NOTE — PROGRESS NOTES
Pt assessment completed and charted. VSS. Pt a/o. Pt c/o chronic joint pain. States she normally takes 800 mg ibuprofen bid. PRN tylenol administered per MAR and heat pack applied to bilateral knees. Pt ambulates independently w/ cane. Pt SOB w/ ambulation w/ O2/NC in place. Bed in lowest position and wheels locked. Call light within reach. Bedside table within reach. Non-skid footwear in place. Pt denies any other needs at this time. Pt calls out appropriately.

## 2023-01-17 NOTE — PROGRESS NOTES
INPATIENT PULMONARY CRITICAL CARE PROGRESS NOTE      Reason for visit: Hypoxemia. Consult for 1/15/2023 for low oxygen saturation, normal PFT. Past asthma, KRYSTIAN noncompliant with CPAP, suspected pulmonary hypertension, morbid obesity and multiple other medical problems. Recent increase in exertional dyspnea, significant desaturation on home O2 at 2 LPM.  She had turned up the oxygen flow at home, back to her home oxygen flow at 2 LPM with SaO2 89%. She denied cough, wheezing, sick contacts. Weight gain of about 50 pounds over the last few years, left leg swelling    She has developed a skin rash over the last 1 year, has an appointment at [facility] in May    On home O2 at 2 LPM, [facility]    PFT 4/25/2019  FVC 1.5 L, 49% predicted, FEV1 1.2 L, 50% predicted with ratio of 80%. There was no response to bronchodilator. TLC 41% predicted, ERV 5% predicted. DLCO likely erroneous    6-minute walk test 4/45/19  Reduced walk distance, required oxygen at 3 LPM to maintain adequate saturation    Echocardiogram 5/10/2022  LVEF 55%, normal LV diastolic function. Mild MR, TR, FL. Technically difficult study. SUBJECTIVE: Afebrile and hemodynamically stable, on 2 LPM O2 with SaO2 95%. Denies cough, wheezing. She has been moving around with mild shortness of breath. She denies chest pain, cough. She complains of difficulty with ambulation at home due to avascular necrosis and arthritis of her knees. She is using ibuprofen almost daily, is on gabapentin. She does not use oxygen all the time at home. Physical Exam:  Blood pressure (!) 142/88, pulse 66, temperature 97.7 °F (36.5 °C), temperature source Oral, resp. rate 18, height 5' (1.524 m), weight (!) 314 lb 8 oz (142.7 kg), last menstrual period 07/29/2013, SpO2 95 %.'   Constitutional: Pleasant, short, morbidly obese. In no acute distress. HENT:  Oropharynx is clear and moist. No oral lesions.   Class III airway, mild residual tonsillar tissue  Eyes: Conjunctivae are normal. No scleral icterus. Neck: Short and large neck, no JVD. Cardiovascular: Normal rate, regular rhythm, normal heart sounds. No lower extremity edema. Pulmonary/Chest: No wheezes. No rales. Diminished air entry. No accessory muscle usage or stridor. Abdominal: Deferred, large protuberant abdomen. Musculoskeletal: No cyanosis. No clubbing. No obvious joint deformity. Lymphadenopathy: Deferred. Skin: Skin is warm and dry. Erythematous nodular rash on her face, no angiomas in the oral mucosa. Psychiatric: Normal mood and affect. Behavior is normal.  No anxiety. Neurologic: Alert, awake and oriented. PERRL. Speech fluent. No obvious neurologic deficit, detailed exam not performed        Results:  CBC:   Recent Labs     01/15/23  0534 01/16/23  0615 01/17/23  0542   WBC 7.2 6.8 7.9   HGB 11.6* 12.0 13.1   HCT 36.6 38.0 41.0   MCV 86.4 85.7 85.8    263 314       BMP:   Recent Labs     01/14/23 1927 01/17/23  0542    140   K 3.9 4.5    97*   CO2 31 32   BUN 15 16   CREATININE 0.7 0.7       LIVER PROFILE:   Recent Labs     01/14/23 1927 01/17/23  0542   AST 17 20   ALT 20 18   BILITOT 0.4 0.6   ALKPHOS 100 104         UA:  Recent Labs     01/15/23  0027   PHUR 6.0         Imaging:  I have reviewed radiology images personally. VL Extremity Venous Left         XR CHEST PORTABLE   Final Result   Enlarged cardiac silhouette with suggestion of mild to moderate pulmonary   vascular congestion. XR CHEST PORTABLE    Result Date: 1/14/2023  EXAMINATION: ONE XRAY VIEW OF THE CHEST 1/14/2023 7:20 pm COMPARISON: 10/13/2022. HISTORY: ORDERING SYSTEM PROVIDED HISTORY: pain or SOB TECHNOLOGIST PROVIDED HISTORY: Reason for exam:->pain or SOB Reason for Exam: sob FINDINGS: Evaluation of left lower lung field is limited due to overlying soft tissues. The cardiac silhouette is enlarged, appearing grossly similar to the prior study.   There is suggestion of mild to moderate pulmonary vascular congestion. No dense airspace opacity, large pleural effusion or pneumothorax is seen. Enlarged cardiac silhouette with suggestion of mild to moderate pulmonary vascular congestion. Assessment and plan:  Acute on chronic respiratory failure. Encouraged her to continue with oxygen at 2 LPM throughout the day, 3 LPM with sleep. Recommending outpatient 6-minute walk test  Acute heart failure, unspecified heart failure type ([address] CXR is rather nonspecific, BNP normal.  Echocardiogram showed normal LVEF and diastolic dysfunction in May 2022. Would consider CT chest.  Primary team felt she has HFpEF, had stopped spironolactone and was noncompliant with diet. Was placed on Lasix. Can continue her home diuretic regimen  Bronchial asthma. Diagnosis in the past, CT showed possible atelectasis versus air trapping. She says she did have wheezing frequently, has been on Flovent diskus and rescue albuterol. Continue present treatment  KRYSTIAN. Was followed by Dr. Fred Talbert at [facility]. At that time she was on auto CPAP 6-20 cmH2O. Although her compliance was excellent, she had good control of sleep apnea. Her AHI dropped from 38/h to 6.6/h. CPAP pressure 90% of the time was 10.2 cm H2O. She was due to see Dr. Fred Talbert in follow-up to assess her APAP. She does wishes to see Dr. Fred Talbert at our practice, was made aware that he is with [facility] at present. Obesity hypoventilation. Compensated respiratory acidosis likely from obesity hypoventilation. Restrictive lung disease. Likely due to obesity. Pulmonary hypertension. Due to obesity hypoventilation and untreated KRYSTIAN  Cellulitis left leg. On Ancef. No evidence of DVT  Morbid obesity with body mass index (BMI) of 60.0 to 69.9 in [facility]). She is agreeable to seeing a nutritionist while she is here  Hypothyroidism, essential hypertension. Per IM  DVT prophylaxis. Lovenox. Discussed with patient, nursing.   She can be discharged today, will send a message to our office to set up follow-up with Dr. Ilene Park, and probably about a month. We will get a PFT and 6-minute walk test prior to her visit.            Electronically signed by:  Alvarado Barksdale MD    1/17/2023    9:25 AM.

## 2023-01-17 NOTE — PLAN OF CARE
Problem: Safety - Adult  Goal: Free from fall injury  Outcome: Progressing  Flowsheets (Taken 1/16/2023 1230)  Free From Fall Injury: Instruct family/caregiver on patient safety     Problem: Pain  Goal: Verbalizes/displays adequate comfort level or baseline comfort level  Outcome: Progressing  Flowsheets (Taken 1/16/2023 1230)  Verbalizes/displays adequate comfort level or baseline comfort level:   Encourage patient to monitor pain and request assistance   Assess pain using appropriate pain scale   Administer analgesics based on type and severity of pain and evaluate response   Implement non-pharmacological measures as appropriate and evaluate response     Problem: Cardiovascular - Adult  Goal: Absence of cardiac dysrhythmias or at baseline  Flowsheets (Taken 1/16/2023 1230)  Absence of cardiac dysrhythmias or at baseline:   Monitor cardiac rate and rhythm   Assess for signs of decreased cardiac output   Administer antiarrhythmia medication and electrolyte replacement as ordered     Problem: Skin/Tissue Integrity - Adult  Goal: Skin integrity remains intact  Outcome: Progressing  Flowsheets (Taken 1/16/2023 1230)  Skin Integrity Remains Intact:   Monitor for areas of redness and/or skin breakdown   Every 4-6 hours minimum: Change oxygen saturation probe site

## 2023-01-17 NOTE — TELEPHONE ENCOUNTER
----- Message from Berny Gatica MD sent at 1/17/2023  9:40 AM EST -----  Please set up follow-up with Dr. Nicole Postal with PFT and 6-minute walk test

## 2023-01-17 NOTE — PROGRESS NOTES
INTERNAL MED PROGRESS NOTE      Subjective     Reported doing fairly okay this morning. No acute overnight event reported. Assessment/Plan      --Acute on chronic diastolic CHF exacerbation: Continue IV Lasix, monitor PAOLA and electrolytes closely. 2D echo showed preserved LVEF, also noted pulmonary hypertension. --Acute hypoxic respiratory failure: Secondary to above, continue supplemental oxygen, wean off as able. --Asthma: stable w/o any acute s/s of exacerbation. Placed on prn duonebs    --? Bilateral extremity cellulitis: Findings consistent with chronic stasis dermatitis. Antibiotic discontinued    --Hypothyroidism: clinically euthyroid. Cont current outpt dose of levothyroxine.    --HTN, controlled, cont home meds as ordered       DVT prophylaxis: Heparin/Lovenox        Ugo Bucio MD  1/17/2023 @ 2:25 PM      Objective               Intake/Output Summary (Last 24 hours) at 1/17/2023 1425  Last data filed at 1/17/2023 0552  Gross per 24 hour   Intake 1830 ml   Output 1450 ml   Net 380 ml        Vitals:   Vitals:    01/17/23 1131   BP: 132/80   Pulse: 71   Resp: 18   Temp: 98.1 °F (36.7 °C)   SpO2: 95%       Physical Exam       Constitutional: Well developed, Well nourished, NAD  Neurologic: Alert & oriented x 3, No focal deficits noted. CNs II-XII grossly intact and symmetrical  Psychiatric: Affect normal, Mood normal.  HENT: Normocephalic, Atraumatic,  Eyes: PERRLA, EOMI, No pallor/scleral icterus. Neck: Normal range of motion, No tenderness, Supple, no bruit  Lymphatic: No cervical lymphadenopathy noted. Cardiovascular: Regular rate and rhythm, normal S1-S2, No murmurs, gallops or rubs. Thorax & Lungs: CTA bilaterally, No respiratory distress, No wheezing   Abdomen: Soft, BS +ve, no tenderness, no rebound or guarding  Skin: Warm, Dry, No erythema, No rash. Back: No tenderness, No CVA tenderness.    Extremities: No edema, No tenderness  Musculoskeletal:. No major deformities noted. Labs       Reviewed, as follows:  Recent Results (from the past 24 hour(s))   Brain Natriuretic Peptide    Collection Time: 01/17/23  5:42 AM   Result Value Ref Range    Pro-BNP 47 0 - 124 pg/mL   Magnesium    Collection Time: 01/17/23  5:42 AM   Result Value Ref Range    Magnesium 2.50 (H) 1.80 - 2.40 mg/dL   Comprehensive Metabolic Panel    Collection Time: 01/17/23  5:42 AM   Result Value Ref Range    Sodium 140 136 - 145 mmol/L    Potassium 4.5 3.5 - 5.1 mmol/L    Chloride 97 (L) 99 - 110 mmol/L    CO2 32 21 - 32 mmol/L    Anion Gap 11 3 - 16    Glucose 133 (H) 70 - 99 mg/dL    BUN 16 7 - 20 mg/dL    Creatinine 0.7 0.6 - 1.1 mg/dL    Est, Glom Filt Rate >60 >60    Calcium 9.7 8.3 - 10.6 mg/dL    Total Protein 6.8 6.4 - 8.2 g/dL    Albumin 4.8 3.4 - 5.0 g/dL    Albumin/Globulin Ratio 2.4 (H) 1.1 - 2.2    Total Bilirubin 0.6 0.0 - 1.0 mg/dL    Alkaline Phosphatase 104 40 - 129 U/L    ALT 18 10 - 40 U/L    AST 20 15 - 37 U/L   CBC with Auto Differential    Collection Time: 01/17/23  5:42 AM   Result Value Ref Range    WBC 7.9 4.0 - 11.0 K/uL    RBC 4.77 4.00 - 5.20 M/uL    Hemoglobin 13.1 12.0 - 16.0 g/dL    Hematocrit 41.0 36.0 - 48.0 %    MCV 85.8 80.0 - 100.0 fL    MCH 27.4 26.0 - 34.0 pg    MCHC 31.9 31.0 - 36.0 g/dL    RDW 16.1 (H) 12.4 - 15.4 %    Platelets 892 783 - 250 K/uL    MPV 8.3 5.0 - 10.5 fL    Neutrophils % 76.2 %    Lymphocytes % 16.5 %    Monocytes % 6.6 %    Eosinophils % 0.1 %    Basophils % 0.6 %    Neutrophils Absolute 6.0 1.7 - 7.7 K/uL    Lymphocytes Absolute 1.3 1.0 - 5.1 K/uL    Monocytes Absolute 0.5 0.0 - 1.3 K/uL    Eosinophils Absolute 0.0 0.0 - 0.6 K/uL    Basophils Absolute 0.0 0.0 - 0.2 K/uL          Body mass index is 61.42 kg/m².  Patient will follow up with PCP for further management as indicated unless otherwise specifically noted above        Rodney Murphy MD  1/17/2023 @ 2:25 PM

## 2023-01-18 VITALS
HEIGHT: 60 IN | TEMPERATURE: 98.2 F | RESPIRATION RATE: 16 BRPM | BODY MASS INDEX: 57.52 KG/M2 | SYSTOLIC BLOOD PRESSURE: 129 MMHG | OXYGEN SATURATION: 95 % | WEIGHT: 293 LBS | HEART RATE: 79 BPM | DIASTOLIC BLOOD PRESSURE: 70 MMHG

## 2023-01-18 LAB — MAGNESIUM: 2.4 MG/DL (ref 1.8–2.4)

## 2023-01-18 PROCEDURE — 94761 N-INVAS EAR/PLS OXIMETRY MLT: CPT

## 2023-01-18 PROCEDURE — 99231 SBSQ HOSP IP/OBS SF/LOW 25: CPT | Performed by: INTERNAL MEDICINE

## 2023-01-18 PROCEDURE — 83735 ASSAY OF MAGNESIUM: CPT

## 2023-01-18 PROCEDURE — 6370000000 HC RX 637 (ALT 250 FOR IP): Performed by: INTERNAL MEDICINE

## 2023-01-18 PROCEDURE — 6360000002 HC RX W HCPCS: Performed by: INTERNAL MEDICINE

## 2023-01-18 PROCEDURE — 36415 COLL VENOUS BLD VENIPUNCTURE: CPT

## 2023-01-18 PROCEDURE — 2700000000 HC OXYGEN THERAPY PER DAY

## 2023-01-18 RX ORDER — FUROSEMIDE 40 MG/1
40 TABLET ORAL 2 TIMES DAILY
Qty: 90 TABLET | Refills: 3 | Status: SHIPPED | OUTPATIENT
Start: 2023-01-18 | End: 2023-02-17

## 2023-01-18 RX ADMIN — PANTOPRAZOLE SODIUM 40 MG: 40 TABLET, DELAYED RELEASE ORAL at 09:35

## 2023-01-18 RX ADMIN — LISINOPRIL 10 MG: 10 TABLET ORAL at 09:35

## 2023-01-18 RX ADMIN — GABAPENTIN 600 MG: 300 CAPSULE ORAL at 09:34

## 2023-01-18 RX ADMIN — LAMOTRIGINE 200 MG: 100 TABLET ORAL at 10:53

## 2023-01-18 RX ADMIN — MONTELUKAST SODIUM 10 MG: 10 TABLET ORAL at 09:35

## 2023-01-18 RX ADMIN — LEVOTHYROXINE SODIUM 125 MCG: 0.12 TABLET ORAL at 09:35

## 2023-01-18 RX ADMIN — ASPIRIN 81 MG: 81 TABLET, COATED ORAL at 09:35

## 2023-01-18 RX ADMIN — HEPARIN SODIUM 5000 UNITS: 5000 INJECTION INTRAVENOUS; SUBCUTANEOUS at 06:45

## 2023-01-18 RX ADMIN — ACETAMINOPHEN 325MG 650 MG: 325 TABLET ORAL at 07:01

## 2023-01-18 RX ADMIN — DILTIAZEM HYDROCHLORIDE 120 MG: 120 CAPSULE, COATED, EXTENDED RELEASE ORAL at 09:35

## 2023-01-18 ASSESSMENT — PAIN DESCRIPTION - LOCATION: LOCATION: LEG

## 2023-01-18 ASSESSMENT — PAIN SCALES - GENERAL
PAINLEVEL_OUTOF10: 4
PAINLEVEL_OUTOF10: 0

## 2023-01-18 ASSESSMENT — PAIN DESCRIPTION - ORIENTATION: ORIENTATION: RIGHT

## 2023-01-18 ASSESSMENT — PAIN DESCRIPTION - DESCRIPTORS: DESCRIPTORS: ACHING

## 2023-01-18 ASSESSMENT — PAIN SCALES - WONG BAKER: WONGBAKER_NUMERICALRESPONSE: 0

## 2023-01-18 NOTE — DISCHARGE SUMMARY
V2.0  Discharge Summary    Name:  Charles Dela Cruz /Age/Sex: 1965 (56 y.o. female)   Admit Date: 2023  Discharge Date: 23    MRN & CSN:  [id number] & [id number] Encounter Date and Time 23 8:56 AM EST    Attending:  Michelle Billings MD Discharging Provider: Michelle Billings MD           Hospital Course         Brief HPI: Charles Dela Cruz is a 56 y.o. female who presented with complaint of shortness of breath mostly with exertion, also had acute on chronic hypoxemic respiratory failure. Baseline history of obesity and obesity related hypoventilatory syndrome, diastolic CHF. Admitted for acute on chronic diastolic CHF exacerbation, started on IV Lasix. Brief Problem Based Course:   --Acute on chronic diastolic CHF exacerbation: Adequately diuresed, currently euvolemic. Will go home on p.o. Lasix 40 mg twice daily. Patient to follow-up closely with PCP soon after discharge for reevaluation and med adjustment as indicated. 2D echo showed preserved LVEF, also noted pulmonary hypertension. --Acute on chronic hypoxic respiratory failure: Secondary to above. required up to 5 L of NC O2 on admission, now back to baseline requirement of 2 L at rest and 3 L on exertion. --Asthma: stable w/o any acute s/s of exacerbation. Placed on prn duonebs     --? Bilateral extremity cellulitis: Findings consistent with chronic stasis dermatitis. Antibiotic discontinued     --Hypothyroidism: clinically euthyroid. Cont current outpt dose of levothyroxine.     --HTN, controlled, cont home meds as ordered             The patient expressed appropriate understanding of, and agreement with the discharge recommendations, medications, and plan.      Consults this admission:  IP CONSULT TO HEART FAILURE NURSE/COORDINATOR  IP CONSULT TO DIETITIAN  IP CONSULT TO PULMONOLOGY  IP CONSULT TO DIETITIAN      Discharge Diagnosis       Acute heart failure, unspecified heart failure type [facility])        Discharge Instructions Follow up appointments: PCP and cardiology  Primary care physician: Yonas Appiah MD within 2 weeks  Diet: cardiac diet   Activity: activity as tolerated  Disposition: Discharged to:   [x]Home, []C, []SNF, []Acute Rehab, []Hospice   Condition on discharge: Stable  Labs and Tests to be Followed up as an outpatient by PCP or Specialist:         Discharge Medications          Medication List        CHANGE how you take these medications      furosemide 40 MG tablet  Commonly known as: LASIX  Take 1 tablet by mouth 2 times daily  What changed: See the new instructions. CONTINUE taking these medications      albuterol sulfate  (90 Base) MCG/ACT inhaler  Commonly known as: Proventil HFA  Inhale 2 puffs into the lungs every 4 hours as needed for Wheezing or Shortness of Breath (Space out to every 6 hours as symptoms improve) Space out to every 6 hours as symptoms improve.      aspirin 81 MG EC tablet     dilTIAZem 120 MG extended release capsule  Commonly known as: CARDIZEM CD  Take 1 capsule by mouth 2 times daily     ferrous sulfate 325 (65 Fe) MG tablet  Commonly known as: IRON 325     gabapentin 600 MG tablet  Commonly known as: NEURONTIN     ibuprofen 200 MG tablet  Commonly known as: ADVIL;MOTRIN     ipratropium-albuterol 0.5-2.5 (3) MG/3ML Soln nebulizer solution  Commonly known as: DUONEB  Inhale 3 mLs into the lungs every 4 hours as needed for Shortness of Breath     lamoTRIgine 200 MG tablet  Commonly known as: LAMICTAL     levothyroxine 112 MCG tablet  Commonly known as: SYNTHROID     lisinopril 10 MG tablet  Commonly known as: PRINIVIL;ZESTRIL  TAKE ONE TABLET BY MOUTH DAILY     montelukast 10 MG tablet  Commonly known as: SINGULAIR     MULTI ADULT GUMMIES PO     nystatin 260192 UNIT/GM powder  Commonly known as: MYCOSTATIN     oyster shell calcium w/D 500-200 MG-UNIT Tabs tablet     pantoprazole 40 MG tablet  Commonly known as: PROTONIX     pimecrolimus 1 % cream  Commonly known as: ELIDEL sertraline 100 MG tablet  Commonly known as: ZOLOFT     vitamin D3 125 MCG (5000 UT) Tabs tablet  Commonly known as: CHOLECALCIFEROL            ASK your doctor about these medications      acetaminophen 500 MG tablet  Commonly known as: TYLENOL     spironolactone 25 MG tablet  Commonly known as: ALDACTONE  TAKE ONE TABLET BY MOUTH ON MONDAY, WEDNESDAY, [address] SUNDAY               Where to Get Your Medications        These medications were sent to [address], [facility] [address] [phone] - F [phone]  [address], [place] [address]      Phone: [phone]   furosemide 40 MG tablet        O'    Objective Findings at discharge       /77   Pulse 69   Temp 97.9 °F (36.6 °C) (Oral)   Resp 16   Ht 5' (1.524 m)   Wt (!) 314 lb 8 oz (142.7 kg)   LMP 07/29/2013   SpO2 95%   BMI 61.42 kg/m²         Physical Exam       General: NAD  Eyes: EOMI  ENT: neck supple  Cardiovascular: Regular rate. Respiratory: Clear to auscultation  Gastrointestinal: Soft, non tender  Genitourinary: no suprapubic tenderness  Musculoskeletal: No edema  Skin: warm, dry  Neuro: Alert. Psych: Mood appropriate.            Labs and Imaging        VL Extremity Venous Left    Result Date: 1/16/2023  Vascular Lower Extremities DVT Study Procedure -- PRELIMINARY SONOGRAPHER REPORT --   Demographics   Patient Name       Christine Rivera   Date of Study      01/16/2023         Gender              Female   Patient Number     [id number]         Date of Birth       1965   Visit Number       [id number]          Age                 56 year(s)   Accession Number   [id number]         Room Number         8562   Corporate ID       [id number]            Sonographer         Jose Alberto Schreiber,                                                            HUMZA, RDCS,   Ordering Physician Hiren Tubbs.,  Interpreting        Esa Browning DO                 Physician Vascular  Procedure Type of Study:   Veins:Lower Extremities DVT Study, VL EXTREMITY VENOUS DUPLEX LEFT. Tech Comments Right No evidence of deep venous thrombosis is seen involving the right common femoral vein. Left No evidence of deep or superficial venous thrombosis seen involving the left lower extremity. XR CHEST PORTABLE    Result Date: 1/14/2023  EXAMINATION: ONE XRAY VIEW OF THE CHEST 1/14/2023 7:20 pm COMPARISON: 10/13/2022. HISTORY: ORDERING SYSTEM PROVIDED HISTORY: pain or SOB TECHNOLOGIST PROVIDED HISTORY: Reason for exam:->pain or SOB Reason for Exam: sob FINDINGS: Evaluation of left lower lung field is limited due to overlying soft tissues. The cardiac silhouette is enlarged, appearing grossly similar to the prior study. There is suggestion of mild to moderate pulmonary vascular congestion. No dense airspace opacity, large pleural effusion or pneumothorax is seen. Enlarged cardiac silhouette with suggestion of mild to moderate pulmonary vascular congestion. CBC:   Recent Labs     01/16/23  0615 01/17/23  0542   WBC 6.8 7.9   HGB 12.0 13.1    314     BMP:    Recent Labs     01/17/23  0542      K 4.5   CL 97*   CO2 32   BUN 16   CREATININE 0.7   GLUCOSE 133*     Hepatic:   Recent Labs     01/17/23  0542   AST 20   ALT 18   BILITOT 0.6   ALKPHOS 104     Lipids:   Lab Results   Component Value Date/Time    CHOL 161 06/26/2020 08:30 AM    HDL 47 06/26/2020 08:30 AM    TRIG 99 06/26/2020 08:30 AM     Hemoglobin A1C:   Lab Results   Component Value Date/Time    LABA1C 6.0 10/17/2020 12:00 AM     TSH:   Lab Results   Component Value Date/Time    TSH 5.46 01/15/2023 05:34 AM     Troponin: No results found for: TROPONINT  Lactic Acid: No results for input(s): LACTA in the last 72 hours.   BNP:   Recent Labs     01/17/23  0542   PROBNP 47     UA:  Lab Results   Component Value Date/Time    NITRU Negative 09/03/2019 06:35 PM    COLORU Yellow 09/03/2019 06:35 PM    PHUR 6.0 01/15/2023 12:27 AM    WBCUA 6-10 11/25/2018 02:48 PM    RBCUA 0-2 11/25/2018 02:48 PM    BACTERIA 1+ 11/02/2018 09:08 PM    CLARITYU Clear 09/03/2019 06:35 PM    SPECGRAV 1.015 09/03/2019 06:35 PM    LEUKOCYTESUR Negative 09/03/2019 06:35 PM    UROBILINOGEN 0.2 09/03/2019 06:35 PM    BILIRUBINUR Negative 09/03/2019 06:35 PM    BILIRUBINUR neg 01/09/2011 09:35 PM    BLOODU Negative 09/03/2019 06:35 PM    GLUCOSEU Negative 09/03/2019 06:35 PM    KETUA Negative 09/03/2019 06:35 PM     Urine Cultures:   Lab Results   Component Value Date/Time    LABURIN  11/25/2018 02:48 PM     >50,000 CFU/ml mixed skin/urogenital davida.  No further workup     Blood Cultures: No results found for: BC  No results found for: BLOODCULT2  Organism: No results found for: ORG    Time Spent Discharging patient 30 minutes    Electronically signed by Jeaneth Otoole MD on 1/18/2023 at 8:56 AM

## 2023-01-18 NOTE — PROGRESS NOTES
Pt refused lasix @ 0900 d/t possibility of discharge. Pt discharge was postponed for one more day. Pt requested morning lasix be given and not to be given 1800 lasix.

## 2023-01-18 NOTE — PLAN OF CARE
Problem: Respiratory - Adult  Goal: Achieves optimal ventilation and oxygenation  Outcome: Progressing  Flowsheets (Taken 1/17/2023 1230)  Achieves optimal ventilation and oxygenation:   Assess for changes in respiratory status   Assess for changes in mentation and behavior   Position to facilitate oxygenation and minimize respiratory effort   Oxygen supplementation based on oxygen saturation or arterial blood gases     Problem: Cardiovascular - Adult  Goal: Maintains optimal cardiac output and hemodynamic stability  Outcome: Progressing  Flowsheets (Taken 1/17/2023 1230)  Maintains optimal cardiac output and hemodynamic stability:   Monitor blood pressure and heart rate   Monitor urine output and notify Licensed Independent Practitioner for values outside of normal range   Assess for signs of decreased cardiac output

## 2023-01-18 NOTE — PROGRESS NOTES
Pt assessment completed and charted. VSS. Pt a/o. Pt c/o chronic joint pain. Heat packs applied. Pt ambulates independently w/ cane. 2L/NC during daytimes - 3L/NC @ night. Will f/u outpatient w/ pulmonology. Low air loss bed ordered per pt request. Bed in lowest position and wheels locked. Call light within reach. Bedside table within reach. Non-skid footwear in place. Pt denies any other needs at this time. Pt calls out appropriately.  Will continue to monit

## 2023-01-18 NOTE — PROGRESS NOTES
Report called to Andreea Rangel to [facility]- pt would like to have breakfast on the floor before heading to [facility]. Pt made aware of d/c plan and explained to wear 3LNC with activity and at night.

## 2023-01-18 NOTE — PROGRESS NOTES
INPATIENT PULMONARY CRITICAL CARE PROGRESS NOTE      Reason for visit: Hypoxemia. Consult for 1/15/2023 for low oxygen saturation, normal PFT. Past asthma, KRYSTIAN noncompliant with CPAP, suspected pulmonary hypertension, morbid obesity and multiple other medical problems. Recent increase in exertional dyspnea, significant desaturation on home O2 at 2 LPM.  She had turned up the oxygen flow at home, back to her home oxygen flow at 2 LPM with SaO2 89%. She denied cough, wheezing, sick contacts. Weight gain of about 50 pounds over the last few years, left leg swelling    She has developed a skin rash over the last 1 year, has an appointment at [facility] in May    On home O2 at 2 LPM, [facility]    PFT 4/25/2019  FVC 1.5 L, 49% predicted, FEV1 1.2 L, 50% predicted with ratio of 80%. There was no response to bronchodilator. TLC 41% predicted, ERV 5% predicted. DLCO likely erroneous    6-minute walk test 4/45/19  Reduced walk distance, required oxygen at 3 LPM to maintain adequate saturation    Echocardiogram 5/10/2022  LVEF 55%, normal LV diastolic function. Mild MR, TR, AR. Technically difficult study. SUBJECTIVE: Afebrile and hemodynamically stable, on 2 LPM O2 with SaO2 95%. Denies cough, wheezing. She has been moving around with mild shortness of breath. She denies chest pain, cough. She complains of difficulty with ambulation at home due to avascular necrosis and arthritis of her knees. She is using ibuprofen almost daily, is on gabapentin. She does not use oxygen all the time at home. Physical Exam:  Blood pressure 108/77, pulse 69, temperature 97.9 °F (36.6 °C), temperature source Oral, resp. rate 16, height 5' (1.524 m), weight (!) 314 lb 8 oz (142.7 kg), last menstrual period 07/29/2013, SpO2 95 %.'   Constitutional: Pleasant, short, morbidly obese. In no acute distress. HENT:  Oropharynx is clear and moist. No oral lesions.   Class III airway  Eyes:  Conjunctivae are normal. No scleral icterus. Neck: Short and large neck, no JVD. Cardiovascular: Normal rate, regular rhythm, distant heart sounds. No lower extremity edema. Pulmonary/Chest: No wheezes. No rales. Diminished air entry. No accessory muscle usage or stridor. Abdominal: Deferred, large protuberant abdomen. Musculoskeletal: No cyanosis. No clubbing. No obvious joint deformity. Lymphadenopathy: Deferred. Skin: Skin is warm and dry. Erythematous nodular rash on her face, no angiomas in the oral mucosa. Psychiatric: Normal mood and affect. Behavior is normal.  No anxiety. Neurologic: Alert, awake and oriented. PERRL. Speech fluent. No obvious neurologic deficit, detailed exam not performed        Results:  CBC:   Recent Labs     01/16/23  0615 01/17/23  0542   WBC 6.8 7.9   HGB 12.0 13.1   HCT 38.0 41.0   MCV 85.7 85.8    314     BMP:   Recent Labs     01/17/23  0542      K 4.5   CL 97*   CO2 32   BUN 16   CREATININE 0.7     LIVER PROFILE:   Recent Labs     01/17/23  0542   AST 20   ALT 18   BILITOT 0.6   ALKPHOS 104         Imaging:  I have reviewed radiology images personally. VL Extremity Venous Left         XR CHEST PORTABLE   Final Result   Enlarged cardiac silhouette with suggestion of mild to moderate pulmonary   vascular congestion. XR CHEST PORTABLE    Result Date: 1/14/2023  EXAMINATION: ONE XRAY VIEW OF THE CHEST 1/14/2023 7:20 pm COMPARISON: 10/13/2022. HISTORY: ORDERING SYSTEM PROVIDED HISTORY: pain or SOB TECHNOLOGIST PROVIDED HISTORY: Reason for exam:->pain or SOB Reason for Exam: sob FINDINGS: Evaluation of left lower lung field is limited due to overlying soft tissues. The cardiac silhouette is enlarged, appearing grossly similar to the prior study. There is suggestion of mild to moderate pulmonary vascular congestion. No dense airspace opacity, large pleural effusion or pneumothorax is seen.      Enlarged cardiac silhouette with suggestion of mild to moderate pulmonary vascular congestion. Assessment and plan:  Acute on chronic respiratory failure. Encouraged her to continue with oxygen at 2 LPM throughout the day, 3 LPM with sleep. Recommending outpatient 6-minute walk test  Acute heart failure, unspecified heart failure type ([address] CXR is rather nonspecific, BNP normal.  Echocardiogram showed normal LVEF and diastolic dysfunction in May 2022. Would consider CT chest.  Primary team felt she has HFpEF, had stopped spironolactone and was noncompliant with diet. Was placed on Lasix. Can continue her home diuretic regimen  Bronchial asthma. Diagnosis in the past, CT showed possible atelectasis versus air trapping. She says she did have wheezing frequently, has been on Flovent diskus and rescue albuterol. Continue present treatment  KRYSTIAN. Was followed by Dr. Shane Posada at [facility]. At that time she was on auto CPAP 6-20 cmH2O. Although her compliance was excellent, she had good control of sleep apnea. Her AHI dropped from 38/h to 6.6/h. CPAP pressure 90% of the time was 10.2 cm H2O. She was due to see Dr. Shane Posada in follow-up to assess her APAP. She does wishes to see Dr. Shane Posada at our practice, was made aware that he is with [facility] at present. Obesity hypoventilation. Compensated respiratory acidosis likely from obesity hypoventilation. Restrictive lung disease. Likely due to obesity. Pulmonary hypertension. Due to obesity hypoventilation and untreated KRYSTIAN  Cellulitis left leg. On Ancef. No evidence of DVT  Morbid obesity with body mass index (BMI) of 60.0 to 69.9 in adult [facility]). She is agreeable to seeing a nutritionist while she is here  Hypothyroidism, essential hypertension. Per IM  DVT prophylaxis. Lovenox. Discussed with patient, nursing. She can be discharged today, sent a message to our office to set up follow-up with Dr. Shane Posada, inprobably about a month. We will get a PFT and 6-minute walk test prior to her visit.            Electronically signed by:  Cecille Marin MD    1/18/2023    9:21 AM.

## 2023-01-19 ENCOUNTER — FOLLOWUP TELEPHONE ENCOUNTER (OUTPATIENT)
Dept: TELEMETRY | Age: 58
End: 2023-01-19

## 2023-01-19 NOTE — TELEPHONE ENCOUNTER
Per , we can schedule her. Needs to be made aware of no show policy. Pt scheduled on 3/2/23 for testing and fu visit. Pt also informed of no show policy and she verbalized understanding.

## 2023-01-19 NOTE — TELEPHONE ENCOUNTER
2nd Attempt; No Answer- Left HIPAA compliant voicemail with Non-Urgent Heart Failure Resource Line number for call back.      Rainelle Boast, RN

## 2023-01-19 NOTE — TELEPHONE ENCOUNTER
1st Attempt; No Answer- Left HIPAA compliant voicemail with Non-Urgent Heart Failure Resource Line number for call back.      Nita Sandoval RN

## 2023-01-19 NOTE — TELEPHONE ENCOUNTER
Care Transitions Initial Follow Up Call    Call within 2 business days of discharge: Yes     Patient: Froylan Singh Patient : 1965 MRN: [id number]    [unfilled]    RARS: Readmission Risk Score: 9.8       Spoke with: patient    Discharge department/facility: home with oxygen     Non-face-to-face services provided:  Scheduled appointment with PCP-22 with cardiology    Spoke to patient for hospital follow up. Pt asking questions about lasix. Questions answered. Pt states she has to buy a scale. Hers is broken. Denies any special needs.       Follow Up  Future Appointments   Date Time Provider Pedro Garrett   2023  1:45 PM Serene [facility] MD [facility]       Candi Sim RN

## 2023-01-20 NOTE — PROGRESS NOTES
[facility]  Advanced CHF/Pulmonary Hypertension   Cardiac Evaluation      Tamika Sutton  YOB: 1965    Date of Visit:  1/24/23      Chief Complaint   Patient presents with    Congestive Heart Failure    Follow-Up from Hospital            History of Present Illness:  Del Walker is a 56 y.o. female who presents for hospital follow up. She originally was referred from Dr Nicolas Ugarte for consultation and management of pulmonary HTN. She has a history of RV enlargement, CHF, palpitations, HTN and KRYSTIAN with CPAP. She had a stress test on 6/18/2020 which was abnormal. She underwent a right and left heart cath on 6/26/2020 which showed normal coronaries with elevated PCWP. Her echocardiogram from 6/26/2020 showed an EF of 55-60%. She reports she had pneumonia last year and has needed supplemental oxygen at night due to low O2 sats. She was a smoker in her teens. She has a history of palpitations and is on diltiazem for it. She reports she has palpitations almost daily but has improved with diltiazem. She started on lasix in July 2020. She reports improved breathing and edema on the lasix. She reports she has been watching her diet but her weight has not changed. She reports her home -150. She has been on lisinopril for some time. She is thinking of weight loss surgery. At her OV on 09/01/20 her lisinopril was increased 10 mg daily and started spironolactone 25 mg M,W,F,Sun.   OV, 5/10/2022, she states she hasn't been feeling great. She has been having weeping from her left leg. She has open blisters currently on her left shin. She states she has been working on trying to get it to heal but it continues to come back. She feels like only her left leg has been swollen. Patient has been taking her diuretics as prescribed. She states she tries to drink a lot of water during the day. She also has been having chest pain.   She feels it on the left side of her chest, described as a sharp stabbing pain. She gets the pain a few times a week for a few minutes at a time and then it goes away. She reports she occasionally notices associated pain in her arm/back. She notices the pain more with activity. She thinks the pain also could be due to indigestion. She has been taking iron supplements once a day and has tried to increase iron in her diet. She states she has lost about 20 pounds recently because she started a diet. Admitted 1/14/2023-1/18/2023 for SOB. Lasix changed to 40 mg twice daily. Laura Hammonds RN care transition reached out to patient on 1/19/2023. Patient to follow up from the hospital.    Today, 1/24/2023, she presents with a cane and on oxygen. She is now on the oxygen all day and night which is new for her. She was previously on oxygen with activity only. She says she was in the hospital twice since last visit. She received lasix in the hospital and says she lost 8 lbs. She feels much better now. She has sleep apnea and has a machine at home. She says she can not use the sleep machine. Patient is taking all cardiac medications as prescribed and tolerates them well. Patient denies current edema, chest pain, sob, palpitations, dizziness or syncope. Transition of care visit:  Admit date:  1/14/23  Discharge Date 1/18/23  Phone call made by Laura Hammonds. RN on 1/19/23    Allergies   Allergen Reactions    Bioflavonoids Anaphylaxis    Other      Nickel,citrus, pain meds?     Oxycodone-Acetaminophen Hives    Percocet [Oxycodone-Acetaminophen]      Can take VICODIN    Azithromycin Nausea And Vomiting and Rash    Nickel Hives, Other (See Comments) and Rash     Current Outpatient Medications   Medication Sig Dispense Refill    furosemide (LASIX) 40 MG tablet Take 1 tablet by mouth 2 times daily 90 tablet 3    Multiple Vitamins-Minerals (MULTI ADULT GUMMIES PO) Take 50 mg by mouth daily      ferrous sulfate (IRON 325) 325 (65 Fe) MG tablet Take 325 mg by mouth in the morning and at bedtime      nystatin (MYCOSTATIN) 749717 UNIT/GM powder Apply 10,000 Units topically daily      pimecrolimus (ELIDEL) 1 % cream Apply 1 application topically daily      Calcium Carb-Cholecalciferol (OYSTER SHELL CALCIUM W/D) 500-200 MG-UNIT TABS tablet       vitamin D3 (CHOLECALCIFEROL) 125 MCG (5000 UT) TABS tablet Take by mouth      lisinopril (PRINIVIL;ZESTRIL) 10 MG tablet TAKE ONE TABLET BY MOUTH DAILY 90 tablet 3    levothyroxine (SYNTHROID) 112 MCG tablet Take 112 mcg by mouth daily      ibuprofen (ADVIL;MOTRIN) 200 MG tablet Take 600 mg by mouth as needed for Pain      sertraline (ZOLOFT) 100 MG tablet Take 100 mg by mouth daily      lamoTRIgine (LAMICTAL) 200 MG tablet Take 200 mg by mouth 2 times daily      diltiazem (CARDIZEM CD) 120 MG extended release capsule Take 1 capsule by mouth 2 times daily 30 capsule 0    albuterol sulfate HFA (PROVENTIL HFA) 108 (90 Base) MCG/ACT inhaler Inhale 2 puffs into the lungs every 4 hours as needed for Wheezing or Shortness of Breath (Space out to every 6 hours as symptoms improve) Space out to every 6 hours as symptoms improve. 1 Inhaler 0    pantoprazole (PROTONIX) 40 MG tablet Take 40 mg by mouth daily. gabapentin (NEURONTIN) 600 MG tablet Take 600 mg by mouth 2 times daily. montelukast (SINGULAIR) 10 MG tablet Take 10 mg by mouth every morning       aspirin 81 MG EC tablet Take 81 mg by mouth daily. acetaminophen (TYLENOL) 500 MG tablet Take 500 mg by mouth every 6 hours as needed      ipratropium-albuterol (DUONEB) 0.5-2.5 (3) MG/3ML SOLN nebulizer solution Inhale 3 mLs into the lungs every 4 hours as needed for Shortness of Breath (Patient not taking: Reported on 1/24/2023) 360 mL 0    spironolactone (ALDACTONE) 25 MG tablet TAKE ONE TABLET BY MOUTH ON MONDAY, WEDNESDAY, FRIDAY AND SUNDAY (Patient not taking: No sig reported) 90 tablet 1     No current facility-administered medications for this visit.        Past Medical History:   Diagnosis Date    Anemia     Anxiety Depression     Fibromyalgia     GERD (gastroesophageal reflux disease)     Heart palpitations     Hiatal hernia     Hypertension     Irritable bowel syndrome     Mild intermittent asthma with exacerbation     Osteoarthritis     Panic attacks     Pneumonia     Pulmonary infiltrates 2019    Seasonal allergies     Seizure disorder ([facility] [address]     Sleep apnea     Spastic colon     Thyroid disease      Past Surgical History:   Procedure Laterality Date    BLADDER SURGERY      BRAIN SURGERY      BRONCHOSCOPY N/A 2019    BRONCHOSCOPY ALVEOLAR LAVAGE performed by Wesley [facility] MD at [address]  2019    BRONCHOSCOPY THERAPUTIC ASPIRATION INITIAL performed by [facility] MD at [facility] N/A 5/15/2019    BRONCHOSCOPY ALVEOLAR LAVAGE performed by Reina Matos MD at [facility] [facility]  5/15/2019    BRONCHOSCOPY THERAPUTIC ASPIRATION INITIAL performed by Reina Matos MD at [place] [id number]    hip    KNEE SURGERY      OTHER SURGICAL HISTORY  2013    cystoscopy, urethral dilatation    SHOULDER SURGERY      r rotator cuff repair    TOTAL HIP ARTHROPLASTY      Left    URETHRAL STRICTURE DILATATION       Family History   Problem Relation Age of Onset    Asthma Other     Asthma Other     Hypertension Father     Hypertension Sister     Cancer Neg Hx     Diabetes Neg Hx     Emphysema Neg Hx     Heart Failure Neg Hx      Social History     Socioeconomic History    Marital status: Single     Spouse name: Not on file    Number of children: Not on file    Years of education: Not on file    Highest education level: Not on file   Occupational History    Not on file   Tobacco Use    Smoking status: Former     Packs/day: 0.50     Years: 2.00     Pack years: 1.00     Types: Cigarettes     Quit date: 1986     Years since quittin.7    Smokeless tobacco: Never   Substance and Sexual Activity    Alcohol use: No    Drug use: No    Sexual activity: Not on file   Other Topics Concern    Not on file   Social History Narrative    Not on file     Social Determinants of Health     Financial Resource Strain: Not on file   Food Insecurity: Not on file   Transportation Needs: Not on file   Physical Activity: Not on file   Stress: Not on file   Social Connections: Not on file   Intimate Partner Violence: Not on file   Housing Stability: Not on file       Review of Systems:   Constitutional: there has been no unanticipated weight loss. There's been no change in energy level, sleep pattern, or activity level. Eyes: No visual changes or diplopia. No scleral icterus. ENT: No Headaches, hearing loss or vertigo. No mouth sores or sore throat. Cardiovascular: Reviewed in HPI  Respiratory: No cough or wheezing, no sputum production. No hematemesis. Gastrointestinal: No abdominal pain, appetite loss, blood in stools. No change in bowel or bladder habits. Genitourinary: No dysuria, trouble voiding, or hematuria. Musculoskeletal:  No gait disturbance, weakness or joint complaints. Integumentary: No rash or pruritis. Neurological: No headache, diplopia, change in muscle strength, numbness or tingling. No change in gait, balance, coordination, mood, affect, memory, mentation, behavior. Psychiatric: No anxiety, no depression. Endocrine: No malaise, fatigue or temperature intolerance. No excessive thirst, fluid intake, or urination. No tremor. Hematologic/Lymphatic: No abnormal bruising or bleeding, blood clots or swollen lymph nodes. Allergic/Immunologic: No nasal congestion or hives. Physical Examination:    Vitals:    01/24/23 1358   BP: 110/76   Pulse: 81   SpO2: 95%   Weight: (!) 314 lb (142.4 kg)   Height: 5' (1.524 m)       Body mass index is 61.32 kg/m².      Wt Readings from Last 3 Encounters:   01/24/23 (!) 314 lb (142.4 kg)   01/17/23 (!) 314 lb 8 oz (142.7 kg)   10/14/22 (!) 315 lb 6.4 oz (143.1 kg)     BP Readings from Last 3 Encounters:   01/24/23 110/76   01/18/23 129/70   10/15/22 137/77       Constitutional and General Appearance:   WD/WN in NAD, morbidly obese  HEENT:  NC/AT  WILNER  No problems with hearing  Skin:  Warm, dry  Respiratory:  Normal excursion and expansion without use of accessory muscles  Resp Auscultation: Normal breath sounds without dullness  Cardiovascular: The apical impulses not displaced  Heart tones are crisp and normal  Cervical veins are not engorged  The carotid upstroke is normal in amplitude and contour without delay or bruit  JVP less than 8 cm H2O  RRR with nl S1 and S2 without m,r,g  Peripheral pulses are symmetrical and full  There is no clubbing, cyanosis of the extremities. No edema  Femoral Arteries: 2+ and equal  Pedal Pulses: 2+ and equal   Neck:  No thyromegaly  Abdomen:  No masses or tenderness  Liver/Spleen: No Abnormalities Noted  Neurological/Psychiatric:  Alert and oriented in all spheres  Moves all extremities well  Exhibits normal gait balance and coordination  No abnormalities of mood, affect, memory, mentation, or behavior are noted    Echo: 06/26/20  Summary   Normal left ventricular systolic function with ejection fraction of 55-60%. No regional wall motion abnormalites are seen. Left ventricular diastolic filling pressure is normal.   Compared to previous study from 04- no changes noted in left   ventricular function. Echo 7/21/2022   Summary   Technically difficult examination. Normal left ventricle systolic function with an estimated ejection fraction   of 55%. No regional wall motion abnormalities are seen. Normal left ventricular diastolic filling pressure. Mild mitral, pulmonic and tricuspid regurgitation. Echo limited 1/16/2023   Limited only f/u for LVEF, RVF and right sided pressures. Normal left ventricular systolic function with ejection fraction of 55-60%. No regional wall motion abnormalites are seen.    Compared to previous study from 7- no changes noted. Right ventricular systolic function visually is mildly reduced . Moderate tricuspid regurgitation. Systolic pulmonic artery pressure (SPAP) is estimated at 56 mmHg consistent   with moderate to severe pulmonary hypertension (Right atrial pressure of 8   mmHg). Labs were reviewed including labs from other hospital systems through [facility]. Cardiac testing was reviewed including echos, nuclear scans, cardiac catheterization, including from other hospital systems through [facility]. Assessment:    1. Chronic diastolic heart failure ([address]    2. Pulmonary hypertension ([address]    3. Shortness of breath on exertion    4. Essential hypertension    5. Obstructive sleep apnea syndrome             Plan:  1. Increase spironolactone (aldactone) 25 mg to 4 days a week on Monday, Wednesday, Friday, Sunday  2. Labs in 1 month: BNP, BMP  3. Follow up with Fritz Wharton NP end of March 2023  4. She will continue to take lasix twice a day which is an increase from her baseline      This note was scribed in the presence of Eyal Thomas MD by Wilkins Goldberg, RN    The scribe's documentation has been prepared under my direction and personally reviewed by me in its entirety. I confirm that the note above accurately reflects all work, treatment, procedures, and medical decision making performed by me. Time Based Itemization  A total of 40 minutes was spent on today's patient encounter. If applicable, non-patient-facing activities:  ( x)Preparing to see the patient and reviewing records  ( ) Individual interpretation of results  ( ) Discussion or coordination of care with other health care professionals  ( x) Ordering of unique tests, medications, or procedures  ( x) Documentation within the EHR        I appreciate the opportunity of cooperating in the care of this patient.     Ann Posadas M.D., [facility]

## 2023-01-24 ENCOUNTER — OFFICE VISIT (OUTPATIENT)
Dept: CARDIOLOGY CLINIC | Age: 58
End: 2023-01-24

## 2023-01-24 VITALS
HEIGHT: 60 IN | OXYGEN SATURATION: 95 % | WEIGHT: 293 LBS | HEART RATE: 81 BPM | BODY MASS INDEX: 57.52 KG/M2 | DIASTOLIC BLOOD PRESSURE: 76 MMHG | SYSTOLIC BLOOD PRESSURE: 110 MMHG

## 2023-01-24 DIAGNOSIS — R06.02 SHORTNESS OF BREATH ON EXERTION: ICD-10-CM

## 2023-01-24 DIAGNOSIS — I10 ESSENTIAL HYPERTENSION: ICD-10-CM

## 2023-01-24 DIAGNOSIS — I50.32 CHRONIC DIASTOLIC HEART FAILURE (HCC): Primary | ICD-10-CM

## 2023-01-24 DIAGNOSIS — I27.20 PULMONARY HYPERTENSION (HCC): ICD-10-CM

## 2023-01-24 DIAGNOSIS — G47.33 OBSTRUCTIVE SLEEP APNEA SYNDROME: ICD-10-CM

## 2023-01-24 RX ORDER — SPIRONOLACTONE 25 MG/1
TABLET ORAL
Qty: 90 TABLET | Refills: 3 | Status: SHIPPED | OUTPATIENT
Start: 2023-01-24

## 2023-01-24 NOTE — PATIENT INSTRUCTIONS
Plan:  1. Increase spironolactone (aldactone) 25 mg to 4 days a week on Monday, Wednesday, Friday, Sunday  2. Labs in 1 month: BNP, BMP  3.  Follow up with Sandeep Wheeler NP end of March 2023

## 2023-02-02 ENCOUNTER — FOLLOWUP TELEPHONE ENCOUNTER (OUTPATIENT)
Dept: TELEMETRY | Age: 58
End: 2023-02-02

## 2023-03-01 DIAGNOSIS — R06.09 DOE (DYSPNEA ON EXERTION): Primary | ICD-10-CM

## 2023-03-01 DIAGNOSIS — R06.02 SHORTNESS OF BREATH ON EXERTION: ICD-10-CM

## 2023-03-02 ENCOUNTER — OFFICE VISIT (OUTPATIENT)
Dept: PULMONOLOGY | Age: 58
End: 2023-03-02
Payer: MEDICAID

## 2023-03-02 ENCOUNTER — HOSPITAL ENCOUNTER (OUTPATIENT)
Dept: PULMONOLOGY | Age: 58
Discharge: HOME | End: 2023-03-02
Payer: MEDICAID

## 2023-03-02 VITALS
HEART RATE: 79 BPM | RESPIRATION RATE: 20 BRPM | OXYGEN SATURATION: 96 % | SYSTOLIC BLOOD PRESSURE: 133 MMHG | HEIGHT: 60 IN | WEIGHT: 293 LBS | TEMPERATURE: 98.3 F | BODY MASS INDEX: 57.52 KG/M2 | DIASTOLIC BLOOD PRESSURE: 75 MMHG

## 2023-03-02 VITALS — RESPIRATION RATE: 18 BRPM | OXYGEN SATURATION: 92 %

## 2023-03-02 DIAGNOSIS — E66.1 CLASS 3 DRUG-INDUCED OBESITY WITHOUT SERIOUS COMORBIDITY WITH BODY MASS INDEX (BMI) OF 50.0 TO 59.9 IN ADULT (HCC): Primary | ICD-10-CM

## 2023-03-02 DIAGNOSIS — J43.9 MIXED RESTRICTIVE AND OBSTRUCTIVE LUNG DISEASE (HCC): ICD-10-CM

## 2023-03-02 DIAGNOSIS — J98.4 MIXED RESTRICTIVE AND OBSTRUCTIVE LUNG DISEASE (HCC): ICD-10-CM

## 2023-03-02 DIAGNOSIS — J45.40: ICD-10-CM

## 2023-03-02 DIAGNOSIS — R06.09 DOE (DYSPNEA ON EXERTION): ICD-10-CM

## 2023-03-02 DIAGNOSIS — R06.02 SHORTNESS OF BREATH ON EXERTION: ICD-10-CM

## 2023-03-02 DIAGNOSIS — I50.32 CHRONIC CONGESTIVE HEART FAILURE WITH LEFT VENTRICULAR DIASTOLIC DYSFUNCTION (HCC): ICD-10-CM

## 2023-03-02 DIAGNOSIS — I27.20 PULMONARY HTN (HCC): ICD-10-CM

## 2023-03-02 DIAGNOSIS — J44.9: ICD-10-CM

## 2023-03-02 LAB — FENO: 7 PPB

## 2023-03-02 PROCEDURE — 95012 NITRIC OXIDE EXP GAS DETER: CPT | Performed by: INTERNAL MEDICINE

## 2023-03-02 PROCEDURE — 94618 PULMONARY STRESS TESTING: CPT

## 2023-03-02 PROCEDURE — G8484 FLU IMMUNIZE NO ADMIN: HCPCS | Performed by: INTERNAL MEDICINE

## 2023-03-02 PROCEDURE — 3075F SYST BP GE 130 - 139MM HG: CPT | Performed by: INTERNAL MEDICINE

## 2023-03-02 PROCEDURE — G8417 CALC BMI ABV UP PARAM F/U: HCPCS | Performed by: INTERNAL MEDICINE

## 2023-03-02 PROCEDURE — 3017F COLORECTAL CA SCREEN DOC REV: CPT | Performed by: INTERNAL MEDICINE

## 2023-03-02 PROCEDURE — 99204 OFFICE O/P NEW MOD 45 MIN: CPT | Performed by: INTERNAL MEDICINE

## 2023-03-02 PROCEDURE — 94010 BREATHING CAPACITY TEST: CPT

## 2023-03-02 PROCEDURE — 1036F TOBACCO NON-USER: CPT | Performed by: INTERNAL MEDICINE

## 2023-03-02 PROCEDURE — 94060 EVALUATION OF WHEEZING: CPT

## 2023-03-02 PROCEDURE — 3023F SPIROM DOC REV: CPT | Performed by: INTERNAL MEDICINE

## 2023-03-02 PROCEDURE — G8427 DOCREV CUR MEDS BY ELIG CLIN: HCPCS | Performed by: INTERNAL MEDICINE

## 2023-03-02 PROCEDURE — 94729 DIFFUSING CAPACITY: CPT

## 2023-03-02 PROCEDURE — 94726 PLETHYSMOGRAPHY LUNG VOLUMES: CPT

## 2023-03-02 PROCEDURE — 3078F DIAST BP <80 MM HG: CPT | Performed by: INTERNAL MEDICINE

## 2023-03-02 RX ORDER — ASCORBIC ACID 500 MG
500 TABLET ORAL DAILY
COMMUNITY

## 2023-03-02 ASSESSMENT — ENCOUNTER SYMPTOMS
ALLERGIC/IMMUNOLOGIC NEGATIVE: 1
RESPIRATORY NEGATIVE: 1
GASTROINTESTINAL NEGATIVE: 1
EYES NEGATIVE: 1

## 2023-03-02 ASSESSMENT — PULMONARY FUNCTION TESTS: FENO: 7

## 2023-03-02 NOTE — PROGRESS NOTES
MA Communication:   The following orders are received by verbal communication from Max Otero MD    Orders include:  Referral for Dr. Swan Mates given to pt       ONPO order sent to [facility] via [place]       8-10 wk fu scheduled 5/7/13

## 2023-03-02 NOTE — PATIENT INSTRUCTIONS
ASSESSMENT/PLAN:   Diagnosis Orders   1. Asthma with irreversible airway obstruction, moderate persistent, uncomplicated ([facility]        2. Chronic congestive heart failure with left ventricular diastolic dysfunction (HCC)        3. Mixed restrictive and obstructive lung disease (HCC)        4. Class 3 drug-induced obesity without serious comorbidity with body mass index (BMI) of 50.0 to 59.9 in adult ([facility]        5. Pulmonary HTN ([facility]                Restrictive lung disease  Previous PFT from April 2020 2019 showed a severe restrictive lung defect      PFT done 3/2/23  Shows severe restrictive lung  TLC of 27%, severe restrictive lung defect with a normal DLCO and normal spirometry  FVC of 51%    6-minute walk test was done showing a desaturation and requirement of 2 L of oxygen                Wearing 2-3 lpm      We will get nocturnal pulse ox on 2 L of oxygen if there is a desaturation would need to consider noninvasive therapy  Patient already has an elevated PCO2  Will probably need NIV  Will plan for Bipap STA    Dme is [facility]      Asthma????  No eosinophils    Continue with  Albuterol MDI-as needed  Albuterol Nebulizer-we will use this as needed, this has been the most effective for the patient  Arnuity 100 mcg 1 puff a day-we will stop this      3/2/23  FENO  was 7  I do not think the patient has asthma        Diastolic dysfunction  Last echo done 1/14/2023      Summary   Limited only f/u for LVEF, RVF and right sided pressures. Normal left ventricular systolic function with ejection fraction of 55-60%. No regional wall motion abnormalites are seen. Compared to previous study from 7- no changes noted. Right ventricular systolic function visually is mildly reduced . Moderate tricuspid regurgitation. Systolic pulmonic artery pressure (SPAP) is estimated at 56 mmHg consistent   with moderate to severe pulmonary hypertension (Right atrial pressure of 8   mmHg).   Followed by Dr. Maru Escamilla at the pulm hypertension clinic  Last seen on 1/24/2023      Pulmonary hypertension-most consistent with diastolic  CTPA done 37/08/9827  Impression   1. No pulmonary embolus. 2. Low lung volumes with basilar atelectasis. 3. Borderline cardiomegaly. Ultrasound Dopplers of the left lower and right lower legs done 1/18/2023   Summary        There is no evidence of deep or superficial venous thrombosis involving the    left lower extremity or the right common femoral vein. Morbid obesity  Weight was 310 then to 314 then to 300 now at 305    Will send to weight loss clinic  Needs to have drastic weight loss      RTC in 8-10 weeks. Remember to bring a list of pulmonary medications and any CPAP or BiPAP machines to your next appointment with the office. Please keep all of your future appointments scheduled by [facility]. Out of respect for other patients and providers, you may be asked to reschedule your appointment if you arrive later than your scheduled appointment time. Appointments cancelled less than 24hrs in advance will be considered a no show. Patients with three missed appointments within 1 year or four missed appointments within 2 years can be dismissed from the practice. Please be aware that our physicians are required to work in the Intensive Care Unit at [facility].  Your appointment may need to be rescheduled if they are designated to work during your appointment time. You may receive a survey regarding the care you received during your visit. Your input is valuable to us. We encourage you to complete and return your survey. We hope you will choose us in the future for your healthcare needs. Pt instructed of all future appointment dates & times, including radiology, labs, procedures & referrals. If procedures were scheduled preparation instructions provided.  Instructions on future appointments with [facility] were given.

## 2023-03-02 NOTE — PROGRESS NOTES
Reyes Ruffing (: 1965 ) is a 56 y.o. female here for an evaluation of   Chief Complaint   Patient presents with    Follow-Up from Hospital    Results     PFT/6MW         ASSESSMENT/PLAN:   Diagnosis Orders   1. Asthma with irreversible airway obstruction, moderate persistent, uncomplicated ([facility]        2. Chronic congestive heart failure with left ventricular diastolic dysfunction (HCC)        3. Mixed restrictive and obstructive lung disease (HCC)        4. Class 3 drug-induced obesity without serious comorbidity with body mass index (BMI) of 50.0 to 59.9 in adult ([facility]        5. Pulmonary HTN ([facility]                Restrictive lung disease  Previous PFT from  showed a severe restrictive lung defect      PFT done 3/2/23  Shows severe restrictive lung  TLC of 27%, severe restrictive lung defect with a normal DLCO and normal spirometry  FVC of 51%    6-minute walk test was done showing a desaturation and requirement of 2 L of oxygen                Wearing 2-3 lpm      We will get nocturnal pulse ox on 2 L of oxygen if there is a desaturation would need to consider noninvasive therapy  Patient already has an elevated PCO2  Will probably need NIV  Will plan for Bipap STA    Dme is [facility]      Asthma????  No eosinophils    Continue with  Albuterol MDI-as needed  Albuterol Nebulizer-we will use this as needed, this has been the most effective for the patient  Arnuity 100 mcg 1 puff a day-we will stop this      3/2/23  FENO  was 7  I do not think the patient has asthma        Diastolic dysfunction  Last echo done 2023      Summary   Limited only f/u for LVEF, RVF and right sided pressures. Normal left ventricular systolic function with ejection fraction of 55-60%. No regional wall motion abnormalites are seen. Compared to previous study from 2022 no changes noted. Right ventricular systolic function visually is mildly reduced . Moderate tricuspid regurgitation.    Systolic pulmonic artery pressure (SPAP) is estimated at 56 mmHg consistent   with moderate to severe pulmonary hypertension (Right atrial pressure of 8   mmHg). Followed by Dr. Shruthi Celestin at the pul hypertension clinic  Last seen on 1/24/2023      Pulmonary hypertension-most consistent with diastolic  CTPA done 04/86/2236  Impression   1. No pulmonary embolus. 2. Low lung volumes with basilar atelectasis. 3. Borderline cardiomegaly. Ultrasound Dopplers of the left lower and right lower legs done 1/18/2023   Summary        There is no evidence of deep or superficial venous thrombosis involving the    left lower extremity or the right common femoral vein. Morbid obesity  Weight was 310 then to 314 then to 300 now at 305    Will send to weight loss clinic  Needs to have drastic weight loss      RTC in 8-10 weeks. No follow-ups on file. I have personally reviewed and summarized the old records    I have independently reviewed the images and reviewed with patient    I have reviewed the lab tests, radiology/sleep reports and medications      Reviewed present meds and side effects. Continue present meds. Stay compliant. Call if worsens. Reviewed proper inhaler usage        The patient reports benefit from the above therapies, will continue with current therapeutic approach.                          [address] [facility]  [address]  [address] [address]  Dept: [phone]  Loc: [phone]     Diagnosis:  [] KRYSTIAN (ICD-10: G47.33)  o CSA (ICD-10: G47.31)  [] Complex Sleep Apnea (ICD-10: G47.37)  []  (ICD-10: G47.37)  [] Hypoxemia (ICD-10: R09.02)  [] COPD (J44.90)  [] Chronic Respiratory Failure with hypoxemia (J96.11)  [] Chronicr Respiratory Failure with hypercapnia (J96.12)  [x] Restrictive Lung Disease (J98.4)      [] New Rx (Device Preference: _________________________)     [] Change Order [] Replace ___________  [] Clinical assessments and may include IN-Check device, spirometry and ETCO2 PRN    [] Discontinue Order -  [] CPAP    [] BPAP    [] PAP    [] Oxygen   /   AMA?  [] Yes   [] No              Therapy    AHI: ________ LANDON: ________  LOW SpO2: ________%      DME:[facility]    DEVICE / SETTINGS RAMP / COMFORT INTERFACE   []  CPAP () Pressure    Ramp: _________ min's  [] Ramp to patient preference General PAP Supply Kit  Medicaid does not cover heated tubing  ([employer])  PAP Tubing:  [] Heated ()- 1/3 mos                         [] Regular () - 1/3 mos  [] Disposable Water Chamber () - 1/6 mos  [] Disposable Filter () - 2/mo  [] Non-Disposable Filter () - 1/6 mos   []  BiLevel PAP ()           IPAP        []  BiLevel PAP with   ()       Backup Rate ()      EPAP Rate  [] Adjust FLEX to patient comfort       SUPPLEMENTAL OXYGEN  [] OXYGEN:      Liter/min: _________  [] Continuous        [] Nocturnal  [] Bleed into PAP Device      []  EchoStar  Min Press                   Max Press   Mask Interface Kit    [] Mask interface () - 1/3 mos  [] Nasal Cushion () - 2/mo  [] Nasal Pillows ()  -2/mo  [] Headgear () - 1/6 mos   []  AutoBiLevel () Pressure  ()      Support           []  ResMed® IVAPS EPAP  [] Overnight Oximetry on Room Air  [] Overnight Oximetry on PAP Therapy  [x] Overnight Oximetry on __2_ LPM of oxygen  []  Overnight Oximetry on  PAP therapy with _____ lpm of O2    Target Pt Rate  Min PS      Target Va  Patient Ht  Ti Max                Ti Min        Rise time  Max PS  Trigger  Cycle  Epap  Epap max  Epap min  The patient has a history of:  [] Excessive Daytime Sleepiness  [] Insomnia  [] Impaired Cognition  [] Ischemic Heart Disease  [] Hypertension  [] Mood Disorders          [] History of Stroke  Additional Orders:______________________________________________________________________________________________________________    [] Titrate to comfort  [] Add cloud access via [employer] [facility]) or [facility]  Full Face Mask Kit    [] Full face mask () - 1/3 mos  [] Full Face Cushion () - 1/mo  [] Headgear () - 1/6 mos                                           [] Respironics® ASV Advanced ()     EPAP Min  PS Min      EPAP Max  PS Max   Additional Supplies    [] Chin Strap ()  [] Heated Humidifier Kit ()  Mask Specifications:   [] Patient Preference      -or-            Brand:______________ Size:_______________   Type: __________________________________   [] Mask Refit:___________________________  [] Mask Fitting:  [] Full     [] Nasal                []  Pillows                                Max Press  Ramp Time      Rate  Bi-flex: []1  []2  []3     [] Respironics® AVAPS: ()     IPAP Min  IPAP Max  Pressure Max  Epap max  Epap min  Rise time                      Avaps rate  EPAP   Additional Services    [] Annual PRN service and check of equipment  [] Routine service and check of equipment  [] Download and report compliance data   Tidal volume      Tigger                                     Rate                                         Inspiratory time                                                         The following equipment is Medically Necessary for the above stated patient. It is reasonable and necessary in reference to acceptable standards of medical practice for this condition, and is not prescribed as a convenience. Frequency of Use:    Daily                 Length of Need: 13 Months              o The patient requires BiLevel PAP and the following apply: []  The patient requires a Respiratory Assist Device (RAD) and the following apply:   o CPAP was tried and failed to meet therapeutic goals.  [] CPAP was tried, but failed to meet therapeutic goals   o The prescribed mask interface has been properly fit, is the most comfortable to the patient and will be used with the BPAP device. [] The prescribed mask interface has been properly fit, is the most comfortable to the patient and will be used with the RAD.   o Current CPAP setting prevents patient from tolerating the therapy and lower CPAP settings fail to adequately control the symptoms of KRYSTIAN, improve sleep quality, or reduce AHI to acceptable levels. [] Current CPAP setting prevent patient from tolerating the therapy and lower PAP settings fail to  adequately control KRYSTIAN symptoms, improve sleep quality, or reduce AHI to acceptable levels. [] There is significant improvement of the respiratory events on the RAD                                                                                                                                                                                                                                  Keisha Tubbs MD               NPI- [id number]     Javan Fuentes [phone]                    03/02/23       ____________________________                        _______________________           Physician Signature                                                         Date                                                   SUBJECTIVE/OBJECTIVE:    Transfer of care from [address] [place] [address] to [address] [place] [address]  This is a patient that I did see in 2021 at [address] [place] [address]  Patient normally follows with Dr. Ramin Aldridge at [address] [place] [address] for her pulmonary disease  Has been seen by Dr. Oscar Omalley, Dr. Diana Marshall and Dr. Ade Delgado during previous hospitalizations at [facility] and [facility]  Patient was last hospitalized in January 2023      Brief HPI: Collin Jay is a 56 y.o. female who presented with complaint of shortness of breath mostly with exertion, also had acute on chronic hypoxemic respiratory failure. Baseline history of obesity and obesity related hypoventilatory syndrome, diastolic CHF.   Admitted for acute on chronic diastolic CHF exacerbation, started on IV Lasix. Brief Problem Based Course:   --Acute on chronic diastolic CHF exacerbation: Adequately diuresed, currently euvolemic. Will go home on p.o. Lasix 40 mg twice daily. Patient to follow-up closely with PCP soon after discharge for reevaluation and med adjustment as indicated. 2D echo showed preserved LVEF, also noted pulmonary hypertension. --Acute on chronic hypoxic respiratory failure: Secondary to above. required up to 5 L of NC O2 on admission, now back to baseline requirement of 2 L at rest and 3 L on exertion. --Asthma: stable w/o any acute s/s of exacerbation. Placed on prn duonebs     --? Bilateral extremity cellulitis: Findings consistent with chronic stasis dermatitis. Antibiotic discontinued     --Hypothyroidism: clinically euthyroid. Cont current outpt dose of levothyroxine.     --HTN, controlled, cont home meds as ordered           Since hospitalization  Seen Dr. Nita Oliva  And then has been on oxygen  Has been oxygen for years. Review of Systems   Constitutional: Negative. HENT: Negative. Eyes: Negative. Respiratory: Negative. Cardiovascular: Negative. Gastrointestinal: Negative. Endocrine: Negative. Genitourinary: Negative. Musculoskeletal: Negative. Skin: Negative. Allergic/Immunologic: Negative. Neurological: Negative. Hematological: Negative. Psychiatric/Behavioral: Negative. Vitals:    03/02/23 0912   BP: 133/75   Site: Left Lower Arm   Position: Sitting   Cuff Size: Medium Adult   Pulse: 79   Resp: 20   Temp: 98.3 °F (36.8 °C)   TempSrc: Temporal   SpO2: 96%   Weight: (!) 305 lb (138.3 kg)   Height: 5' (1.524 m)        Physical Exam  Vitals and nursing note reviewed. Constitutional:       General: She is not in acute distress. Appearance: Normal appearance. She is obese. She is not ill-appearing. HENT:      Head: Normocephalic and atraumatic.       Right Ear: External ear normal.      Left Ear: External ear normal.      Nose: Nose normal.      Mouth/Throat:      Mouth: Mucous membranes are moist.      Pharynx: Oropharynx is clear. Comments: Mallampati 3  Eyes:      General: No scleral icterus. Extraocular Movements: Extraocular movements intact. Conjunctiva/sclera: Conjunctivae normal.      Pupils: Pupils are equal, round, and reactive to light. Cardiovascular:      Rate and Rhythm: Normal rate and regular rhythm. Pulses: Normal pulses. Heart sounds: Normal heart sounds. No murmur heard. No friction rub. Pulmonary:      Effort: No respiratory distress. Breath sounds: No wheezing or rales. Abdominal:      General: Abdomen is flat. Bowel sounds are normal. There is no distension. Tenderness: There is no abdominal tenderness. There is no guarding. Musculoskeletal:         General: No swelling or tenderness. Normal range of motion. Cervical back: Normal range of motion and neck supple. No rigidity. Right lower leg: Edema present. Left lower leg: Edema present. Skin:     General: Skin is warm and dry. Coloration: Skin is not jaundiced. Neurological:      General: No focal deficit present. Mental Status: She is alert and oriented to person, place, and time. Mental status is at baseline. Cranial Nerves: No cranial nerve deficit. Sensory: No sensory deficit. Motor: No weakness. Gait: Gait normal.   Psychiatric:         Mood and Affect: Mood normal.         Thought Content:  Thought content normal.         Judgment: Judgment normal.            Miguel Omer MD

## 2023-03-02 NOTE — LETTER
March 2, 2023       Dawson Bowen YOB: 1965   [address]  [place] [address] Date of Visit:  3/2/2023       3/2/23        Toribio Sutton      I have seen this patient in the office today and wanted to communicate my findings and recommendations. Patient Instructions       ASSESSMENT/PLAN:   Diagnosis Orders   1. Asthma with irreversible airway obstruction, moderate persistent, uncomplicated ([address]        2. Chronic congestive heart failure with left ventricular diastolic dysfunction (HCC)        3. Mixed restrictive and obstructive lung disease (HCC)        4. Class 3 drug-induced obesity without serious comorbidity with body mass index (BMI) of 50.0 to 59.9 in adult ([address]        5. Pulmonary HTN ([address]                Restrictive lung disease  Previous PFT from April 2020 2019 showed a severe restrictive lung defect      PFT done 3/2/23  Shows severe restrictive lung  TLC of 27%, severe restrictive lung defect with a normal DLCO and normal spirometry  FVC of 51%    6-minute walk test was done showing a desaturation and requirement of 2 L of oxygen                Wearing 2-3 lpm      We will get nocturnal pulse ox on 2 L of oxygen if there is a desaturation would need to consider noninvasive therapy  Patient already has an elevated PCO2  Will probably need NIV  Will plan for Bipap STA    Dme is [facility]      Asthma????  No eosinophils    Continue with  Albuterol MDI-as needed  Albuterol Nebulizer-we will use this as needed, this has been the most effective for the patient  Arnuity 100 mcg 1 puff a day-we will stop this      3/2/23  FENO  was 7  I do not think the patient has asthma        Diastolic dysfunction  Last echo done 1/14/2023      Summary   Limited only f/u for LVEF, RVF and right sided pressures. Normal left ventricular systolic function with ejection fraction of 55-60%. No regional wall motion abnormalites are seen.    Compared to previous study from 7- no changes noted.   Right ventricular systolic function visually is mildly reduced . Moderate tricuspid regurgitation. Systolic pulmonic artery pressure (SPAP) is estimated at 56 mmHg consistent   with moderate to severe pulmonary hypertension (Right atrial pressure of 8   mmHg). Followed by Dr. Bora Burkett at the pul hypertension clinic  Last seen on 1/24/2023      Pulmonary hypertension-most consistent with diastolic  CTPA done 13/82/7258  Impression   1. No pulmonary embolus. 2. Low lung volumes with basilar atelectasis. 3. Borderline cardiomegaly. Ultrasound Dopplers of the left lower and right lower legs done 1/18/2023   Summary        There is no evidence of deep or superficial venous thrombosis involving the    left lower extremity or the right common femoral vein. Morbid obesity  Weight was 310 then to 314 then to 300 now at 305    Will send to weight loss clinic  Needs to have drastic weight loss      RTC in 8-10 weeks.                    Thank you for allowing me to assist in the care of the MD Keisha Ryan MD

## 2023-03-02 NOTE — PROCEDURES
[facility] Pulmonary Function Lab - Six Minute Walk      Test Performed on:   Room Air____X__   Oxygen at __1-2___ lpm via N/C- continuous Oxygen at _____ lpm via N/C- OCD  Assist Device Used During Test:  None______ Cane___X_____ Walker_________    Modified Piter's Scale  0 Nothing at all 5 Strong    0.5 Just noticeable 6 Stronger (Hard)    1 Very Light 7 Very Strong   2 Light 8 Very Very Strong   3 Moderate 9 Extremely Strong   4 Somewhat Strong 10 Maximum All out      Time SpO2 Heart Rate Respiratory Rate Dyspnea-  Modified Piters Scale Fatigue- Modified Piters Scale Other Symptoms   Baseline                     92% room air @rest 74 18 3 3      1 minute                     84%  20 4 4 Pt stopped, placed on 1L NC O2, increased in 1L increments to keep sat 88% and above. 2 minutes                     91% 1L 87 21 4 4      3 minutes                     87% 2L 108 22 5 5    4 minutes                     88% 2L 110 23 5 5    5 minutes                     89% 2L 110 24 6 6 Pt wanted to stop walk due to hip pain. 6 minutes                     %        Recovery x 1 minute                     97% 2L 92 22 5 5    Recovery x 2 Minute                     %         Number of Laps_____5__ X 120 feet + _________ additional feet = Total Distance __600_____feet   Stopped or paused before 6 minutes? No_______ Yes _x_______  Total expected 6 MW distance is _______feet. Patient achieved ______% of expected distance. Pre Blood Pressure: 143/72  Post Blood Pressure:   Other symptoms at the end of exercise: sob, hip pain

## 2023-03-03 NOTE — PROCEDURES
[address] [address]                               PULMONARY FUNCTION    PATIENT NAME: Felipe Cooper                     :        1965  MED REC NO:   [id number]                          ROOM:  ACCOUNT NO:   [de-identified]                           ADMIT DATE: 2023  PROVIDER:     Emmanuelle Oconnor MD    DATE OF PROCEDURE:  2023    Spirometry showed FVC 1.47 L, 51% predicted, FEV1 1.21 L, 52% predicted  with FEV1/FVC ratio of 83%. No postbronchodilator spirometry was done. TLC 27% predicted. ERV 12% predicted. Diffusion capacity 76%  predicted. IMPRESSION:  PFT shows a severe restrictive defect with a mild reduction  in diffusion. This could represent a process such as interstitial lung  disease. Some component of the restriction could be due to obesity  since ERV is severely diminished. Clinical correlation is recommended.         Jose Juarez MD    D: 2023 18:02:15       T: 2023 23:31:14     [email or web address]  Job#: [id number]     Doc#: [id number]    CC:  Jacqlyn Bence, MD Joyice Merritt, MD

## 2023-03-03 NOTE — PROCEDURES
[address] [address]                               PULMONARY FUNCTION    PATIENT NAME: Christiano LIRA                     :        1965  MED REC NO:   [id number]                          ROOM:  ACCOUNT NO:   [de-identified]                           ADMIT DATE: 2023  PROVIDER:     Katharine Ribeiro MD    DATE OF PROCEDURE:  2023    SIX-MINUTE WALK TEST    A six-minute walk test was performed utilizing standard criteria. Baseline oxygen saturation was 92%, Piter scales were 3 each. At one  minute, the patient desaturated to 84%, she was placed on supplemental  oxygen at 1 LPM.  It was increased by increment of 1 LPM to keep SaO2 >  88%. She walked up to 5 minutes on 2 LPM with SaO2 at 88% or slightly  higher, stopped her walk due to hip pain. Total distance walked was 600  feet. Piter scales peaked at 6 each. There was mild increase in heart  rate and respiratory rate. IMPRESSION:  Incomplete six-minute walk test, but reached the criteria  for supplemental oxygen with ambulation at 2 LPM at least.  Clinical  correlation is recommended.         Andrés Hernandez MD    D: 2023 18:02:15       T: 2023 0:01:39     [email or web address]  Job#: [id number]     Doc#: [id number]    CC:  MD Nemo Vivar MD

## 2023-03-23 ENCOUNTER — TELEPHONE (OUTPATIENT)
Dept: BARIATRICS/WEIGHT MGMT | Age: 58
End: 2023-03-23

## 2023-03-23 NOTE — TELEPHONE ENCOUNTER
Patient was sent Dr. [facility] digital bariatric seminar. Patient DOES have BWLS coverage with [facility] Bariatric Benefit form scanned in media.     *Spoke with patient, Info given  Per pt: no hx of wls, bmi > 35  Pk mailed

## 2023-03-27 DIAGNOSIS — I50.9 ACUTE HEART FAILURE, UNSPECIFIED HEART FAILURE TYPE (HCC): Primary | ICD-10-CM

## 2023-03-27 DIAGNOSIS — I27.20 PULMONARY HYPERTENSION (HCC): ICD-10-CM

## 2023-03-27 RX ORDER — FUROSEMIDE 40 MG/1
40 TABLET ORAL 2 TIMES DAILY
Qty: 180 TABLET | Refills: 3 | Status: SHIPPED | OUTPATIENT
Start: 2023-03-27 | End: 2023-04-26

## 2023-03-27 NOTE — TELEPHONE ENCOUNTER
Pt is requesting refill of Lasix 40mg. Preferred pharmacy is [facility] in [place] [address]. Last ov 01/24/2023 desmond. Upcoming ov 04/27/2023 nprb.

## 2023-05-09 ENCOUNTER — TELEPHONE (OUTPATIENT)
Dept: BARIATRICS/WEIGHT MGMT | Age: 58
End: 2023-05-09

## 2023-05-11 ENCOUNTER — TELEPHONE (OUTPATIENT)
Dept: PULMONOLOGY | Age: 58
End: 2023-05-11

## 2023-05-11 ENCOUNTER — OFFICE VISIT (OUTPATIENT)
Dept: BARIATRICS/WEIGHT MGMT | Age: 58
End: 2023-05-11
Payer: MEDICAID

## 2023-05-11 VITALS
HEART RATE: 82 BPM | WEIGHT: 293 LBS | SYSTOLIC BLOOD PRESSURE: 130 MMHG | DIASTOLIC BLOOD PRESSURE: 72 MMHG | OXYGEN SATURATION: 96 % | BODY MASS INDEX: 57.52 KG/M2 | HEIGHT: 60 IN | RESPIRATION RATE: 16 BRPM

## 2023-05-11 DIAGNOSIS — K43.9 VENTRAL HERNIA WITHOUT OBSTRUCTION OR GANGRENE: ICD-10-CM

## 2023-05-11 DIAGNOSIS — I10 ESSENTIAL HYPERTENSION: ICD-10-CM

## 2023-05-11 DIAGNOSIS — Z01.818 PREOPERATIVE CLEARANCE: ICD-10-CM

## 2023-05-11 DIAGNOSIS — J98.4 RESTRICTIVE LUNG DISEASE: ICD-10-CM

## 2023-05-11 DIAGNOSIS — E66.2 OBESITY HYPOVENTILATION SYNDROME (HCC): ICD-10-CM

## 2023-05-11 DIAGNOSIS — K21.9 CHRONIC GERD: ICD-10-CM

## 2023-05-11 DIAGNOSIS — E66.01 MORBID OBESITY WITH BMI OF 60.0-69.9, ADULT (HCC): Primary | ICD-10-CM

## 2023-05-11 DIAGNOSIS — G47.33 OBSTRUCTIVE SLEEP APNEA: ICD-10-CM

## 2023-05-11 PROCEDURE — 3075F SYST BP GE 130 - 139MM HG: CPT | Performed by: SURGERY

## 2023-05-11 PROCEDURE — 99205 OFFICE O/P NEW HI 60 MIN: CPT | Performed by: SURGERY

## 2023-05-11 PROCEDURE — G8427 DOCREV CUR MEDS BY ELIG CLIN: HCPCS | Performed by: SURGERY

## 2023-05-11 PROCEDURE — 1036F TOBACCO NON-USER: CPT | Performed by: SURGERY

## 2023-05-11 PROCEDURE — G8417 CALC BMI ABV UP PARAM F/U: HCPCS | Performed by: SURGERY

## 2023-05-11 PROCEDURE — 3078F DIAST BP <80 MM HG: CPT | Performed by: SURGERY

## 2023-05-11 PROCEDURE — 3017F COLORECTAL CA SCREEN DOC REV: CPT | Performed by: SURGERY

## 2023-05-11 NOTE — TELEPHONE ENCOUNTER
NPT referral by Catie,Preoperative clearance  Morbid obesity with BMI of 60.0-69.9, adult  Obesity hypoventilation syndrome   Restrictive lung disease   Obstructive sleep apnea   Essential hypertension   Chronic GERD    Pt seen by Dr. Gates All 3/2/23. Please advise on scheduling.

## 2023-05-11 NOTE — PATIENT INSTRUCTIONS
Patient received dietary handouts and education.        -Plan for Laparoscopic sleeve gastrectomy      Pre-operative work up Ordered:    - F/U in 4 weeks. - Nutrition Labs. - Protein Shake Trial.  - Psych Evaluation.   - Cardiac Clearance. - Pulmonary Clearance. - EGD (endoscopy to check your stomach). - Support Group Attendance. - Obtain letter of medical necessity (PCP Letter). - Quit Smoking,  Alcohol, Caffeine and Carbonated Drinks  - Obtain records for Weight History 2 yrs. - Start Regular Exercise and track your activities. - Start Tracking your food Intake and follow dietary guidelines. - Avoid Pregnancy for 2 yrs from date of surgery. (for female patients in childbearing age)  - Referral to [address] [place] [address].         Patient advised that its their responsibility to follow up for studies, referrals and/or labs ordered today.

## 2023-05-11 NOTE — PROGRESS NOTES
[facility]   [facility] [employer]  Marly Rodriguez MD, [facility], [address], [address] [place] [address]    [facility] [address] . Phone: [phone]  Fax: [phone]       Chief Complaint   Patient presents with    Bariatric, Initial Visit     NP Dread Ocampo           HPI:    Alice Silva is a very pleasant 56 y.o. obese female ,   Body mass index is 62.69 kg/m². And multiple medical problems who is presenting for weight loss surgery evaluation and consultation by Dr. Doc Baumgarten. Patient has been struggling for several years now with obesity. Patient feels the weight is an obstacle to achieve and perform things in daily living as well risk on health. Tries to diet, and exercise but can't keep the weight off. Patient tried  [employer], Luis Isabella, LF, Grapefruit, Nutrition Counseling and Jeremiah Restriction. Patient has not participated in meal replacement/liquid diets. Patient has not participated in weight loss medications and other regimens, but with no sustainable weight loss. Patient  is very determined to lose weight and be healthy, and is interested in surgical weight loss for future weight loss. .    Otherwise patient denies any nausea, vomiting, fevers, chills, shortness of breath, chest pain, constipation or urinary symptoms.         Obesity related problems [facility] is dealing with:  Patient Active Problem List    Diagnosis Date Noted    Acute on chronic respiratory failure with hypoxia and hypercapnia ([address] 01/16/2023     Priority: Medium    Obesity hypoventilation syndrome ([address] 01/16/2023     Priority: Medium    Persistent asthma without complication 96/20/7772     Priority: Medium    Pulmonary hypertension ([address] 01/16/2023     Priority: Medium    OH (dyspnea on exertion) 01/16/2023     Priority: Medium    Pulmonary venous congestion 01/15/2023     Priority: Medium    Exercise hypoxemia 01/15/2023     Priority: Medium    Restrictive lung disease 01/15/2023     Priority:
proceed. Goals  Weight: 150 lb  Health Improvement: breathing / get rid of O2 / leg pain / KRYSTIAN     Assessment  Nutritional Needs: RMR=(9.99 x 146) + (6.25 x 152) - (4.92 x 57 y.o.) -161 = 1977 kcal x 1.3 (sedentary activity factor)= 2570 kcal - 1000 (for 2 lb weight loss/week)= 1570 kcal.    Plan  Plan/Recommendations: Start presurgical guidelines. Goals:   -Eat 4-5 times daily  -Avoid high fat and high sugar foods  -Include protein with all meals and snacks  -Avoid carbonation and caffeine  -Avoid calorie containing beverages  -Increase physical activity as tolerated    PES Statement:  Overweight/Obesity related to lack of exercise, sedentary lifestyle, unhealthy eating habits, and unsuccessful diet attempts as evidenced by BMI. Body mass index is 62.69 kg/m². Will follow up as necessary.     Susana Montes De Oca, RD, LD

## 2023-06-08 ENCOUNTER — OFFICE VISIT (OUTPATIENT)
Dept: PULMONOLOGY | Age: 58
End: 2023-06-08
Payer: MEDICAID

## 2023-06-08 VITALS
OXYGEN SATURATION: 95 % | WEIGHT: 293 LBS | HEIGHT: 60 IN | TEMPERATURE: 97.8 F | HEART RATE: 87 BPM | SYSTOLIC BLOOD PRESSURE: 128 MMHG | RESPIRATION RATE: 18 BRPM | DIASTOLIC BLOOD PRESSURE: 77 MMHG | BODY MASS INDEX: 57.52 KG/M2

## 2023-06-08 DIAGNOSIS — I50.32 CHRONIC CONGESTIVE HEART FAILURE WITH LEFT VENTRICULAR DIASTOLIC DYSFUNCTION (HCC): ICD-10-CM

## 2023-06-08 DIAGNOSIS — J44.9: ICD-10-CM

## 2023-06-08 DIAGNOSIS — J45.40: ICD-10-CM

## 2023-06-08 DIAGNOSIS — J98.4 RESTRICTIVE LUNG DISEASE: Primary | ICD-10-CM

## 2023-06-08 DIAGNOSIS — G47.33 OSA (OBSTRUCTIVE SLEEP APNEA): ICD-10-CM

## 2023-06-08 DIAGNOSIS — E66.1 CLASS 3 DRUG-INDUCED OBESITY WITHOUT SERIOUS COMORBIDITY WITH BODY MASS INDEX (BMI) OF 50.0 TO 59.9 IN ADULT (HCC): ICD-10-CM

## 2023-06-08 PROCEDURE — 3078F DIAST BP <80 MM HG: CPT | Performed by: INTERNAL MEDICINE

## 2023-06-08 PROCEDURE — G8417 CALC BMI ABV UP PARAM F/U: HCPCS | Performed by: INTERNAL MEDICINE

## 2023-06-08 PROCEDURE — 3017F COLORECTAL CA SCREEN DOC REV: CPT | Performed by: INTERNAL MEDICINE

## 2023-06-08 PROCEDURE — 3023F SPIROM DOC REV: CPT | Performed by: INTERNAL MEDICINE

## 2023-06-08 PROCEDURE — 1036F TOBACCO NON-USER: CPT | Performed by: INTERNAL MEDICINE

## 2023-06-08 PROCEDURE — G8427 DOCREV CUR MEDS BY ELIG CLIN: HCPCS | Performed by: INTERNAL MEDICINE

## 2023-06-08 PROCEDURE — 99214 OFFICE O/P EST MOD 30 MIN: CPT | Performed by: INTERNAL MEDICINE

## 2023-06-08 PROCEDURE — 3074F SYST BP LT 130 MM HG: CPT | Performed by: INTERNAL MEDICINE

## 2023-06-08 RX ORDER — ACETAMINOPHEN 500 MG
500 TABLET ORAL 4 TIMES DAILY PRN
Qty: 360 TABLET | Refills: 1 | Status: SHIPPED | OUTPATIENT
Start: 2023-06-08

## 2023-06-08 ASSESSMENT — ENCOUNTER SYMPTOMS
EYES NEGATIVE: 1
GASTROINTESTINAL NEGATIVE: 1
RESPIRATORY NEGATIVE: 1
ALLERGIC/IMMUNOLOGIC NEGATIVE: 1

## 2023-06-08 NOTE — PATIENT INSTRUCTIONS
ASSESSMENT/PLAN:   Diagnosis Orders   1. Restrictive lung disease        2. Asthma with irreversible airway obstruction, moderate persistent, uncomplicated ([facility]        3. Chronic congestive heart failure with left ventricular diastolic dysfunction (HCC)        4. Class 3 drug-induced obesity without serious comorbidity with body mass index (BMI) of 50.0 to 59.9 in adult ([facility]        5. KRYSTIAN (obstructive sleep apnea)  Sleep Study with PAP Titration        KRYSTIAN          Has been given cpap but unable to tolerate  Will need bipap  Will do bipap titration with oxygen    Will need to be done with nasal mask or pillow!!!!!    Dme is [employer]    Restrictive lung disease  Previous PFT from April 2020 2019 showed a severe restrictive lung defect      PFT done 3/2/23  Shows severe restrictive lung  TLC of 27%, severe restrictive lung defect with a normal DLCO and normal spirometry  FVC of 51%    6-minute walk test was done showing a desaturation and requirement of 2 L of oxygen                Wearing 2-3 lpm      Dme is [employer]              Asthma????  No eosinophils    Continue with  Albuterol MDI-as needed  Albuterol Nebulizer-we will use this as needed, this has been the most effective for the patient  Arnuity 100 mcg 1 puff a day-we will stop this      3/2/23  FENO  was 7  I do not think the patient has asthma        Diastolic dysfunction  Last echo done 1/14/2023      Summary   Limited only f/u for LVEF, RVF and right sided pressures. Normal left ventricular systolic function with ejection fraction of 55-60%. No regional wall motion abnormalites are seen. Compared to previous study from 7- no changes noted. Right ventricular systolic function visually is mildly reduced . Moderate tricuspid regurgitation. Systolic pulmonic artery pressure (SPAP) is estimated at 56 mmHg consistent   with moderate to severe pulmonary hypertension (Right atrial pressure of 8   mmHg).   Followed by Dr. Mariama Macedo at the [facility]

## 2023-06-08 NOTE — PROGRESS NOTES
MA Communication:   The following orders are received by verbal communication from Marlon Walsh MD    Orders include:  3 Month f/u, Titration sleep study-Bipap
obese. She is not ill-appearing. HENT:      Head: Normocephalic and atraumatic. Right Ear: External ear normal.      Left Ear: External ear normal.      Nose: Nose normal.      Mouth/Throat:      Mouth: Mucous membranes are moist.      Pharynx: Oropharynx is clear. Comments: Mallampati 3  Eyes:      General: No scleral icterus. Extraocular Movements: Extraocular movements intact. Conjunctiva/sclera: Conjunctivae normal.      Pupils: Pupils are equal, round, and reactive to light. Cardiovascular:      Rate and Rhythm: Normal rate and regular rhythm. Pulses: Normal pulses. Heart sounds: Normal heart sounds. No murmur heard. No friction rub. Pulmonary:      Effort: No respiratory distress. Breath sounds: No wheezing or rales. Abdominal:      General: Abdomen is flat. Bowel sounds are normal. There is no distension. Tenderness: There is no abdominal tenderness. There is no guarding. Musculoskeletal:         General: No swelling or tenderness. Normal range of motion. Cervical back: Normal range of motion and neck supple. No rigidity. Right lower leg: Edema present. Left lower leg: Edema present. Skin:     General: Skin is warm and dry. Coloration: Skin is not jaundiced. Neurological:      General: No focal deficit present. Mental Status: She is alert and oriented to person, place, and time. Mental status is at baseline. Cranial Nerves: No cranial nerve deficit. Sensory: No sensory deficit. Motor: No weakness. Gait: Gait normal.   Psychiatric:         Mood and Affect: Mood normal.         Thought Content:  Thought content normal.         Judgment: Judgment normal.            Andre Rdz MD

## 2023-06-10 PROBLEM — Z01.818 PREOPERATIVE CLEARANCE: Status: RESOLVED | Noted: 2023-05-11 | Resolved: 2023-06-10

## 2023-07-13 ENCOUNTER — OFFICE VISIT (OUTPATIENT)
Dept: BARIATRICS/WEIGHT MGMT | Age: 58
End: 2023-07-13
Payer: MEDICAID

## 2023-07-13 VITALS
SYSTOLIC BLOOD PRESSURE: 122 MMHG | BODY MASS INDEX: 57.52 KG/M2 | RESPIRATION RATE: 18 BRPM | HEART RATE: 100 BPM | WEIGHT: 293 LBS | HEIGHT: 60 IN | OXYGEN SATURATION: 91 % | DIASTOLIC BLOOD PRESSURE: 74 MMHG

## 2023-07-13 DIAGNOSIS — I10 ESSENTIAL HYPERTENSION: ICD-10-CM

## 2023-07-13 DIAGNOSIS — E66.01 MORBID OBESITY WITH BMI OF 60.0-69.9, ADULT (HCC): Primary | ICD-10-CM

## 2023-07-13 DIAGNOSIS — K21.9 CHRONIC GERD: ICD-10-CM

## 2023-07-13 DIAGNOSIS — G47.33 OBSTRUCTIVE SLEEP APNEA: ICD-10-CM

## 2023-07-13 PROCEDURE — 1036F TOBACCO NON-USER: CPT | Performed by: SURGERY

## 2023-07-13 PROCEDURE — 3074F SYST BP LT 130 MM HG: CPT | Performed by: SURGERY

## 2023-07-13 PROCEDURE — 3078F DIAST BP <80 MM HG: CPT | Performed by: SURGERY

## 2023-07-13 PROCEDURE — G8427 DOCREV CUR MEDS BY ELIG CLIN: HCPCS | Performed by: SURGERY

## 2023-07-13 PROCEDURE — G8417 CALC BMI ABV UP PARAM F/U: HCPCS | Performed by: SURGERY

## 2023-07-13 PROCEDURE — 99214 OFFICE O/P EST MOD 30 MIN: CPT | Performed by: SURGERY

## 2023-07-13 PROCEDURE — 3017F COLORECTAL CA SCREEN DOC REV: CPT | Performed by: SURGERY

## 2023-07-13 NOTE — PROGRESS NOTES
Kassy Sutton lost 5.4 lbs over past ~month. Is pt eating at least 4 times everyday? yes    Gets up at 1p-2p, sleeps 3a-1p  2p- omelette w/ veggies  430p- apple w/ PB  7p- turkey sandwich w/ broccoli   930p- orange    Is pt eating a lean protein source with all meals and snacks? yes- most meals/snacks    Has pt decreased their portions using the plate method?  yes    Is pt choosing low fat/sugar free options? yes- avoids buying sweets because she loves them    Is pt drinking at least 64 oz of clear liquids everyday? yes - water / sf lemonade / sf fruit punch    Has pt stopped drinking carbonation, caffeinated, and sugar sweetened beverages?  hot tea    Has pt sampled Unjury and/or Nectar protein?  discussed, to try - concerned about cost    Has pt attended a support group?  Not scheduled, provided schedule    Participating in intentional exercise? no - limited d/t hip, losing wt for hip replacement    Plan/Recommendations:   - Focus on lean protein 4x/day  - Try protein powder  - Complete Support Group    Handouts: 1500 doris pre-surg meal plan / SG schedule / frozen meals    Lemuel Daigle RD, LD

## 2023-07-13 NOTE — PATIENT INSTRUCTIONS
Patient received dietary handouts and education.     Plan/Recommendations:   - Focus on lean protein 4x/day  - Try protein powder  - Complete Support Group

## 2023-07-24 ENCOUNTER — OFFICE VISIT (OUTPATIENT)
Dept: CARDIOLOGY CLINIC | Age: 58
End: 2023-07-24
Payer: MEDICAID

## 2023-07-24 VITALS
BODY MASS INDEX: 57.52 KG/M2 | OXYGEN SATURATION: 93 % | SYSTOLIC BLOOD PRESSURE: 116 MMHG | DIASTOLIC BLOOD PRESSURE: 72 MMHG | WEIGHT: 293 LBS | HEIGHT: 60 IN | HEART RATE: 74 BPM

## 2023-07-24 DIAGNOSIS — G47.33 OBSTRUCTIVE SLEEP APNEA SYNDROME: ICD-10-CM

## 2023-07-24 DIAGNOSIS — E66.01 CLASS 3 SEVERE OBESITY WITH SERIOUS COMORBIDITY AND BODY MASS INDEX (BMI) OF 50.0 TO 59.9 IN ADULT, UNSPECIFIED OBESITY TYPE (HCC): ICD-10-CM

## 2023-07-24 DIAGNOSIS — I50.32 CHRONIC DIASTOLIC HEART FAILURE (HCC): Primary | ICD-10-CM

## 2023-07-24 DIAGNOSIS — I10 ESSENTIAL HYPERTENSION: ICD-10-CM

## 2023-07-24 PROCEDURE — G8427 DOCREV CUR MEDS BY ELIG CLIN: HCPCS | Performed by: NURSE PRACTITIONER

## 2023-07-24 PROCEDURE — 3074F SYST BP LT 130 MM HG: CPT | Performed by: NURSE PRACTITIONER

## 2023-07-24 PROCEDURE — 1036F TOBACCO NON-USER: CPT | Performed by: NURSE PRACTITIONER

## 2023-07-24 PROCEDURE — 3078F DIAST BP <80 MM HG: CPT | Performed by: NURSE PRACTITIONER

## 2023-07-24 PROCEDURE — G8417 CALC BMI ABV UP PARAM F/U: HCPCS | Performed by: NURSE PRACTITIONER

## 2023-07-24 PROCEDURE — 99214 OFFICE O/P EST MOD 30 MIN: CPT | Performed by: NURSE PRACTITIONER

## 2023-07-24 PROCEDURE — 3017F COLORECTAL CA SCREEN DOC REV: CPT | Performed by: NURSE PRACTITIONER

## 2023-07-24 RX ORDER — TRIAMCINOLONE ACETONIDE 0.25 MG/G
CREAM TOPICAL
COMMUNITY
Start: 2023-05-15

## 2023-07-24 NOTE — PROGRESS NOTES
[facility]   Cardiac Follow-up    Primary Care Doctor:  Violetta Mansfield MD    Chief Complaint   Patient presents with    Follow-up    Congestive Heart Failure        History of Present Illness:   I had the pleasure of seeing Fidelina Patino in follow up for chronic diastolic heart failure, hypertension, KRYSTIAN. She has a history of RV enlargement, palpitations, HTN and KRYSTIAN with CPAP. She had a stress test on 6/18/2020 which was abnormal. She underwent a right and left heart cath on 6/26/2020 which showed normal coronaries with elevated PCWP. Her most recent echocardiogram from 6/26/2020 showed an EF of 55-60%. Since last visit, seen Dr. Barbie Russ 1/24/2023, remains on oxygen. Spironolactone (aldactone) was increased to 25mg 4 days a week. She is having a lot hip pain. Needing replaced, but needs to lose 6-lbs. She forgot her oxygen today. She usually wears the oxygen with sleep and activity. Taking ibuprofen - helps in the evening to help the hip pain. She will take 800mg once a day and at times BID. Continues with shortness of breath with max exertion. Osiris Sutton describes symptoms including dyspnea on exertion, fatigue, lower extremity edema stable. Worse by the end of the day improved by the morning    Home weights: not checking regularly     Past Medical History:   has a past medical history of Anemia, Anxiety, CHF (congestive heart failure) ([address]), Depression, Fibromyalgia, GERD (gastroesophageal reflux disease), Heart palpitations, Hiatal hernia, Hypertension, Irritable bowel syndrome, Mild intermittent asthma with exacerbation, Osteoarthritis, Panic attacks, Pneumonia, Pulmonary infiltrates, Seasonal allergies, Seizure disorder ([address]), Sleep apnea, Spastic colon, Thyroid disease, and Urinary incontinence.   Surgical History:   has a past surgical history that includes Total hip arthroplasty; brain surgery; Urethra dilation; knee surgery; hip surgery; Bladder surgery; joint replacement

## 2023-07-24 NOTE — PATIENT INSTRUCTIONS
Continue daily weights  Continue the great job with weight loss  Check labs next month  as planned next month to monitor your kidney levels and potassium level since you are taking ibuprofen. Minimize your ibuprofen use to using the lowest effective dose.    Continue current Spironolactone (aldactone) dose  Continue lasix   Stay as active as possible  Follow up in 3 months or sooner if needed

## 2023-08-24 ENCOUNTER — OFFICE VISIT (OUTPATIENT)
Dept: BARIATRICS/WEIGHT MGMT | Age: 58
End: 2023-08-24
Payer: MEDICAID

## 2023-08-24 VITALS
HEART RATE: 100 BPM | BODY MASS INDEX: 57.52 KG/M2 | RESPIRATION RATE: 16 BRPM | HEIGHT: 60 IN | OXYGEN SATURATION: 95 % | SYSTOLIC BLOOD PRESSURE: 114 MMHG | WEIGHT: 293 LBS | DIASTOLIC BLOOD PRESSURE: 62 MMHG

## 2023-08-24 DIAGNOSIS — G47.33 OBSTRUCTIVE SLEEP APNEA: ICD-10-CM

## 2023-08-24 DIAGNOSIS — I10 ESSENTIAL HYPERTENSION: ICD-10-CM

## 2023-08-24 DIAGNOSIS — E66.01 MORBID OBESITY WITH BMI OF 60.0-69.9, ADULT (HCC): Primary | ICD-10-CM

## 2023-08-24 DIAGNOSIS — K21.9 CHRONIC GERD: ICD-10-CM

## 2023-08-24 PROCEDURE — 1036F TOBACCO NON-USER: CPT | Performed by: SURGERY

## 2023-08-24 PROCEDURE — G8417 CALC BMI ABV UP PARAM F/U: HCPCS | Performed by: SURGERY

## 2023-08-24 PROCEDURE — G8427 DOCREV CUR MEDS BY ELIG CLIN: HCPCS | Performed by: SURGERY

## 2023-08-24 PROCEDURE — 3078F DIAST BP <80 MM HG: CPT | Performed by: SURGERY

## 2023-08-24 PROCEDURE — 3017F COLORECTAL CA SCREEN DOC REV: CPT | Performed by: SURGERY

## 2023-08-24 PROCEDURE — 3074F SYST BP LT 130 MM HG: CPT | Performed by: SURGERY

## 2023-08-24 PROCEDURE — 99214 OFFICE O/P EST MOD 30 MIN: CPT | Performed by: SURGERY

## 2023-08-24 NOTE — PROGRESS NOTES
Kassy Sutton gained 3 lbs over past ~month. Pt reports \"I can't do anything\", pt endorses feelings of hopelessness. Is pt eating at least 4 times everyday? yes    Gets up at 1p-2p, sleeps 3a-1p  2p- omelette w/ veggies  430p- apple w/ PB  7p- turkey sandwich on [employer] bread plain w/ raw broccoli with lite Tamazight - reports 4g per TB  930p- orange    Is pt eating a lean protein source with all meals and snacks? yes- most meals/snacks  Provided education on protein sources in food. Has pt decreased their portions using the plate method?  yes    Is pt choosing low fat/sugar free options? yes- avoids buying sweets because she loves them    Is pt drinking at least 64 oz of clear liquids everyday? yes - water / sf lemonade / sf fruit punch    Has pt stopped drinking carbonation, caffeinated, and sugar sweetened beverages?  hot decaf tea    Has pt sampled Unjury and/or Nectar protein?  discussed, to try - concerned about cost  we discussed expectations for preop and post op and patient verbalized understanding that she will have a plan in place for this. She will try protein powders at next visit    Has pt attended a support group? Not scheduled, provided schedule. Pt reports \"my mind is not ready to do that. I'm afraid I will start crying. \" We discussed zoom options as well as in person with other patients who are going through similar journeys.     Participating in intentional exercise? no - limited d/t hip, losing wt for hip replacement    Plan/Recommendations:   - Focus on lean protein 4x/day  - Try protein powder- will purchase grab and goes at next visit  - Schedule with counselor or behaviorist   - schedule Support Group    Handouts:none    Bacilio Boss, MS, RD, LD

## 2023-09-13 ENCOUNTER — TELEMEDICINE (OUTPATIENT)
Dept: BARIATRICS/WEIGHT MGMT | Age: 58
End: 2023-09-13

## 2023-09-13 DIAGNOSIS — E66.01 MORBID OBESITY WITH BMI OF 60.0-69.9, ADULT (HCC): Primary | ICD-10-CM

## 2023-09-13 NOTE — PROGRESS NOTES
Alfredito Mariscal is a 56 y.o. female who presents today for individual counseling encounter / psychosocial assessment related to behavioral health. Alfredito Mariscal is presented oriented and engaged throughout assessment. Patient appeared with appropriate insight and cognition. Patient is referred to therapist by Dr. Zarina Sainz. Patient meets criteria for Morbid Obesity with BMI of 60.0-69.9. Presenting Problem:   Per patient \"I just knew that if I didn't lose weight I was going to have a short life. I'm very heavy. Plus, I have to have a hip replacement, my hip is so bad I'm using a walker right now,it's really bad. They told me I had to lose 60-70 pounds before I could get it done. \"    Home life / Family relationships / Supports:   Patient is single and does not have children. She reports \"I have family and friends that are always there for me. I have great family and friends. \"    Hobbies/Positives:   Patient reports she loves crafting. She is scheduled to do a [employer] [place] show in November. Employment hx  Patient is not working at this time. She is on disability. Psychiatric hx  Per patient [place] lot of time I have anxiety because of my pain. I've already had my left hip replaced and not it's the rights turn. \" Medication is managed by a Neurologist.    Hx of emotional/stress/bored eating:   Patient feels these behaviors are under control since starting the pre-surgical clearance. Daily Health Concerns/Limitations  Need for a hip replacement, Thyroid disease, high blood pressure. Patient reports all co-morbidities other than her knee are well controlled. Substance Use:  None reported    Current needs / Limitations   Per patient \"Just that I like to eat, that's about it. As long as I have my family and friends on my side I think I can do this. \"    Additional Questions/Concerns per patient  Patient asked appropriate questions. Some questions were deferred for dietician consult.     Behaviorist overview:

## 2023-09-18 ENCOUNTER — TELEPHONE (OUTPATIENT)
Dept: CARDIOLOGY CLINIC | Age: 58
End: 2023-09-18

## 2023-09-18 ENCOUNTER — TELEPHONE (OUTPATIENT)
Dept: BARIATRICS/WEIGHT MGMT | Age: 58
End: 2023-09-18

## 2023-09-18 NOTE — TELEPHONE ENCOUNTER
CARDIAC CLEARANCE REQUEST    What type of procedure are you having:  EGD  Are you taking any blood thinners:  Aspirin - 7 days   Type on anesthesia:  Unknown   When is your procedure scheduled for:  09.22.23  What physician is performing your procedure:  Dr. Yousif Engle   Phone Number:  [phone]  Fax number to send the letter:     Pt has already stopped aspirin, she wants to make sure this is safe. Please advise.

## 2023-09-18 NOTE — TELEPHONE ENCOUNTER
Patient called today for 2 reasons. Her sister had covid and she was around her this weekend. Patient has no symptoms, has psych eval 9/19, MID visit on 9/21 and is to have Egd 9/22. Was instructed to wear mask at visits. And let us know fo nay changes. 2nd thing on egd patient stated she took ibuprofen 1 day but will be free of it the next 6 before egd wanted to make sure that wasn't an issue.

## 2023-09-19 ENCOUNTER — INITIAL CONSULT (OUTPATIENT)
Dept: BARIATRICS/WEIGHT MGMT | Age: 58
End: 2023-09-19
Payer: MEDICAID

## 2023-09-19 ENCOUNTER — HOSPITAL ENCOUNTER (OUTPATIENT)
Age: 58
Discharge: HOME | End: 2023-09-19
Payer: MEDICAID

## 2023-09-19 DIAGNOSIS — J98.4 RESTRICTIVE LUNG DISEASE: ICD-10-CM

## 2023-09-19 DIAGNOSIS — K21.9 CHRONIC GERD: ICD-10-CM

## 2023-09-19 DIAGNOSIS — E66.2 OBESITY HYPOVENTILATION SYNDROME (HCC): ICD-10-CM

## 2023-09-19 DIAGNOSIS — Z01.818 PREOPERATIVE CLEARANCE: ICD-10-CM

## 2023-09-19 DIAGNOSIS — E66.01 MORBID OBESITY WITH BMI OF 60.0-69.9, ADULT (HCC): ICD-10-CM

## 2023-09-19 DIAGNOSIS — G47.33 OBSTRUCTIVE SLEEP APNEA: ICD-10-CM

## 2023-09-19 DIAGNOSIS — F41.8 ANXIETY WITH DEPRESSION: Primary | ICD-10-CM

## 2023-09-19 DIAGNOSIS — I10 ESSENTIAL HYPERTENSION: ICD-10-CM

## 2023-09-19 DIAGNOSIS — E66.01 MORBID OBESITY WITH BMI OF 50.0-59.9, ADULT (HCC): ICD-10-CM

## 2023-09-19 LAB
25(OH)D3 SERPL-MCNC: 29.1 NG/ML
ALBUMIN SERPL-MCNC: 4.2 G/DL (ref 3.4–5)
ALBUMIN/GLOB SERPL: 1.7 {RATIO} (ref 1.1–2.2)
ALP SERPL-CCNC: 102 U/L (ref 40–129)
ALT SERPL-CCNC: 15 U/L (ref 10–40)
ANION GAP SERPL CALCULATED.3IONS-SCNC: 6 MMOL/L (ref 3–16)
AST SERPL-CCNC: 16 U/L (ref 15–37)
BASOPHILS # BLD: 0 K/UL (ref 0–0.2)
BASOPHILS NFR BLD: 0.3 %
BILIRUB SERPL-MCNC: 0.4 MG/DL (ref 0–1)
BUN SERPL-MCNC: 15 MG/DL (ref 7–20)
CALCIUM SERPL-MCNC: 9.4 MG/DL (ref 8.3–10.6)
CHLORIDE SERPL-SCNC: 101 MMOL/L (ref 99–110)
CHOLEST SERPL-MCNC: 175 MG/DL (ref 0–199)
CO2 SERPL-SCNC: 32 MMOL/L (ref 21–32)
CREAT SERPL-MCNC: 0.8 MG/DL (ref 0.6–1.1)
DEPRECATED RDW RBC AUTO: 15.9 % (ref 12.4–15.4)
EOSINOPHIL # BLD: 0 K/UL (ref 0–0.6)
EOSINOPHIL NFR BLD: 0.1 %
FOLATE SERPL-MCNC: 17.43 NG/ML (ref 4.78–24.2)
GFR SERPLBLD CREATININE-BSD FMLA CKD-EPI: >60 ML/MIN/{1.73_M2}
GLUCOSE SERPL-MCNC: 133 MG/DL (ref 70–99)
HCT VFR BLD AUTO: 41.5 % (ref 36–48)
HDLC SERPL-MCNC: 38 MG/DL (ref 40–60)
HGB BLD-MCNC: 14.1 G/DL (ref 12–16)
INR PPP: 1.07 (ref 0.84–1.16)
IRON SATN MFR SERPL: 16 % (ref 15–50)
IRON SERPL-MCNC: 47 UG/DL (ref 37–145)
LDLC SERPL CALC-MCNC: 108 MG/DL
LYMPHOCYTES # BLD: 1.8 K/UL (ref 1–5.1)
LYMPHOCYTES NFR BLD: 20 %
MCH RBC QN AUTO: 29.7 PG (ref 26–34)
MCHC RBC AUTO-ENTMCNC: 34 G/DL (ref 31–36)
MCV RBC AUTO: 87.2 FL (ref 80–100)
MONOCYTES # BLD: 0.6 K/UL (ref 0–1.3)
MONOCYTES NFR BLD: 6.8 %
NEUTROPHILS # BLD: 6.5 K/UL (ref 1.7–7.7)
NEUTROPHILS NFR BLD: 72.8 %
PLATELET # BLD AUTO: 285 K/UL (ref 135–450)
PMV BLD AUTO: 9.6 FL (ref 5–10.5)
POTASSIUM SERPL-SCNC: 4.4 MMOL/L (ref 3.5–5.1)
PROT SERPL-MCNC: 6.7 G/DL (ref 6.4–8.2)
PROTHROMBIN TIME: 13.9 SEC (ref 11.5–14.8)
RBC # BLD AUTO: 4.75 M/UL (ref 4–5.2)
SODIUM SERPL-SCNC: 139 MMOL/L (ref 136–145)
TIBC SERPL-MCNC: 296 UG/DL (ref 260–445)
TRIGL SERPL-MCNC: 144 MG/DL (ref 0–150)
TSH SERPL DL<=0.005 MIU/L-ACNC: 2.36 UIU/ML (ref 0.27–4.2)
VIT B12 SERPL-MCNC: 333 PG/ML (ref 211–911)
VLDLC SERPL CALC-MCNC: 29 MG/DL
WBC # BLD AUTO: 8.9 K/UL (ref 4–11)

## 2023-09-19 PROCEDURE — 83550 IRON BINDING TEST: CPT

## 2023-09-19 PROCEDURE — 85025 COMPLETE CBC W/AUTO DIFF WBC: CPT

## 2023-09-19 PROCEDURE — 82306 VITAMIN D 25 HYDROXY: CPT

## 2023-09-19 PROCEDURE — 85610 PROTHROMBIN TIME: CPT

## 2023-09-19 PROCEDURE — 36415 COLL VENOUS BLD VENIPUNCTURE: CPT

## 2023-09-19 PROCEDURE — 84590 ASSAY OF VITAMIN A: CPT

## 2023-09-19 PROCEDURE — 80061 LIPID PANEL: CPT

## 2023-09-19 PROCEDURE — 82607 VITAMIN B-12: CPT

## 2023-09-19 PROCEDURE — 82746 ASSAY OF FOLIC ACID SERUM: CPT

## 2023-09-19 PROCEDURE — 84446 ASSAY OF VITAMIN E: CPT

## 2023-09-19 PROCEDURE — 80053 COMPREHEN METABOLIC PANEL: CPT

## 2023-09-19 PROCEDURE — 83540 ASSAY OF IRON: CPT

## 2023-09-19 PROCEDURE — 83036 HEMOGLOBIN GLYCOSYLATED A1C: CPT

## 2023-09-19 PROCEDURE — 84425 ASSAY OF VITAMIN B-1: CPT

## 2023-09-19 PROCEDURE — 96137 PSYCL/NRPSYC TST PHY/QHP EA: CPT | Performed by: PSYCHOLOGIST

## 2023-09-19 PROCEDURE — 90791 PSYCH DIAGNOSTIC EVALUATION: CPT | Performed by: PSYCHOLOGIST

## 2023-09-19 PROCEDURE — 1036F TOBACCO NON-USER: CPT | Performed by: PSYCHOLOGIST

## 2023-09-19 PROCEDURE — 96136 PSYCL/NRPSYC TST PHY/QHP 1ST: CPT | Performed by: PSYCHOLOGIST

## 2023-09-19 PROCEDURE — 96130 PSYCL TST EVAL PHYS/QHP 1ST: CPT | Performed by: PSYCHOLOGIST

## 2023-09-19 PROCEDURE — 84443 ASSAY THYROID STIM HORMONE: CPT

## 2023-09-19 NOTE — PROGRESS NOTES
evaluation. Provider spent 52 minutes in test administration services. Provider spent 45 minutes in test evaluation services on 09/19/2023, and an additional 15 minutes on 09/26/2023.

## 2023-09-20 LAB
EST. AVERAGE GLUCOSE BLD GHB EST-MCNC: 108.3 MG/DL
HBA1C MFR BLD: 5.4 %

## 2023-09-21 ENCOUNTER — OFFICE VISIT (OUTPATIENT)
Dept: BARIATRICS/WEIGHT MGMT | Age: 58
End: 2023-09-21
Payer: MEDICAID

## 2023-09-21 VITALS
BODY MASS INDEX: 57.52 KG/M2 | WEIGHT: 293 LBS | SYSTOLIC BLOOD PRESSURE: 116 MMHG | RESPIRATION RATE: 16 BRPM | DIASTOLIC BLOOD PRESSURE: 74 MMHG | HEART RATE: 80 BPM | OXYGEN SATURATION: 98 % | HEIGHT: 60 IN

## 2023-09-21 DIAGNOSIS — G47.33 OBSTRUCTIVE SLEEP APNEA: ICD-10-CM

## 2023-09-21 DIAGNOSIS — I10 ESSENTIAL HYPERTENSION: ICD-10-CM

## 2023-09-21 DIAGNOSIS — E78.5 DYSLIPIDEMIA (HIGH LDL; LOW HDL): ICD-10-CM

## 2023-09-21 DIAGNOSIS — E66.01 MORBID OBESITY WITH BMI OF 50.0-59.9, ADULT (HCC): ICD-10-CM

## 2023-09-21 DIAGNOSIS — K43.9 VENTRAL HERNIA WITHOUT OBSTRUCTION OR GANGRENE: Primary | ICD-10-CM

## 2023-09-21 DIAGNOSIS — K21.9 CHRONIC GERD: ICD-10-CM

## 2023-09-21 PROCEDURE — 99214 OFFICE O/P EST MOD 30 MIN: CPT | Performed by: SURGERY

## 2023-09-21 PROCEDURE — 3074F SYST BP LT 130 MM HG: CPT | Performed by: SURGERY

## 2023-09-21 PROCEDURE — 1036F TOBACCO NON-USER: CPT | Performed by: SURGERY

## 2023-09-21 PROCEDURE — G8417 CALC BMI ABV UP PARAM F/U: HCPCS | Performed by: SURGERY

## 2023-09-21 PROCEDURE — 3017F COLORECTAL CA SCREEN DOC REV: CPT | Performed by: SURGERY

## 2023-09-21 PROCEDURE — G8427 DOCREV CUR MEDS BY ELIG CLIN: HCPCS | Performed by: SURGERY

## 2023-09-21 PROCEDURE — 3078F DIAST BP <80 MM HG: CPT | Performed by: SURGERY

## 2023-09-21 NOTE — PATIENT INSTRUCTIONS
Patient received dietary handouts and education.       Plan/Recommendations:   - Be consistent with eating 4X/day  - Include protein + plants each time you each   - Attend Support Group

## 2023-09-22 ENCOUNTER — ANESTHESIA EVENT (OUTPATIENT)
Dept: ENDOSCOPY | Age: 58
End: 2023-09-22
Payer: MEDICAID

## 2023-09-22 ENCOUNTER — ANESTHESIA (OUTPATIENT)
Dept: ENDOSCOPY | Age: 58
End: 2023-09-22
Payer: MEDICAID

## 2023-09-22 ENCOUNTER — HOSPITAL ENCOUNTER (OUTPATIENT)
Age: 58
Setting detail: OUTPATIENT SURGERY
Discharge: HOME | End: 2023-09-22
Attending: SURGERY | Admitting: SURGERY
Payer: MEDICAID

## 2023-09-22 VITALS
SYSTOLIC BLOOD PRESSURE: 122 MMHG | WEIGHT: 293 LBS | BODY MASS INDEX: 57.52 KG/M2 | OXYGEN SATURATION: 94 % | DIASTOLIC BLOOD PRESSURE: 64 MMHG | HEIGHT: 60 IN | RESPIRATION RATE: 25 BRPM | TEMPERATURE: 97.3 F | HEART RATE: 80 BPM

## 2023-09-22 DIAGNOSIS — K21.9 GASTROESOPHAGEAL REFLUX DISEASE, UNSPECIFIED WHETHER ESOPHAGITIS PRESENT: ICD-10-CM

## 2023-09-22 LAB
A-TOCOPHEROL VIT E SERPL-MCNC: 9.8 MG/L (ref 5.5–18)
ANNOTATION COMMENT IMP: NORMAL
BETA+GAMMA TOCOPHEROL SERPL-MCNC: 1.4 MG/L (ref 0–6)
RETINYL PALMITATE SERPL-MCNC: 0.03 MG/L (ref 0–0.1)
VIT A SERPL-MCNC: 0.65 MG/L (ref 0.3–1.2)
VIT B1 BLD-MCNC: 242 NMOL/L (ref 70–180)

## 2023-09-22 PROCEDURE — 6360000002 HC RX W HCPCS: Performed by: NURSE ANESTHETIST, CERTIFIED REGISTERED

## 2023-09-22 PROCEDURE — 88313 SPECIAL STAINS GROUP 2: CPT

## 2023-09-22 PROCEDURE — 7100000011 HC PHASE II RECOVERY - ADDTL 15 MIN: Performed by: SURGERY

## 2023-09-22 PROCEDURE — 2580000003 HC RX 258: Performed by: SURGERY

## 2023-09-22 PROCEDURE — 43239 EGD BIOPSY SINGLE/MULTIPLE: CPT | Performed by: SURGERY

## 2023-09-22 PROCEDURE — 43251 EGD REMOVE LESION SNARE: CPT | Performed by: SURGERY

## 2023-09-22 PROCEDURE — 7100000000 HC PACU RECOVERY - FIRST 15 MIN: Performed by: SURGERY

## 2023-09-22 PROCEDURE — 88342 IMHCHEM/IMCYTCHM 1ST ANTB: CPT

## 2023-09-22 PROCEDURE — 3609012400 HC EGD TRANSORAL BIOPSY SINGLE/MULTIPLE: Performed by: SURGERY

## 2023-09-22 PROCEDURE — 88305 TISSUE EXAM BY PATHOLOGIST: CPT

## 2023-09-22 PROCEDURE — 7100000001 HC PACU RECOVERY - ADDTL 15 MIN: Performed by: SURGERY

## 2023-09-22 PROCEDURE — 3700000000 HC ANESTHESIA ATTENDED CARE: Performed by: SURGERY

## 2023-09-22 PROCEDURE — 2500000003 HC RX 250 WO HCPCS: Performed by: NURSE ANESTHETIST, CERTIFIED REGISTERED

## 2023-09-22 PROCEDURE — 6370000000 HC RX 637 (ALT 250 FOR IP): Performed by: ANESTHESIOLOGY

## 2023-09-22 PROCEDURE — 7100000010 HC PHASE II RECOVERY - FIRST 15 MIN: Performed by: SURGERY

## 2023-09-22 PROCEDURE — 3609013500 HC EGD REMOVAL TUMOR POLYP/OTHER LESION SNARE TECH: Performed by: SURGERY

## 2023-09-22 PROCEDURE — 3700000001 HC ADD 15 MINUTES (ANESTHESIA): Performed by: SURGERY

## 2023-09-22 PROCEDURE — 2709999900 HC NON-CHARGEABLE SUPPLY: Performed by: SURGERY

## 2023-09-22 RX ORDER — SODIUM CHLORIDE 9 MG/ML
INJECTION, SOLUTION INTRAVENOUS CONTINUOUS
Status: DISCONTINUED | OUTPATIENT
Start: 2023-09-22 | End: 2023-09-22 | Stop reason: HOSPADM

## 2023-09-22 RX ORDER — IPRATROPIUM BROMIDE AND ALBUTEROL SULFATE 2.5; .5 MG/3ML; MG/3ML
1 SOLUTION RESPIRATORY (INHALATION) ONCE
Status: COMPLETED | OUTPATIENT
Start: 2023-09-22 | End: 2023-09-22

## 2023-09-22 RX ORDER — PROPOFOL 10 MG/ML
INJECTION, EMULSION INTRAVENOUS PRN
Status: DISCONTINUED | OUTPATIENT
Start: 2023-09-22 | End: 2023-09-22 | Stop reason: SDUPTHER

## 2023-09-22 RX ORDER — GLYCOPYRROLATE 1 MG/5 ML
SYRINGE (ML) INTRAVENOUS PRN
Status: DISCONTINUED | OUTPATIENT
Start: 2023-09-22 | End: 2023-09-22 | Stop reason: SDUPTHER

## 2023-09-22 RX ORDER — LIDOCAINE HYDROCHLORIDE 20 MG/ML
INJECTION, SOLUTION EPIDURAL; INFILTRATION; INTRACAUDAL; PERINEURAL PRN
Status: DISCONTINUED | OUTPATIENT
Start: 2023-09-22 | End: 2023-09-22 | Stop reason: SDUPTHER

## 2023-09-22 RX ORDER — KETAMINE HCL IN NACL, ISO-OSM 100MG/10ML
SYRINGE (ML) INJECTION PRN
Status: DISCONTINUED | OUTPATIENT
Start: 2023-09-22 | End: 2023-09-22 | Stop reason: SDUPTHER

## 2023-09-22 RX ORDER — MIDAZOLAM HYDROCHLORIDE 1 MG/ML
INJECTION INTRAMUSCULAR; INTRAVENOUS PRN
Status: DISCONTINUED | OUTPATIENT
Start: 2023-09-22 | End: 2023-09-22 | Stop reason: SDUPTHER

## 2023-09-22 RX ADMIN — SODIUM CHLORIDE: 9 INJECTION, SOLUTION INTRAVENOUS at 08:01

## 2023-09-22 RX ADMIN — PROPOFOL 50 MG: 10 INJECTION, EMULSION INTRAVENOUS at 08:51

## 2023-09-22 RX ADMIN — MIDAZOLAM 2 MG: 1 INJECTION INTRAMUSCULAR; INTRAVENOUS at 08:48

## 2023-09-22 RX ADMIN — PROPOFOL 50 MG: 10 INJECTION, EMULSION INTRAVENOUS at 09:03

## 2023-09-22 RX ADMIN — Medication 50 MG: at 08:51

## 2023-09-22 RX ADMIN — IPRATROPIUM BROMIDE AND ALBUTEROL SULFATE 1 DOSE: .5; 3 SOLUTION RESPIRATORY (INHALATION) at 08:05

## 2023-09-22 RX ADMIN — PROPOFOL 50 MG: 10 INJECTION, EMULSION INTRAVENOUS at 09:00

## 2023-09-22 RX ADMIN — Medication 0.2 MG: at 08:51

## 2023-09-22 RX ADMIN — PROPOFOL 50 MG: 10 INJECTION, EMULSION INTRAVENOUS at 08:56

## 2023-09-22 RX ADMIN — PROPOFOL 50 MG: 10 INJECTION, EMULSION INTRAVENOUS at 08:53

## 2023-09-22 RX ADMIN — LIDOCAINE HYDROCHLORIDE 120 MG: 20 INJECTION, SOLUTION EPIDURAL; INFILTRATION; INTRACAUDAL; PERINEURAL at 08:51

## 2023-09-22 ASSESSMENT — PAIN SCALES - GENERAL: PAINLEVEL_OUTOF10: 0

## 2023-09-22 NOTE — DISCHARGE INSTRUCTIONS
ENDOSCOPY DISCHARGE INSTRUCTIONS    You may experience some lightheadedness for the next several hours. Plan on quiet relaxation for the rest of today. A responsible adult needs to stay with you today. Because of the medications you received today-do not drive,operate machinery,or sign any contractual agreement for the next 24 hours. Do not drink any alcoholic beverages or take any unprescribed medications tonight. Eat bland food and avoid anything greasy or spicy initially-progress to your normal diet gradually. Diet restrictions as instructed. If you have any of the following problems, notify your physician or return to the hospital emergency room : fever, chills, excessive bleeding, excessive vomiting, difficulty swallowing, uncontrolled pain, increased abdominal distention, shortness of breath or any other problems. If you have a sore throat, you may use lozenges or salt water gargles. Please call Dr. Ilana Dalal office in 5 business days for biopsy results [phone]. ANESTHESIA DISCHARGE INSTRUCTIONS    Wear your seatbelt home. You are under the influence of drugs-do not drink alcohol,drive,operate machinery,or make any important decisions or sign any legal documentsfor 24 hours  Children should not ride Testt or play on gym sets  for 24 hours after surgery. A responsible adult needs to be with you for 24 hours. You may experience lightheadedness,dizziness,or sleepiness following surgery. Rest at home today- increase activity as tolerated. Progress slowly to a regular diet unless your physician has instructed you otherwise. Drink plenty of water. If nausea becomes a problem call your physician. Coughing,sore throat,and muscle aches are other side effects of anesthesia,and should improve with time. Do not drive,operate machinery while taking narcotics.

## 2023-09-22 NOTE — ANESTHESIA PRE PROCEDURE
consistent   with moderate to severe pulmonary hypertension (Right atrial pressure of 8   mmHg). Signature      ------------------------------------------------------------------   Electronically signed by Valarie Monte MD, [facility]   (Interpreting physician) on 01/16/2023 at 10:47 AM   ------------------------------------------------------------------           occ palpitations with stress     Neuro/Psych:   (+) neuromuscular disease:,    (-) TIA and CVA            ROS comment: Uses walker GI/Hepatic/Renal:   (+) hiatal hernia, GERD (denies symtpom today):, morbid obesity          Endo/Other:    (+) hypothyroidism::., .                 Abdominal:             Vascular: Other Findings:           Anesthesia Plan      MAC     ASA 3       Induction: intravenous. Anesthetic plan and risks discussed with patient. Plan discussed with CRNA.                     Erica Keys MD   9/22/2023

## 2023-09-22 NOTE — ANESTHESIA POSTPROCEDURE EVALUATION
Department of Anesthesiology  Postprocedure Note    Patient: Nelson Coon  MRN: [id number]  YOB: 1965  Date of evaluation: 9/22/2023      Procedure Summary     Date: 09/22/23 Room / Location: [address] [place] [address]    Anesthesia Start: 0848 Anesthesia Stop: 1466    Procedures:       EGD BIOPSY (Abdomen)      EGD POLYP SNARE (Abdomen) Diagnosis:       Gastroesophageal reflux disease, unspecified whether esophagitis present      (Gastroesophageal reflux disease, unspecified whether esophagitis present [K21.9])    Surgeons: Lencho Ac MD Responsible Provider: Diana Laguna MD    Anesthesia Type: MAC ASA Status: 3          Anesthesia Type: No value filed. Tory Phase I: Tory Score: 10    Tory Phase II: Tory Score: 10      Anesthesia Post Evaluation    Patient location during evaluation: PACU  Patient participation: complete - patient participated  Level of consciousness: awake  Airway patency: patent  Nausea & Vomiting: no vomiting  Complications: no  Cardiovascular status: hemodynamically stable  Respiratory status: acceptable  Hydration status: euvolemic  There was medical reason for not using a multimodal analgesia pain management approach. Pain management: adequate

## 2023-09-22 NOTE — PROGRESS NOTES
Discharge instructions review with patient and pt friend. All home medications have been reviewed, pt v/u. Pt discharged via wheelchair. Pt discharged with instructions and all belongings. Pt friend taking stable pt home.
Patient reached __X__ yes  _____ no   VM instructions left ____ yes   phone number ________                                ____ no-office notified          Date _9/22/23________  Time ___0845____  Arrival __0645  hosp-endo____    Nothing to eat or drink after midnight-follow your doctors prep instructions-this may include taking a second dose of your prep after midnight  Responsible adult 25 or older to stay on site while you are here-drive you home-stay with you after  Follow any instructions your doctors office has given you  Bring a complete list of all your medications and supplements including name,dose,how often taken the day of your procedure  If you normally take the following medications in the morning please do so the AM of your procedure with a small sip of water       Heart,blood pressure,seizure,thyroid or breathing medications-use your inhalers-bring any rescue inhalers with you DOS       DO NOT take blood pressure medications ending in \"brad\" or \"pril\" the AM of procedure or evening prior-DO NOT TAKE LISINOPRIL   Dr Summers Kind patients are not to take any medications the AM of surgery  Take half or your normal dose of any long acting insulins the night before your procedure-do not take any diabetic medications the AM of procedure  Follow your doctors instructions regarding stopping or taking  any blood thinners-if you do not have instructions-call them  Any questions call your doctor  Other ______________________________________________________________    Naveed Arizmendi POLICY(subject to change)             The current policy is 2 visitors per patient. There are no children allowed. Mask at discretion of facility. Visiting hours are 8a-8p. Overnight visitors will be at the discretion of the nurse. All policies are subject to change.
Pt arrived from endo to PACU bay 4. Reported received from endo staff. Pt minimally arousable to voice upon arrival. Pt arrives with simple mask in place, infusing 6L oxygen. Vitals as charted.
Pt awake and alert at this time. Pt on RA, and VSS. Pt denies pain and nausea, tolerating PO. Pt meets criteria to be discharged from Phase 1.
Teaching / education initiated regarding perioperative experience, expectations, and pain management during stay. Patient verbalized understanding.
Patient presents to ED febrile, tachycardiac, w/associated fevers for 3 days.  Patient has large abscess to right buttocks, can't sit on right side. Code sepsis called.  Dr. Hightower at bedside.

## 2023-10-23 ENCOUNTER — TELEPHONE (OUTPATIENT)
Dept: CARDIOLOGY CLINIC | Age: 58
End: 2023-10-23

## 2023-10-23 NOTE — TELEPHONE ENCOUNTER
Pt called rita Cui message given. V/U. Pt stated that she will be having surgery with Dr. Rajeev Gracia. Pt had to reschedule due to transportation issues and is scheduled for 11/14/2023 with [facility].

## 2023-10-23 NOTE — TELEPHONE ENCOUNTER
I reviewed the chart and it appears that this visit could potentially be for cardiac clearance for gastric sleeve. Is that the case? If so, then we cannot do video visit. She would need ekg, etc.  And since I haven't seen her in a long time, in person visit is better.   KATRIN

## 2023-10-26 ENCOUNTER — OFFICE VISIT (OUTPATIENT)
Dept: BARIATRICS/WEIGHT MGMT | Age: 58
End: 2023-10-26
Payer: MEDICAID

## 2023-10-26 VITALS
DIASTOLIC BLOOD PRESSURE: 68 MMHG | BODY MASS INDEX: 57.52 KG/M2 | HEART RATE: 85 BPM | WEIGHT: 293 LBS | OXYGEN SATURATION: 96 % | HEIGHT: 60 IN | SYSTOLIC BLOOD PRESSURE: 124 MMHG | RESPIRATION RATE: 16 BRPM

## 2023-10-26 DIAGNOSIS — K21.9 CHRONIC GERD: ICD-10-CM

## 2023-10-26 DIAGNOSIS — G47.33 OBSTRUCTIVE SLEEP APNEA: ICD-10-CM

## 2023-10-26 DIAGNOSIS — E66.01 MORBID OBESITY WITH BMI OF 50.0-59.9, ADULT (HCC): Primary | ICD-10-CM

## 2023-10-26 DIAGNOSIS — E78.5 DYSLIPIDEMIA (HIGH LDL; LOW HDL): ICD-10-CM

## 2023-10-26 DIAGNOSIS — I10 ESSENTIAL HYPERTENSION: ICD-10-CM

## 2023-10-26 PROCEDURE — G8417 CALC BMI ABV UP PARAM F/U: HCPCS | Performed by: SURGERY

## 2023-10-26 PROCEDURE — 1036F TOBACCO NON-USER: CPT | Performed by: SURGERY

## 2023-10-26 PROCEDURE — 99214 OFFICE O/P EST MOD 30 MIN: CPT | Performed by: SURGERY

## 2023-10-26 PROCEDURE — 3017F COLORECTAL CA SCREEN DOC REV: CPT | Performed by: SURGERY

## 2023-10-26 PROCEDURE — 3078F DIAST BP <80 MM HG: CPT | Performed by: SURGERY

## 2023-10-26 PROCEDURE — G8484 FLU IMMUNIZE NO ADMIN: HCPCS | Performed by: SURGERY

## 2023-10-26 PROCEDURE — G8427 DOCREV CUR MEDS BY ELIG CLIN: HCPCS | Performed by: SURGERY

## 2023-10-26 PROCEDURE — 3074F SYST BP LT 130 MM HG: CPT | Performed by: SURGERY

## 2023-10-26 NOTE — PROGRESS NOTES
Dietary plan discussed and reviewed with provider.
Date/Time    LABA1C 5.4 09/19/2023 02:29 PM    .3 09/19/2023 02:29 PM         Current Outpatient Medications:     triamcinolone (KENALOG) 0.025 % cream, Apply topically (Patient not taking: Reported on 9/22/2023), Disp: , Rfl:     acetaminophen (TYLENOL) 500 MG tablet, Take 1 tablet by mouth 4 times daily as needed for Pain, Disp: 360 tablet, Rfl: 1    furosemide (LASIX) 40 MG tablet, Take 1 tablet by mouth 2 times daily, Disp: 180 tablet, Rfl: 3    vitamin C (ASCORBIC ACID) 500 MG tablet, Take 1 tablet by mouth daily, Disp: , Rfl:     spironolactone (ALDACTONE) 25 MG tablet, TAKE ONE TABLET BY MOUTH ON MONDAY, WEDNESDAY, FRIDAY AND SUNDAY, Disp: 90 tablet, Rfl: 3    Multiple Vitamins-Minerals (MULTI ADULT GUMMIES PO), Take 50 mg by mouth daily, Disp: , Rfl:     ferrous sulfate (IRON 325) 325 (65 Fe) MG tablet, Take 1 tablet by mouth in the morning and at bedtime, Disp: , Rfl:     nystatin (MYCOSTATIN) 515566 UNIT/GM powder, Apply 0.1 g topically daily, Disp: , Rfl:     pimecrolimus (ELIDEL) 1 % cream, Apply 1 application topically daily (Patient not taking: Reported on 9/22/2023), Disp: , Rfl:     ipratropium-albuterol (DUONEB) 0.5-2.5 (3) MG/3ML SOLN nebulizer solution, Inhale 3 mLs into the lungs every 4 hours as needed for Shortness of Breath (Patient not taking: Reported on 9/22/2023), Disp: 360 mL, Rfl: 0    Calcium Carb-Cholecalciferol (OYSTER SHELL CALCIUM W/D) 500-200 MG-UNIT TABS tablet, , Disp: , Rfl:     vitamin D3 (CHOLECALCIFEROL) 125 MCG (5000 UT) TABS tablet, Take by mouth, Disp: , Rfl:     lisinopril (PRINIVIL;ZESTRIL) 10 MG tablet, TAKE ONE TABLET BY MOUTH DAILY, Disp: 90 tablet, Rfl: 3    levothyroxine (SYNTHROID) 112 MCG tablet, Take 1 tablet by mouth daily, Disp: , Rfl:     ibuprofen (ADVIL;MOTRIN) 200 MG tablet, Take 3 tablets by mouth as needed for Pain, Disp: , Rfl:     sertraline (ZOLOFT) 100 MG tablet, Take 1 tablet by mouth daily, Disp: , Rfl:     lamoTRIgine (LAMICTAL) 200 MG

## 2023-11-13 ENCOUNTER — ANESTHESIA EVENT (OUTPATIENT)
Dept: ENDOSCOPY | Age: 58
End: 2023-11-13
Payer: MEDICAID

## 2023-11-14 ENCOUNTER — OFFICE VISIT (OUTPATIENT)
Dept: CARDIOLOGY CLINIC | Age: 58
End: 2023-11-14

## 2023-11-14 VITALS
OXYGEN SATURATION: 91 % | DIASTOLIC BLOOD PRESSURE: 66 MMHG | HEIGHT: 60 IN | HEART RATE: 94 BPM | WEIGHT: 293 LBS | SYSTOLIC BLOOD PRESSURE: 108 MMHG | BODY MASS INDEX: 57.52 KG/M2

## 2023-11-14 DIAGNOSIS — Z01.810 PREOP CARDIOVASCULAR EXAM: Primary | ICD-10-CM

## 2023-11-14 DIAGNOSIS — I27.20 PULMONARY HYPERTENSION (HCC): ICD-10-CM

## 2023-11-14 DIAGNOSIS — G47.33 OBSTRUCTIVE SLEEP APNEA: ICD-10-CM

## 2023-11-14 DIAGNOSIS — I50.32 CHRONIC DIASTOLIC HEART FAILURE (HCC): ICD-10-CM

## 2023-11-14 DIAGNOSIS — I10 ESSENTIAL HYPERTENSION: ICD-10-CM

## 2023-11-14 DIAGNOSIS — E78.5 DYSLIPIDEMIA (HIGH LDL; LOW HDL): ICD-10-CM

## 2023-11-14 RX ORDER — LIDOCAINE 40 MG/G
CREAM TOPICAL
COMMUNITY
Start: 2023-09-08

## 2023-11-14 NOTE — PROGRESS NOTES
the pain a few times a week for a few minutes at a time and then it goes away. She reports she occasionally notices associated pain in her arm/back. She notices the pain more with activity. She thinks the pain also could be due to indigestion. She has been taking iron supplements once a day and has tried to increase iron in her diet. She states she has lost about 20 pounds recently because she started a diet. LOV, 1/24/2023, she presents with a cane and on oxygen. She is now on the oxygen all day and night which is new for her. She was previously on oxygen with activity only. She says she was in the hospital twice since last visit. She received lasix in the hospital and says she lost 8 lbs. She feels much better now. She has sleep apnea and has a machine at home. She says she can not use the sleep machine. Today, 11/14/2023, She presents with a rolling walker. She is here for cardiac clearance for gastric sleeve. She reports she needs hip replacement but needs to lose 45 lbs before they will operate. She plans for weight loss surgery in February. She expresses concern in her ability to lose weight. She reports SOB and pain when walking. She reports her PCP is suggesting pain medication but wants the cardiologist to advise on the safety given her cardiac history. She is planning on having an updated sleep study. Patient is taking all cardiac medications as prescribed and tolerates them well. Patient denies current edema, chest pain,    NYHA:  3        Allergies   Allergen Reactions    Bioflavonoids Anaphylaxis    Other      Nickel,citrus, pain meds?     Oxycodone-Acetaminophen Hives    Percocet [Oxycodone-Acetaminophen]      Can take VICODIN    Azithromycin Nausea And Vomiting and Rash    Nickel Hives, Other (See Comments) and Rash     Current Outpatient Medications   Medication Sig Dispense Refill    lidocaine (LMX) 4 % cream       triamcinolone (KENALOG) 0.025 % cream Apply topically      acetaminophen

## 2023-11-14 NOTE — PATIENT INSTRUCTIONS
Your provider has ordered testing for further evaluation. An order/prescription has been included in your paper work. To schedule outpatient testing, contact [facility] ([phone]). Plan: 1.Echocardiogram to assess heart function, strength and valves   Schedule this in February. Make sure you are back on your BIPAP prior to completing this test  Call [phone] to schedule   2. Follow up with your pulmonologist for sleep apnea evaluation  3. Stop lisinopril  4. Start entresto 24-26 HALF TABLET twice daily   Start this 36 hrs after stopping lisinopril  5. Okay to take prescribed pain medication from a cardiac standpoint. Follow up with your other doctors regarding this  6. EKG today  7. Labs in 3-4 weeks BNP, BMP  8.  Follow up with Linda Esteves at [facility] In 3 months

## 2023-11-20 ENCOUNTER — TELEPHONE (OUTPATIENT)
Dept: CARDIOLOGY CLINIC | Age: 58
End: 2023-11-20

## 2023-11-20 NOTE — TELEPHONE ENCOUNTER
PT called and stated she is needing a clearance from [facility] to take a lose dose pain killer. Pts stated Shante Guerrero will not prescribe anything until approved by [facility]. Pt has been taking ibuprofen and wants her to stop. PT stated she needs something for her hip pain until her surgery is completed. PT does not have surgery scheduled yet.

## 2023-11-21 NOTE — TELEPHONE ENCOUNTER
Please see KATRIN plan from her last visit:    Plan: 1.Echocardiogram to assess heart function, strength and valves              Schedule this in February. Make sure you are back on your BIPAP prior to completing this test  Call [phone] to schedule   2. Follow up with your pulmonologist for sleep apnea evaluation  3. Stop lisinopril  4. Start entresto 24-26 HALF TABLET twice daily              Start this 36 hrs after stopping lisinopril  5. Okay to take prescribed pain medication from a cardiac standpoint. Follow up with your other doctors regarding this  6. EKG today  7. Labs in 3-4 weeks BNP, BMP  8. Follow up with Pushpa Geiger at [facility] In 3 months. If echo is unchanged and symptoms stable, she would be okay to proceed with gastric surgery. She would be moderate risk.

## 2023-11-24 NOTE — TELEPHONE ENCOUNTER
Spoke with the patient and relayed results message from BB NP. Patient VU. Letter was created and mailed to the patient with the plan attached from her visit with KATRIN 11/14/23, Dr. Valente Phoenix was also cc'd.

## 2023-11-29 NOTE — PROGRESS NOTES
..1.  Do not eat or drink anything after 12 midnight prior to surgery. This includes no water, chewing gum or mints, except for bowel prep complete per MD.  2.  Take the following pills with a small sip of water on the morning of surgery 12/06/2023.  3.  Aspirin, Ibuprofen, Advil, Naproxen, Vitamin E and other Anti-inflammatory products should be stopped for 5 days before surgery or as directed by your physician. 4.  Check with your doctor regarding stopping Plavix, Coumadin, Lovenox, Fragmin or other blood thinners. 5.  Do not smoke and do not drink alcoholic beverages 24 hours prior to surgery. This includes NA Beer. 6.  You may brush your teeth and gargle the morning of surgery. DO NOT SWALLOW WATER.  7.  You MUST make arrangements for a responsible adult to take you home after your surgery. You will not be allowed to leave alone or drive yourself home. It is strongly suggested someone stay with you the first 24 hours. Your surgery will be cancelled if you do not have a ride home. 8.  A parent/legal guardian must accompany a child scheduled for surgery and plan to stay at the hospital until the child is discharged. Please do not bring other children with you. 9.  Please wear simple, loose fitting clothing to the hospital.  Maximiliano Capes not bring valuables ( money, credit cards, checkbooks, etc.)  Do not wear any makeup (including no eye makeup) or nail polish on your fingers or toes. 10.  Do not wear any jewelry or piercing on the day of surgery. All body piercing jewelry must be removed. 11.  If you have dentures, they will be removed before going to the OR; we will provide you a container. If you wear contact lenses or glasses, they will be removed; please bring a case for them.   15.  Notify your Surgeon if you develop any illness between now and surgery time; cough, cold, fever, sore throat, nausea, vomiting, etc.  Please notify your surgeon if you experience dizziness, shortness of breath or blurred

## 2023-12-04 ENCOUNTER — HOSPITAL ENCOUNTER (OUTPATIENT)
Dept: MAMMOGRAPHY | Age: 58
Discharge: HOME | End: 2023-12-04
Payer: MEDICAID

## 2023-12-04 DIAGNOSIS — Z12.39 SCREENING BREAST EXAMINATION: ICD-10-CM

## 2023-12-04 PROCEDURE — 77063 BREAST TOMOSYNTHESIS BI: CPT

## 2023-12-04 ASSESSMENT — ENCOUNTER SYMPTOMS: SHORTNESS OF BREATH: 1

## 2023-12-04 NOTE — ANESTHESIA PRE PROCEDURE
(+) pneumonia:  COPD (Home O2  2 lpm  prn): severe and no interval change,  shortness of breath:   sleep apnea:       asthma:                            Cardiovascular:    (+) hypertension:, CHF: no interval change, OH:, pulmonary hypertension: no interval change                  Neuro/Psych:   (+) seizures: well controlled, neuromuscular disease:depression/anxiety             GI/Hepatic/Renal:   (+) hiatal hernia, GERD:, morbid obesity (BMI 58)     (-) liver disease and no renal disease       Endo/Other:    (+) hypothyroidism::..    (-) diabetes mellitus               Abdominal:             Vascular: negative vascular ROS. Other Findings:           Anesthesia Plan      MAC     ASA 3       Induction: intravenous. Anesthetic plan and risks discussed with patient. Plan discussed with CRNA.                 Slade Roberto MD   12/4/2023

## 2023-12-06 ENCOUNTER — ANESTHESIA (OUTPATIENT)
Dept: ENDOSCOPY | Age: 58
End: 2023-12-06
Payer: MEDICAID

## 2023-12-06 ENCOUNTER — HOSPITAL ENCOUNTER (OUTPATIENT)
Age: 58
Setting detail: OUTPATIENT SURGERY
Discharge: HOME | End: 2023-12-06
Attending: INTERNAL MEDICINE | Admitting: INTERNAL MEDICINE
Payer: MEDICAID

## 2023-12-06 VITALS
SYSTOLIC BLOOD PRESSURE: 114 MMHG | TEMPERATURE: 97.2 F | OXYGEN SATURATION: 96 % | BODY MASS INDEX: 57.52 KG/M2 | WEIGHT: 293 LBS | HEIGHT: 60 IN | DIASTOLIC BLOOD PRESSURE: 72 MMHG | HEART RATE: 84 BPM | RESPIRATION RATE: 13 BRPM

## 2023-12-06 PROCEDURE — 7100000010 HC PHASE II RECOVERY - FIRST 15 MIN: Performed by: INTERNAL MEDICINE

## 2023-12-06 PROCEDURE — 2500000003 HC RX 250 WO HCPCS: Performed by: ANESTHESIOLOGY

## 2023-12-06 PROCEDURE — 3700000000 HC ANESTHESIA ATTENDED CARE: Performed by: INTERNAL MEDICINE

## 2023-12-06 PROCEDURE — 7100000011 HC PHASE II RECOVERY - ADDTL 15 MIN: Performed by: INTERNAL MEDICINE

## 2023-12-06 PROCEDURE — 2709999900 HC NON-CHARGEABLE SUPPLY: Performed by: INTERNAL MEDICINE

## 2023-12-06 PROCEDURE — 6360000002 HC RX W HCPCS: Performed by: NURSE ANESTHETIST, CERTIFIED REGISTERED

## 2023-12-06 PROCEDURE — 3700000001 HC ADD 15 MINUTES (ANESTHESIA): Performed by: INTERNAL MEDICINE

## 2023-12-06 PROCEDURE — 3609027000 HC COLONOSCOPY: Performed by: INTERNAL MEDICINE

## 2023-12-06 RX ORDER — PROPOFOL 10 MG/ML
INJECTION, EMULSION INTRAVENOUS PRN
Status: DISCONTINUED | OUTPATIENT
Start: 2023-12-06 | End: 2023-12-06 | Stop reason: SDUPTHER

## 2023-12-06 RX ORDER — SODIUM CHLORIDE 9 MG/ML
INJECTION, SOLUTION INTRAVENOUS PRN
Status: DISCONTINUED | OUTPATIENT
Start: 2023-12-06 | End: 2023-12-06 | Stop reason: HOSPADM

## 2023-12-06 RX ORDER — FAMOTIDINE 10 MG/ML
20 INJECTION, SOLUTION INTRAVENOUS ONCE
Status: COMPLETED | OUTPATIENT
Start: 2023-12-06 | End: 2023-12-06

## 2023-12-06 RX ORDER — SODIUM CHLORIDE 0.9 % (FLUSH) 0.9 %
5-40 SYRINGE (ML) INJECTION EVERY 12 HOURS SCHEDULED
Status: DISCONTINUED | OUTPATIENT
Start: 2023-12-06 | End: 2023-12-06 | Stop reason: HOSPADM

## 2023-12-06 RX ORDER — SODIUM CHLORIDE, SODIUM LACTATE, POTASSIUM CHLORIDE, CALCIUM CHLORIDE 600; 310; 30; 20 MG/100ML; MG/100ML; MG/100ML; MG/100ML
INJECTION, SOLUTION INTRAVENOUS CONTINUOUS
Status: DISCONTINUED | OUTPATIENT
Start: 2023-12-06 | End: 2023-12-06 | Stop reason: HOSPADM

## 2023-12-06 RX ORDER — SODIUM CHLORIDE 0.9 % (FLUSH) 0.9 %
5-40 SYRINGE (ML) INJECTION PRN
Status: DISCONTINUED | OUTPATIENT
Start: 2023-12-06 | End: 2023-12-06 | Stop reason: HOSPADM

## 2023-12-06 RX ADMIN — PROPOFOL 100 MCG/KG/MIN: 10 INJECTION, EMULSION INTRAVENOUS at 11:19

## 2023-12-06 RX ADMIN — FAMOTIDINE 20 MG: 10 INJECTION, SOLUTION INTRAVENOUS at 10:57

## 2023-12-06 RX ADMIN — PROPOFOL 50 MG: 10 INJECTION, EMULSION INTRAVENOUS at 11:18

## 2023-12-06 ASSESSMENT — PAIN DESCRIPTION - DESCRIPTORS
DESCRIPTORS: ACHING
DESCRIPTORS: OTHER (COMMENT)

## 2023-12-06 ASSESSMENT — COPD QUESTIONNAIRES: CAT_SEVERITY: SEVERE

## 2023-12-06 ASSESSMENT — PAIN DESCRIPTION - PAIN TYPE: TYPE: OTHER (COMMENT)

## 2023-12-06 ASSESSMENT — PAIN SCALES - GENERAL: PAINLEVEL_OUTOF10: 0

## 2023-12-06 ASSESSMENT — PAIN DESCRIPTION - ONSET: ONSET: OTHER (COMMENT)

## 2023-12-06 ASSESSMENT — PAIN DESCRIPTION - ORIENTATION: ORIENTATION: OTHER (COMMENT)

## 2023-12-06 ASSESSMENT — PAIN DESCRIPTION - FREQUENCY: FREQUENCY: OTHER (COMMENT)

## 2023-12-06 ASSESSMENT — PAIN - FUNCTIONAL ASSESSMENT
PAIN_FUNCTIONAL_ASSESSMENT: PREVENTS OR INTERFERES WITH MANY ACTIVE NOT PASSIVE ACTIVITIES
PAIN_FUNCTIONAL_ASSESSMENT: 0-10

## 2023-12-06 ASSESSMENT — PAIN DESCRIPTION - LOCATION: LOCATION: OTHER (COMMENT)

## 2023-12-06 NOTE — H&P
History and Physical / Pre-Sedation Assessment    Patient:  Nikolay Gonzalez   :   1965     Intended Procedure:  Colonoscopy    HPI: 66year old female with positive cologuard    Past Medical History:   Diagnosis Date    Anemia     Anxiety     CHF (congestive heart failure) (HCC)     Depression     Fibromyalgia     GERD (gastroesophageal reflux disease)     Heart palpitations     Hiatal hernia     Hypertension     Irritable bowel syndrome     Mild intermittent asthma with exacerbation     Osteoarthritis     Oxygen dependent     O2 at 2L/NC    Panic attacks     Pneumonia     Pulmonary infiltrates 2019    Seasonal allergies     Seizure disorder ([address])     Sleep apnea     does not use CPAP    Spastic colon     Thyroid disease     Urinary incontinence      Past Surgical History:   Procedure Laterality Date    BLADDER SURGERY      BRAIN SURGERY      no shunt, patient states a piece of chipped bone was removed    BRONCHOSCOPY N/A 2019    BRONCHOSCOPY ALVEOLAR LAVAGE performed by Harrison Arreola MD at [address]  2019    BRONCHOSCOPY THERAPUTIC ASPIRATION INITIAL performed by Harrison Arreola MD at [address] N/A 05/15/2019    BRONCHOSCOPY ALVEOLAR LAVAGE performed by Chu Smith MD at [address]  05/15/2019    BRONCHOSCOPY THERAPUTIC ASPIRATION INITIAL performed by Chu Smith MD at [address] [place] [address]      hip    KNEE SURGERY      OTHER SURGICAL HISTORY  2013    cystoscopy, urethral dilatation    SHOULDER SURGERY      r rotator cuff repair    TOTAL HIP ARTHROPLASTY      Left    UPPER GASTROINTESTINAL ENDOSCOPY N/A 2023    EGD BIOPSY performed by Fredrick Rangel MD at [facility] 2023    EGD POLYP SNARE performed by Fredrick Rangel MD at [address] [place] [address] yes

## 2023-12-06 NOTE — BRIEF OP NOTE
Brief Postoperative Note      Patient: Brit Bolaños  YOB: 1965  MRN: [id number]    Date of Procedure: 12/6/2023    Pre-Op Diagnosis Codes:     * Positive FIT (fecal immunochemical test) [R19.5]    Post-Op Diagnosis:  normal colon       Procedure(s):  COLON W/ANES.  (11:00)    Surgeon(s):  Adam Marte MD    Assistant:  * No surgical staff found *    Anesthesia: Monitor Anesthesia Care    Estimated Blood Loss (mL): Minimal    Complications: None    Specimens:   * No specimens in log *    Implants:  * No implants in log *      Drains: * No LDAs found *    Findings: normal colon    Recommendation  Repeat in 10y      Electronically signed by Adam Marte MD on 12/6/2023 at 11:33 AM

## 2023-12-06 NOTE — ANESTHESIA POSTPROCEDURE EVALUATION
Department of Anesthesiology  Postprocedure Note    Patient: Kareen Posey  MRN: [id number]  YOB: 1965  Date of evaluation: 12/6/2023      Procedure Summary       Date: 12/06/23 Room / Location: [address] [place] [address],[address] / [facility]    Anesthesia Start: 1114 Anesthesia Stop: 2999    Procedure: COLON W/ANES. (11:00) Diagnosis:       Positive FIT (fecal immunochemical test)      (Positive FIT (fecal immunochemical test) [R19.5])    Surgeons: Angel Vanegas MD Responsible Provider: Freddie Zavala MD    Anesthesia Type: MAC ASA Status: 3            Anesthesia Type: No value filed.     Tory Phase I: Tory Score: 10    Tory Phase II: Tory Score: 10      Anesthesia Post Evaluation    Patient location during evaluation: PACU  Level of consciousness: awake  Airway patency: patent  Nausea & Vomiting: no nausea  Complications: no  Cardiovascular status: blood pressure returned to baseline  Respiratory status: acceptable  Hydration status: euvolemic  Pain management: adequate

## 2023-12-06 NOTE — OP NOTE
ulcers, pseudomembranes, polyps,  or masses. Retroflexed view of the rectum showed internal hemorrhoids. SUMMARY:  1. Normal colon to cecum. 2.  No polyps or masses. 3.  Internal hemorrhoids. RECOMMENDATIONS:  1. Discharge the patient to home when standard parameters are met. 2.  Encourage high-fiber diet. 3.  Repeat colonoscopy in 10 years for screening purposes. 4.  Followup as needed.     EBL: <5mL    Ignacio Lopez MD    D: 12/06/2023 11:37:03       T: 12/06/2023 18:27:35     [id number]  Job#: [id number]     Doc#: [id number]    CC:  MD Aris Escalera MD

## 2023-12-06 NOTE — DISCHARGE INSTRUCTIONS
[phone]. Repeat Colonoscopy in 10 years. ANESTHESIA DISCHARGE INSTRUCTIONS    You are under the influence of drugs- do not drink alcohol, drive a car, operate machinery(such as power tools, kitchen appliances, etc), sign legal documents, or make any important decisions for 24 hours (or while on pain medications). Children should not ride bikes or [place] or play on gym sets  for 24 hours after surgery. A responsible adult should be with you for 24 hours. Rest at home today- increase activity as tolerated. Progress slowly to a regular diet unless your physician has instructed you otherwise. Drink plenty of water. CALL YOUR DOCTOR IF YOU:  Have moderate to severe nausea or vomiting AND are unable to hold down fluids or prescribed medications. Have bright red bloody drainage from your dressing that won't stop oozing. Do not get relief with your pain medication    NORMAL (POSSIBLE) SIDE EFFECTS FROM ANESTHESIA:     Confusion, temporary memory loss, delayed reaction times in the first 24 hours  Lightheadedness, dizziness, difficulty focusing, blurred vision  Nausea/vomiting can happen  Shivering, feeling cold, sore throat, cough and muscle aches should stop within 24-48 hours  Trouble urinating - call your surgeon if it has been more than 8 hrs  Bruising or soreness at the IV site - call if it remains red, firm or there is drainage             FEMALES OF CHILDBEARING AGE WHO ARE TAKING BIRTH CONTROL PILLS:  You may have received a medication during your procedure that interferes with the   actions of birth control pills (Bridion or Emend). Use some other kind of birth control in addition to your pills, like a condom, for 1 month after your procedure to prevent unwanted pregnancy. The following instructions are to be followed if you have a known history or diagnosis of sleep apnea:   For all sleep apnea patients:  ? Sleep on your side or sitting up in a chair whenever possible, especially the

## 2023-12-13 ENCOUNTER — TELEPHONE (OUTPATIENT)
Dept: CARDIOLOGY CLINIC | Age: 58
End: 2023-12-13

## 2023-12-13 NOTE — TELEPHONE ENCOUNTER
Pt called to clarify dosage on Enteresto. On med list sts   sacubitril-valsartan (ENTRESTO) 24-26 MG per tablet  but pt has been taking 1/2 tablet. Please clarify.  TY

## 2024-01-10 ENCOUNTER — OFFICE VISIT (OUTPATIENT)
Dept: PULMONOLOGY | Age: 59
End: 2024-01-10
Payer: MEDICAID

## 2024-01-10 VITALS
SYSTOLIC BLOOD PRESSURE: 152 MMHG | HEIGHT: 60 IN | HEART RATE: 94 BPM | RESPIRATION RATE: 16 BRPM | OXYGEN SATURATION: 95 % | TEMPERATURE: 97.7 F | WEIGHT: 293 LBS | DIASTOLIC BLOOD PRESSURE: 83 MMHG | BODY MASS INDEX: 57.52 KG/M2

## 2024-01-10 DIAGNOSIS — J44.9: ICD-10-CM

## 2024-01-10 DIAGNOSIS — G47.33 OSA (OBSTRUCTIVE SLEEP APNEA): ICD-10-CM

## 2024-01-10 DIAGNOSIS — J45.40: ICD-10-CM

## 2024-01-10 DIAGNOSIS — I27.20 PULMONARY HTN (HCC): ICD-10-CM

## 2024-01-10 DIAGNOSIS — J98.4 RESTRICTIVE LUNG DISEASE: Primary | ICD-10-CM

## 2024-01-10 PROCEDURE — 3023F SPIROM DOC REV: CPT | Performed by: INTERNAL MEDICINE

## 2024-01-10 PROCEDURE — 3079F DIAST BP 80-89 MM HG: CPT | Performed by: INTERNAL MEDICINE

## 2024-01-10 PROCEDURE — 3017F COLORECTAL CA SCREEN DOC REV: CPT | Performed by: INTERNAL MEDICINE

## 2024-01-10 PROCEDURE — G8417 CALC BMI ABV UP PARAM F/U: HCPCS | Performed by: INTERNAL MEDICINE

## 2024-01-10 PROCEDURE — G8427 DOCREV CUR MEDS BY ELIG CLIN: HCPCS | Performed by: INTERNAL MEDICINE

## 2024-01-10 PROCEDURE — 1036F TOBACCO NON-USER: CPT | Performed by: INTERNAL MEDICINE

## 2024-01-10 PROCEDURE — G8484 FLU IMMUNIZE NO ADMIN: HCPCS | Performed by: INTERNAL MEDICINE

## 2024-01-10 PROCEDURE — 3077F SYST BP >= 140 MM HG: CPT | Performed by: INTERNAL MEDICINE

## 2024-01-10 PROCEDURE — 99214 OFFICE O/P EST MOD 30 MIN: CPT | Performed by: INTERNAL MEDICINE

## 2024-01-10 ASSESSMENT — ENCOUNTER SYMPTOMS
ALLERGIC/IMMUNOLOGIC NEGATIVE: 1
RESPIRATORY NEGATIVE: 1
EYES NEGATIVE: 1
GASTROINTESTINAL NEGATIVE: 1

## 2024-01-10 NOTE — PROGRESS NOTES
MA Communication:  The following orders are received by verbal communication from Mello Rodriguez MD    Orders include:  follow up with Dr Rodriguez in 3-4 months

## 2024-01-10 NOTE — PROGRESS NOTES
Kassy Sutton (: 1965 ) is a 58 y.o. female here for an evaluation of   Chief Complaint   Patient presents with    Follow-up     Patient is here to discuss surgery clearance. She has not had her sleep study done.         ASSESSMENT/PLAN:   Diagnosis Orders   1. Restrictive lung disease        2. Pulmonary HTN (HCC)        3. KRYSTIAN (obstructive sleep apnea)        4. Asthma with irreversible airway obstruction, moderate persistent, uncomplicated (HCC)          KRYSTIAN          Has been given cpap but unable to tolerate and stopped using    Will need bipap  Will do bipap titration with oxygen- unable to do bipap titration b/c of hip issues    Will need to be done with nasal mask or pillow!!!!!  Will order bipap    Dme is [facility]    Restrictive lung disease  Previous PFT from  showed a severe restrictive lung defect      PFT done 3/2/23  Shows severe restrictive lung  TLC of 27%, severe restrictive lung defect with a normal DLCO and normal spirometry  FVC of 51%    6-minute walk test was done showing a desaturation and requirement of 2 L of oxygen                Wearing 2-3 lpm      Dme is [facility]              Asthma????  No eosinophils    Continue with  Albuterol MDI-as needed  Albuterol Nebulizer-we will use this as needed, this has been the most effective for the patient        3/2/23  FENO  was 7  I do not think the patient has asthma        Diastolic dysfunction  Last echo done 2023      Summary   Limited only f/u for LVEF, RVF and right sided pressures.   Normal left ventricular systolic function with ejection fraction of 55-60%.   No regional wall motion abnormalites are seen.   Compared to previous study from 2022 no changes noted.   Right ventricular systolic function visually is mildly reduced .   Moderate tricuspid regurgitation.   Systolic pulmonic artery pressure (SPAP) is estimated at 56 mmHg consistent   with moderate to severe pulmonary hypertension (Right atrial pressure

## 2024-01-10 NOTE — PATIENT INSTRUCTIONS
ASSESSMENT/PLAN:   Diagnosis Orders   1. Restrictive lung disease        2. Pulmonary HTN (HCC)        3. KRYSTIAN (obstructive sleep apnea)        4. Asthma with irreversible airway obstruction, moderate persistent, uncomplicated (HCC)          KRYSTIAN          Has been given cpap but unable to tolerate and stopped using    Will need bipap  Will do bipap titration with oxygen- unable to do bipap titration b/c of hip issues    Will need to be done with nasal mask or pillow!!!!!  Will order bipap    Dme is [facility]    Restrictive lung disease  Previous PFT from April 2020 2019 showed a severe restrictive lung defect      PFT done 3/2/23  Shows severe restrictive lung  TLC of 27%, severe restrictive lung defect with a normal DLCO and normal spirometry  FVC of 51%    6-minute walk test was done showing a desaturation and requirement of 2 L of oxygen                Wearing 2-3 lpm      Dme is [facility]              Asthma????  No eosinophils    Continue with  Albuterol MDI-as needed  Albuterol Nebulizer-we will use this as needed, this has been the most effective for the patient        3/2/23  FENO  was 7  I do not think the patient has asthma        Diastolic dysfunction  Last echo done 1/14/2023      Summary   Limited only f/u for LVEF, RVF and right sided pressures.   Normal left ventricular systolic function with ejection fraction of 55-60%.   No regional wall motion abnormalites are seen.   Compared to previous study from 7- no changes noted.   Right ventricular systolic function visually is mildly reduced .   Moderate tricuspid regurgitation.   Systolic pulmonic artery pressure (SPAP) is estimated at 56 mmHg consistent   with moderate to severe pulmonary hypertension (Right atrial pressure of 8   mmHg).  Followed by Dr. Covarrubias at the pul hypertension clinic  Last seen on 1/24/2023      Pulmonary hypertension-most consistent with diastolic  CTPA done 10/14/2022  Impression   1. No pulmonary embolus.   2. Low lung

## 2024-01-18 ENCOUNTER — OFFICE VISIT (OUTPATIENT)
Dept: BARIATRICS/WEIGHT MGMT | Age: 59
End: 2024-01-18
Payer: MEDICAID

## 2024-01-18 VITALS
OXYGEN SATURATION: 95 % | RESPIRATION RATE: 16 BRPM | HEIGHT: 60 IN | HEART RATE: 100 BPM | WEIGHT: 293 LBS | BODY MASS INDEX: 57.52 KG/M2 | SYSTOLIC BLOOD PRESSURE: 130 MMHG | DIASTOLIC BLOOD PRESSURE: 78 MMHG

## 2024-01-18 DIAGNOSIS — E78.5 DYSLIPIDEMIA (HIGH LDL; LOW HDL): ICD-10-CM

## 2024-01-18 DIAGNOSIS — J98.4 RESTRICTIVE LUNG DISEASE: ICD-10-CM

## 2024-01-18 DIAGNOSIS — I10 ESSENTIAL HYPERTENSION: ICD-10-CM

## 2024-01-18 DIAGNOSIS — E66.01 MORBID OBESITY WITH BMI OF 60.0-69.9, ADULT (HCC): Primary | ICD-10-CM

## 2024-01-18 DIAGNOSIS — K21.9 CHRONIC GERD: ICD-10-CM

## 2024-01-18 DIAGNOSIS — E66.01 MORBID OBESITY WITH BMI OF 50.0-59.9, ADULT (HCC): ICD-10-CM

## 2024-01-18 PROCEDURE — 1036F TOBACCO NON-USER: CPT | Performed by: SURGERY

## 2024-01-18 PROCEDURE — 3075F SYST BP GE 130 - 139MM HG: CPT | Performed by: SURGERY

## 2024-01-18 PROCEDURE — G8484 FLU IMMUNIZE NO ADMIN: HCPCS | Performed by: SURGERY

## 2024-01-18 PROCEDURE — G8427 DOCREV CUR MEDS BY ELIG CLIN: HCPCS | Performed by: SURGERY

## 2024-01-18 PROCEDURE — 3017F COLORECTAL CA SCREEN DOC REV: CPT | Performed by: SURGERY

## 2024-01-18 PROCEDURE — 3078F DIAST BP <80 MM HG: CPT | Performed by: SURGERY

## 2024-01-18 PROCEDURE — G8417 CALC BMI ABV UP PARAM F/U: HCPCS | Performed by: SURGERY

## 2024-01-18 PROCEDURE — 99214 OFFICE O/P EST MOD 30 MIN: CPT | Performed by: SURGERY

## 2024-01-18 NOTE — PROGRESS NOTES
Kassy Sutton gained 1.6 lbs over 3 months. Struggling with hip pain and limited mobility waiting for hip sx but needs to lose 60 lbs prior. Acknowledged not in good mindset at this time.     Wake up 12 PM  Breakfast: 1 PM: Ham/cheese sandwich w/ dominique + pickle + fruit  Snack: None  Lunch: 3-3:30 PM: Boost Original Protein Shake (stomach upset) + string cheese  Snack: None  Dinner: 7 PM: Baked chix + broccoli/cauliflower/carrots   Snack: Orange/Apple/Banana/Grapes + sometimes string cheese OR chocolates     Is pt consuming smaller portions?  Yes    Is pt consuming at least 64 oz of fluids per day?  4 bottles water/sf flavors  - has 60 oz fluid restriction     Is pt consuming carbonated, caffeinated, or sugary beverages?  Hot chocolate    Has pt sampled Unjury and/or Nectar protein? Yes likes strawberry and chocolate      Has patient attended a support group? Not scheduled     Exercise: Limited d/t hip - uses walker     Plan/Recommendations:   - Switch to lower sugar protein drink  - Schedule Support Group      Handouts: SG24, Protein shakes     Jane Melvin RD, LD    
patient that compliance with pre- and post op diet and other recommendations are integral part to improve the chances of successful weight loss and also not following it could end with serious health complications.   Some strategies discussed today include but not limited to : 30/30/30 minutes rule, food diary, avoid fast food and packing/planing ahead, & increasing exercise.    Also stressed to the patient importance of taking the multivitamins as instructed, otherwise risk significant complications.      The visit today, included any number of the following: Bariatric Preoperative work up/protocols, review of labs, imaging, provider notes, outside hospital records, performing examination/evaluation, counseling patient and/or family, ordering medications/tests, placing referrals and communication with referring physicians, coordination of care; discussing dietary plan/recall with the patient as well with registered dietitian and documentation in the EHR. Of note, the above was done during same day of the actual patient encounter.      Kassy is here for her presurgical visit.  Patient still working with Dr. Rodriguez for her sleep apnea and pulmonary issues.  Patient informed us that she is really struggling emotionally because of her hip pain.  Patient wants to establish a psychiatrist before proceeding with surgery which I think is absolutely crucial.  I think is a great idea.  Patient also wants to maybe see a pain specialist with also think is an excellent idea.  Will see the patient back in 3 months for continued follow-up.      We discussed how her excess weight affects her overall health and importance of weight loss, healthy diet and active lifestyle to alleviate those co morbid conditions, otherwise risk deterioration.       Morbid Obesity: Body mass index is 58.9 kg/m².  [x] Continue to make dietary and lifestyle modifications.  [x] Plan for Future laparoscopic sleeve gastrectomy.  [x] Return for follow-up next

## 2024-01-22 ENCOUNTER — TELEPHONE (OUTPATIENT)
Dept: CARDIOLOGY CLINIC | Age: 59
End: 2024-01-22

## 2024-01-23 NOTE — TELEPHONE ENCOUNTER
Spoke with pt. She says her insurance will not cover entresto. Informed pt of samples policy. She says she has not started PA. Sample ready for  at [facility] with PA paperwork to complete and return. Pt V/U

## 2024-01-23 NOTE — TELEPHONE ENCOUNTER
Pt states she has 3 pills left and would like to know if she can get samples of medication while waiting on PA. Please advise.

## 2024-01-25 ENCOUNTER — TELEPHONE (OUTPATIENT)
Dept: CARDIOLOGY CLINIC | Age: 59
End: 2024-01-25

## 2024-01-25 NOTE — TELEPHONE ENCOUNTER
01/25 pt presented self in office w/ patient assistance form for NOVARTIS, placed in KATRIN folder.

## 2024-01-31 ENCOUNTER — TELEMEDICINE (OUTPATIENT)
Dept: BARIATRICS/WEIGHT MGMT | Age: 59
End: 2024-01-31

## 2024-01-31 DIAGNOSIS — E66.01 MORBID OBESITY WITH BMI OF 60.0-69.9, ADULT (HCC): Primary | ICD-10-CM

## 2024-01-31 NOTE — PROGRESS NOTES
Kassy Sutton is a 58 y.o. female who presents today for individual weight management counseling.  Patient is referred to therapist by Dr. Haddad. Patient meets criteria for Morbid Obesity with BMI of 60.0-69.9    My mind is not set on this surgery right now. I'm so miserable with my hip pain. I'm on a walker now. I'm a mess right now. I don't know what to do, I just don't know. They want me to see a pain management specialist but I don't know how to do that. I guess I should ask my doctor. I don't know if I can do what I need to do for the surgery. I'm scared and nervous. My mind is always on my hip. I know my mind should be on this surgery, but it's not. My mind is always scared about my hip.\"    New Goals  Patient is going to have a conversation with her PCP for a pain management referral and schedule as soon as possible.    Kassy Sutton, was evaluated through a synchronous (real-time) audio-video encounter. The patient (or guardian if applicable) is aware that this is a billable service, which includes applicable co-pays. This Virtual Visit was conducted with patient's (and/or legal guardian's) consent. Patient identification was verified, and a caregiver was present when appropriate.   The patient was located at home  Provider was located at a facility office          21 minutes was spent in direct counseling with patient    EVIE Leyva

## 2024-04-30 ENCOUNTER — HOSPITAL ENCOUNTER (OUTPATIENT)
Dept: CARDIOLOGY | Age: 59
Discharge: HOME | End: 2024-04-30
Payer: MEDICAID

## 2024-04-30 DIAGNOSIS — I27.20 PULMONARY HYPERTENSION (HCC): ICD-10-CM

## 2024-04-30 DIAGNOSIS — I50.32 CHRONIC DIASTOLIC HEART FAILURE (HCC): ICD-10-CM

## 2024-04-30 PROCEDURE — 93306 TTE W/DOPPLER COMPLETE: CPT

## 2024-05-03 ENCOUNTER — TELEPHONE (OUTPATIENT)
Dept: CARDIOLOGY CLINIC | Age: 59
End: 2024-05-03

## 2024-05-03 NOTE — TELEPHONE ENCOUNTER
----- Message from Nell Stuart MD sent at 5/3/2024  1:06 PM EDT -----  Please call patient and let her know that her echo looks very good.  Heart function is normal.  Valves look good.  No changes.  KATRIN

## 2024-06-12 ENCOUNTER — TELEPHONE (OUTPATIENT)
Dept: CARDIOLOGY CLINIC | Age: 59
End: 2024-06-12

## 2024-06-12 NOTE — TELEPHONE ENCOUNTER
Spoke with pt. She says she was taking lasix 40 BID on M, W, F, Sun. On T, TH, Sat she was taking lasix 40 once daily. For the past 3 days she stopped taking lasix and reports feeling good. She reports urinary incontinence every time she takes lasix. She says she has been on lasix for 2 years after being discharged at the hospital. She reports she does not feel any different when she takes lasix. She denies edema, SOB. Current weight 295 lbs. Please advise    JOSIAH Cui 11/14/23

## 2024-06-12 NOTE — TELEPHONE ENCOUNTER
I cannot advise this since she has not been seen since November.  She was supposed to follow up with Elisha 3 months ago.  Needs an appt to sort this out, but I would not advise reducing lasix any further without an OV to review everything.  KATRIN

## 2024-06-12 NOTE — TELEPHONE ENCOUNTER
Pt contacted office in regards to lasix, pt states she had stopped taking evening does and when she takes her morning dose she can't make it to the bathroom in time and urinates in her pants. Pt would like to know if she can take a few times a week. Pt states she feels no different then when she wasn't taking medication.

## 2024-08-12 NOTE — PROGRESS NOTES
[facility]   Cardiac Follow-up    Primary Care Doctor:  Aminata Wright MD    No chief complaint on file.       History of Present Illness:   I had the pleasure of seeing Kassy Sutton in follow up for chronic diastolic heart failure, hypertension, KRYSTIAN.    She has a history of RV enlargement, palpitations, HTN and KRYSTIAN with CPAP. She had a stress test on 6/18/2020 which was abnormal. She underwent a right and left heart cath on 6/26/2020 which showed normal coronaries with elevated PCWP. Her most recent echocardiogram from 6/26/2020 showed an EF of 55-60%.     Since last visit, seen Dr. Stuart 1/24/2023, remains on oxygen.   Spironolactone (aldactone) was increased to 25mg 4 days a week.  She is having a lot hip pain. Needing replaced, but needs to lose 6-lbs.   She forgot her oxygen today. She usually wears the oxygen with sleep and activity.   Taking ibuprofen - helps in the evening to help the hip pain. She will take 800mg once a day and at times BID.   Continues with shortness of breath with max exertion.     Kassy Sutton describes symptoms including dyspnea on exertion, fatigue, lower extremity edema stable. Worse by the end of the day improved by the morning    Since last visit, echo 4/2024 completed.   She doesn't take the water pills due to incontinence. She can't take it at night either due to nocutira.   She quit taking them at night   Taking sun, mon, wed, Friday- takes the Spironolactone (aldactone) and lasix these days for the last 6 weeks   BLE edema is stable with this regimen.   Edema worsens throught hte day.   She willl some days skip the Sunday water pills.     Kassy Sutton describes symptoms including dyspnea on exertion, lower extremity edema but denies palpitations.     Home weights: doesn't have a scale     Taking medications as prescribed: Yes  Able to afford medications: Yes    Past Medical History:   has a past medical history of Anemia, Anxiety, CHF (congestive heart failure)

## 2024-08-13 ENCOUNTER — OFFICE VISIT (OUTPATIENT)
Dept: CARDIOLOGY CLINIC | Age: 59
End: 2024-08-13
Payer: MEDICAID

## 2024-08-13 VITALS
OXYGEN SATURATION: 94 % | BODY MASS INDEX: 57.52 KG/M2 | SYSTOLIC BLOOD PRESSURE: 138 MMHG | WEIGHT: 293 LBS | DIASTOLIC BLOOD PRESSURE: 70 MMHG | HEIGHT: 60 IN | HEART RATE: 90 BPM

## 2024-08-13 DIAGNOSIS — I27.20 PULMONARY HYPERTENSION (HCC): ICD-10-CM

## 2024-08-13 DIAGNOSIS — I50.9 ACUTE HEART FAILURE, UNSPECIFIED HEART FAILURE TYPE (HCC): ICD-10-CM

## 2024-08-13 DIAGNOSIS — I50.32 CHRONIC DIASTOLIC HEART FAILURE (HCC): Primary | ICD-10-CM

## 2024-08-13 PROCEDURE — 1036F TOBACCO NON-USER: CPT | Performed by: NURSE PRACTITIONER

## 2024-08-13 PROCEDURE — G8427 DOCREV CUR MEDS BY ELIG CLIN: HCPCS | Performed by: NURSE PRACTITIONER

## 2024-08-13 PROCEDURE — 3078F DIAST BP <80 MM HG: CPT | Performed by: NURSE PRACTITIONER

## 2024-08-13 PROCEDURE — 3017F COLORECTAL CA SCREEN DOC REV: CPT | Performed by: NURSE PRACTITIONER

## 2024-08-13 PROCEDURE — 99214 OFFICE O/P EST MOD 30 MIN: CPT | Performed by: NURSE PRACTITIONER

## 2024-08-13 PROCEDURE — G8417 CALC BMI ABV UP PARAM F/U: HCPCS | Performed by: NURSE PRACTITIONER

## 2024-08-13 PROCEDURE — 3075F SYST BP GE 130 - 139MM HG: CPT | Performed by: NURSE PRACTITIONER

## 2024-08-13 RX ORDER — FUROSEMIDE 40 MG/1
TABLET ORAL
Qty: 180 TABLET | Refills: 3
Start: 2024-08-13

## 2024-08-13 NOTE — PATIENT INSTRUCTIONS
Continue daily weights- obtain a scale; recommend reaching out to your  to get a new scale.   Adjust the entresto 1 tab twice a day  Continue current Spironolactone (aldactone) dose 4 days a week   Continue lasix  3-4 days a week   Labs in about 1-2 weeks - not fasting   Stay as active as possible  Follow up Dr. Stuart in 3 months   Follow up with primary care regarding weight loss meds

## 2024-12-04 ENCOUNTER — TELEPHONE (OUTPATIENT)
Dept: CARDIOLOGY CLINIC | Age: 59
End: 2024-12-04

## 2025-01-09 NOTE — PATIENT INSTRUCTIONS
Plan:  Continue daily weights  Check iron levels and cbc   No changes to medications today  Follow up 6 months
Yes

## 2025-01-30 ENCOUNTER — APPOINTMENT (OUTPATIENT)
Dept: GENERAL RADIOLOGY | Age: 60
End: 2025-01-30
Payer: MEDICAID

## 2025-01-30 ENCOUNTER — HOSPITAL ENCOUNTER (EMERGENCY)
Age: 60
Discharge: HOME | End: 2025-01-30
Payer: MEDICAID

## 2025-01-30 ENCOUNTER — APPOINTMENT (OUTPATIENT)
Dept: CT IMAGING | Age: 60
End: 2025-01-30
Payer: MEDICAID

## 2025-01-30 VITALS
OXYGEN SATURATION: 96 % | SYSTOLIC BLOOD PRESSURE: 143 MMHG | HEIGHT: 60 IN | RESPIRATION RATE: 13 BRPM | DIASTOLIC BLOOD PRESSURE: 73 MMHG | BODY MASS INDEX: 57.52 KG/M2 | HEART RATE: 107 BPM | TEMPERATURE: 98.2 F | WEIGHT: 293 LBS

## 2025-01-30 DIAGNOSIS — J45.909 REACTIVE AIRWAY DISEASE WITHOUT COMPLICATION, UNSPECIFIED ASTHMA SEVERITY, UNSPECIFIED WHETHER PERSISTENT: ICD-10-CM

## 2025-01-30 DIAGNOSIS — I50.9 CHRONIC CONGESTIVE HEART FAILURE, UNSPECIFIED HEART FAILURE TYPE (HCC): ICD-10-CM

## 2025-01-30 DIAGNOSIS — R06.09 DOE (DYSPNEA ON EXERTION): ICD-10-CM

## 2025-01-30 DIAGNOSIS — J18.9 PNEUMONIA OF RIGHT LOWER LOBE DUE TO INFECTIOUS ORGANISM: Primary | ICD-10-CM

## 2025-01-30 LAB
ALBUMIN SERPL-MCNC: 4.1 G/DL (ref 3.4–5)
ALBUMIN/GLOB SERPL: 1.3 {RATIO} (ref 1.1–2.2)
ALP SERPL-CCNC: 116 U/L (ref 40–129)
ALT SERPL-CCNC: 22 U/L (ref 10–40)
ANION GAP SERPL CALCULATED.3IONS-SCNC: 10 MMOL/L (ref 3–16)
AST SERPL-CCNC: 18 U/L (ref 15–37)
BACTERIA URNS QL MICRO: ABNORMAL /HPF
BASOPHILS # BLD: 0 K/UL (ref 0–0.2)
BASOPHILS NFR BLD: 0.2 %
BILIRUB SERPL-MCNC: 0.6 MG/DL (ref 0–1)
BILIRUB UR QL STRIP.AUTO: NEGATIVE
BUN SERPL-MCNC: 14 MG/DL (ref 7–20)
CALCIUM SERPL-MCNC: 9.3 MG/DL (ref 8.3–10.6)
CHLORIDE SERPL-SCNC: 103 MMOL/L (ref 99–110)
CLARITY UR: CLEAR
CO2 SERPL-SCNC: 29 MMOL/L (ref 21–32)
COLOR UR: YELLOW
CREAT SERPL-MCNC: 0.8 MG/DL (ref 0.6–1.1)
DEPRECATED RDW RBC AUTO: 14.9 % (ref 12.4–15.4)
EOSINOPHIL # BLD: 0 K/UL (ref 0–0.6)
EOSINOPHIL NFR BLD: 0.1 %
EPI CELLS #/AREA URNS HPF: ABNORMAL /HPF (ref 0–5)
FLUAV RNA RESP QL NAA+PROBE: NOT DETECTED
FLUBV RNA RESP QL NAA+PROBE: NOT DETECTED
GFR SERPLBLD CREATININE-BSD FMLA CKD-EPI: 86 ML/MIN/{1.73_M2}
GLUCOSE SERPL-MCNC: 128 MG/DL (ref 70–99)
GLUCOSE UR STRIP.AUTO-MCNC: NEGATIVE MG/DL
HCT VFR BLD AUTO: 42.1 % (ref 36–48)
HGB BLD-MCNC: 14.3 G/DL (ref 12–16)
HGB UR QL STRIP.AUTO: ABNORMAL
KETONES UR STRIP.AUTO-MCNC: NEGATIVE MG/DL
LEUKOCYTE ESTERASE UR QL STRIP.AUTO: ABNORMAL
LYMPHOCYTES # BLD: 1.3 K/UL (ref 1–5.1)
LYMPHOCYTES NFR BLD: 15 %
MCH RBC QN AUTO: 30.3 PG (ref 26–34)
MCHC RBC AUTO-ENTMCNC: 33.8 G/DL (ref 31–36)
MCV RBC AUTO: 89.4 FL (ref 80–100)
MONOCYTES # BLD: 0.5 K/UL (ref 0–1.3)
MONOCYTES NFR BLD: 6.4 %
NEUTROPHILS # BLD: 6.7 K/UL (ref 1.7–7.7)
NEUTROPHILS NFR BLD: 78.3 %
NITRITE UR QL STRIP.AUTO: NEGATIVE
NT-PROBNP SERPL-MCNC: 229 PG/ML (ref 0–124)
PH UR STRIP.AUTO: 7 [PH] (ref 5–8)
PLATELET # BLD AUTO: 273 K/UL (ref 135–450)
PMV BLD AUTO: 8 FL (ref 5–10.5)
POTASSIUM SERPL-SCNC: 4.4 MMOL/L (ref 3.5–5.1)
PROT SERPL-MCNC: 7.2 G/DL (ref 6.4–8.2)
PROT UR STRIP.AUTO-MCNC: NEGATIVE MG/DL
RBC # BLD AUTO: 4.71 M/UL (ref 4–5.2)
RBC #/AREA URNS HPF: ABNORMAL /HPF (ref 0–4)
SARS-COV-2 RNA RESP QL NAA+PROBE: NOT DETECTED
SODIUM SERPL-SCNC: 142 MMOL/L (ref 136–145)
SP GR UR STRIP.AUTO: 1.02 (ref 1–1.03)
TROPONIN, HIGH SENSITIVITY: 10 NG/L (ref 0–14)
TROPONIN, HIGH SENSITIVITY: 12 NG/L (ref 0–14)
UA COMPLETE W REFLEX CULTURE PNL UR: ABNORMAL
UA DIPSTICK W REFLEX MICRO PNL UR: YES
URN SPEC COLLECT METH UR: ABNORMAL
UROBILINOGEN UR STRIP-ACNC: 2 E.U./DL
WBC # BLD AUTO: 8.5 K/UL (ref 4–11)
WBC #/AREA URNS HPF: ABNORMAL /HPF (ref 0–5)

## 2025-01-30 PROCEDURE — 93005 ELECTROCARDIOGRAM TRACING: CPT | Performed by: PHYSICIAN ASSISTANT

## 2025-01-30 PROCEDURE — 6370000000 HC RX 637 (ALT 250 FOR IP): Performed by: PHYSICIAN ASSISTANT

## 2025-01-30 PROCEDURE — 94640 AIRWAY INHALATION TREATMENT: CPT

## 2025-01-30 PROCEDURE — 99285 EMERGENCY DEPT VISIT HI MDM: CPT

## 2025-01-30 PROCEDURE — 87636 SARSCOV2 & INF A&B AMP PRB: CPT

## 2025-01-30 PROCEDURE — 81001 URINALYSIS AUTO W/SCOPE: CPT

## 2025-01-30 PROCEDURE — 83880 ASSAY OF NATRIURETIC PEPTIDE: CPT

## 2025-01-30 PROCEDURE — 71046 X-RAY EXAM CHEST 2 VIEWS: CPT

## 2025-01-30 PROCEDURE — 6360000004 HC RX CONTRAST MEDICATION: Performed by: PHYSICIAN ASSISTANT

## 2025-01-30 PROCEDURE — 84484 ASSAY OF TROPONIN QUANT: CPT

## 2025-01-30 PROCEDURE — 71260 CT THORAX DX C+: CPT

## 2025-01-30 PROCEDURE — 36415 COLL VENOUS BLD VENIPUNCTURE: CPT

## 2025-01-30 PROCEDURE — 85025 COMPLETE CBC W/AUTO DIFF WBC: CPT

## 2025-01-30 PROCEDURE — 80053 COMPREHEN METABOLIC PANEL: CPT

## 2025-01-30 RX ORDER — PREDNISONE 20 MG/1
40 TABLET ORAL ONCE
Status: COMPLETED | OUTPATIENT
Start: 2025-01-30 | End: 2025-01-30

## 2025-01-30 RX ORDER — PREDNISONE 20 MG/1
40 TABLET ORAL DAILY
Qty: 10 TABLET | Refills: 0 | Status: SHIPPED | OUTPATIENT
Start: 2025-01-30 | End: 2025-02-04

## 2025-01-30 RX ORDER — IOPAMIDOL 755 MG/ML
75 INJECTION, SOLUTION INTRAVASCULAR
Status: COMPLETED | OUTPATIENT
Start: 2025-01-30 | End: 2025-01-30

## 2025-01-30 RX ORDER — LEVOFLOXACIN 500 MG/1
750 TABLET, FILM COATED ORAL ONCE
Status: COMPLETED | OUTPATIENT
Start: 2025-01-30 | End: 2025-01-30

## 2025-01-30 RX ORDER — LEVOFLOXACIN 750 MG/1
750 TABLET, FILM COATED ORAL DAILY
Qty: 5 TABLET | Refills: 0 | Status: SHIPPED | OUTPATIENT
Start: 2025-01-30 | End: 2025-02-04

## 2025-01-30 RX ORDER — ALBUTEROL SULFATE 90 UG/1
2 INHALANT RESPIRATORY (INHALATION) EVERY 6 HOURS PRN
Qty: 18 G | Refills: 1 | Status: SHIPPED | OUTPATIENT
Start: 2025-01-30

## 2025-01-30 RX ORDER — IPRATROPIUM BROMIDE AND ALBUTEROL SULFATE 2.5; .5 MG/3ML; MG/3ML
1 SOLUTION RESPIRATORY (INHALATION) ONCE
Status: COMPLETED | OUTPATIENT
Start: 2025-01-30 | End: 2025-01-30

## 2025-01-30 RX ADMIN — PREDNISONE 40 MG: 20 TABLET ORAL at 22:46

## 2025-01-30 RX ADMIN — IPRATROPIUM BROMIDE AND ALBUTEROL SULFATE 1 DOSE: 2.5; .5 SOLUTION RESPIRATORY (INHALATION) at 20:57

## 2025-01-30 RX ADMIN — LEVOFLOXACIN 750 MG: 500 TABLET, FILM COATED ORAL at 22:46

## 2025-01-30 RX ADMIN — IOPAMIDOL 75 ML: 755 INJECTION, SOLUTION INTRAVENOUS at 22:10

## 2025-01-30 ASSESSMENT — LIFESTYLE VARIABLES
HOW MANY STANDARD DRINKS CONTAINING ALCOHOL DO YOU HAVE ON A TYPICAL DAY: PATIENT DOES NOT DRINK
HOW OFTEN DO YOU HAVE A DRINK CONTAINING ALCOHOL: NEVER

## 2025-01-30 ASSESSMENT — PAIN - FUNCTIONAL ASSESSMENT: PAIN_FUNCTIONAL_ASSESSMENT: NONE - DENIES PAIN

## 2025-01-31 LAB
EKG ATRIAL RATE: 97 BPM
EKG DIAGNOSIS: NORMAL
EKG P AXIS: 41 DEGREES
EKG P-R INTERVAL: 172 MS
EKG Q-T INTERVAL: 360 MS
EKG QRS DURATION: 88 MS
EKG QTC CALCULATION (BAZETT): 457 MS
EKG R AXIS: 28 DEGREES
EKG T AXIS: 50 DEGREES
EKG VENTRICULAR RATE: 97 BPM

## 2025-01-31 PROCEDURE — 93010 ELECTROCARDIOGRAM REPORT: CPT | Performed by: INTERNAL MEDICINE

## 2025-01-31 NOTE — ED PROVIDER NOTES
[facility] EMERGENCY DEPARTMENT  EMERGENCY DEPARTMENT ENCOUNTER        Pt Name: Kassy Sutton  MRN: [id number]  Birthdate 1965  Date of evaluation: 1/30/2025  Provider: JUSTIN CADE PA-C  PCP: Aminata Wright MD  ED Attending: MD Braeden      KAIT. I have evaluated this patient.      CHIEF COMPLAINT:     Chief Complaint   Patient presents with    Shortness of Breath     States SOB over past couple days, states stopped taking her water pill       HISTORY OF PRESENT ILLNESS:      History provided by the patient. No limitations.    Kassy Sutton is a 59 y.o. female who arrives to the ED by private vehicle patient is here complaining of increasing shortness of breath as well as wheezing for the last 1 week and particularly since yesterday.  She walks with the assistance of a walker when she gets up to walk around or try to do things, symptoms are worse.  The patient has an extensive past medical history with both COPD/restrictive airway disease and CHF.  She is oxygen to wear as needed.  She states she stopped taking her water pill about 6 months ago because it caused frequent urination and she \"could not go anywhere or get anything done\".  When her symptoms became worse yesterday she started taking it again and is taken a dose of both yesterday and today.  Patient denies chest pain.  She states she feels unwell and describes generalized fatigue.    Nursing Notes were reviewed     REVIEW OF SYSTEMS:     Review of Systems  Positives and pertinent negatives as per HPI.      PAST MEDICAL HISTORY:     Past Medical History:   Diagnosis Date    Anemia     Anxiety     CHF (congestive heart failure) (HCC)     Chronic obstructive pulmonary disease (HCC) 10/19/2022    Depression     Fibromyalgia     GERD (gastroesophageal reflux disease)     Heart palpitations     Hiatal hernia     Hypertension     Irritable bowel syndrome     Mild intermittent asthma with exacerbation     Osteoarthritis     Oxygen dependent     O2

## 2025-01-31 NOTE — ED NOTES
Assisted patient to wheelchair, able to transfer with no difficulty. Fetched by her sister. Left stable.

## 2025-01-31 NOTE — DISCHARGE INSTRUCTIONS
Take your diuretics as prescribed.    Take antibiotics until complete.    Take prednisone until complete.    Use inhaler as needed for shortness of breath or wheezing.    Use your home oxygen as needed.    Come back to the ED if your symptoms worsen.

## 2025-02-13 NOTE — PROGRESS NOTES
[facility]  Advanced CHF/Pulmonary Hypertension   Cardiac Evaluation      Kassy Sutton  YOB: 1965    Date of Visit:  2/18/25      Chief Complaint   Patient presents with    Follow-up    Congestive Heart Failure        History of Present Illness:  Kassy Sutton is a 59 y.o. female who presents for hospital follow up. She originally was referred from Dr Osborn for consultation and management of pulmonary HTN. She has a history of RV enlargement, CHF, palpitations, HTN and KRYSTIAN with CPAP. She had a stress test on 6/18/2020 which was abnormal. She underwent a right and left heart cath on 6/26/2020 which showed normal coronaries with elevated PCWP. Her echocardiogram from 6/26/2020 showed an EF of 55-60%. She reports she had pneumonia last year and has needed supplemental oxygen at night due to low O2 sats. She was a smoker in her teens. She has a history of palpitations and is on diltiazem for it. She reports she has palpitations almost daily but has improved with diltiazem. She started on lasix in July 2020. She reports improved breathing and edema on the lasix. She reports she has been watching her diet but her weight has not changed. She reports her home -150. She has been on lisinopril for some time. She is thinking of weight loss surgery. At her OV on 09/01/20 her lisinopril was increased 10 mg daily and started spironolactone 25 mg M,W,F,Sun.   OV, 5/10/2022, she states she hasn't been feeling great. She has been having weeping from her left leg. She has open blisters currently on her left shin. She states she has been working on trying to get it to heal but it continues to come back. She feels like only her left leg has been swollen. Patient has been taking her diuretics as prescribed. She states she tries to drink a lot of water during the day. She also has been having chest pain.  She feels it on the left side of her chest, described as a sharp stabbing pain. She gets the

## 2025-02-18 ENCOUNTER — OFFICE VISIT (OUTPATIENT)
Dept: CARDIOLOGY CLINIC | Age: 60
End: 2025-02-18

## 2025-02-18 VITALS
DIASTOLIC BLOOD PRESSURE: 70 MMHG | HEART RATE: 97 BPM | HEIGHT: 60 IN | SYSTOLIC BLOOD PRESSURE: 124 MMHG | WEIGHT: 293 LBS | BODY MASS INDEX: 57.52 KG/M2 | OXYGEN SATURATION: 90 %

## 2025-02-18 DIAGNOSIS — I50.32 CHRONIC DIASTOLIC HEART FAILURE (HCC): Primary | ICD-10-CM

## 2025-02-18 DIAGNOSIS — I27.20 PULMONARY HYPERTENSION (HCC): ICD-10-CM

## 2025-02-18 DIAGNOSIS — E66.01 CLASS 3 SEVERE OBESITY WITH SERIOUS COMORBIDITY AND BODY MASS INDEX (BMI) OF 50.0 TO 59.9 IN ADULT, UNSPECIFIED OBESITY TYPE: ICD-10-CM

## 2025-02-18 DIAGNOSIS — Z79.899 MEDICATION MANAGEMENT: ICD-10-CM

## 2025-02-18 DIAGNOSIS — I10 ESSENTIAL HYPERTENSION: ICD-10-CM

## 2025-02-18 DIAGNOSIS — G47.33 OBSTRUCTIVE SLEEP APNEA: ICD-10-CM

## 2025-02-18 DIAGNOSIS — E66.813 CLASS 3 SEVERE OBESITY WITH SERIOUS COMORBIDITY AND BODY MASS INDEX (BMI) OF 50.0 TO 59.9 IN ADULT, UNSPECIFIED OBESITY TYPE: ICD-10-CM

## 2025-02-18 RX ORDER — SPIRONOLACTONE 25 MG/1
TABLET ORAL
Qty: 90 TABLET | Refills: 3 | Status: SHIPPED | OUTPATIENT
Start: 2025-02-18

## 2025-02-18 RX ORDER — SACUBITRIL AND VALSARTAN 24; 26 MG/1; MG/1
1 TABLET, FILM COATED ORAL 2 TIMES DAILY
Qty: 180 TABLET | Refills: 3 | Status: SHIPPED | OUTPATIENT
Start: 2025-02-18

## 2025-02-18 RX ORDER — FUROSEMIDE 40 MG/1
TABLET ORAL
Qty: 180 TABLET | Refills: 3 | Status: SHIPPED
Start: 2025-02-18

## 2025-02-18 NOTE — PATIENT INSTRUCTIONS
Plan:  Ask your PCP about zepbound for weight loss given severe sleep apnea   Try benefiber for constipation  Ask your dermatologist about medication for your face. You can try hydrocortisone over the counter   Continue current cardiac medications  Labs: CBC, CMP, BNP  Follow up in 4 months

## 2025-02-23 PROBLEM — I50.32 CHRONIC DIASTOLIC HEART FAILURE (HCC): Status: ACTIVE | Noted: 2025-02-23

## 2025-03-06 ENCOUNTER — TELEPHONE (OUTPATIENT)
Dept: CARDIOLOGY CLINIC | Age: 60
End: 2025-03-06

## 2025-03-06 NOTE — TELEPHONE ENCOUNTER
Pt spoke to her PCP about zepbound for weight loss given severe sleep apnea and they are unable to get authorized by insurance company. Pt wanting to know if there is something KATRIN can do. Please advise.

## 2025-03-07 NOTE — TELEPHONE ENCOUNTER
Spoke with pt. Advised she could ask her PCP to send to [facility] compounding pharmacy for out of pocket options. She V/U

## 2025-05-08 ENCOUNTER — TELEPHONE (OUTPATIENT)
Dept: CARDIOLOGY CLINIC | Age: 60
End: 2025-05-08

## 2025-05-08 NOTE — TELEPHONE ENCOUNTER
Pt was referred to a vascular doctor by her PCP. That doctor is out in [place] and pt does not want to go that far, can KATRIN refer pt closer Vascular doctor. Pt having swelling in legs, blister popped on leg. Should pt see vascular first before seeing KATRIN again. Please advise.

## 2025-06-13 ENCOUNTER — HOSPITAL ENCOUNTER (OUTPATIENT)
Dept: LAB | Age: 60
Discharge: HOME | End: 2025-06-13
Payer: MEDICAID

## 2025-06-13 DIAGNOSIS — I50.32 CHRONIC DIASTOLIC HEART FAILURE (HCC): ICD-10-CM

## 2025-06-13 DIAGNOSIS — Z79.899 MEDICATION MANAGEMENT: ICD-10-CM

## 2025-06-13 LAB
ALBUMIN SERPL-MCNC: 4 G/DL (ref 3.4–5)
ALBUMIN/GLOB SERPL: 1.5 {RATIO} (ref 1.1–2.2)
ALP SERPL-CCNC: 108 U/L (ref 40–129)
ALT SERPL-CCNC: 22 U/L (ref 10–40)
ANION GAP SERPL CALCULATED.3IONS-SCNC: 11 MMOL/L (ref 3–16)
AST SERPL-CCNC: 20 U/L (ref 15–37)
BASOPHILS # BLD: 0 K/UL (ref 0–0.2)
BASOPHILS NFR BLD: 0.5 %
BILIRUB SERPL-MCNC: 0.5 MG/DL (ref 0–1)
BUN SERPL-MCNC: 18 MG/DL (ref 7–20)
CALCIUM SERPL-MCNC: 9.5 MG/DL (ref 8.3–10.6)
CHLORIDE SERPL-SCNC: 102 MMOL/L (ref 99–110)
CHOLEST SERPL-MCNC: 172 MG/DL (ref 0–199)
CO2 SERPL-SCNC: 28 MMOL/L (ref 21–32)
CREAT SERPL-MCNC: 0.7 MG/DL (ref 0.6–1.1)
DEPRECATED RDW RBC AUTO: 15.3 % (ref 12.4–15.4)
EOSINOPHIL # BLD: 0 K/UL (ref 0–0.6)
EOSINOPHIL NFR BLD: 0.1 %
GFR SERPLBLD CREATININE-BSD FMLA CKD-EPI: >90 ML/MIN/{1.73_M2}
GLUCOSE SERPL-MCNC: 122 MG/DL (ref 70–99)
HCT VFR BLD AUTO: 41.5 % (ref 36–48)
HDLC SERPL-MCNC: 41 MG/DL (ref 40–60)
HGB BLD-MCNC: 14.1 G/DL (ref 12–16)
LDLC SERPL CALC-MCNC: 94 MG/DL
LYMPHOCYTES # BLD: 1.2 K/UL (ref 1–5.1)
LYMPHOCYTES NFR BLD: 14.9 %
MCH RBC QN AUTO: 30.1 PG (ref 26–34)
MCHC RBC AUTO-ENTMCNC: 34 G/DL (ref 31–36)
MCV RBC AUTO: 88.5 FL (ref 80–100)
MONOCYTES # BLD: 0.5 K/UL (ref 0–1.3)
MONOCYTES NFR BLD: 6.4 %
NEUTROPHILS # BLD: 6.5 K/UL (ref 1.7–7.7)
NEUTROPHILS NFR BLD: 78.1 %
NT-PROBNP SERPL-MCNC: 60 PG/ML (ref 0–124)
PLATELET # BLD AUTO: 249 K/UL (ref 135–450)
PMV BLD AUTO: 9.9 FL (ref 5–10.5)
POTASSIUM SERPL-SCNC: 4.1 MMOL/L (ref 3.5–5.1)
PROT SERPL-MCNC: 6.6 G/DL (ref 6.4–8.2)
RBC # BLD AUTO: 4.69 M/UL (ref 4–5.2)
SODIUM SERPL-SCNC: 141 MMOL/L (ref 136–145)
TRIGL SERPL-MCNC: 187 MG/DL (ref 0–150)
VLDLC SERPL CALC-MCNC: 37 MG/DL
WBC # BLD AUTO: 8.3 K/UL (ref 4–11)

## 2025-06-13 PROCEDURE — 80053 COMPREHEN METABOLIC PANEL: CPT

## 2025-06-13 PROCEDURE — 85025 COMPLETE CBC W/AUTO DIFF WBC: CPT

## 2025-06-13 PROCEDURE — 36415 COLL VENOUS BLD VENIPUNCTURE: CPT

## 2025-06-13 PROCEDURE — 83880 ASSAY OF NATRIURETIC PEPTIDE: CPT

## 2025-06-13 PROCEDURE — 80061 LIPID PANEL: CPT

## 2025-06-18 ENCOUNTER — RESULTS FOLLOW-UP (OUTPATIENT)
Dept: CARDIOLOGY CLINIC | Age: 60
End: 2025-06-18

## 2025-09-02 ENCOUNTER — TELEPHONE (OUTPATIENT)
Dept: CARDIOLOGY CLINIC | Age: 60
End: 2025-09-02

## 2025-09-02 DIAGNOSIS — I27.20 PULMONARY HYPERTENSION (HCC): ICD-10-CM

## 2025-09-02 RX ORDER — FUROSEMIDE 40 MG/1
TABLET ORAL
Qty: 180 TABLET | Refills: 3 | Status: SHIPPED | OUTPATIENT
Start: 2025-09-02

## (undated) DEVICE — TRAP,MUCUS SPECIMEN, 80CC: Brand: MEDLINE

## (undated) DEVICE — SYRINGE MED 10ML SLIP TIP BLNT FILL AND LUERLOCK DISP

## (undated) DEVICE — Z DISCONTINUED USE 2276105 GOWN PROTCT UNIV CHST W28IN L49IN SL 24IN BLU SPUNBOND FLM

## (undated) DEVICE — BOWL MED M 16OZ PLAS CAP GRAD

## (undated) DEVICE — SYRINGE MED 50ML LUERSLIP TIP

## (undated) DEVICE — AIR/WATER CLEANING ADAPTER FOR OLYMPUS® GI ENDOSCOPE: Brand: BULLDOG®

## (undated) DEVICE — SINGLE USE BIOPSY VALVE MAJ-210: Brand: SINGLE USE BIOPSY VALVE (STERILE)

## (undated) DEVICE — ENDO CARRY-ON PROCEDURE KIT INCLUDES SUCTION TUBING, LUBRICANT, GAUZE, BIOHAZARD STICKER, TRANSPORT PAD AND INTERCEPT BEDSIDE KIT.: Brand: ENDO CARRY-ON PROCEDURE KIT

## (undated) DEVICE — CANNULA,OXY,ADULT,SUPERSOFT,W/7'TUB,SC: Brand: MEDLINE INDUSTRIES, INC.

## (undated) DEVICE — SINGLE USE SUCTION VALVE MAJ-209: Brand: SINGLE USE SUCTION VALVE (STERILE)

## (undated) DEVICE — TUBING, SUCTION, 3/16" X 12', STRAIGHT: Brand: MEDLINE

## (undated) DEVICE — LENS EYEGLASS LTWT REPL DISP SAFEVIEW

## (undated) DEVICE — TUBING, SUCTION, 3/16" X 6', STRAIGHT: Brand: MEDLINE

## (undated) DEVICE — LINER,SOFT,SUCTION CANISTER,1500CC: Brand: MEDLINE

## (undated) DEVICE — SOLUTION IV IRRIG WATER 500ML POUR BRL ST 2F7113

## (undated) DEVICE — ENDOSCOPIC KIT 6X3/16 FT COLON W/ 1.1 OZ 2 GWN W/O BRSH

## (undated) DEVICE — FORCEPS BX L240CM WRK CHN 2.8MM STD CAP W/ NDL MIC MESH

## (undated) DEVICE — CONMED SCOPE SAVER BITE BLOCK, 20X27 MM: Brand: SCOPE SAVER

## (undated) DEVICE — VALVE SUCTION AIR H2O SET ORCA POD + DISP

## (undated) DEVICE — BW-412T DISP COMBO CLEANING BRUSH: Brand: SINGLE USE COMBINATION CLEANING BRUSH

## (undated) DEVICE — ELECTRODE,RADIOTRANSLUCENT,FOAM,3PK: Brand: MEDLINE

## (undated) DEVICE — TRAP SPEC RETRV CLR PLAS POLYP IN LN SUCT QUIK CTCH

## (undated) DEVICE — MOUTHPIECE ENDOSCP L CTRL OPN AND SIDE PORTS DISP

## (undated) DEVICE — YANKAUER,BULB TIP,W/O VENT,RIGID,STERILE: Brand: MEDLINE

## (undated) DEVICE — SNARES COLD OVAL 10MM THIN